# Patient Record
Sex: MALE | Race: WHITE | Employment: OTHER | URBAN - METROPOLITAN AREA
[De-identification: names, ages, dates, MRNs, and addresses within clinical notes are randomized per-mention and may not be internally consistent; named-entity substitution may affect disease eponyms.]

---

## 2017-02-22 ENCOUNTER — APPOINTMENT (OUTPATIENT)
Dept: GENERAL RADIOLOGY | Age: 67
End: 2017-02-22
Attending: EMERGENCY MEDICINE
Payer: COMMERCIAL

## 2017-02-22 ENCOUNTER — HOSPITAL ENCOUNTER (EMERGENCY)
Age: 67
Discharge: HOME | End: 2017-02-22
Attending: EMERGENCY MEDICINE
Payer: COMMERCIAL

## 2017-02-22 VITALS
RESPIRATION RATE: 16 BRPM | HEIGHT: 71 IN | TEMPERATURE: 98.3 F | SYSTOLIC BLOOD PRESSURE: 145 MMHG | HEART RATE: 79 BPM | OXYGEN SATURATION: 97 % | WEIGHT: 219 LBS | DIASTOLIC BLOOD PRESSURE: 89 MMHG | BODY MASS INDEX: 30.66 KG/M2

## 2017-02-22 DIAGNOSIS — J06.9 ACUTE UPPER RESPIRATORY INFECTION: Primary | ICD-10-CM

## 2017-02-22 PROCEDURE — 99283 EMERGENCY DEPT VISIT LOW MDM: CPT

## 2017-02-22 PROCEDURE — 71020 XR CHEST PA LAT: CPT

## 2017-02-22 PROCEDURE — 93005 ELECTROCARDIOGRAM TRACING: CPT

## 2017-02-22 RX ORDER — CODEINE PHOSPHATE AND GUAIFENESIN 10; 100 MG/5ML; MG/5ML
10 SOLUTION ORAL
Qty: 118 ML | Refills: 0 | Status: SHIPPED | OUTPATIENT
Start: 2017-02-22 | End: 2017-03-01

## 2017-02-22 NOTE — ED PROVIDER NOTES
HPI Comments: 1:46 PM Isamar Cui is a 78 y.o. male who presents to the ED with complaints of CP for 3 days, which he only experiences when he coughs. The pt reports the nonproductive dry cough as an associated sx that began around the same time as the CP, as well as rhinorrhea and mild abdominal pain. He also reports chills, which began when he woke up this morning. The pt denies sick contacts, fever, allergies, tobacco use, and etoh use. The pt states he has been taking OTC medications, including Nyquil and Sudafed, which has not relieved his sxs. No further symptoms or complaints expressed at this time. PCP: Jassi Garza MD      The history is provided by the patient. No past medical history on file. No past surgical history on file. No family history on file. Social History     Social History    Marital status:      Spouse name: N/A    Number of children: N/A    Years of education: N/A     Occupational History    Not on file. Social History Main Topics    Smoking status: Never Smoker    Smokeless tobacco: Not on file    Alcohol use Yes    Drug use: Not on file    Sexual activity: Not on file     Other Topics Concern    Not on file     Social History Narrative         ALLERGIES: Review of patient's allergies indicates no known allergies. Review of Systems   Constitutional: Positive for chills. Negative for fever. HENT: Positive for rhinorrhea. Negative for sore throat. Eyes: Negative for redness and visual disturbance. Respiratory: Positive for cough. Negative for shortness of breath and wheezing. Cardiovascular: Negative for chest pain. Gastrointestinal: Positive for abdominal pain. Negative for nausea. Endocrine: Negative for polyuria. Genitourinary: Negative for dysuria. Musculoskeletal: Negative for arthralgias and neck stiffness. Skin: Negative for rash. Neurological: Negative for dizziness and headaches.    All other systems reviewed and are negative. Vitals:    02/22/17 1329   BP: 145/89   Pulse: 79   Resp: 16   Temp: 98.3 °F (36.8 °C)   SpO2: 97%   Weight: 99.3 kg (219 lb)   Height: 5' 11\" (1.803 m)            Physical Exam   Constitutional: He is oriented to person, place, and time. He appears well-developed and well-nourished. No distress. Active cough  Nontoxic appearing   HENT:   Head: Normocephalic and atraumatic. Nose: Rhinorrhea (Mild) present. Mouth/Throat: Oropharynx is clear and moist.   Eyes: Conjunctivae are normal. Pupils are equal, round, and reactive to light. No scleral icterus. Neck: Normal range of motion. Neck supple. Cardiovascular: Normal rate and intact distal pulses. Capillary refill < 3 seconds   Pulmonary/Chest: Effort normal and breath sounds normal. No respiratory distress. He has no wheezes. Abdominal: Soft. Bowel sounds are normal. He exhibits no distension. There is no tenderness. Musculoskeletal: Normal range of motion. He exhibits no edema. Lymphadenopathy:     He has no cervical adenopathy. Neurological: He is alert and oriented to person, place, and time. No cranial nerve deficit. Skin: Skin is warm and dry. He is not diaphoretic. Psychiatric: His behavior is normal.   Nursing note and vitals reviewed. MDM  Number of Diagnoses or Management Options  Acute upper respiratory infection:   Diagnosis management comments: ddx URI, bronchitis, PNA    I have reassessed the patient. I have discussed the workup, results and plan with the patient and patient is in agreement. Patient has no new complaint. Patient will be prescribed robitussin ac, saline nasal spray. Patient was discharge in stable condition. Patient was given outpatient follow up. Patient is to return to emergency department if any new or worsening condition.         Amount and/or Complexity of Data Reviewed  Tests in the radiology section of CPT®: ordered and reviewed    Risk of Complications, Morbidity, and/or Mortality  Presenting problems: low    Patient Progress  Patient progress: stable    ED Course       Procedures    PROGRESS NOTES  2:02 PM: Nuriarosalva Velazquez DO arrives to the bedside to evaluate the patient. Answered the patient's questions regarding the treatment plan. ED PHYSICIAN ORDERS  Orders Placed This Encounter    XR CHEST PA LAT     Standing Status:   Standing     Number of Occurrences:   1     Order Specific Question:   Transport     Answer:   Wheelchair [7]     Order Specific Question:   Reason for Exam     Answer:   cp    EKG, 12 LEAD, INITIAL     Standing Status:   Standing     Number of Occurrences:   1     Order Specific Question:   Reason for Exam:     Answer:   cp    guaiFENesin-codeine (ROBITUSSIN AC) 100-10 mg/5 mL solution     Sig: Take 10 mL by mouth three (3) times daily as needed for Cough or Congestion for up to 7 days. Max Daily Amount: 30 mL. Dispense:  118 mL     Refill:  0    sodium chloride (SALINE MIST) 0.65 % nasal spray     Si Sprays by Both Nostrils route every three (3) hours as needed for Congestion. Indications: Nasal Congestion     Dispense:  15 mL     Refill:  0         Vitals:  Patient Vitals for the past 12 hrs:   Temp Pulse Resp BP SpO2   17 1329 98.3 °F (36.8 °C) 79 16 145/89 97 %           RADIOLOGY INTERPRETATIONS  XR CHEST PA LAT        Dr. Aashish Kaufman Impression: No acute process. EKG interpretation by ED Dr. Aashish Kaufman  NSR rate of 82. Normal QRS. No ST elevation. No ST depression. ED DIAGNOSIS & DISPOSITION INFORMATION  Diagnosis:   1.  Acute upper respiratory infection          Disposition: DISCHARGED    Follow-up Information     Follow up With Details Comments Contact Info    Ajit Wang MD Schedule an appointment as soon as possible for a visit in 2 days  [address]  [address] [facility] EMERGENCY DEPT  As needed, If symptoms worsen [address] [place] [id number] [phone] Patient's Medications   Start Taking    GUAIFENESIN-CODEINE (ROBITUSSIN AC) 100-10 MG/5 ML SOLUTION    Take 10 mL by mouth three (3) times daily as needed for Cough or Congestion for up to 7 days. Max Daily Amount: 30 mL. SODIUM CHLORIDE (SALINE MIST) 0.65 % NASAL SPRAY    2 Sprays by Both Nostrils route every three (3) hours as needed for Congestion. Indications: Nasal Congestion   Continue Taking    ALLOPURINOL (ZYLOPRIM) 300 MG TABLET        BYSTOLIC 10 MG TABLET    take 1 tablet by mouth once daily    ESCITALOPRAM OXALATE (LEXAPRO) 10 MG TABLET        HYDROCHLOROTHIAZIDE (HYDRODIURIL) 12.5 MG TABLET        MELOXICAM (MOBIC) 15 MG TABLET        ZOLPIDEM (AMBIEN) 10 MG TABLET    take 1 tablet by mouth at bedtime if needed   These Medications have changed    No medications on file   Stop Taking    No medications on file       ATTESTATION STATEMENT  Scribe Attestation:     Jesús Roberto, scribing for and in the presence of Catina Porter DO February 22, 2017 at 2:02 PM   Signed by: Austin Roman February 22, 2017, 2:02 PM      Physician Attestation:   I personally performed the services described in this documentation, reviewed and edited the documentation which was dictated to the scribe in my presence, and it accurately records my words and actions.  Catina Porter DO February 22, 2017 at 2:02 PM

## 2017-02-22 NOTE — DISCHARGE INSTRUCTIONS

## 2017-02-24 LAB
ATRIAL RATE: 82 BPM
CALCULATED P AXIS, ECG09: 20 DEGREES
CALCULATED R AXIS, ECG10: -19 DEGREES
CALCULATED T AXIS, ECG11: 37 DEGREES
DIAGNOSIS, 93000: NORMAL
P-R INTERVAL, ECG05: 188 MS
Q-T INTERVAL, ECG07: 386 MS
QRS DURATION, ECG06: 84 MS
QTC CALCULATION (BEZET), ECG08: 450 MS
VENTRICULAR RATE, ECG03: 82 BPM

## 2018-01-22 ENCOUNTER — HOSPITAL ENCOUNTER (OUTPATIENT)
Dept: GENERAL RADIOLOGY | Age: 68
Discharge: HOME | End: 2018-01-22
Payer: COMMERCIAL

## 2018-01-22 DIAGNOSIS — M54.31 RIGHT SIDED SCIATICA: ICD-10-CM

## 2018-01-22 PROCEDURE — 72110 X-RAY EXAM L-2 SPINE 4/>VWS: CPT

## 2018-04-03 ENCOUNTER — OFFICE VISIT (OUTPATIENT)
Dept: ORTHOPEDIC SURGERY | Age: 68
End: 2018-04-03

## 2018-04-03 VITALS
HEART RATE: 72 BPM | OXYGEN SATURATION: 97 % | WEIGHT: 220 LBS | RESPIRATION RATE: 16 BRPM | DIASTOLIC BLOOD PRESSURE: 89 MMHG | TEMPERATURE: 97.6 F | SYSTOLIC BLOOD PRESSURE: 157 MMHG | HEIGHT: 71 IN | BODY MASS INDEX: 30.8 KG/M2

## 2018-04-03 DIAGNOSIS — M54.17 LUMBOSACRAL RADICULOPATHY AT L5: ICD-10-CM

## 2018-04-03 DIAGNOSIS — M79.2 NEURITIS: Primary | ICD-10-CM

## 2018-04-03 DIAGNOSIS — M51.36 DDD (DEGENERATIVE DISC DISEASE), LUMBAR: ICD-10-CM

## 2018-04-03 DIAGNOSIS — M62.838 MUSCLE SPASM: ICD-10-CM

## 2018-04-03 RX ORDER — GABAPENTIN 300 MG/1
CAPSULE ORAL
Qty: 60 CAP | Refills: 1 | Status: SHIPPED | OUTPATIENT
Start: 2018-04-03 | End: 2018-04-17 | Stop reason: SDUPTHER

## 2018-04-03 NOTE — PROGRESS NOTES
[facility]  [address] [place], [facility] [address]  Phone: [phone]  Fax: [phone]    NEW PATIENT  Pt's YOB: 1950    ASSESSMENT   Diagnoses and all orders for this visit:    1. Neuritis  -     gabapentin (NEURONTIN) 300 mg capsule; Take 1 in the evening for 1 week then increase to 2  Indications: NEUROPATHIC PAIN  -     MRI LUMB SPINE WO CONT; Future    2. DDD (degenerative disc disease), lumbar  -     gabapentin (NEURONTIN) 300 mg capsule; Take 1 in the evening for 1 week then increase to 2  Indications: NEUROPATHIC PAIN  -     MRI LUMB SPINE WO CONT; Future    3. Muscle spasm  -     MRI LUMB SPINE WO CONT; Future    4. Lumbosacral radiculopathy at L5  -     MRI LUMB SPINE WO CONT; Future         IMPRESSION AND PLAN:  Zackary Pugh is a 80 y.o. male with history of lumbar pain x 2-3.5 months. He complains of pain in the lower back that radiates down the right leg through the foot and 1st toe. Pt has not tried physical therapy and admits to weakness in the right leg. 1) Pt was given information on lumbar arthritis exercises. 2) Discussed treatment options with the Pt, including medication and physical therapy,   3) An open lumbar MRI was ordered. He has lumbar radiculopathy, weakness in the right leg, and has tried Mobic. 4) Pt was referred to lumbar physical therapy. 5) He was prescribed Neurontin 300 mg 2 tabs QHS, tapering up as directed. 6) Mr. Naila Izquierdo has a reminder for a \"due or due soon\" health maintenance. I have asked that he contact his primary care provider, Tresa Hernandez MD, for follow-up on this health maintenance. 7)  demonstrated consistency with prescribing. 8) Pt will follow-up in 2-3 weeks or sooner if needed. HISTORY OF PRESENT ILLNESS:  Zackary Pugh is a 80 y.o. male with history of lumbar pain x 2-3.5 months.  Pt presents to the office today as a new patient referred by  Isaac Marshall. He complains of pain in the lower back that radiates down the right leg through the foot and 1st toe. Pt admits to weakness in the right leg, balance issues, and occasionally feels unstable on his feet. He denies any issues when bending forward but admits to pain when walking for prolonged periods of time with his job. Of note, patient works at the [employer] as a  and will be retiring within the year. He admits that his pain interferes with his sleep and often has difficulty finding a comfortable position in bed. Pt states that he fell years ago when his blood sugars dropped and acquired a compression fracture. He was diagnosed with diabetes about 14 years ago and manages his blood sugars with diet. Pt admits that his blood sugars are now well controlled and since his fall he has not experienced any other episodes of hypoglycemia. He has not tried physical therapy and admits that he is physically active while at work. Pt denies any prior lumbar surgery and has tried Mobic. Pt at this time desires to proceed with a lumbar MRI, medication evaluation, and physical therapy. Pain Scale: 8/10     PCP: Yvrose Jose MD    Past Medical History:   Diagnosis Date    Arthritis     Ill-defined condition     GOUT        Social History     Social History    Marital status:      Spouse name: N/A    Number of children: N/A    Years of education: N/A     Occupational History    Not on file.      Social History Main Topics    Smoking status: Never Smoker    Smokeless tobacco: Never Used    Alcohol use 3.6 oz/week     6 Cans of beer per week      Comment: weekly on weekends    Drug use: Not on file    Sexual activity: Not on file     Other Topics Concern    Not on file     Social History Narrative       Current Outpatient Prescriptions   Medication Sig Dispense Refill    gabapentin (NEURONTIN) 300 mg capsule Take 1 in the evening for 1 week then increase to 2  Indications: NEUROPATHIC PAIN 60 Cap 1    BYSTOLIC 10 mg tablet take 1 tablet by mouth once daily  0    meloxicam (MOBIC) 15 mg tablet   0    allopurinol (ZYLOPRIM) 300 mg tablet   0    zolpidem (AMBIEN) 10 mg tablet take 1 tablet by mouth at bedtime if needed  0    sodium chloride (SALINE MIST) 0.65 % nasal spray 2 Sprays by Both Nostrils route every three (3) hours as needed for Congestion. Indications: Nasal Congestion 15 mL 0    hydroCHLOROthiazide (HYDRODIURIL) 12.5 mg tablet   0    escitalopram oxalate (LEXAPRO) 10 mg tablet   0       No Known Allergies    REVIEW OF SYSTEMS    Constitutional: Negative for fever, chills, or weight change. Respiratory: Negative for cough or shortness of breath. Cardiovascular: Negative for chest pain or palpitations. Gastrointestinal: Negative for acid reflux, change in bowel habits, or constipation. Genitourinary: Negative for dysuria and flank pain. Musculoskeletal: Positive for lumbar pain. Skin: Negative for rash. Neurological: Negative for headaches, dizziness, or numbness. Endo/Heme/Allergies: Negative for increased bruising. Psychiatric/Behavioral: Positive for difficulty with sleep. PHYSICAL EXAMINATION  Visit Vitals    /89 (BP 1 Location: Left arm, BP Patient Position: Sitting)    Pulse 72    Temp 97.6 °F (36.4 °C) (Oral)    Resp 16    Ht 5' 11\" (1.803 m)    Wt 220 lb (99.8 kg)    SpO2 97%    BMI 30.68 kg/m2       Constitutional: Awake, alert, and in no acute distress. HEENT: Normocephalic. Atraumatic. Oropharynx is moist and clear. PERRL. EOMI. Sclerae are nonicteric  Heart: Regular rate and rhythm  Lungs: Clear to auscultation bilaterally  Abdomen: Soft and nontender. Bowel sounds are present  Neurological: 1+ symmetrical DTRs in the upper extremities. 1+ symmetrical DTRs in the lower extremities. Sensation to light touch is intact. Negative Camden's sign bilaterally. Skin: warm, dry, and intact.    Musculoskeletal: Tenderness to palpation in the lower lumbar region. Moderate pain with extension and axial loading. Mild decreased range of motion with internal rotation of his left hip. Negative straight leg raise bilaterally. No difficulty with the single leg stand bilaterally. No difficulty with toe or heel walking. Biceps  Triceps Deltoids Wrist Ext Wrist Flex Hand Intrin   Right +4/5 +4/5 +4/5 +4/5 +4/5 +4/5   Left +4/5 +4/5 +4/5 +4/5 +4/5 +4/5      Hip Flex  Quads Hamstrings Ankle DF EHL Ankle PF   Right +4/5 +4/5  4/5  4/5 +4/5 +4/5   Left +4/5 +4/5 +4/5 +4/5 +4/5 +4/5     IMAGING:    Lumbar spine 4V x-rays from 01/22/2018 were personally reviewed with the patient and demonstrated:    Results from [place] encounter on 01/22/18   XR SPINE LUMB MIN 4 V   Narrative EXAM: X-ray of the Lumbar Spine. INDICATION: Back pain    TECHNIQUE: 5 views of the lumbar spine obtained. COMPARISON: None    FINDINGS: There are 5 lumbar-type vertebra. There is retrolisthesis of L1 on L2  and L2 on L3. Old wedging of L1 is noted. This is mild. No evidence of an acute  fracture. The facets are appropriately aligned. Moderate disc space height loss  is noted at every level with endplate sclerosis and osteophyte formation. Impression IMPRESSION:  1. No evidence of acute fracture. 2.  Multilevel degenerative disc disease. 3.  Old L1 wedging. Written by Edmundo Beltre, as dictated by Genevieve Siegel MD.  I, Dr. Genevieve Siegel confirm that all documentation is accurate.

## 2018-04-03 NOTE — MR AVS SNAPSHOT
[address] [place] [address] [address] [place] [address] 
[phone] Patient: Anu Scales MRN: [id number] NHT:[birth year] Visit Information Date & Time Provider Department Dept. Phone Encounter #  
 4/3/2018  9:00 AM Christine Garcia, [facility] [phone] [id number] Follow-up Instructions Return in about 2 weeks (around 4/17/2018) for Diagnostic Test follow up, Medication follow up. Upcoming Health Maintenance Date Due Hepatitis C Screening 1950 DTaP/Tdap/Td series (1 - Tdap) 2/3/1972 FOBT Q 1 YEAR AGE 50-75 2/3/2001 ZOSTER VACCINE AGE 60> 12/3/2010 GLAUCOMA SCREENING Q2Y 2/3/2016 Pneumococcal 65+ Low/Medium Risk (1 of 2 - PCV13) 2/3/2016 Influenza Age 5 to Adult 8/1/2017 MEDICARE YEARLY EXAM 3/20/2018 Allergies as of 4/3/2018  Review Complete On: 4/3/2018 By: Christine Garcia MD  
 No Known Allergies Current Immunizations  Never Reviewed No immunizations on file. Not reviewed this visit You Were Diagnosed With   
  
 Codes Comments Neuritis    -  Primary ICD-10-CM: M79.2 ICD-9-CM: 729.2 DDD (degenerative disc disease), lumbar     ICD-10-CM: M51.36 
ICD-9-CM: 722.52 Muscle spasm     ICD-10-CM: R60.145 ICD-9-CM: 728.85 Lumbosacral radiculopathy at L5     ICD-10-CM: M54.17 ICD-9-CM: 724.4 Vitals BP Pulse Temp Resp Height(growth percentile) Weight(growth percentile) 157/89 (BP 1 Location: Left arm, BP Patient Position: Sitting) 72 97.6 °F (36.4 °C) (Oral) 16 5' 11\" (1.803 m) 220 lb (99.8 kg) SpO2 BMI Smoking Status 97% 30.68 kg/m2 Never Smoker BMI and BSA Data Body Mass Index Body Surface Area  
 30.68 kg/m 2 2.24 m 2 Preferred Pharmacy Pharmacy Name Phone [id number] [address] [facility]. [phone] Your Updated Medication List  
  
 This list is accurate as of 4/3/18 10:25 AM.  Always use your most recent med list.  
  
  
  
  
 allopurinol 300 mg tablet Commonly known as:  ZYLOPRIM  
  
 BYSTOLIC 10 mg tablet Generic drug:  nebivolol  
take 1 tablet by mouth once daily  
  
 escitalopram oxalate 10 mg tablet Commonly known as:  LEXAPRO  
  
 gabapentin 300 mg capsule Commonly known as:  NEURONTIN Take 1 in the evening for 1 week then increase to 2  Indications: NEUROPATHIC PAIN  
  
 hydroCHLOROthiazide 12.5 mg tablet Commonly known as:  HYDRODIURIL  
  
 meloxicam 15 mg tablet Commonly known as:  MOBIC  
  
 sodium chloride 0.65 % nasal squeeze bottle Commonly known as:  SALINE MIST 2 Sprays by Both Nostrils route every three (3) hours as needed for Congestion. Indications: Nasal Congestion  
  
 zolpidem 10 mg tablet Commonly known as:  AMBIEN  
take 1 tablet by mouth at bedtime if needed Prescriptions Sent to Pharmacy Refills  
 gabapentin (NEURONTIN) 300 mg capsule 1 Sig: Take 1 in the evening for 1 week then increase to 2  Indications: NEUROPATHIC PAIN Class: Normal  
 Pharmacy: [facility] [address]. [facility], [address] [facility] #: [phone] Follow-up Instructions Return in about 2 weeks (around 4/17/2018) for Diagnostic Test follow up, Medication follow up. To-Do List   
 04/04/2018 8:30 PM  
  Appointment with [facility] MRI RM 1 at [facility] RAD MRI ([phone]) GENERAL INSTRUCTIONS  Bring information (ID card) if you have any medically implanted devices. You will be required to lie still while the procedure is being performed. Remove any jewelry (including body piercing, hairpins) prior to MRI. If you have had a creatinine level drawn within the past 30 days, please bring most recent results to your appt.   Bring any films, CD's, and reports related to your study with you on the day of your exam.  This only includes studies done outside of [facility], [facility] [place], [place], and [place]. Bring a complete list of all medications you are currently taking to include prescriptions, over-the-counter meds, herbals, vitamins & any dietary supplements. If you were given medications for claustrophobia or anxiety, please arrange to have someone drive you to your appointment. QUESTIONS  Notify the MRI Department if you have any questions concerning your study. Cielo Lang [facility] - [phone]  
  
 04/10/2018 Imaging:  MRI LUMB SPINE WO CONT Patient Instructions Low Back Arthritis: Exercises Your Care Instructions Here are some examples of typical rehabilitation exercises for your condition. Start each exercise slowly. Ease off the exercise if you start to have pain. Your doctor or physical therapist will tell you when you can start these exercises and which ones will work best for you. When you are not being active, find a comfortable position for rest. Some people are comfortable on the floor or a medium-firm bed with a small pillow under their head and another under their knees. Some people prefer to lie on their side with a pillow between their knees. Don't stay in one position for too long. Take short walks (10 to 20 minutes) every 2 to 3 hours. Avoid slopes, hills, and stairs until you feel better. Walk only distances you can manage without pain, especially leg pain. How to do the exercises Pelvic tilt 1. Lie on your back with your knees bent. 2. \"Brace\" your stomach-tighten your muscles by pulling in and imagining your belly button moving toward your spine. 3. Press your lower back into the floor. You should feel your hips and pelvis rock back. 4. Hold for 6 seconds while breathing smoothly. 5. Relax and allow your pelvis and hips to rock forward. 6. Repeat 8 to 12 times. Back stretches 1. Get down on your hands and knees on the floor. 2. Relax your head and allow it to droop. Round your back up toward the ceiling until you feel a nice stretch in your upper, middle, and lower back. Hold this stretch for as long as it feels comfortable, or about 15 to 30 seconds. 3. Return to the starting position with a flat back while you are on your hands and knees. 4. Let your back sway by pressing your stomach toward the floor. Lift your buttocks toward the ceiling. 5. Hold this position for 15 to 30 seconds. 6. Repeat 2 to 4 times. Follow-up care is a key part of your treatment and safety. Be sure to make and go to all appointments, and call your doctor if you are having problems. It's also a good idea to know your test results and keep a list of the medicines you take. Where can you learn more? Go to http://kaila-ynes.info/. Enter S203 in the search box to learn more about \"Low Back Arthritis: Exercises. \" Current as of: March 21, 2017 Content Version: 11.4 © 0802-5665 Healthwise, [employer]. Care instructions adapted under license by [employer] (which disclaims liability or warranty for this information). If you have questions about a medical condition or this instruction, always ask your healthcare professional. [address] any warranty or liability for your use of this information. Please provide this summary of care documentation to your next provider. Your primary care clinician is listed as [phone]. If you have any questions after today's visit, please call [phone].

## 2018-04-04 ENCOUNTER — HOSPITAL ENCOUNTER (OUTPATIENT)
Dept: MRI IMAGING | Age: 68
Discharge: HOME | End: 2018-04-04
Attending: PHYSICAL MEDICINE & REHABILITATION
Payer: COMMERCIAL

## 2018-04-04 DIAGNOSIS — M79.2 NEURITIS: ICD-10-CM

## 2018-04-04 DIAGNOSIS — M54.17 LUMBOSACRAL RADICULOPATHY AT L5: ICD-10-CM

## 2018-04-04 DIAGNOSIS — M51.36 DDD (DEGENERATIVE DISC DISEASE), LUMBAR: ICD-10-CM

## 2018-04-04 DIAGNOSIS — M62.838 MUSCLE SPASM: ICD-10-CM

## 2018-04-04 PROCEDURE — 72148 MRI LUMBAR SPINE W/O DYE: CPT

## 2018-04-05 ENCOUNTER — TELEPHONE (OUTPATIENT)
Dept: ORTHOPEDIC SURGERY | Age: 68
End: 2018-04-05

## 2018-04-05 NOTE — TELEPHONE ENCOUNTER
Patient called stating he is having severe pain spreading down his legs and he has been unable to go to work. He is wondering if he can get a work note for 04/03/18, 04/04/18, 04/05/8, and 04/06/18. He is requesting a call back regarding this at [phone].

## 2018-04-05 NOTE — TELEPHONE ENCOUNTER
Per Dr. Núñez Sheets, ok to give out of work note until his follow up on 04/17/18. Called patient to inform, Reached voicemail identified as \"Jamie\", left message, identified myself/facility/callback number, informed letter is ready for  at our [place] location, requested return call to facility with any further questions/concerns. No further action required at this time.

## 2018-04-05 NOTE — LETTER
NOTIFICATION RETURN TO WORK / SCHOOL 
 
4/5/2018 1:08 PM 
 
Mr. Yo Lam [address] [place] [id number] To Whom It May Concern: 
 
Yo Lam is currently under the care of [facility]. Mr. Antwan Gómez was seen in our office on 04/03/18. Mr. Antwan Gómez is to remain out of work beginning 04/03/18, until his follow up appointment on 04/17/18 at which time his work status will be re-evaluated. If there are questions or concerns please have the patient contact our office. Sincerely, Lacey Burris MD

## 2018-04-12 ENCOUNTER — TELEPHONE (OUTPATIENT)
Dept: ORTHOPEDIC SURGERY | Age: 68
End: 2018-04-12

## 2018-04-12 DIAGNOSIS — G89.29 OTHER CHRONIC PAIN: Primary | ICD-10-CM

## 2018-04-12 NOTE — TELEPHONE ENCOUNTER
Patient is requesting pain medication. He is having extreme back pain and radiating rt leg having difficulty walking. MRI done and his appt not till 04/17/2018. He works at [employer] and has to due awful lot of walking due to his job  Patient has no know allergies to any pain meds.   Please call him at [phone]

## 2018-04-12 NOTE — TELEPHONE ENCOUNTER
We can not prescribe pain medications at this time due to regulations. He was started on Gabapentin 300 mg 2 tabs QHS. If he is tolerating this dose, I would recommend adding 300 mg in the morning. He can also try OTC ES tylenol, heat and ice.

## 2018-04-12 NOTE — TELEPHONE ENCOUNTER
Per Dr. James Meza, ok to give Tylenol #3 1 PO BID #10/0RF as long as patient is not allergic to anything. Attempted to call patient to verify allergies. Reached voicemail identified as \"Jamie\", unable to leave message, voicemail box is full.

## 2018-04-13 RX ORDER — ACETAMINOPHEN AND CODEINE PHOSPHATE 300; 30 MG/1; MG/1
1 TABLET ORAL
Qty: 10 TAB | Refills: 0 | Status: ON HOLD | OUTPATIENT
Start: 2018-04-13 | End: 2018-04-25

## 2018-04-13 NOTE — TELEPHONE ENCOUNTER
Patient called back checking on status for medication. He states he does not have any allergies. He will be clearing out his mailbox so when the office calls back to let him know the medication has been sent he can answer or get VM.  Please contact patient at [phone]

## 2018-04-13 NOTE — TELEPHONE ENCOUNTER
Returned call to patient, verified , patient denies having any allergies nor any liver conditions. Patient prefers for med to be phoned into [facility] on [address] Patient aware this medication is a controlled substance. Patient verified next appointment date, and will bring disc to appointment to review MRI with Dr. Ramona Kim. Med phoned in to [address] [place] [address] as per patient request.    Yessenia Harrison left with patient's name, , medication dose/frequency/quantity. Provided Dr. Chavarria Credit name/SUNNY number/office phone number. Provided my name and title. No further action required at this time.

## 2018-04-17 ENCOUNTER — OFFICE VISIT (OUTPATIENT)
Dept: ORTHOPEDIC SURGERY | Age: 68
End: 2018-04-17

## 2018-04-17 VITALS
DIASTOLIC BLOOD PRESSURE: 80 MMHG | HEART RATE: 68 BPM | RESPIRATION RATE: 17 BRPM | SYSTOLIC BLOOD PRESSURE: 143 MMHG | TEMPERATURE: 97.6 F | WEIGHT: 220 LBS | HEIGHT: 71 IN | OXYGEN SATURATION: 98 % | BODY MASS INDEX: 30.8 KG/M2

## 2018-04-17 DIAGNOSIS — M51.26 HNP (HERNIATED NUCLEUS PULPOSUS), LUMBAR: Primary | ICD-10-CM

## 2018-04-17 DIAGNOSIS — M79.2 NEURITIS: ICD-10-CM

## 2018-04-17 DIAGNOSIS — M51.36 DDD (DEGENERATIVE DISC DISEASE), LUMBAR: ICD-10-CM

## 2018-04-17 DIAGNOSIS — R29.898 RIGHT LEG WEAKNESS: ICD-10-CM

## 2018-04-17 DIAGNOSIS — M54.50 LUMBAR PAIN: ICD-10-CM

## 2018-04-17 RX ORDER — GABAPENTIN 300 MG/1
CAPSULE ORAL
Qty: 120 CAP | Refills: 1 | Status: SHIPPED | OUTPATIENT
Start: 2018-04-17 | End: 2020-03-11

## 2018-04-17 RX ORDER — TRAMADOL HYDROCHLORIDE 50 MG/1
TABLET ORAL
Qty: 14 TAB | Refills: 0 | Status: SHIPPED | OUTPATIENT
Start: 2018-04-17 | End: 2020-03-11

## 2018-04-17 NOTE — PATIENT INSTRUCTIONS

## 2018-04-17 NOTE — LETTER
NOTIFICATION RETURN TO WORK / SCHOOL 
 
4/17/2018 10:30 AM 
 
Mr. Yo Lam [address] [place] [id number] To Whom It May Concern: 
 
Yo Lam is currently under the care of [facility]. He was seen in the office today and will return to work tomorrow, April 18, 2018. If there are questions or concerns please have the patient contact our office. Sincerely, Lacey Burris MD

## 2018-04-17 NOTE — PROGRESS NOTES
[facility]  [address] [place] [address], [facility] [address]  Phone: [phone]  Fax: [phone]    Pt's YOB: 1950    ASSESSMENT   Diagnoses and all orders for this visit:    1. HNP (herniated nucleus pulposus), lumbar  -     traMADol (ULTRAM) 50 mg tablet; 1 po bid as needed for severe pain  Indications: NEUROPATHIC PAIN, Pain  -     SCHEDULE SURGERY    2. Neuritis  -     gabapentin (NEURONTIN) 300 mg capsule; Take 1-2 in the morning and 2 in the evening  Indications: NEUROPATHIC PAIN  -     traMADol (ULTRAM) 50 mg tablet; 1 po bid as needed for severe pain  Indications: NEUROPATHIC PAIN, Pain  -     SCHEDULE SURGERY    3. Right leg weakness  -     SCHEDULE SURGERY    4. Lumbar pain    5. DDD (degenerative disc disease), lumbar  -     gabapentin (NEURONTIN) 300 mg capsule; Take 1-2 in the morning and 2 in the evening  Indications: NEUROPATHIC PAIN         IMPRESSION AND PLAN:  Shayna Thomson is a 80 y.o. male with history of lumbar pain with right radicular symptoms. He complains of pain in the lower back that extends down the right leg through the foot x 2.5 months. 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 1) Pt was given information on lumbar arthritis exercises. 2) Discussed treatment options with the Pt, including medication, physical therapy, steroid injections, and surgical intervention. 3) Pt was scheduled for a right L5 and right S1 SNRB. 4) He will increase his Neurontin 300 mg to 1-2 tabs QAM and 2 tabs QHS, tapering up as directed, to better magnae his right leg pain. 5) Pt was given an out of work note for today. 6) He was prescribed Ultram 50 mg 1 tab BID prn severe pain. 7) Mr. Rivera García has a reminder for a \"due or due soon\" health maintenance.  I have asked that he contact his primary care provider, Addison Cogan, MD, for follow-up on this health maintenance. 8)  demonstrated consistency with prescribing. 9) Pt will follow-up in 1 month or sooner if needed. HISTORY OF PRESENT ILLNESS:  Ted Barrera is a 80 y.o. male with history of lumbar pain with right radicular symptoms x 2.5 months. Pt presents to the office today for lumbar MRI follow up. He complains of pain in the lower back that extends down the right leg through the foot x 2.5 months. 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 He tried steroid injections 11-12 years ago with significant relief. Pt states he has been using Neurontin 300 mg and takes 1 tab QAM and 1 tab QHS with minimal relief. He admits that he wakes up at night due to pain. Pt takes Mobic 15 mg. He has not tried Ultram. Pt at this time desires to proceed with steroid injections and medication evaluation. Pain Scale: 6/10    PCP: Patricia Caro MD     Past Medical History:   Diagnosis Date    Arthritis     Ill-defined condition     GOUT        Social History     Social History    Marital status:      Spouse name: N/A    Number of children: N/A    Years of education: N/A     Occupational History    Not on file.      Social History Main Topics    Smoking status: Never Smoker    Smokeless tobacco: Never Used    Alcohol use 3.6 oz/week     6 Cans of beer per week      Comment: weekly on weekends    Drug use: Not on file    Sexual activity: Not on file     Other Topics Concern    Not on file     Social History Narrative       Current Outpatient Prescriptions   Medication Sig Dispense Refill    gabapentin (NEURONTIN) 300 mg capsule Take 1-2 in the morning and 2 in the evening  Indications: NEUROPATHIC PAIN 120 Cap 1    traMADol (ULTRAM) 50 mg tablet 1 po bid as needed for severe pain  Indications: NEUROPATHIC PAIN, Pain 14 Tab 0    sodium chloride (SALINE MIST) 0.65 % nasal spray 2 Sprays by Both Nostrils route every three (3) hours as needed for Congestion. Indications: Nasal Congestion 15 mL 0    BYSTOLIC 10 mg tablet take 1 tablet by mouth once daily  0    meloxicam (MOBIC) 15 mg tablet   0    hydroCHLOROthiazide (HYDRODIURIL) 12.5 mg tablet   0    escitalopram oxalate (LEXAPRO) 10 mg tablet   0    allopurinol (ZYLOPRIM) 300 mg tablet   0    zolpidem (AMBIEN) 10 mg tablet take 1 tablet by mouth at bedtime if needed  0    acetaminophen-codeine (TYLENOL-CODEINE #3) 300-30 mg per tablet Take 1 Tab by mouth two (2) times daily as needed for Pain. Max Daily Amount: 2 Tabs. 10 Tab 0       No Known Allergies      REVIEW OF SYSTEMS    Constitutional: Negative for fever, chills, or weight change. Respiratory: Negative for cough or shortness of breath. Cardiovascular: Negative for chest pain or palpitations. Gastrointestinal: Negative for acid reflux, change in bowel habits, or constipation. Genitourinary: Negative for dysuria and flank pain. Musculoskeletal: Positive for lumbar pain. Skin: Negative for rash. Neurological: Negative for headaches, dizziness, or numbness. Endo/Heme/Allergies: Negative for increased bruising. Psychiatric/Behavioral: Positive for difficulty with sleep. PHYSICAL EXAMINATION  Visit Vitals    /80    Pulse 68    Temp 97.6 °F (36.4 °C) (Oral)    Resp 17    Ht 5' 11\" (1.803 m)    Wt 220 lb (99.8 kg)    SpO2 98%    BMI 30.68 kg/m2       Constitutional: Awake, alert, and in no acute distress. Neurological: 1+ symmetrical DTRs in the lower extremities. Sensation to light touch is intact. Skin: warm, dry, and intact. Musculoskeletal: Tenderness to palpation in the lower lumbar region. Moderate pain with extension and axial loading. No pain with internal rotation of his hips. Negative straight leg raise bilaterally.      Hip Flex  Quads Hamstrings Ankle DF EHL Ankle PF   Right +4/5 +4/5 +4/5 +4/5 +4/5 +4/5   Left +4/5 +4/5 +4/5 +4/5 +4/5 +4/5     IMAGING:    Lumbar spine MRI from 04/04/2018 was personally reviewed with the patient and demonstrated:    Results from [place] encounter on 04/04/18   MRI LUMB SPINE WO CONT   Narrative MRI LUMBAR SPINE     CPT CODE: 46976    INDICATION: Chronic lower back pain. Right sided radiculopathy extending to the  foot. Degenerative discogenic disease. .    TECHNIQUE: MRI of the lumbar spine performed consisting of sagittal T1, T2 and  inversion recovery sequences with additional axial T2 sequence and oblique axial  T1 sequence. COMPARISON: Plain films 1/22/2018. FINDINGS:     Sagittal images show slight straightening of lumbar lordosis overall. There is  mild superior endplate compression of L1, stable from plain films, and without  edema to suggest this is acute. There is midline superior endplate impression  consistent with large Schmorl's node involving L4. Chronic degenerative signal  changes involving L3-L5 endplate margins. No vertebral body edema otherwise. Probable small hemangioma within inferior T12. There are confluent bridging  osteophytes between inferior T12 and superior L1 endplates anterior. Cauda equina tapers at superior L2. Correlation with axial images shows:    L1-2:  No significant canal narrowing. There is mild to moderate left-sided  foraminal encroachment due to disc bulge and some mild facet encroachment. L2-3:  Mild to moderate bilateral degenerative facet disease and mild posterior  ligamentous thickening. Mild diffuse disc bulge with effective central canal  narrowing mild in degree down to 9.2 mm. There is superimposed disc herniation into the left lateral recess and proximal  left-sided foramina. This contacts and distorts the thecal sac and results in  severe lateral recess stenosis. Disc herniation measures approximately 11 mm x 5  mm in size on sagittal images. Effective moderate to severe exit foramina  stenosis due to combined factors. Mild right foraminal narrowing due to facet  hypertrophy.     L3-4: Moderate bilateral degenerative facet hypertrophy and mild posterior  ligamentous thickening. Prominent epidural fat in volume as well. There is mild  asymmetric disc bulge. Effective central canal narrowing down to 7.4 mm midline  due to combined factors. There is moderate to significant left foraminal  stenosis due to disc bulge laterally, endplate ridging and facet hypertrophy. There is moderate narrowing due to similar factors on the right. L4-5:  Mild to moderate bilateral degenerative facet hypertrophy, right greater  than left with some posterior ligamentous thickening. There is mild endplate  ridging and mild disc bulge. Significant volume of fat within the canal  contributes to compression of the nerve roots to an effective 8mm AP diameter on  axial image 13. There is moderate to severe right-sided foraminal stenosis due  to endplate spur and facet encroachment. Moderate narrowing on the left due to  similar factors. L5-S1:  Moderate bilateral degenerative facet hypertrophy. There is an  asymmetric broad-based disc protrusion much more extensive from the midline  through the right lateral recess and extending into the right exit foramina. There is significant lateral recess narrowing and severe proximal right  foraminal stenosis due to combination of factors. There is moderate left  foraminal narrowing due to facet encroachment predominantly. No significant  central canal stenosis. Impression IMPRESSION:    1. Mild wedging L-1 without edema, compatible with congenital or remote chronic  findings.  -No vertebral body edema or compression deformity seen to suggest acute injury. 2. Severe right foraminal stenosis L5-S1 on a multifactorial basis as above. Moderate to significant narrowing on the left. 3. Severe left foraminal stenosis and lateral recess narrowing L2-3 due to focal  disc protrusion/herniation and associated degenerative change as above.     4. Effective up to moderate central canal narrowing on a multifactorial basis  throughout the lumbar spine as discussed above.  -Additional degrees of mild to significant neural foraminal stenosis on both  sides as outlined above. Written by Raven Hagen, as dictated by Nasreen Perez MD.  I, Dr. Nasreen Perez confirm that all documentation is accurate.

## 2018-04-17 NOTE — MR AVS SNAPSHOT
[address] [place] [address] [address] [place] [address] 
[phone] Patient: Dagmar Banks MRN: [id number] OCB:[id number]10 Visit Information Date & Time Provider Department Dept. Phone Encounter #  
 4/17/2018  9:50 AM Karli Weeks MD [facility] [phone] [id number] Upcoming Health Maintenance Date Due Hepatitis C Screening 1950 DTaP/Tdap/Td series (1 - Tdap) 2/3/1972 FOBT Q 1 YEAR AGE 50-75 2/3/2001 ZOSTER VACCINE AGE 60> 12/3/2010 GLAUCOMA SCREENING Q2Y 2/3/2016 Pneumococcal 65+ Low/Medium Risk (1 of 2 - PCV13) 2/3/2016 Influenza Age 5 to Adult 8/1/2017 MEDICARE YEARLY EXAM 3/20/2018 Allergies as of 4/17/2018  Review Complete On: 4/17/2018 By: Karli Weeks MD  
 No Known Allergies Current Immunizations  Never Reviewed No immunizations on file. Not reviewed this visit You Were Diagnosed With   
  
 Codes Comments HNP (herniated nucleus pulposus), lumbar    -  Primary ICD-10-CM: M51.26 
ICD-9-CM: 722.10 Neuritis     ICD-10-CM: M79.2 ICD-9-CM: 729.2 Right leg weakness     ICD-10-CM: R29.898 ICD-9-CM: 729.89 Lumbar pain     ICD-10-CM: M54.5 ICD-9-CM: 724.2 DDD (degenerative disc disease), lumbar     ICD-10-CM: M51.36 
ICD-9-CM: 722.52 Vitals BP Pulse Temp Resp Height(growth percentile) Weight(growth percentile) 143/80 68 97.6 °F (36.4 °C) (Oral) 17 5' 11\" (1.803 m) 220 lb (99.8 kg) SpO2 BMI Smoking Status 98% 30.68 kg/m2 Never Smoker Vitals History BMI and BSA Data Body Mass Index Body Surface Area  
 30.68 kg/m 2 2.24 m 2 Preferred Pharmacy Pharmacy Name Phone [id number] [place], [address] [facility] [phone] Your Updated Medication List  
  
   
This list is accurate as of 4/17/18 10:40 AM.  Always use your most recent med list.  
  
 acetaminophen-codeine 300-30 mg per tablet Commonly known as:  TYLENOL-CODEINE #3 Take 1 Tab by mouth two (2) times daily as needed for Pain. Max Daily Amount: 2 Tabs. allopurinol 300 mg tablet Commonly known as:  ZYLOPRIM  
  
 BYSTOLIC 10 mg tablet Generic drug:  nebivolol  
take 1 tablet by mouth once daily  
  
 escitalopram oxalate 10 mg tablet Commonly known as:  LEXAPRO  
  
 gabapentin 300 mg capsule Commonly known as:  NEURONTIN Take 1-2 in the morning and 2 in the evening  Indications: NEUROPATHIC PAIN  
  
 hydroCHLOROthiazide 12.5 mg tablet Commonly known as:  HYDRODIURIL  
  
 meloxicam 15 mg tablet Commonly known as:  MOBIC  
  
 sodium chloride 0.65 % nasal squeeze bottle Commonly known as:  SALINE MIST 2 Sprays by Both Nostrils route every three (3) hours as needed for Congestion. Indications: Nasal Congestion  
  
 traMADol 50 mg tablet Commonly known as:  ULTRAM  
1 po bid as needed for severe pain  Indications: NEUROPATHIC PAIN, Pain  
  
 zolpidem 10 mg tablet Commonly known as:  AMBIEN  
take 1 tablet by mouth at bedtime if needed Prescriptions Printed Refills  
 traMADol (ULTRAM) 50 mg tablet 0 Si po bid as needed for severe pain  Indications: NEUROPATHIC PAIN, Pain Class: Print Prescriptions Sent to Pharmacy Refills  
 gabapentin (NEURONTIN) 300 mg capsule 1 Sig: Take 1-2 in the morning and 2 in the evening  Indications: NEUROPATHIC PAIN Class: Normal  
 Pharmacy: [id number]. [facility] [address] [facility] #: [phone] We Performed the Following SCHEDULE SURGERY [ABG4484 Custom] Patient Instructions Low Back Arthritis: Exercises Your Care Instructions Here are some examples of typical rehabilitation exercises for your condition. Start each exercise slowly. Ease off the exercise if you start to have pain. Your doctor or physical therapist will tell you when you can start these exercises and which ones will work best for you. When you are not being active, find a comfortable position for rest. Some people are comfortable on the floor or a medium-firm bed with a small pillow under their head and another under their knees. Some people prefer to lie on their side with a pillow between their knees. Don't stay in one position for too long. Take short walks (10 to 20 minutes) every 2 to 3 hours. Avoid slopes, hills, and stairs until you feel better. Walk only distances you can manage without pain, especially leg pain. How to do the exercises Pelvic tilt 1. Lie on your back with your knees bent. 2. \"Brace\" your stomach-tighten your muscles by pulling in and imagining your belly button moving toward your spine. 3. Press your lower back into the floor. You should feel your hips and pelvis rock back. 4. Hold for 6 seconds while breathing smoothly. 5. Relax and allow your pelvis and hips to rock forward. 6. Repeat 8 to 12 times. Back stretches 1. Get down on your hands and knees on the floor. 2. Relax your head and allow it to droop. Round your back up toward the ceiling until you feel a nice stretch in your upper, middle, and lower back. Hold this stretch for as long as it feels comfortable, or about 15 to 30 seconds. 3. Return to the starting position with a flat back while you are on your hands and knees. 4. Let your back sway by pressing your stomach toward the floor. Lift your buttocks toward the ceiling. 5. Hold this position for 15 to 30 seconds. 6. Repeat 2 to 4 times. Follow-up care is a key part of your treatment and safety. Be sure to make and go to all appointments, and call your doctor if you are having problems. It's also a good idea to know your test results and keep a list of the medicines you take. Where can you learn more? Go to http://kaila-ynes.info/. Enter X401 in the search box to learn more about \"Low Back Arthritis: Exercises. \" Current as of: March 21, 2017 Content Version: 11.4 © 2755-2185 Healthwise, [employer]. Care instructions adapted under license by [employer] (which disclaims liability or warranty for this information). If you have questions about a medical condition or this instruction, always ask your healthcare professional. [place] [address] any warranty or liability for your use of this information. Please provide this summary of care documentation to your next provider. Your primary care clinician is listed as [phone]. If you have any questions after today's visit, please call [phone].

## 2018-04-25 ENCOUNTER — HOSPITAL ENCOUNTER (OUTPATIENT)
Age: 68
Setting detail: OUTPATIENT SURGERY
Discharge: HOME | End: 2018-04-25
Attending: PHYSICAL MEDICINE & REHABILITATION | Admitting: PHYSICAL MEDICINE & REHABILITATION
Payer: COMMERCIAL

## 2018-04-25 ENCOUNTER — APPOINTMENT (OUTPATIENT)
Dept: GENERAL RADIOLOGY | Age: 68
End: 2018-04-25
Attending: PHYSICAL MEDICINE & REHABILITATION
Payer: COMMERCIAL

## 2018-04-25 VITALS
HEIGHT: 71 IN | WEIGHT: 220 LBS | RESPIRATION RATE: 20 BRPM | DIASTOLIC BLOOD PRESSURE: 84 MMHG | SYSTOLIC BLOOD PRESSURE: 139 MMHG | HEART RATE: 70 BPM | OXYGEN SATURATION: 94 % | TEMPERATURE: 98.4 F | BODY MASS INDEX: 30.8 KG/M2

## 2018-04-25 PROBLEM — M51.26 HNP (HERNIATED NUCLEUS PULPOSUS), LUMBAR: Status: ACTIVE | Noted: 2018-04-25

## 2018-04-25 PROBLEM — M79.2 NEURITIS: Status: ACTIVE | Noted: 2018-04-25

## 2018-04-25 PROCEDURE — 77030003669 HC NDL SPN COOK -B: Performed by: PHYSICAL MEDICINE & REHABILITATION

## 2018-04-25 PROCEDURE — 74011636320 HC RX REV CODE- 636/320: Performed by: PHYSICAL MEDICINE & REHABILITATION

## 2018-04-25 PROCEDURE — 74011000250 HC RX REV CODE- 250

## 2018-04-25 PROCEDURE — 74011250636 HC RX REV CODE- 250/636: Performed by: PHYSICAL MEDICINE & REHABILITATION

## 2018-04-25 PROCEDURE — 74011636320 HC RX REV CODE- 636/320

## 2018-04-25 PROCEDURE — 76010000009 HC PAIN MGT 0 TO 30 MIN PROC: Performed by: PHYSICAL MEDICINE & REHABILITATION

## 2018-04-25 PROCEDURE — 74011250636 HC RX REV CODE- 250/636

## 2018-04-25 PROCEDURE — 74011250637 HC RX REV CODE- 250/637: Performed by: PHYSICAL MEDICINE & REHABILITATION

## 2018-04-25 RX ORDER — SODIUM CHLORIDE 0.9 % (FLUSH) 0.9 %
5-10 SYRINGE (ML) INJECTION AS NEEDED
Status: DISCONTINUED | OUTPATIENT
Start: 2018-04-25 | End: 2018-04-25 | Stop reason: HOSPADM

## 2018-04-25 RX ORDER — DIAZEPAM 5 MG/1
5-20 TABLET ORAL ONCE
Status: COMPLETED | OUTPATIENT
Start: 2018-04-25 | End: 2018-04-25

## 2018-04-25 RX ORDER — DEXAMETHASONE SODIUM PHOSPHATE 100 MG/10ML
INJECTION INTRAMUSCULAR; INTRAVENOUS AS NEEDED
Status: DISCONTINUED | OUTPATIENT
Start: 2018-04-25 | End: 2018-04-25 | Stop reason: HOSPADM

## 2018-04-25 RX ORDER — LIDOCAINE HYDROCHLORIDE 10 MG/ML
INJECTION, SOLUTION EPIDURAL; INFILTRATION; INTRACAUDAL; PERINEURAL AS NEEDED
Status: DISCONTINUED | OUTPATIENT
Start: 2018-04-25 | End: 2018-04-25 | Stop reason: HOSPADM

## 2018-04-25 RX ADMIN — DIAZEPAM 10 MG: 5 TABLET ORAL at 13:41

## 2018-04-25 NOTE — H&P
Date of Surgery Update:  Darren Chandler was seen and examined. History and physical has been reviewed. The patient has been examined. There have been no significant clinical changes since the last office visit.       Signed By: Jody Cardenas MD     April 25, 2018 1:00 PM

## 2018-04-25 NOTE — DISCHARGE INSTRUCTIONS
[address] [place] [address] for Pain Management      Post Procedures Instructions    *Resume Diet and Activity as tolerated. Rest for the remainder of the day. *You may fell worse before you feel better as the numbing medications wear off before the steroids take effect if used for your procedures. *Do not use affected extremity until numbness or loss of sensation has completely resolved without assistance. *DO NOT DRIVE, operate machinery/heavey equipment for 24 hours. *DO NOT DRINK ALCOHOL for 24 hours as it may interact with the sedation if you received it and also thins your blood and may cause you to bleed. *WAIT 24 hours before starting back ANY Blood thinning medications:   (Heparin, Coumadin, Warfarin, Lovenox, Plavix, Aggrenox)    *Resume Pre-Procedure Medications as prescribed except Blood Thinners unless directed by your Physician or Cardiologist.     *Avoid Hot tubs and Heating pad for 24 hours to prevent dissipation of medications, you may shower to remove bandages and remaining prep residue on the skin. * If you develop a Headache, drink plenty of fluids including beverages with caffeine (Coffee, Mt. Dew etc.) and rest.  If the headache persists longer than 24 hoursor intensifies - Please call [facility] ([facility]) [phone]    * If you are DIABETIC, check your blood sugar three times a day for the next three days, the steroids will increase your blood sugar. If your blood sugar is greater than 400 have someone drive you to the nearest [address]. * If you experience any of the following problems, call the [facility] [phone] between 8:00 am - 4:30pm or After Hours [phone].     Shortness of breath    Fever of 101 F or higher    Nausea / Vomiting (not normal to you)    Increasing stiffness in the neck    Weakness or numbness in the arms or legs that is not resolving    Prolonged and increasing pain > than 4 days    ANYTHING OUT of the ORDINARY TO YOU    If YOU are experiencing a severe reaction / complication that you have never had before post procedure, call 911 or go to the nearest emergency room! All patients must have a  for transportation [place] regardless if you do or do not receive sedation. DISCHARGE SUMMARY from Nurse      PATIENT INSTRUCTIONS:    After Oral  or intravenous sedation, for 24 hours or while taking prescription Narcotics:  · Limit your activities  · Do not drive and operate hazardous machinery  · Do not make important personal or business decisions  · Do  not drink alcoholic beverages  · If you have not urinated within 8 hours after discharge, please contact your surgeon on call. Report the following to your surgeon:  · Excessive pain, swelling, redness or odor of or around the surgical area  · Temperature over 101  · Nausea and vomiting lasting longer than 4 hours or if unable to take medications  · Any signs of decreased circulation or nerve impairment to extremity: change in color, persistent  numbness, tingling, coldness or increase pain  · Any questions        What to do at Home:  Recommended activity: Activity as tolerated, NO DRIVING FOR 24 Hours post injection          *  Please give a list of your current medications to your Primary Care Provider. *  Please update this list whenever your medications are discontinued, doses are      changed, or new medications (including over-the-counter products) are added. *  Please carry medication information at all times in case of emergency situations. These are general instructions for a healthy lifestyle:    No smoking/ No tobacco products/ Avoid exposure to second hand smoke    Surgeon General's Warning:  Quitting smoking now greatly reduces serious risk to your health.     Obesity, smoking, and sedentary lifestyle greatly increases your risk for illness    A healthy diet, regular physical exercise & weight monitoring are important for maintaining a healthy lifestyle    You may be retaining fluid if you have a history of heart failure or if you experience any of the following symptoms:  Weight gain of 3 pounds or more overnight or 5 pounds in a week, increased swelling in our hands or feet or shortness of breath while lying flat in bed. Please call your doctor as soon as you notice any of these symptoms; do not wait until your next office visit. Recognize signs and symptoms of STROKE:    F-face looks uneven    A-arms unable to move or move unevenly    S-speech slurred or non-existent    T-time-call 911 as soon as signs and symptoms begin-DO NOT go       Back to bed or wait to see if you get better-TIME IS BRAIN.

## 2018-04-25 NOTE — PROCEDURES
PROCEDURE NOTE      Patient Name: Mari Cam    Date of Procedure: April 25, 2018    Preoperative Diagnosis:  HNP (herniated nucleus pulposus), lumbar [M51.26]  Neuritis [M79.2]  Weakness of right leg [R29.898]    Post Operative Diagnosis:  HNP (herniated nucleus pulposus), lumbar [M51.26]  Neuritis [M79.2]  Weakness of right leg [R29.898]    Procedure :    right L5 Selective Nerve Root Block  right S1 Selective Nerve Root Block      Consent:  Informed consent was obtained prior to the procedure. The patient was given the opportunity to ask questions regarding the procedure and its associated risks. In addition to the potential risks associated with the procedure itself, the patient was informed both verbally and in writing of the potential side effects of the use of glucocorticoid. The patient appeared to comprehend the informed consent and desired to have the procedure performed. Procedure: The patient was placed in the prone position on the fluoroscopy table and the back was prepped and draped in the usual sterile manner. The exact spinal level was  identified using fluoroscopy, and Lidocaine 1 % was injected locally, a # 22 gauge spinal needle was passed to the transverse process. The depth was noted and the needle redirected to pass inferior and approximately one cm anterior to the transverse process. YES  1 cc of Isovue M-200 was used to verify positioning in the epidural and paravertebral space and outlined the course of the spinal nerve into the epidural space. The same procedure was repeated at each spinal level indicated above. A total of 30 mg of preservative free Dexamethasone and 5 cc of Lidocaine was slowly injected. The patient tolerated the procedure well. The injection area was cleaned and bandaids applied. Not excessive bleeding was noted. Patient dressed and discharged to home with instructions. Discussion: The patient tolerated the procedure well. Guzman Kat MD  April 25, 2018

## 2018-11-07 ENCOUNTER — HOSPITAL ENCOUNTER (OUTPATIENT)
Dept: ULTRASOUND IMAGING | Age: 68
Discharge: HOME | End: 2018-11-07
Attending: INTERNAL MEDICINE
Payer: COMMERCIAL

## 2018-11-07 DIAGNOSIS — R79.89 ABNORMAL LFTS: ICD-10-CM

## 2018-11-07 PROCEDURE — 76705 ECHO EXAM OF ABDOMEN: CPT

## 2018-11-20 ENCOUNTER — HOSPITAL ENCOUNTER (OUTPATIENT)
Dept: VASCULAR SURGERY | Age: 68
Discharge: HOME | End: 2018-11-20
Attending: FAMILY MEDICINE
Payer: COMMERCIAL

## 2018-11-20 DIAGNOSIS — R20.8 DECREASED SENSATION: ICD-10-CM

## 2018-11-20 PROCEDURE — 93923 UPR/LXTR ART STDY 3+ LVLS: CPT

## 2018-11-21 LAB
LEFT ABI: 1.32
LEFT ANTERIOR TIBIAL: 193 MMHG
LEFT ARM BP: 167 MMHG
LEFT CALF PRESSURE: 199 MMHG
LEFT POSTERIOR TIBIAL: 220 MMHG
LEFT TBI: 0.88
LEFT TOE PRESSURE: 147 MMHG
RIGHT ABI: 1.29
RIGHT ANTERIOR TIBIAL: 192 MMHG
RIGHT ARM BP: 164 MMHG
RIGHT CALF PRESSURE: 203 MMHG
RIGHT POSTERIOR TIBIAL: 216 MMHG
RIGHT TBI: 0.9
RIGHT TOE PRESSURE: 150 MMHG

## 2018-12-03 ENCOUNTER — DOCUMENTATION ONLY (OUTPATIENT)
Dept: NEUROLOGY | Age: 68
End: 2018-12-03

## 2018-12-03 NOTE — PROGRESS NOTES
Patient's chart reviewed for up coming appointment in our office.    Patient is New   And is being seen for: EMG testing d/t cold feet   BLE, ordered by Dr. Leilani Stanley, juve Frankel needed

## 2018-12-05 ENCOUNTER — OFFICE VISIT (OUTPATIENT)
Dept: NEUROLOGY | Age: 68
End: 2018-12-05

## 2018-12-05 VITALS — HEIGHT: 71 IN | BODY MASS INDEX: 30.68 KG/M2

## 2018-12-05 DIAGNOSIS — M79.2 NEURITIS: Primary | ICD-10-CM

## 2018-12-05 NOTE — LETTER
12/5/2018 10:00 AM 
 
Patient:  Aliyah Saab YOB: 1950 Date of Visit: 12/5/2018 Dear MD Patricia Sanders [address] [place] [id number] VIA Facsimile: [phone] 
 : Thank you for referring Mr. Idalia Canavan to me for evaluation/treatment. Below are the relevant portions of my assessment and plan of care. If you have questions, please do not hesitate to call me. I look forward to following Mr. Eden Perez along with you.  
 
 
 
Sincerely, 
 
 
Rashmi Drew MD

## 2018-12-05 NOTE — PROGRESS NOTES
Deloris Grijalva is a 80 y.o., right handed male, with an established history of hypertension but no diabetes who comes into this practice complaining of 2 years of pain in his feet starting in this feet working its way up to his knees. He says the right side is worse than the left side. It does not go above the knee. Says that it is an aching sensation starting from the arch of the foot. His feet are always cold. He denies any heavy alcohol use. He denies any history of diabetes. Social History; patient is single lives alone. No alcohol tobacco use. Works as a . Current Outpatient Medications   Medication Sig Dispense Refill    gabapentin (NEURONTIN) 300 mg capsule Take 1-2 in the morning and 2 in the evening  Indications: NEUROPATHIC PAIN 120 Cap 1    traMADol (ULTRAM) 50 mg tablet 1 po bid as needed for severe pain  Indications: NEUROPATHIC PAIN, Pain 14 Tab 0    sodium chloride (SALINE MIST) 0.65 % nasal spray 2 Sprays by Both Nostrils route every three (3) hours as needed for Congestion.  Indications: Nasal Congestion 15 mL 0    BYSTOLIC 10 mg tablet take 1 tablet by mouth once daily  0    meloxicam (MOBIC) 15 mg tablet   0    hydroCHLOROthiazide (HYDRODIURIL) 12.5 mg tablet   0    escitalopram oxalate (LEXAPRO) 10 mg tablet   0    allopurinol (ZYLOPRIM) 300 mg tablet   0    zolpidem (AMBIEN) 10 mg tablet take 1 tablet by mouth at bedtime if needed  0       Past Medical History:   Diagnosis Date    Arthritis     Ill-defined condition     GOUT       Past Surgical History:   Procedure Laterality Date    HX ORTHOPAEDIC      LEFT HAND       No Known Allergies    Patient Active Problem List   Diagnosis Code    HNP (herniated nucleus pulposus), lumbar M51.26    Neuritis M79.2         Review of Systems:   As above otherwise 11 point review of systems negative including;   Constitutional no fever or chills  Skin denies rash or itching  HENT  Denies tinnitus, hearing lose  Eyes denies diplopia vision lose  Respiratory denies shortness of breath  Cardiovascular denies chest pain, dyspnea on exertion  Gastrointestinal denies nausea, vomiting, diarrhea, constipation  Genitourinary denies incontinence  Musculoskeletal denies joint pain or swelling  Endocrine denies weight change  Hematology denies easy bruising or bleeding   Neurological as above in HPI      PHYSICAL EXAMINATION:      VITAL SIGNS:    Visit Vitals  Ht 5' 11\" (1.803 m)   BMI 30.68 kg/m²       GENERAL: The patient is well developed, well nourished, and in no apparent distress. EXTREMITIES: No clubbing, cyanosis, or edema is identified. Pulses 2+ and symmetrical.  Muscle tone is normal.  HEAD:   Ear, nose, and throat appear to be without trauma. The patient is normocephalic. NEUROLOGIC EXAMINATION    MENTAL STATUS: The patient is awake, alert, and oriented x 4. Fund of knowledge is adequate. Speech is fluent and memory appears to be intact, both long and short term. CRANIAL NERVES: II - Visual fields are full to confrontation. Funduscopic examination reveals flat disks bilaterally. Pupils are both 4 mm and briskly reactive to light and accommodation. III, IV, VI - Extraocular movements are intact and there is no nystagmus. V - Facial sensation is intact to pinprick and light touch. VII - Face is symmetrical.   VIII - Hearing is present. IX, X, XII- Palate rises symmetrically. Gag is present. Tongue is in the midline. XI - Shoulder shrugging and head turning intact  MOTOR:  The patient is 5/5 in all four limbs without any drift. Fine finger movements are symmetrical.  Isolated motor group testing reveals no focal abnormalities. Tone is normal.  Sensory examination is intact to pinprick, light touch and position sense testing. Reflexes are 2+ and symmetrical. Plantars are down going. Cerebellar examination reveals no gross ataxia or dysmetria.  Gait is normal.      [facility]  OFFICE PROCEDURE PROGRESS NOTE        Chart reviewed for the following:   Moshe Morse MD, have reviewed the History, Physical and updated the Allergic reactions for [address] performed immediately prior to start of procedure:   Mohse Morse MD, have performed the following reviews on Clearnce Ramus prior to the start of the procedure:            * Patient was identified by name and date of birth   * Agreement on procedure being performed was verified  * Risks and Benefits explained to the patient  * Procedure site verified and marked as necessary  * Patient was positioned for comfort  * Consent was signed and verified     Time: 9:56 AM    Date of procedure: 12/5/2018    Procedure performed by:  Cielo Horvath MD    Provider assisted by: Chloé Adames MA    Patient assisted by: self    How tolerated by patient: tolerated the procedure well with no complications    Post Procedural Pain Scale: 0 - No Hurt    Comments: none      [email or web address]  [facility] [address], [address]  [phone]      [facility]    Neurophysiology Report      Patient: Brent Garcia     ID: [id number] Physician: Gertrudis Torres.  Christine Alan MD   Gender: Male Ref Phys: Ruddy Councilman, MD    Handedness: Chloé Adames     Study Date: December 5, 2018       Nerve Conduction Studies  Anti Sensory Summary Table     Stim Site NR Peak (ms) Norm Peak (ms) O-P Amp (µV) Norm O-P Amp Dist (cm) Jah (m/s)   Left Sural Anti Sensory (Ankle)   Calf    4.4  6.5 >5 14.0 42.4   Site 2    3.9  1.6      Right Sural Anti Sensory (Ankle)   Calf    3.8  2.0 >5 14.0 43.8   Site 2    3.6  1.8        Motor Summary Table     Stim Site NR Onset (ms) Norm Onset (ms) O-P Amp (mV) Norm O-P Amp Dist (cm) Jah (m/s) Norm Jah (m/s)   Left Peroneal Motor (EDB)   Ankle    4.2 <6.5 2.3 >2.0 38.0 45.2 >44   Fibula (Head)    12.6  2.3  10.0 66.7    Pop Fossa    14.1  2.8       Right Peroneal Motor (EDB) Ankle    4.3 <6.5 1.3 >2.0 38.0 40.9 >44   Fibula (Head)    13.6  1.2  10.0 45.5    Pop Fossa    15.8  1.0       Left Tibial Motor (Abd Palm Brev)   Ankle    5.2 <5.8 3.1 >4.0 47.0 41.2 >41   Knee    16.6  1.9       Right Tibial Motor (Abd Palm Brev)   Ankle    7.0 <5.8 1.1 >4.0 30.0 25.2 >41   Knee    18.9  0.8           EMG     Side Muscle Nerve Root Ins Act Fibs Psw Fasc Amp Dur Poly Recrt Int Alric Pernell Comment   Right AntTibialis Dp Br Fibular L4-5 Nml Nml Nml None Nml Nml 0 Nml Nml    Right MedGastroc   Nml Nml Nml None Nml Nml 0 Nml Nml    Right ExtHallLong Dp Br Fibular L5, S1 Nml Nml Nml None Nml Nml 0 Nml Nml    Right BicepsFemS Sciatic L5-S1 Nml Nml Nml None Nml Nml 0 Nml Nml    Right VastusMed Femoral L2-4 Nml Nml Nml None Nml Nml 0 Nml Nml    Right Lumbo Parasp Up Rami L1-2 Nml Nml Nml          Right Lumbo Parasp Mid Rami L3-4 Nml Nml Nml          Right Lumbo Parasp Low Rami L5-S1 Nml Nml Nml            NCS/EMG FINDINGS:     Evaluation of the Left peroneal motor and the Left sural sensory nerves were unremarkable.  The Right peroneal motor nerve showed reduced amplitude (1.3 mV) and decreased conduction velocity (Fibula (Head)-Ankle, 40.9 m/s).  The Left tibial motor and the Right sural sensory nerves showed reduced amplitude (L3.1, R2.0 µV).  The Right tibial motor nerve showed prolonged distal onset latency (7.0 ms), reduced amplitude (1.1 mV), and decreased conduction velocity (Knee-Ankle, 25.2 m/s). INTERPRETATION:       ___________________________  Forest Casarez.  Ashley Miller MD          Waveforms:                     CBC:   Lab Results   Component Value Date/Time    WBC 10.9 08/18/2010 12:06 AM    RBC 4.75 08/18/2010 12:06 AM    HGB 15.6 08/18/2010 12:06 AM    HCT 44.7 08/18/2010 12:06 AM    PLATELET 252 56/32/4914 12:06 AM     BMP:   Lab Results   Component Value Date/Time    Glucose 122 (H) 08/18/2010 12:06 AM    Sodium 136 08/18/2010 12:06 AM    Potassium 3.3 (L) 08/18/2010 12:06 AM    Chloride 101 08/18/2010 12:06 AM    CO2 22 08/18/2010 12:06 AM    BUN 20 (H) 08/18/2010 12:06 AM    Creatinine 1.6 (H) 08/18/2010 12:06 AM    Calcium 8.4 08/18/2010 12:06 AM     CMP:   Lab Results   Component Value Date/Time    Glucose 122 (H) 08/18/2010 12:06 AM    Sodium 136 08/18/2010 12:06 AM    Potassium 3.3 (L) 08/18/2010 12:06 AM    Chloride 101 08/18/2010 12:06 AM    CO2 22 08/18/2010 12:06 AM    BUN 20 (H) 08/18/2010 12:06 AM    Creatinine 1.6 (H) 08/18/2010 12:06 AM    Calcium 8.4 08/18/2010 12:06 AM    Anion gap 13 08/18/2010 12:06 AM    BUN/Creatinine ratio 13 08/18/2010 12:06 AM     Coagulation: No results found for: PTP, INR, APTT, PTTT       Impression: This was an abnormal nerve conduction and EMG study showing signs of a diffuse sensory worse than motor neuropathy of both lower extremities. This would be supportive of the diagnosis of a peripheral neuropathy. Plan: Appropriate workup should be taken in this gentleman looking for causes of neuropathy which might include hypothyroidism, vitamin deficiency, exposure to hydrocarbons, alcohol usage, diabetes etc.    PLEASE NOTE:   This document has been produced using voice recognition software. Unrecognized errors in transcription may be present.

## 2019-01-17 ENCOUNTER — DOCUMENTATION ONLY (OUTPATIENT)
Dept: NEUROLOGY | Age: 69
End: 2019-01-17

## 2019-01-17 NOTE — PROGRESS NOTES
Chart review reveals scheduled new patient visit to discuss neuritis. patient previously seen for EMG.

## 2019-01-21 ENCOUNTER — OFFICE VISIT (OUTPATIENT)
Dept: NEUROLOGY | Age: 69
End: 2019-01-21

## 2019-01-21 VITALS
SYSTOLIC BLOOD PRESSURE: 180 MMHG | WEIGHT: 227.8 LBS | DIASTOLIC BLOOD PRESSURE: 84 MMHG | HEART RATE: 54 BPM | TEMPERATURE: 97.6 F | HEIGHT: 71 IN | BODY MASS INDEX: 31.89 KG/M2 | RESPIRATION RATE: 22 BRPM | OXYGEN SATURATION: 98 %

## 2019-01-21 DIAGNOSIS — M79.2 NEURITIS: Primary | ICD-10-CM

## 2019-01-21 RX ORDER — DULOXETIN HYDROCHLORIDE 60 MG/1
60 CAPSULE, DELAYED RELEASE ORAL DAILY
Qty: 30 CAP | Refills: 5 | Status: SHIPPED | OUTPATIENT
Start: 2019-01-21 | End: 2019-05-20 | Stop reason: SDUPTHER

## 2019-01-21 RX ORDER — LISINOPRIL 10 MG/1
TABLET ORAL
Refills: 0 | COMMUNITY
Start: 2019-01-06 | End: 2020-03-11

## 2019-01-21 NOTE — PROGRESS NOTES
Rishi Dave is a 80 y.o. male new patient in today to discuss bilateral leg pain 9/10; referred by Fito Garcia MD. Patient previously seen for EMG.

## 2019-01-21 NOTE — PROGRESS NOTES
Yo Lam is a 80 y.o., right handed male, with an established history of hypertnesion, no diabetes, who comes in complaining of pain in the legs. He states that his feet feel cold, like he's walking in snow. They tingle and are numb in various locations. This seemed to have started about 1 year ago and has gradually worsened. He has fallen twice for no particular reason. He just lost his balance and went down. There's no trouble with the hands. No tingling of the fingers. He states that the legs are burning up to the thighs bilaterally. He just has tingling in the feet. There's some back pain, but nothing dramatic. He's drank heavily in the past.      Social History;  20 years ago, single now after the loss of his second wife. He denies tobacco use. He drinks on the weekend, usually a 6 pack only. 20 years ago when going through his divorce, he was drinking up to 20 beers per night. Family History; mother  from cancer. Father  from drinking. Sibling in good health except has hypertension. Current Outpatient Medications   Medication Sig Dispense Refill    lisinopril (PRINIVIL, ZESTRIL) 10 mg tablet   0    gabapentin (NEURONTIN) 300 mg capsule Take 1-2 in the morning and 2 in the evening  Indications: NEUROPATHIC PAIN 120 Cap 1    traMADol (ULTRAM) 50 mg tablet 1 po bid as needed for severe pain  Indications: NEUROPATHIC PAIN, Pain 14 Tab 0    BYSTOLIC 10 mg tablet take 1 tablet by mouth once daily  0    meloxicam (MOBIC) 15 mg tablet   0    allopurinol (ZYLOPRIM) 300 mg tablet   0    sodium chloride (SALINE MIST) 0.65 % nasal spray 2 Sprays by Both Nostrils route every three (3) hours as needed for Congestion.  Indications: Nasal Congestion 15 mL 0    hydroCHLOROthiazide (HYDRODIURIL) 12.5 mg tablet   0    escitalopram oxalate (LEXAPRO) 10 mg tablet   0    zolpidem (AMBIEN) 10 mg tablet take 1 tablet by mouth at bedtime if needed  0       Past Medical History:   Diagnosis Date    Arthritis     Ill-defined condition     GOUT       Past Surgical History:   Procedure Laterality Date    HX ORTHOPAEDIC      LEFT HAND       No Known Allergies    Patient Active Problem List   Diagnosis Code    HNP (herniated nucleus pulposus), lumbar M51.26    Neuritis M79.2         Review of Systems:   As above otherwise 11 point review of systems negative including;   Constitutional no fever or chills  Skin denies rash or itching  HENT  Denies tinnitus, hearing lose  Eyes denies diplopia vision lose  Respiratory denies shortness of breath  Cardiovascular denies chest pain, dyspnea on exertion  Gastrointestinal denies nausea, vomiting, diarrhea, constipation  Genitourinary denies incontinence  Musculoskeletal denies joint pain or swelling  Endocrine denies weight change  Hematology denies easy bruising or bleeding   Neurological as above in HPI      PHYSICAL EXAMINATION:      VITAL SIGNS:    Visit Vitals  /84 (BP 1 Location: Left arm, BP Patient Position: Sitting)   Pulse (!) 54   Temp 97.6 °F (36.4 °C) (Oral)   Resp 22   Ht 5' 11\" (1.803 m)   Wt 103.3 kg (227 lb 12.8 oz)   SpO2 98%   BMI 31.77 kg/m²       GENERAL: The patient is well developed, well nourished, and in no apparent distress. EXTREMITIES: No clubbing, cyanosis, or edema is identified. Pulses 2+ and symmetrical.  Muscle tone is normal.  HEAD:   Ear, nose, and throat appear to be without trauma. The patient is normocephalic. NEUROLOGIC EXAMINATION    MENTAL STATUS: The patient is awake, alert, and oriented x 4. Fund of knowledge is adequate. Speech is fluent and memory appears to be intact, both long and short term. CRANIAL NERVES: II - Visual fields are full to confrontation. Funduscopic examination reveals flat disks bilaterally. Pupils are both 3 mm and briskly reactive to light and accommodation. III, IV, VI - Extraocular movements are intact and there is no nystagmus.    V - Facial sensation is intact to pinprick and light touch. VII - Face is symmetrical.   VIII - Hearing is present. IX, X, XII- Palate rises symmetrically. Gag is present. Tongue is in the midline. XI - Shoulder shrugging and head turning intact  MOTOR:  The patient is 5/5 in all four limbs without any drift. Fine finger movements are symmetrical.  Isolated motor group testing reveals no focal abnormalities. Tone is normal.  Sensory examination decrease to pinprick, light touch to the ankle bilaterally. Reflexes are trace and symmetrical. Plantars are down going. Cerebellar examination reveals no gross ataxia or dysmetria. Gait is normal and the patient can tandem walk without any difficulty. CBC:   Lab Results   Component Value Date/Time    WBC 10.9 08/18/2010 12:06 AM    RBC 4.75 08/18/2010 12:06 AM    HGB 15.6 08/18/2010 12:06 AM    HCT 44.7 08/18/2010 12:06 AM    PLATELET 529 48/02/8353 12:06 AM     BMP:   Lab Results   Component Value Date/Time    Glucose 122 (H) 08/18/2010 12:06 AM    Sodium 136 08/18/2010 12:06 AM    Potassium 3.3 (L) 08/18/2010 12:06 AM    Chloride 101 08/18/2010 12:06 AM    CO2 22 08/18/2010 12:06 AM    BUN 20 (H) 08/18/2010 12:06 AM    Creatinine 1.6 (H) 08/18/2010 12:06 AM    Calcium 8.4 08/18/2010 12:06 AM     CMP:   Lab Results   Component Value Date/Time    Glucose 122 (H) 08/18/2010 12:06 AM    Sodium 136 08/18/2010 12:06 AM    Potassium 3.3 (L) 08/18/2010 12:06 AM    Chloride 101 08/18/2010 12:06 AM    CO2 22 08/18/2010 12:06 AM    BUN 20 (H) 08/18/2010 12:06 AM    Creatinine 1.6 (H) 08/18/2010 12:06 AM    Calcium 8.4 08/18/2010 12:06 AM    Anion gap 13 08/18/2010 12:06 AM    BUN/Creatinine ratio 13 08/18/2010 12:06 AM     Coagulation: No results found for: PTP, INR, APTT, PTTT  Cardiac markers: No results found for: CPK, CKND1, MARLY       Impression: What appears to be a peripheral neuropathy in this gentleman who has risk factors including none identified.   He has a painful neuropathy of both lower extremities with no particular reason. Is drinking in the past may have been an additive effect, but he really has not been drinking that heavy in the last several years. Plan: Need to review his old blood workup is done by his primary doctor. And then to change him, or at least add Cymbalta at this time to try to help with the painful neuropathic pain he is having. Return visit here in a couple of weeks. PLEASE NOTE:   This document has been produced using voice recognition software. Unrecognized errors in transcription may be present.

## 2019-01-21 NOTE — LETTER
1/21/2019 1:23 PM 
 
Patient:  Chio Keiht YOB: 1950 Date of Visit: 1/21/2019 Dear MD Patricia Muñoz [address] [place] [address] VIA Facsimile: [phone] 
 : Thank you for referring Mr. Marielle Cote to me for evaluation/treatment. Below are the relevant portions of my assessment and plan of care. If you have questions, please do not hesitate to call me. I look forward to following Mr. Dinorah Mcgee along with you.  
 
 
 
Sincerely, 
 
 
Jeannie Arias MD

## 2019-02-15 ENCOUNTER — DOCUMENTATION ONLY (OUTPATIENT)
Dept: NEUROLOGY | Age: 69
End: 2019-02-15

## 2019-02-21 ENCOUNTER — OFFICE VISIT (OUTPATIENT)
Dept: NEUROLOGY | Age: 69
End: 2019-02-21

## 2019-02-21 VITALS
BODY MASS INDEX: 30.88 KG/M2 | HEIGHT: 71 IN | OXYGEN SATURATION: 96 % | DIASTOLIC BLOOD PRESSURE: 68 MMHG | SYSTOLIC BLOOD PRESSURE: 134 MMHG | RESPIRATION RATE: 22 BRPM | WEIGHT: 220.6 LBS | TEMPERATURE: 97.8 F

## 2019-02-21 DIAGNOSIS — M79.2 NEURITIS: Primary | ICD-10-CM

## 2019-02-21 NOTE — PROGRESS NOTES
Morgan Gallegos is a 77 y.o. male in today for  follow-up to discuss neuritis of BLE. Learning assessment completed; primary language is Eliza Herzog. 1. Have you been to the ER, urgent care clinic since your last visit? Hospitalized since your last visit? No    2. Have you seen or consulted any other health care providers outside of the [facility] since your last visit? Include any pap smears or colon screening.  No

## 2019-02-21 NOTE — PROGRESS NOTES
Re:  Dickson Razo up visit     2/21/2019 9:55 AM    SSN: [id number]    Subjective:   Zackary Pugh returns for follow up of is neuropathy. The Cymbalta worked great, the burning and stinging is gone, he just feels the numbness. Medications:    Current Outpatient Medications   Medication Sig Dispense Refill    lisinopril (PRINIVIL, ZESTRIL) 10 mg tablet   0    DULoxetine (CYMBALTA) 60 mg capsule Take 1 Cap by mouth daily. 30 Cap 5    gabapentin (NEURONTIN) 300 mg capsule Take 1-2 in the morning and 2 in the evening  Indications: NEUROPATHIC PAIN 120 Cap 1    traMADol (ULTRAM) 50 mg tablet 1 po bid as needed for severe pain  Indications: NEUROPATHIC PAIN, Pain 14 Tab 0    sodium chloride (SALINE MIST) 0.65 % nasal spray 2 Sprays by Both Nostrils route every three (3) hours as needed for Congestion.  Indications: Nasal Congestion 15 mL 0    BYSTOLIC 10 mg tablet take 1 tablet by mouth once daily  0    meloxicam (MOBIC) 15 mg tablet   0    hydroCHLOROthiazide (HYDRODIURIL) 12.5 mg tablet   0    allopurinol (ZYLOPRIM) 300 mg tablet   0    zolpidem (AMBIEN) 10 mg tablet take 1 tablet by mouth at bedtime if needed  0       Vital signs:    Visit Vitals  /68 (BP 1 Location: Left arm, BP Patient Position: Sitting)   Temp 97.8 °F (36.6 °C) (Oral)   Resp 22   Ht 5' 11\" (1.803 m)   Wt 100.1 kg (220 lb 9.6 oz)   SpO2 96%   BMI 30.77 kg/m²       Review of Systems:   As above otherwise 11 point review of systems negative including;   Constitutional no fever or chills  Skin denies rash or itching  HEENT  Denies tinnitus, hearing lose  Eyes denies diplopia vision lose  Respiratory denies sortness of breath  Cardiovascular denies chest pain, dyspnea on exertion  Gastrointestinal denies nausea, vomiting, diarrhea, constipation  Genitourinary denies incontinence  Musculoskeletal denies joint pain or swelling  Endocrine denies weight change  Hematology denies easy bruising or bleeding   Neurological as above in HPI      Patient Active Problem List   Diagnosis Code    HNP (herniated nucleus pulposus), lumbar M51.26    Neuritis M79.2         Objective: The patient is awake, alert, and oriented x 4. Fund of knowledge is adequate. Speech is fluent and memory is intact. Cranial Nerves: II - Visual fields are full to confrontation. III, IV, VI - Extraocular movements are intact. There is no nystagmus. V - Facial sensation is intact to pinprick. VII - Face is symmetrical.  VIII - Hearing is present. IX, X, XII - Palate is symmetrical.   XI - Shoulder shrugging and head turning intact  Motor: The patient moves all four limbs fairly well and symmetrically. Tone is normal. Reflexes are 2+ and symmetrical. Plantars are down going.  Gait is normal.    CBC:   Lab Results   Component Value Date/Time    WBC 10.9 08/18/2010 12:06 AM    RBC 4.75 08/18/2010 12:06 AM    HGB 15.6 08/18/2010 12:06 AM    HCT 44.7 08/18/2010 12:06 AM    PLATELET 859 84/27/4361 12:06 AM     BMP:   Lab Results   Component Value Date/Time    Glucose 122 (H) 08/18/2010 12:06 AM    Sodium 136 08/18/2010 12:06 AM    Potassium 3.3 (L) 08/18/2010 12:06 AM    Chloride 101 08/18/2010 12:06 AM    CO2 22 08/18/2010 12:06 AM    BUN 20 (H) 08/18/2010 12:06 AM    Creatinine 1.6 (H) 08/18/2010 12:06 AM    Calcium 8.4 08/18/2010 12:06 AM     CMP:   Lab Results   Component Value Date/Time    Glucose 122 (H) 08/18/2010 12:06 AM    Sodium 136 08/18/2010 12:06 AM    Potassium 3.3 (L) 08/18/2010 12:06 AM    Chloride 101 08/18/2010 12:06 AM    CO2 22 08/18/2010 12:06 AM    BUN 20 (H) 08/18/2010 12:06 AM    Creatinine 1.6 (H) 08/18/2010 12:06 AM    Calcium 8.4 08/18/2010 12:06 AM    Anion gap 13 08/18/2010 12:06 AM    BUN/Creatinine ratio 13 08/18/2010 12:06 AM     Coagulation: No results found for: PTP, INR, APTT, PTTT  Cardiac markers: No results found for: CPK, CKND1, MARLY     Reviewed Dr Blanca Gómez' records, he has normal resuults, but no SPEP.      Assessment:  Idiopathic neuropathy. , better on Cymbalta. Plan:  Get SPEP. Continue current medications. Sincerely,        Jerald Cox.  Rodriguez Mccormick M.D.

## 2019-05-20 ENCOUNTER — OFFICE VISIT (OUTPATIENT)
Dept: NEUROLOGY | Age: 69
End: 2019-05-20

## 2019-05-20 VITALS
RESPIRATION RATE: 22 BRPM | HEART RATE: 71 BPM | HEIGHT: 71 IN | WEIGHT: 225.6 LBS | TEMPERATURE: 98.1 F | DIASTOLIC BLOOD PRESSURE: 60 MMHG | OXYGEN SATURATION: 98 % | SYSTOLIC BLOOD PRESSURE: 124 MMHG | BODY MASS INDEX: 31.58 KG/M2

## 2019-05-20 DIAGNOSIS — M79.2 NEURITIS: ICD-10-CM

## 2019-05-20 RX ORDER — SILDENAFIL 50 MG/1
50 TABLET, FILM COATED ORAL AS NEEDED
Qty: 10 TAB | Refills: 5 | Status: SHIPPED | OUTPATIENT
Start: 2019-05-20 | End: 2019-08-13

## 2019-05-20 RX ORDER — DULOXETIN HYDROCHLORIDE 60 MG/1
60 CAPSULE, DELAYED RELEASE ORAL DAILY
Qty: 30 CAP | Refills: 11 | Status: SHIPPED | OUTPATIENT
Start: 2019-05-20 | End: 2019-08-13 | Stop reason: SINTOL

## 2019-05-20 NOTE — LETTER
5/20/19 Patient: Kathleen Pastor YOB: 1950 Date of Visit: 5/20/2019 MD Patricia Snell [address] [place] [address] VIA Facsimile: [phone] Dear Sage Whitman MD, Thank you for referring Mr. Blaine Michaels to [facility] for evaluation. My notes for this consultation are attached. If you have questions, please do not hesitate to call me. I look forward to following your patient along with you.  
 
 
Sincerely, 
 
Ayden Talamantes MD

## 2019-05-20 NOTE — PROGRESS NOTES
Re:  Archie Ayala,Follow up visit     5/20/2019 10:50 AM 
 
SSN: [id number] Subjective:   Lynne Pérez returns for follow up of is neuropathy. The Cymbalta worked great, the burning and stinging is gone, he just feels the numbness. The Cymbalta is causing difficulty with achieving orgasm, he has no erectile trouble. Medications:   
Current Outpatient Medications Medication Sig Dispense Refill  lisinopril (PRINIVIL, ZESTRIL) 10 mg tablet   0  
 DULoxetine (CYMBALTA) 60 mg capsule Take 1 Cap by mouth daily. 30 Cap 5  
 gabapentin (NEURONTIN) 300 mg capsule Take 1-2 in the morning and 2 in the evening  Indications: NEUROPATHIC PAIN 120 Cap 1  
 traMADol (ULTRAM) 50 mg tablet 1 po bid as needed for severe pain  Indications: NEUROPATHIC PAIN, Pain 14 Tab 0  
 sodium chloride (SALINE MIST) 0.65 % nasal spray 2 Sprays by Both Nostrils route every three (3) hours as needed for Congestion. Indications: Nasal Congestion 15 mL 0  
 BYSTOLIC 10 mg tablet take 1 tablet by mouth once daily  0  
 meloxicam (MOBIC) 15 mg tablet   0  
 hydroCHLOROthiazide (HYDRODIURIL) 12.5 mg tablet   0  
 allopurinol (ZYLOPRIM) 300 mg tablet   0  
 zolpidem (AMBIEN) 10 mg tablet take 1 tablet by mouth at bedtime if needed  0 Vital signs:   
Visit Vitals /60 (BP 1 Location: Left arm, BP Patient Position: Sitting) Pulse 71 Temp 98.1 °F (36.7 °C) (Oral) Resp 22 Ht 5' 11\" (1.803 m) Wt 102.3 kg (225 lb 9.6 oz) SpO2 98% BMI 31.46 kg/m² Review of Systems: As above otherwise 11 point review of systems negative including;  
Constitutional no fever or chills Skin denies rash or itching HEENT  Denies tinnitus, hearing lose Eyes denies diplopia vision lose Respiratory denies sortness of breath Cardiovascular denies chest pain, dyspnea on exertion Gastrointestinal denies nausea, vomiting, diarrhea, constipation Genitourinary denies incontinence Musculoskeletal denies joint pain or swelling Endocrine denies weight change Hematology denies easy bruising or bleeding Neurological as above in HPI Patient Active Problem List  
Diagnosis Code  HNP (herniated nucleus pulposus), lumbar M51.26  
 Neuritis M79.2 Objective: The patient is awake, alert, and oriented x 4. Fund of knowledge is adequate. Speech is fluent and memory is intact. Cranial Nerves: II  Visual fields are full to confrontation. III, IV, VI  Extraocular movements are intact. There is no nystagmus. V  Facial sensation is intact to pinprick. VII  Face is symmetrical.  VIII - Hearing is present. IX, X, XII  Palate is symmetrical.   XI - Shoulder shrugging and head turning intact Motor: The patient moves all four limbs fairly well and symmetrically. Tone is normal. Reflexes are 2+ and symmetrical. Plantars are down going. Gait is normal. 
 
CBC:  
Lab Results Component Value Date/Time WBC 10.9 08/18/2010 12:06 AM  
 RBC 4.75 08/18/2010 12:06 AM  
 HGB 15.6 08/18/2010 12:06 AM  
 HCT 44.7 08/18/2010 12:06 AM  
 PLATELET 873 35/07/3659 12:06 AM  
 
BMP:  
Lab Results Component Value Date/Time Glucose 122 (H) 08/18/2010 12:06 AM  
 Sodium 136 08/18/2010 12:06 AM  
 Potassium 3.3 (L) 08/18/2010 12:06 AM  
 Chloride 101 08/18/2010 12:06 AM  
 CO2 22 08/18/2010 12:06 AM  
 BUN 20 (H) 08/18/2010 12:06 AM  
 Creatinine 1.6 (H) 08/18/2010 12:06 AM  
 Calcium 8.4 08/18/2010 12:06 AM  
 
CMP:  
Lab Results Component Value Date/Time Glucose 122 (H) 08/18/2010 12:06 AM  
 Sodium 136 08/18/2010 12:06 AM  
 Potassium 3.3 (L) 08/18/2010 12:06 AM  
 Chloride 101 08/18/2010 12:06 AM  
 CO2 22 08/18/2010 12:06 AM  
 BUN 20 (H) 08/18/2010 12:06 AM  
 Creatinine 1.6 (H) 08/18/2010 12:06 AM  
 Calcium 8.4 08/18/2010 12:06 AM  
 Anion gap 13 08/18/2010 12:06 AM  
 BUN/Creatinine ratio 13 08/18/2010 12:06 AM  
 
Coagulation: No results found for: PTP, INR, APTT, PTTT Cardiac markers: No results found for: CPK, CKND1, MARLY  
 
SPEP was done and was normal 
 
Assessment:  Idiopathic neuropathy. , better on Cymbalta. Having significant difficulty with achieving orgasm. Plan:  Continue current medications. Add Viagra and see back in 6 months. Sincerely, 
 
 
 
Hamida Montgomery.  Emmy Fuller M.D.

## 2019-05-20 NOTE — PROGRESS NOTES
Lynne Pérez is a 77 y.o. male in today for follow-up on neuritis and lab results. Learning assessment previously completed 2/21/2019; primary language is [place]. Fall Risk Assessment, last 12 mths 5/20/2019 Able to walk? Yes Fall in past 12 months? No  
 
3 most recent PHQ Screens 5/20/2019 Little interest or pleasure in doing things Not at all Feeling down, depressed, irritable, or hopeless Not at all Total Score PHQ 2 0  
 
 
1. Have you been to the ER, urgent care clinic since your last visit? Hospitalized since your last visit? No 
 
2. Have you seen or consulted any other health care providers outside of the [facility] since your last visit? Include any pap smears or colon screening.  No

## 2019-08-13 ENCOUNTER — OFFICE VISIT (OUTPATIENT)
Dept: NEUROLOGY | Age: 69
End: 2019-08-13

## 2019-08-13 VITALS
HEART RATE: 81 BPM | DIASTOLIC BLOOD PRESSURE: 72 MMHG | SYSTOLIC BLOOD PRESSURE: 126 MMHG | OXYGEN SATURATION: 97 % | HEIGHT: 71 IN | BODY MASS INDEX: 31.89 KG/M2 | WEIGHT: 227.8 LBS | RESPIRATION RATE: 20 BRPM | TEMPERATURE: 98.2 F

## 2019-08-13 DIAGNOSIS — M79.2 NEURITIS: Primary | ICD-10-CM

## 2019-08-13 RX ORDER — NORTRIPTYLINE HYDROCHLORIDE 25 MG/1
25 CAPSULE ORAL
Qty: 60 CAP | Refills: 5 | Status: SHIPPED | OUTPATIENT
Start: 2019-08-13 | End: 2019-09-13 | Stop reason: SDUPTHER

## 2019-08-13 NOTE — PROGRESS NOTES
Re:  Manuela Dears up visit     8/13/2019 10:09 AM    SSN: [id number]    Subjective:   Kvng Hancock returns for follow up of is neuropathy. The Cymbalta worked great, the burning and stinging is gone, he just feels the numbness. Unfortunately he began to get swelling of the feet, which is better off the Cymbalta. Now complaining of significant pain in the feet, tingling and burning. Medications:    Current Outpatient Medications   Medication Sig Dispense Refill    lisinopril (PRINIVIL, ZESTRIL) 10 mg tablet   0    gabapentin (NEURONTIN) 300 mg capsule Take 1-2 in the morning and 2 in the evening  Indications: NEUROPATHIC PAIN 120 Cap 1    traMADol (ULTRAM) 50 mg tablet 1 po bid as needed for severe pain  Indications: NEUROPATHIC PAIN, Pain 14 Tab 0    BYSTOLIC 10 mg tablet take 1 tablet by mouth once daily  0    meloxicam (MOBIC) 15 mg tablet   0    hydroCHLOROthiazide (HYDRODIURIL) 12.5 mg tablet   0    allopurinol (ZYLOPRIM) 300 mg tablet   0    zolpidem (AMBIEN) 10 mg tablet take 1 tablet by mouth at bedtime if needed  0    sildenafil citrate (VIAGRA) 50 mg tablet Take 1 Tab by mouth as needed for Other (intercourse). 10 Tab 5    DULoxetine (CYMBALTA) 60 mg capsule Take 1 Cap by mouth daily. Indications: Neuropathic Pain 30 Cap 11    sodium chloride (SALINE MIST) 0.65 % nasal spray 2 Sprays by Both Nostrils route every three (3) hours as needed for Congestion.  Indications: Nasal Congestion 15 mL 0       Vital signs:    Visit Vitals  /72 (BP 1 Location: Left arm, BP Patient Position: Sitting)   Pulse 81   Temp 98.2 °F (36.8 °C) (Oral)   Resp 20   Ht 5' 11\" (1.803 m)   Wt 103.3 kg (227 lb 12.8 oz)   SpO2 97%   BMI 31.77 kg/m²       Review of Systems:   As above otherwise 11 point review of systems negative including;   Constitutional no fever or chills  Skin denies rash or itching  HEENT  Denies tinnitus, hearing lose  Eyes denies diplopia vision lose  Respiratory denies sortness of breath  Cardiovascular denies chest pain, dyspnea on exertion  Gastrointestinal denies nausea, vomiting, diarrhea, constipation  Genitourinary denies incontinence  Musculoskeletal denies joint pain or swelling  Endocrine denies weight change  Hematology denies easy bruising or bleeding   Neurological as above in HPI      Patient Active Problem List   Diagnosis Code    HNP (herniated nucleus pulposus), lumbar M51.26    Neuritis M79.2         Objective: The patient is awake, alert, and oriented x 4. Fund of knowledge is adequate. Speech is fluent and memory is intact. Cranial Nerves: II - Visual fields are full to confrontation. III, IV, VI - Extraocular movements are intact. There is no nystagmus. V - Facial sensation is intact to pinprick. VII - Face is symmetrical.  VIII - Hearing is present. IX, X, XII - Palate is symmetrical.   XI - Shoulder shrugging and head turning intact  Motor: The patient moves all four limbs fairly well and symmetrically. Tone is normal. Reflexes are 2+ and symmetrical. Plantars are down going. Gait is antalgic, limping off of both feet.     CBC:   Lab Results   Component Value Date/Time    WBC 10.9 08/18/2010 12:06 AM    RBC 4.75 08/18/2010 12:06 AM    HGB 15.6 08/18/2010 12:06 AM    HCT 44.7 08/18/2010 12:06 AM    PLATELET 884 64/79/1711 12:06 AM     BMP:   Lab Results   Component Value Date/Time    Glucose 122 (H) 08/18/2010 12:06 AM    Sodium 136 08/18/2010 12:06 AM    Potassium 3.3 (L) 08/18/2010 12:06 AM    Chloride 101 08/18/2010 12:06 AM    CO2 22 08/18/2010 12:06 AM    BUN 20 (H) 08/18/2010 12:06 AM    Creatinine 1.6 (H) 08/18/2010 12:06 AM    Calcium 8.4 08/18/2010 12:06 AM     CMP:   Lab Results   Component Value Date/Time    Glucose 122 (H) 08/18/2010 12:06 AM    Sodium 136 08/18/2010 12:06 AM    Potassium 3.3 (L) 08/18/2010 12:06 AM    Chloride 101 08/18/2010 12:06 AM    CO2 22 08/18/2010 12:06 AM    BUN 20 (H) 08/18/2010 12:06 AM Creatinine 1.6 (H) 08/18/2010 12:06 AM    Calcium 8.4 08/18/2010 12:06 AM    Anion gap 13 08/18/2010 12:06 AM    BUN/Creatinine ratio 13 08/18/2010 12:06 AM     Coagulation: No results found for: PTP, INR, APTT, PTTT  Cardiac markers: No results found for: CPK, CKND1, MARLY     SPEP was done and was normal    Assessment:  Idiopathic neuropathy. , better on Cymbalta, but now with significant side effects. Plan: Will try Pamelor 25-50 mg nightly. RTC 4 weeks. Sincerely,        Corwin Ott.  Michael Villeda M.D.

## 2019-08-13 NOTE — LETTER
8/13/19 Patient: Kahlil Rogers YOB: 1950 Date of Visit: 8/13/2019 MD Patricia Chung [address] [place] [id number] VIA Facsimile: [phone] Dear Tien Esparza MD, Thank you for referring Mr. Latoya Beverly to [facility] for evaluation. My notes for this consultation are attached. If you have questions, please do not hesitate to call me. I look forward to following your patient along with you.  
 
 
Sincerely, 
 
Aravind Lim MD

## 2019-08-28 ENCOUNTER — HOSPITAL ENCOUNTER (OUTPATIENT)
Dept: ULTRASOUND IMAGING | Age: 69
Discharge: HOME | End: 2019-08-28
Attending: NURSE PRACTITIONER
Payer: MEDICARE

## 2019-08-28 DIAGNOSIS — D64.9 ANEMIA OF UNKNOWN ETIOLOGY: ICD-10-CM

## 2019-08-28 DIAGNOSIS — D53.9 MACROCYTIC ANEMIA: ICD-10-CM

## 2019-08-28 DIAGNOSIS — Z86.010 PERSONAL HISTORY OF COLONIC POLYPS: ICD-10-CM

## 2019-08-28 DIAGNOSIS — K76.0 NONALCOHOLIC FATTY LIVER DISEASE: ICD-10-CM

## 2019-08-28 DIAGNOSIS — R79.89 ABNORMAL LFTS: ICD-10-CM

## 2019-08-28 PROCEDURE — 76705 ECHO EXAM OF ABDOMEN: CPT

## 2019-09-13 ENCOUNTER — OFFICE VISIT (OUTPATIENT)
Dept: NEUROLOGY | Age: 69
End: 2019-09-13

## 2019-09-13 ENCOUNTER — HOSPITAL ENCOUNTER (OUTPATIENT)
Dept: LAB | Age: 69
Discharge: HOME | End: 2019-09-13
Payer: COMMERCIAL

## 2019-09-13 VITALS
WEIGHT: 234 LBS | BODY MASS INDEX: 32.76 KG/M2 | HEART RATE: 65 BPM | TEMPERATURE: 97.7 F | SYSTOLIC BLOOD PRESSURE: 148 MMHG | RESPIRATION RATE: 16 BRPM | HEIGHT: 71 IN | OXYGEN SATURATION: 98 % | DIASTOLIC BLOOD PRESSURE: 80 MMHG

## 2019-09-13 DIAGNOSIS — M79.2 NEURITIS: ICD-10-CM

## 2019-09-13 LAB
ALBUMIN SERPL-MCNC: 4.1 G/DL (ref 3.4–5)
ALBUMIN/GLOB SERPL: 1.4 {RATIO} (ref 0.8–1.7)
ALP SERPL-CCNC: 144 U/L (ref 45–117)
ALT SERPL-CCNC: 43 U/L (ref 16–61)
ANION GAP SERPL CALC-SCNC: 6 MMOL/L (ref 3–18)
AST SERPL-CCNC: 46 U/L (ref 10–38)
BASOPHILS # BLD: 0 K/UL (ref 0–0.1)
BASOPHILS NFR BLD: 1 % (ref 0–2)
BILIRUB SERPL-MCNC: 0.5 MG/DL (ref 0.2–1)
BUN SERPL-MCNC: 12 MG/DL (ref 7–18)
BUN/CREAT SERPL: 9 (ref 12–20)
CALCIUM SERPL-MCNC: 8.9 MG/DL (ref 8.5–10.1)
CHLORIDE SERPL-SCNC: 100 MMOL/L (ref 100–111)
CO2 SERPL-SCNC: 26 MMOL/L (ref 21–32)
CREAT SERPL-MCNC: 1.34 MG/DL (ref 0.6–1.3)
DIFFERENTIAL METHOD BLD: ABNORMAL
EOSINOPHIL # BLD: 0.6 K/UL (ref 0–0.4)
EOSINOPHIL NFR BLD: 8 % (ref 0–5)
ERYTHROCYTE [DISTWIDTH] IN BLOOD BY AUTOMATED COUNT: 12 % (ref 11.6–14.5)
FERRITIN SERPL-MCNC: 525 NG/ML (ref 8–388)
GLOBULIN SER CALC-MCNC: 3 G/DL (ref 2–4)
GLUCOSE SERPL-MCNC: 95 MG/DL (ref 74–99)
HCT VFR BLD AUTO: 38.6 % (ref 36–48)
HGB BLD-MCNC: 13.3 G/DL (ref 13–16)
INR PPP: 1 (ref 0.8–1.2)
IRON SATN MFR SERPL: 58 %
IRON SERPL-MCNC: 163 UG/DL (ref 50–175)
LYMPHOCYTES # BLD: 1.8 K/UL (ref 0.9–3.6)
LYMPHOCYTES NFR BLD: 25 % (ref 21–52)
MCH RBC QN AUTO: 33.3 PG (ref 24–34)
MCHC RBC AUTO-ENTMCNC: 34.5 G/DL (ref 31–37)
MCV RBC AUTO: 96.5 FL (ref 74–97)
MONOCYTES # BLD: 0.8 K/UL (ref 0.05–1.2)
MONOCYTES NFR BLD: 12 % (ref 3–10)
NEUTS SEG # BLD: 3.9 K/UL (ref 1.8–8)
NEUTS SEG NFR BLD: 54 % (ref 40–73)
PLATELET # BLD AUTO: 198 K/UL (ref 135–420)
PMV BLD AUTO: 9.3 FL (ref 9.2–11.8)
POTASSIUM SERPL-SCNC: 4.4 MMOL/L (ref 3.5–5.5)
PROT SERPL-MCNC: 7.1 G/DL (ref 6.4–8.2)
PROTHROMBIN TIME: 13.1 SEC (ref 11.5–15.2)
RBC # BLD AUTO: 4 M/UL (ref 4.7–5.5)
SODIUM SERPL-SCNC: 132 MMOL/L (ref 136–145)
TIBC SERPL-MCNC: 283 UG/DL (ref 250–450)
VIT B12 SERPL-MCNC: 396 PG/ML (ref 211–911)
WBC # BLD AUTO: 7.1 K/UL (ref 4.6–13.2)

## 2019-09-13 PROCEDURE — 85025 COMPLETE CBC W/AUTO DIFF WBC: CPT

## 2019-09-13 PROCEDURE — 82728 ASSAY OF FERRITIN: CPT

## 2019-09-13 PROCEDURE — 80053 COMPREHEN METABOLIC PANEL: CPT

## 2019-09-13 PROCEDURE — 82607 VITAMIN B-12: CPT

## 2019-09-13 PROCEDURE — 83540 ASSAY OF IRON: CPT

## 2019-09-13 PROCEDURE — 84450 TRANSFERASE (AST) (SGOT): CPT

## 2019-09-13 PROCEDURE — 85610 PROTHROMBIN TIME: CPT

## 2019-09-13 PROCEDURE — 86708 HEPATITIS A ANTIBODY: CPT

## 2019-09-13 PROCEDURE — 36415 COLL VENOUS BLD VENIPUNCTURE: CPT

## 2019-09-13 PROCEDURE — 86704 HEP B CORE ANTIBODY TOTAL: CPT

## 2019-09-13 PROCEDURE — 82977 ASSAY OF GGT: CPT

## 2019-09-13 RX ORDER — NORTRIPTYLINE HYDROCHLORIDE 25 MG/1
25 CAPSULE ORAL
Qty: 60 CAP | Refills: 8 | Status: SHIPPED | OUTPATIENT
Start: 2019-09-13 | End: 2020-03-11

## 2019-09-13 NOTE — PROGRESS NOTES
Re:  Donovan Ayala,Follow up visit     9/13/2019 10:10 AM    SSN: [id number]    Subjective:   Vishal Joyner returns for follow up of is neuropathy. The Cymbalta worked great, the burning and stinging is gone, he just feels the numbness. Unfortunately he began to get swelling of the feet, which is better off the Cymbalta. Now on Pamelor which he feels much better on, no side effects. Medications:    Current Outpatient Medications   Medication Sig Dispense Refill    nortriptyline (PAMELOR) 25 mg capsule Take 1 Cap by mouth nightly.  May increase to 2 pills in 3 days 60 Cap 5    lisinopril (PRINIVIL, ZESTRIL) 10 mg tablet   0    gabapentin (NEURONTIN) 300 mg capsule Take 1-2 in the morning and 2 in the evening  Indications: NEUROPATHIC PAIN 754 Cap 1    BYSTOLIC 10 mg tablet take 1 tablet by mouth once daily  0    hydroCHLOROthiazide (HYDRODIURIL) 12.5 mg tablet   0    allopurinol (ZYLOPRIM) 300 mg tablet   0    zolpidem (AMBIEN) 10 mg tablet take 1 tablet by mouth at bedtime if needed  0    traMADol (ULTRAM) 50 mg tablet 1 po bid as needed for severe pain  Indications: NEUROPATHIC PAIN, Pain 14 Tab 0    meloxicam (MOBIC) 15 mg tablet   0       Vital signs:    Visit Vitals  /80 (BP 1 Location: Right arm, BP Patient Position: Sitting)   Pulse 65   Temp 97.7 °F (36.5 °C) (Oral)   Resp 16   Ht 5' 11\" (1.803 m)   Wt 106.1 kg (234 lb)   SpO2 98%   BMI 32.64 kg/m²       Review of Systems:   As above otherwise 11 point review of systems negative including;   Constitutional no fever or chills  Skin denies rash or itching  HEENT  Denies tinnitus, hearing lose  Eyes denies diplopia vision lose  Respiratory denies sortness of breath  Cardiovascular denies chest pain, dyspnea on exertion  Gastrointestinal denies nausea, vomiting, diarrhea, constipation  Genitourinary denies incontinence  Musculoskeletal denies joint pain or swelling  Endocrine denies weight change  Hematology denies easy bruising or bleeding   Neurological as above in HPI      Patient Active Problem List   Diagnosis Code    HNP (herniated nucleus pulposus), lumbar M51.26    Neuritis M79.2         Objective: The patient is awake, alert, and oriented x 4. Fund of knowledge is adequate. Speech is fluent and memory is intact. Cranial Nerves: II - Visual fields are full to confrontation. III, IV, VI - Extraocular movements are intact. There is no nystagmus. V - Facial sensation is intact to pinprick. VII - Face is symmetrical.  VIII - Hearing is present. IX, X, XII - Palate is symmetrical.   XI - Shoulder shrugging and head turning intact  Motor: The patient moves all four limbs fairly well and symmetrically. Tone is normal. Reflexes are 2+ and symmetrical. Plantars are down going. Gait is normal at this time.     CBC:   Lab Results   Component Value Date/Time    WBC 10.9 08/18/2010 12:06 AM    RBC 4.75 08/18/2010 12:06 AM    HGB 15.6 08/18/2010 12:06 AM    HCT 44.7 08/18/2010 12:06 AM    PLATELET 525 11/40/1326 12:06 AM     BMP:   Lab Results   Component Value Date/Time    Glucose 122 (H) 08/18/2010 12:06 AM    Sodium 136 08/18/2010 12:06 AM    Potassium 3.3 (L) 08/18/2010 12:06 AM    Chloride 101 08/18/2010 12:06 AM    CO2 22 08/18/2010 12:06 AM    BUN 20 (H) 08/18/2010 12:06 AM    Creatinine 1.6 (H) 08/18/2010 12:06 AM    Calcium 8.4 08/18/2010 12:06 AM     CMP:   Lab Results   Component Value Date/Time    Glucose 122 (H) 08/18/2010 12:06 AM    Sodium 136 08/18/2010 12:06 AM    Potassium 3.3 (L) 08/18/2010 12:06 AM    Chloride 101 08/18/2010 12:06 AM    CO2 22 08/18/2010 12:06 AM    BUN 20 (H) 08/18/2010 12:06 AM    Creatinine 1.6 (H) 08/18/2010 12:06 AM    Calcium 8.4 08/18/2010 12:06 AM    Anion gap 13 08/18/2010 12:06 AM    BUN/Creatinine ratio 13 08/18/2010 12:06 AM     Coagulation: No results found for: PTP, INR, APTT, PTTT  Cardiac markers: No results found for: CPK, CKND1, MARLY     SPEP was done and was normal    Assessment:  Idiopathic neuropathy. , better on Cymbalta, but now with significant side effects. Better on low dose Pamelor without side effects. Plan:  Continue Pamelor and RTC in 6 moinths. Sincerely,        Nakia Hurley.  Nayla Healy M.D.

## 2019-09-13 NOTE — LETTER
9/13/2019 10:12 AM 
 
Patient:  Kahlil Rogers YOB: 1950 Date of Visit: 9/13/2019 Dear MD Patricia Chung [address] [place] [id number] VIA Facsimile: [phone] 
 : Thank you for referring Mr. Latoya Beverly to me for evaluation/treatment. Below are the relevant portions of my assessment and plan of care. If you have questions, please do not hesitate to call me. I look forward to following Mr. Ananya Mckenzie along with you.  
 
 
 
Sincerely, 
 
 
Aravind Lim MD

## 2019-09-14 LAB
GGT SERPL-CCNC: 202 U/L (ref 15–85)
HAV AB SER QL IA: POSITIVE
HBV CORE AB SERPL QL IA: NEGATIVE

## 2019-09-17 ENCOUNTER — HOSPITAL ENCOUNTER (OUTPATIENT)
Dept: LAB | Age: 69
Discharge: HOME | End: 2019-09-17
Payer: COMMERCIAL

## 2019-09-17 DIAGNOSIS — R74.8 ACID PHOSPHATASE ELEVATED: ICD-10-CM

## 2019-09-17 DIAGNOSIS — R77.8 ELEVATED TOTAL PROTEIN: ICD-10-CM

## 2019-09-17 LAB
A2 MACROGLOB SERPL-MCNC: 221 MG/DL (ref 110–276)
ALT (SGPT) P5P, 001547: 37 IU/L (ref 0–55)
APO A-I SERPL-MCNC: 169 MG/DL (ref 101–178)
AST SERPL W P-5'-P-CCNC: 50 IU/L (ref 0–40)
BILIRUB SERPL-MCNC: 0.3 MG/DL (ref 0–1.2)
CHOLEST SERPL-MCNC: 186 MG/DL (ref 100–199)
COMMENT:: ABNORMAL
FIBROSIS SCORING:, 550107: ABNORMAL
FIBROSIS STAGE SERPL QL: ABNORMAL
GGT SERPL-CCNC: 153 IU/L (ref 0–65)
GLUCOSE SERPL-MCNC: 97 MG/DL (ref 65–99)
HAPTOGLOB SERPL-MCNC: 107 MG/DL (ref 34–200)
HEIGHT (NASH), 13912: 71
INTERPRETATIONS:, 550143: ABNORMAL
LIVER FIBR SCORE SERPL CALC.FIBROSURE: 0.41 (ref 0–0.21)
NASH SCORING, 550144: ABNORMAL
NECROINFLAMMATORY ACT GRADE SERPL QL: ABNORMAL
NECROINFLAMMATORY ACT SCORE SERPL: 0.5
SERVICE CMNT-IMP: ABNORMAL
STEATOSIS GRADE, 550153: ABNORMAL
STEATOSIS GRADING, 550189: ABNORMAL
STEATOSIS SCORE, 550149: 0.72 (ref 0–0.3)
TRIGL SERPL-MCNC: 181 MG/DL (ref 0–149)
WEIGHT (NASH): 220

## 2019-09-17 PROCEDURE — 36415 COLL VENOUS BLD VENIPUNCTURE: CPT

## 2019-09-17 PROCEDURE — 81256 HFE GENE: CPT

## 2019-09-23 LAB — HFE GENE MUT ANL BLD/T: NORMAL

## 2020-03-11 ENCOUNTER — OFFICE VISIT (OUTPATIENT)
Dept: NEUROLOGY | Age: 70
End: 2020-03-11

## 2020-03-11 VITALS
HEART RATE: 76 BPM | TEMPERATURE: 98 F | RESPIRATION RATE: 18 BRPM | HEIGHT: 71 IN | SYSTOLIC BLOOD PRESSURE: 110 MMHG | OXYGEN SATURATION: 96 % | DIASTOLIC BLOOD PRESSURE: 70 MMHG | BODY MASS INDEX: 33.18 KG/M2 | WEIGHT: 237 LBS

## 2020-03-11 DIAGNOSIS — M79.2 NEURITIS: ICD-10-CM

## 2020-03-11 DIAGNOSIS — G62.9 NEUROPATHY: Primary | ICD-10-CM

## 2020-03-11 RX ORDER — NORTRIPTYLINE HYDROCHLORIDE 25 MG/1
25 CAPSULE ORAL
Qty: 60 CAP | Refills: 6 | Status: SHIPPED | OUTPATIENT
Start: 2020-03-11 | End: 2020-09-09

## 2020-03-11 NOTE — PATIENT INSTRUCTIONS
Nortriptyline (By mouth) Nortriptyline (nor-TRIP-ti-fanta) Treats depression. This medicine is a TCA. Brand Name(s): Pamelor There may be other brand names for this medicine. When This Medicine Should Not Be Used: This medicine is not right for everyone. Do not use it if you had an allergic reaction to nortriptyline or similar medicines, or you had a recent heart attack. How to Use This Medicine:  
Capsule, Liquid · Take your medicine as directed. Your dose may need to be changed several times to find what works best for you. · Measure the oral liquid medicine with a marked measuring spoon, oral syringe, or medicine cup. · This medicine should come with a Medication Guide. Ask your pharmacist for a copy if you do not have one. · Missed dose: Take a dose as soon as you remember. If it is almost time for your next dose, wait until then and take a regular dose. Do not take extra medicine to make up for a missed dose. · Store the medicine in a closed container at room temperature, away from heat, moisture, and direct light. Drugs and Foods to Avoid: Ask your doctor or pharmacist before using any other medicine, including over-the-counter medicines, vitamins, and herbal products. · Do not use this medicine and an MAO inhibitor (MAOI) within 14 days of each other. · Tell your doctor if you are using the following:  
¨ Buspirone, chlorpropamide, cimetidine, fentanyl, guanethidine, lithium, reserpine, Bandar's wort, tramadol, tryptophan ¨ Thyroid medicine, a phenothiazine medicine (such as chlorpromazine, perphenazine, promethazine, prochlorperazine, thioridazine), medicine for heart rhythm problems (propafenone, flecainide), triptan medicine to treat migraine headaches · Alcohol, narcotic pain relievers, or sleeping pills may cause you to feel more lightheaded, dizzy, or faint when used with this medicine. Tell your doctor if you drink alcohol or use pain relievers or sleeping pills. Warnings While Using This Medicine: · Tell your doctor if you are pregnant or breastfeeding, or if you have heart disease, heart rhythm problems, glaucoma, depression, an overactive thyroid, trouble urinating, or a history of seizures. · For some children, teenagers, and young adults, this medicine may increase mental or emotional problems. This may lead to thoughts of suicide and violence. Talk with your doctor right away if you have any thoughts or behavior changes that concern you. Tell your doctor if you or anyone in your family has a history of bipolar disorder or suicide attempts. · This medicine may cause serotonin syndrome, especially if you take it with certain other medicines. · This medicine may make you dizzy or drowsy. Do not drive or do anything that could be dangerous until you know how this medicine affects you. · Do not stop using this medicine suddenly. Your doctor will need to slowly decrease your dose before you stop it completely. · Tell any doctor or dentist who treats you that you are using this medicine. You may need to stop using this medicine several days before you have surgery or medical tests. · Keep all medicine out of the reach of children. Never share your medicine with anyone. Possible Side Effects While Using This Medicine:  
Call your doctor right away if you notice any of these side effects: · Allergic reaction: Itching or hives, swelling in your face or hands, swelling or tingling in your mouth or throat, chest tightness, trouble breathing · Anxiety, restlessness, fever, sweating, muscle spasms, twitching, nausea, vomiting, diarrhea, seeing or hearing things that are not there · Change in how much or how often you urinate, problems urinating · Chest pain or fast, pounding, or uneven heartbeat · Eye pain, vision changes, seeing halos around lights · Seizures or tremors · Thoughts of hurting yourself or others, unusual behavior If you notice other side effects that you think are caused by this medicine, tell your doctor. Call your doctor for medical advice about side effects. You may report side effects to FDA at 4-520-IZX-7833 © 2017 [facility] Information is for End User's use only and may not be sold, redistributed or otherwise used for commercial purposes. The above information is an  only. It is not intended as medical advice for individual conditions or treatments. Talk to your doctor, nurse or pharmacist before following any medical regimen to see if it is safe and effective for you. Preventing Falls: After Your Visit Your Care Instructions Getting around your home safely can be a challenge if you have injuries or health problems that make it easy for you to fall. Loose rugs and furniture in walkways are among the dangers for many older people who have problems walking or who have poor eyesight. People who have conditions such as arthritis, osteoporosis, or dementia also have to be careful not to fall. You can make your home safer with a few simple measures. Follow-up care is a key part of your treatment and safety. Be sure to make and go to all appointments, and call your doctor if you are having problems. It's also a good idea to know your test results and keep a list of the medicines you take. How can you care for yourself at home? Taking care of yourself You may get dizzy if you do not drink enough water. To prevent dehydration, drink plenty of fluids, enough so that your urine is light yellow or clear like water. Choose water and other caffeine-free clear liquids. If you have kidney, heart, or liver disease and have to limit fluids, talk with your doctor before you increase the amount of fluids you drink. Exercise regularly to improve your strength, muscle tone, and balance. Walk if you can. Swimming may be a good choice if you cannot walk easily. Have your vision and hearing checked each year or any time you notice a change. If you have trouble seeing and hearing, you might not be able to avoid objects and could lose your balance. Know the side effects of the medicines you take. Ask your doctor or pharmacist whether the medicines you take can affect your balance. Sleeping pills or sedatives can affect your balance. Limit the amount of alcohol you drink. Alcohol can impair your balance and other senses. Ask your doctor whether calluses or corns on your feet need to be removed. If you wear loose-fitting shoes because of calluses or corns, you can lose your balance and fall. Talk to your doctor if you have numbness in your feet. Preventing falls at home Remove raised doorway thresholds, throw rugs, and clutter. Repair loose carpet or raised areas in the floor. Move furniture and electrical cords to keep them out of walking paths. Use nonskid floor wax, and wipe up spills right away, especially on ceramic tile floors. If you use a walker or cane, put rubber tips on it. If you use crutches, clean the bottoms of them regularly with an abrasive pad, such as steel wool. Keep your house well lit, especially stairways, porches, and outside walkways. Use night-lights in areas such as hallways and bathrooms. Add extra light switches or use remote switches (such as switches that go on or off when you clap your hands) to make it easier to turn lights on if you have to get up during the night. Install sturdy handrails on stairways. Move items in your cabinets so that the things you use a lot are on the lower shelves (about waist level). Keep a cordless phone and a flashlight with new batteries by your bed. If possible, put a phone in each of the main rooms of your house, or carry a cell phone in case you fall and cannot reach a phone. Or, you can wear a device around your neck or wrist. You push a button that sends a signal for help. Wear low-heeled shoes that fit well and give your feet good support. Use footwear with nonskid soles. Check the heels and soles of your shoes for wear. Repair or replace worn heels or soles. Do not wear socks without shoes on wood floors. Walk on the grass when the sidewalks are slippery. If you live in an area that gets snow and ice in the winter, sprinkle salt on slippery steps and sidewalks. Preventing falls in the bath Install grab bars and nonskid mats inside and outside your shower or tub and near the toilet and sinks. Use shower chairs and bath benches. Use a hand-held shower head that will allow you to sit while showering. Get into a tub or shower by putting the weaker leg in first. Get out of a tub or shower with your strong side first.  
Repair loose toilet seats and consider installing a raised toilet seat to make getting on and off the toilet easier. Keep your bathroom door unlocked while you are in the shower. Where can you learn more? Go to [employer] Enter [phone] in the search box to learn more about \"Preventing Falls: After Your Visit. \"   
© 9820-7121 [employer]. Care instructions adapted under license by [employer] [facility] (which disclaims liability or warranty for this information). This care instruction is for use with your licensed healthcare professional. If you have questions about a medical condition or this instruction, always ask your healthcare professional. [place] [address] any warranty or liability for your use of this information. Content Version: 10.1.263928; Current as of: May 15, 2013 Thank you for choosing [facility] [facility] and [address] [place] [address] [facility] Clinic for your  
 
care. You may receive a survey about your visit. We appreciate you taking time  
 
to complete this survey as we use your feedback to improve our services. We  
 
realize we are not perfect, but we strive to provide excellent care.

## 2020-03-11 NOTE — PROGRESS NOTES
[facility]  [address], [address], [place], [address]  [address] [place], [address]  Office:  [phone]  Fax: [phone]  Chief Complaint   Patient presents with    Neuropathy     follow up       HPI: Princess Tineo presents in follow-up for neuropathy. He was last seen here in September 2019 by Dr. Blake Nieto. At that time it was noted that the Cymbalta was helping for the burning and stinging pain and he just felt the numbness, but he experienced side effect of swelling of the feet which improved off the Cymbalta. Then he was on Pamelor which was helping without side effects. He was first seen here in December 2018 for 2 years of pain in his feet working its way up to his knees. He has history of hypertension but no diabetes. The right side was worse than the left. It does not go above the knee. It is an aching sensation starting from the arch of the foot. His feet are always cold. He was on gabapentin at that time as well as tramadol. He had an EMG at that time which was an abnormal nerve conduction and EMG study showing signs of a diffuse sensory worsening motor neuropathy of both lower extremities which would be supportive of the diagnosis of a peripheral neuropathy. History of heavy alcohol use in the past.  It was noted that he had lab work for neuropathy and had normal results. He has some labs found in the media section including hemoglobin A1c in August 2018 which was normal, 4.9, and vitamin B12 and folate normal.    He presents today in follow-up. He notes that he is still having the pain affecting the feet and lower legs. He has to think when he is getting ready to walk. He had a couple falls. One was at a store in September in which his foot tripped on the shopping cart when he thought he cleared his foot from the shopping cart and one was last year in April. He reports that he does not have good balance.   He has a list of his medications here and it appears that he is no longer on the Pamelor. He is also no longer on gabapentin. He notes that the pain is constant. He wakes up at night to go to the bathroom but he does use a light. He can feel the pain at night and during the daytime. In the evening it is rated at about an 8 out of 10. He lives by himself. He is retired. He notes that the Cymbalta gave him significant side effects. He was found to have hemochromatosis so he has been undergoing phlebotomy. He is retired. He decreased his alcohol intake to almost nothing. Past Medical History:   Diagnosis Date    Arthritis     Ill-defined condition     GOUT       Past Surgical History:   Procedure Laterality Date    HX ORTHOPAEDIC      LEFT HAND       Current Outpatient Medications   Medication Sig Dispense Refill    nortriptyline (PAMELOR) 25 mg capsule Take 1 Cap by mouth nightly. After 1 week, can increase to 2 caps by mouth nightly. 60 Cap 6    BYSTOLIC 10 mg tablet take 1 tablet by mouth once daily  0    meloxicam (MOBIC) 15 mg tablet   0    allopurinol (ZYLOPRIM) 300 mg tablet   0    zolpidem (AMBIEN) 10 mg tablet take 1 tablet by mouth at bedtime if needed  0        No Known Allergies    Social History     Tobacco Use    Smoking status: Never Smoker    Smokeless tobacco: Never Used   Substance Use Topics    Alcohol use: Yes     Alcohol/week: 6.0 standard drinks     Types: 6 Cans of beer per week     Comment: weekly on weekends    Drug use: Not on file       History reviewed. No pertinent family history. Review of Systems:  GENERAL: Denies fever or fatigue  CARDIAC: No CP or SOB  PULMONARY: No cough or SOB  MUSCULOSKELETAL: No new joint pain  NEURO: SEE HPI    Physical Examination:  Visit Vitals  /70 (BP 1 Location: Left arm, BP Patient Position: Sitting)   Pulse 76   Temp 98 °F (36.7 °C)   Resp 18   Ht 5' 11\" (1.803 m)   Wt 107.5 kg (237 lb)   SpO2 96%   BMI 33.05 kg/m²       Alert, in NAD.  Heart is regular. Oriented x3. Fund of knowledge is adequate. Speech is fluent and memory appears intact. Speech is clear. EOMs are full, PERRL, VFFTC, no nystagmus. No facial asymmetry. Tongue midline. Strength and tone are normal. DTRs +2. Gait symmetric. Impression/Plan: This is a 43-year-old male who presents in follow-up for neuropathy, believed idiopathic. He does have a risk factor including heavy alcohol usage in the past.  He has his list of medications here and appears to no longer be on Pamelor. He denies side effects on the medication that he knows of, reporting that he believes the Cymbalta was the one that caused to the side effects. Will restart the medication at this time at 25 mg nightly and may increase to 2 capsules after 1 week. Discussed with him to call back if he finds that he is on the medication or if he experiences any side effects on the medication. Discussed potential side effects and provided written information. Discussed safety measures. Encouraged physical therapy referral for safety and mobility but he would like to hold off on this for now. Follow up in about 3 months. Diagnoses and all orders for this visit:    1. Neuropathy  -     nortriptyline (PAMELOR) 25 mg capsule; Take 1 Cap by mouth nightly. After 1 week, can increase to 2 caps by mouth nightly. 2. Neuritis      Total time 30 minutes with 20 minutes spent in counseling. Signed By: Daniel Peña NP      This note will not be viewable in [address] PLEASE NOTE:   Portions of this document may have been produced using voice recognition software. Unrecognized errors in transcription may be present.

## 2020-03-11 NOTE — PROGRESS NOTES
Chief Complaint   Patient presents with    Neuropathy     follow up       Kathleen Pastor presents today for   Chief Complaint   Patient presents with    Neuropathy     follow up       Is someone accompanying this pt? no    Is the patient using any DME equipment during [address]? no    Depression Screening:  3 most recent PHQ Screens 3/11/2020   Little interest or pleasure in doing things Not at all   Feeling down, depressed, irritable, or hopeless Not at all   Total Score PHQ 2 0       Learning Assessment:  Learning Assessment 2/21/2019   PRIMARY LEARNER Patient   HIGHEST LEVEL OF EDUCATION - PRIMARY LEARNER  DID NOT GRADUATE HIGH SCHOOL   BARRIERS PRIMARY LEARNER HEARING   CO-LEARNER CAREGIVER No   PRIMARY LANGUAGE ENGLISH   LEARNER PREFERENCE PRIMARY DEMONSTRATION   ANSWERED BY Patient   RELATIONSHIP SELF       Abuse Screening:  No flowsheet data found. Fall Risk  Fall Risk Assessment, last 12 mths 3/11/2020   Able to walk? Yes   Fall in past 12 months? Yes   Fall with injury? No   Number of falls in past 12 months 2   Fall Risk Score 2         Coordination of Care:  1. Have you been to the ER, urgent care clinic since your last visit? Hospitalized since your last visit? no    2. Have you seen or consulted any other health care providers outside of the [address] [place] [address] since your last visit? Include any pap smears or colon screening.  no

## 2020-07-28 ENCOUNTER — OFFICE VISIT (OUTPATIENT)
Dept: VASCULAR SURGERY | Age: 70
End: 2020-07-28

## 2020-07-28 VITALS — HEIGHT: 71 IN | WEIGHT: 237 LBS | BODY MASS INDEX: 33.18 KG/M2

## 2020-07-28 DIAGNOSIS — M79.89 LEG SWELLING: Primary | ICD-10-CM

## 2020-07-28 DIAGNOSIS — M79.605 PAIN IN BOTH LOWER EXTREMITIES: ICD-10-CM

## 2020-07-28 DIAGNOSIS — E83.119 HEMOCHROMATOSIS, UNSPECIFIED HEMOCHROMATOSIS TYPE: ICD-10-CM

## 2020-07-28 DIAGNOSIS — G62.9 NEUROPATHY: ICD-10-CM

## 2020-07-28 DIAGNOSIS — M79.604 PAIN IN BOTH LOWER EXTREMITIES: ICD-10-CM

## 2021-01-01 DIAGNOSIS — G62.9 NEUROPATHY: ICD-10-CM

## 2021-01-04 RX ORDER — NORTRIPTYLINE HYDROCHLORIDE 25 MG/1
CAPSULE ORAL
Qty: 180 CAP | Refills: 0 | Status: SHIPPED | OUTPATIENT
Start: 2021-01-04 | End: 2021-03-31

## 2021-02-16 NOTE — PATIENT INSTRUCTIONS

## 2021-03-02 ENCOUNTER — TRANSCRIBE ORDER (OUTPATIENT)
Dept: SCHEDULING | Age: 71
End: 2021-03-02

## 2021-03-02 DIAGNOSIS — E83.119 HEMOCHROMATOSIS: Primary | ICD-10-CM

## 2021-03-08 ENCOUNTER — HOSPITAL ENCOUNTER (OUTPATIENT)
Dept: ULTRASOUND IMAGING | Age: 71
Discharge: HOME | End: 2021-03-08
Attending: NURSE PRACTITIONER
Payer: MEDICARE

## 2021-03-08 DIAGNOSIS — E83.119 HEMOCHROMATOSIS: ICD-10-CM

## 2021-03-08 PROCEDURE — 76705 ECHO EXAM OF ABDOMEN: CPT

## 2021-03-09 ENCOUNTER — TRANSCRIBE ORDER (OUTPATIENT)
Dept: SCHEDULING | Age: 71
End: 2021-03-09

## 2021-03-09 DIAGNOSIS — R93.2 ABNORMAL LIVER ULTRASOUND: Primary | ICD-10-CM

## 2021-03-09 DIAGNOSIS — E83.119 HEMOCHROMATOSIS: ICD-10-CM

## 2021-03-17 ENCOUNTER — HOSPITAL ENCOUNTER (OUTPATIENT)
Age: 71
Discharge: HOME | End: 2021-03-17
Attending: NURSE PRACTITIONER
Payer: MEDICARE

## 2021-03-17 DIAGNOSIS — E83.119 HEMOCHROMATOSIS: ICD-10-CM

## 2021-03-17 DIAGNOSIS — R93.2 ABNORMAL LIVER ULTRASOUND: ICD-10-CM

## 2021-03-17 LAB — CREAT UR-MCNC: 1.6 MG/DL (ref 0.6–1.3)

## 2021-03-17 PROCEDURE — 82565 ASSAY OF CREATININE: CPT

## 2021-03-17 PROCEDURE — 74183 MRI ABD W/O CNTR FLWD CNTR: CPT

## 2021-03-17 PROCEDURE — A9577 INJ MULTIHANCE: HCPCS

## 2021-03-17 PROCEDURE — 74011250636 HC RX REV CODE- 250/636

## 2021-03-17 RX ADMIN — GADOBENATE DIMEGLUMINE 20 ML: 529 INJECTION, SOLUTION INTRAVENOUS at 16:00

## 2021-03-31 DIAGNOSIS — G62.9 NEUROPATHY: ICD-10-CM

## 2021-03-31 RX ORDER — NORTRIPTYLINE HYDROCHLORIDE 25 MG/1
CAPSULE ORAL
Qty: 180 CAP | Refills: 0 | Status: SHIPPED | OUTPATIENT
Start: 2021-03-31 | End: 2021-06-29

## 2021-05-24 ENCOUNTER — TRANSCRIBE ORDER (OUTPATIENT)
Dept: SCHEDULING | Age: 71
End: 2021-05-24

## 2021-05-24 DIAGNOSIS — R74.8 ACID PHOSPHATASE ELEVATED: Primary | ICD-10-CM

## 2021-05-27 ENCOUNTER — TRANSCRIBE ORDER (OUTPATIENT)
Dept: SCHEDULING | Age: 71
End: 2021-05-27

## 2021-05-27 DIAGNOSIS — R74.8 ACID PHOSPHATASE ELEVATED: Primary | ICD-10-CM

## 2021-06-28 DIAGNOSIS — G62.9 NEUROPATHY: ICD-10-CM

## 2021-06-29 RX ORDER — NORTRIPTYLINE HYDROCHLORIDE 25 MG/1
CAPSULE ORAL
Qty: 180 CAPSULE | Refills: 0 | Status: SHIPPED | OUTPATIENT
Start: 2021-06-29 | End: 2021-10-04

## 2021-10-13 ENCOUNTER — HOSPITAL ENCOUNTER (EMERGENCY)
Age: 71
Discharge: HOME | End: 2021-10-13
Attending: EMERGENCY MEDICINE
Payer: MEDICARE

## 2021-10-13 ENCOUNTER — APPOINTMENT (OUTPATIENT)
Dept: GENERAL RADIOLOGY | Age: 71
End: 2021-10-13
Attending: EMERGENCY MEDICINE
Payer: MEDICARE

## 2021-10-13 VITALS
OXYGEN SATURATION: 98 % | WEIGHT: 220 LBS | SYSTOLIC BLOOD PRESSURE: 131 MMHG | TEMPERATURE: 97.7 F | HEART RATE: 70 BPM | RESPIRATION RATE: 16 BRPM | BODY MASS INDEX: 30.68 KG/M2 | DIASTOLIC BLOOD PRESSURE: 73 MMHG

## 2021-10-13 DIAGNOSIS — M51.37 DDD (DEGENERATIVE DISC DISEASE), LUMBOSACRAL: Primary | ICD-10-CM

## 2021-10-13 PROCEDURE — 99281 EMR DPT VST MAYX REQ PHY/QHP: CPT

## 2021-10-13 PROCEDURE — 72110 X-RAY EXAM L-2 SPINE 4/>VWS: CPT

## 2021-10-13 RX ORDER — BENZYL ALCOHOL, LIDOCAINE HYDROCHLORIDE 10; 4 G/100G; G/100G
1 CREAM PERCUTANEOUS; TOPICAL; TRANSDERMAL
Qty: 1 EACH | Refills: 0 | Status: SHIPPED | OUTPATIENT
Start: 2021-10-13

## 2021-10-13 RX ORDER — OXYCODONE AND ACETAMINOPHEN 5; 325 MG/1; MG/1
1 TABLET ORAL
Qty: 12 TABLET | Refills: 0 | Status: SHIPPED | OUTPATIENT
Start: 2021-10-13 | End: 2021-10-16

## 2021-10-13 NOTE — ED PROVIDER NOTES
EMERGENCY DEPARTMENT HISTORY AND PHYSICAL EXAM    Date: 10/13/2021  Patient Name: Marlin Duffy    History of Presenting Illness     Chief Complaint   Patient presents with    Back Pain         History Provided By: Patient    Additional History (Context): Marlin Duffy is a 80 y.o. male with osteoarthritis who presents with c/o low back pain x 1 week. Denies saddle anesthesia, bowel incontinence, fever, rash, trauma, sciatica, urinary retention, IVDU. Has lost weight this past month. PCP: Deangelo Will MD    Current Outpatient Medications   Medication Sig Dispense Refill    lidocaine HCL-benzyl alcohoL (Salonpas Lidocaine Plus) 4-10 % crea 1 Actuation(s) by Apply Externally route two (2) times daily as needed for Pain. 1 Each 0    oxyCODONE-acetaminophen (Percocet) 5-325 mg per tablet Take 1 Tablet by mouth every six (6) hours as needed for Pain for up to 3 days. Max Daily Amount: 4 Tablets. 12 Tablet 0    nortriptyline (PAMELOR) 25 mg capsule take 1 capsule by mouth every evening ;AFTER 1 WEEK, CAN INCREASE TO 2 CAPSULES BY MOUTH NIGHTLY 60 Capsule 0    BYSTOLIC 10 mg tablet take 1 tablet by mouth once daily  0    meloxicam (MOBIC) 15 mg tablet   0    allopurinol (ZYLOPRIM) 300 mg tablet   0    zolpidem (AMBIEN) 10 mg tablet take 1 tablet by mouth at bedtime if needed  0       Past History     Past Medical History:  Past Medical History:   Diagnosis Date    Arthritis     Ill-defined condition     GOUT       Past Surgical History:  Past Surgical History:   Procedure Laterality Date    HX ORTHOPAEDIC      LEFT HAND       Family History:  No family history on file. Social History:  Social History     Tobacco Use    Smoking status: Never Smoker    Smokeless tobacco: Never Used   Substance Use Topics    Alcohol use:  Yes     Alcohol/week: 6.0 standard drinks     Types: 6 Cans of beer per week     Comment: weekly on weekends    Drug use: Not on file       Allergies:  No Known Allergies      Review of Systems   Review of Systems   Constitutional: Positive for unexpected weight change. Negative for fever. Genitourinary: Negative for difficulty urinating. Musculoskeletal: Positive for back pain. Skin: Negative for rash and wound. Neurological: Negative for weakness and numbness. All Other Systems Negative  Physical Exam     Vitals:    10/13/21 0802   BP: 131/73   Pulse: 70   Resp: 16   Temp: 97.7 °F (36.5 °C)   SpO2: 98%   Weight: 99.8 kg (220 lb)     Physical Exam  Vitals and nursing note reviewed. Constitutional:       General: He is not in acute distress. Appearance: He is well-developed. He is not ill-appearing, toxic-appearing or diaphoretic. HENT:      Head: Normocephalic and atraumatic. Neck:      Thyroid: No thyromegaly. Vascular: No carotid bruit. Trachea: No tracheal deviation. Cardiovascular:      Rate and Rhythm: Normal rate and regular rhythm. Heart sounds: Normal heart sounds. No murmur heard. No friction rub. No gallop. Pulmonary:      Effort: Pulmonary effort is normal. No respiratory distress. Breath sounds: Normal breath sounds. No stridor. No wheezing or rales. Chest:      Chest wall: No tenderness. Abdominal:      General: There is no distension. Palpations: Abdomen is soft. There is no mass. Tenderness: There is no abdominal tenderness. There is no guarding or rebound. Comments: No pulsatile mass palpated. Musculoskeletal:         General: Tenderness present. Normal range of motion. Cervical back: Normal range of motion and neck supple. Comments: Diffuse lower lumbar spine TTP. Skin:     General: Skin is warm and dry. Coloration: Skin is not pale. Neurological:      Mental Status: He is alert. Psychiatric:         Speech: Speech normal.         Behavior: Behavior normal.         Thought Content:  Thought content normal.         Judgment: Judgment normal.          Diagnostic Study Results     Labs -   No results found for this or any previous visit (from the past 12 hour(s)). Radiologic Studies -   XR SPINE LUMB MIN 4 V    (Results Pending)     CT Results  (Last 48 hours)    None        CXR Results  (Last 48 hours)    None            Medical Decision Making   I am the first provider for this patient. I reviewed the vital signs, available nursing notes, past medical history, past surgical history, family history and social history. Vital Signs-Reviewed the patient's vital signs. Records Reviewed: Nursing Notes    Procedures:  Procedures    Provider Notes (Medical Decision Making): image. Severe DDD on x-rays. No concern for epidural abscess, cauda equina, spinal cord hematoma. Treat pain; refer to PCP. MED RECONCILIATION:  No current facility-administered medications for this encounter. Current Outpatient Medications   Medication Sig    lidocaine HCL-benzyl alcohoL (Salonpas Lidocaine Plus) 4-10 % crea 1 Actuation(s) by Apply Externally route two (2) times daily as needed for Pain.  oxyCODONE-acetaminophen (Percocet) 5-325 mg per tablet Take 1 Tablet by mouth every six (6) hours as needed for Pain for up to 3 days. Max Daily Amount: 4 Tablets.  nortriptyline (PAMELOR) 25 mg capsule take 1 capsule by mouth every evening ;AFTER 1 WEEK, CAN INCREASE TO 2 CAPSULES BY MOUTH NIGHTLY    BYSTOLIC 10 mg tablet take 1 tablet by mouth once daily    meloxicam (MOBIC) 15 mg tablet     allopurinol (ZYLOPRIM) 300 mg tablet     zolpidem (AMBIEN) 10 mg tablet take 1 tablet by mouth at bedtime if needed       Disposition:  Home    DISCHARGE NOTE:   8:38 AM    Pt has been reexamined. Patient has no new complaints, changes, or physical findings. Care plan outlined and precautions discussed. Results of x-rays were reviewed with the patient. All medications were reviewed with the patient; will d/c home with lidocaine and percocet.  All of pt's questions and concerns were addressed. Patient was instructed and agrees to follow up with PCP, as well as to return to the ED upon further deterioration. Patient is ready to go home. Follow-up Information     Follow up With Specialties Details Why Contact Info    Aida Hills MD Family Medicine Schedule an appointment as soon as possible for a visit in 3 days As needed [address] [facility] [address] [address] [phone]      [facility] EMERGENCY DEPT Emergency Medicine  If symptoms worsen return immediately [address] [place] [address]  [phone]          Current Discharge Medication List      START taking these medications    Details   lidocaine HCL-benzyl alcohoL (Salonpas Lidocaine Plus) 4-10 % crea 1 Actuation(s) by Apply Externally route two (2) times daily as needed for Pain. Qty: 1 Each, Refills: 0  Start date: 10/13/2021      oxyCODONE-acetaminophen (Percocet) 5-325 mg per tablet Take 1 Tablet by mouth every six (6) hours as needed for Pain for up to 3 days. Max Daily Amount: 4 Tablets. Qty: 12 Tablet, Refills: 0  Start date: 10/13/2021, End date: 10/16/2021    Associated Diagnoses: DDD (degenerative disc disease), lumbosacral             Diagnosis     Clinical Impression:   1.  DDD (degenerative disc disease), lumbosacral

## 2021-10-13 NOTE — ED TRIAGE NOTES
Pt presents to ED w/ lower back pain x 1 week. Pt states no injury has occurred & he has had no relief w/ home pain relievers.

## 2021-11-24 ENCOUNTER — OFFICE VISIT (OUTPATIENT)
Dept: ORTHOPEDIC SURGERY | Age: 71
End: 2021-11-24
Payer: MEDICARE

## 2021-11-24 VITALS
HEIGHT: 71 IN | BODY MASS INDEX: 30.38 KG/M2 | HEART RATE: 117 BPM | OXYGEN SATURATION: 98 % | WEIGHT: 217 LBS | TEMPERATURE: 97.6 F

## 2021-11-24 DIAGNOSIS — M48.061 LUMBAR STENOSIS WITHOUT NEUROGENIC CLAUDICATION: ICD-10-CM

## 2021-11-24 DIAGNOSIS — M47.816 LUMBAR SPONDYLOSIS: Primary | ICD-10-CM

## 2021-11-24 PROCEDURE — G8536 NO DOC ELDER MAL SCRN: HCPCS | Performed by: PHYSICAL MEDICINE & REHABILITATION

## 2021-11-24 PROCEDURE — 3017F COLORECTAL CA SCREEN DOC REV: CPT | Performed by: PHYSICAL MEDICINE & REHABILITATION

## 2021-11-24 PROCEDURE — 99204 OFFICE O/P NEW MOD 45 MIN: CPT | Performed by: PHYSICAL MEDICINE & REHABILITATION

## 2021-11-24 PROCEDURE — G8417 CALC BMI ABV UP PARAM F/U: HCPCS | Performed by: PHYSICAL MEDICINE & REHABILITATION

## 2021-11-24 PROCEDURE — G8427 DOCREV CUR MEDS BY ELIG CLIN: HCPCS | Performed by: PHYSICAL MEDICINE & REHABILITATION

## 2021-11-24 PROCEDURE — G8432 DEP SCR NOT DOC, RNG: HCPCS | Performed by: PHYSICAL MEDICINE & REHABILITATION

## 2021-11-24 PROCEDURE — 1101F PT FALLS ASSESS-DOCD LE1/YR: CPT | Performed by: PHYSICAL MEDICINE & REHABILITATION

## 2021-11-24 RX ORDER — COLCHICINE 0.6 MG/1
0.6 TABLET ORAL AS NEEDED
COMMUNITY
Start: 2021-11-15

## 2021-11-24 RX ORDER — BACLOFEN 10 MG/1
5-10 TABLET ORAL
Qty: 60 TABLET | Refills: 0 | Status: SHIPPED | OUTPATIENT
Start: 2021-11-24 | End: 2022-05-09

## 2021-11-24 RX ORDER — LISINOPRIL 10 MG/1
10 TABLET ORAL DAILY
COMMUNITY
Start: 2021-11-03 | End: 2021-11-24

## 2021-11-24 RX ORDER — ZOSTER VACCINE RECOMBINANT, ADJUVANTED 50 MCG/0.5
KIT INTRAMUSCULAR
COMMUNITY

## 2021-11-24 NOTE — PATIENT INSTRUCTIONS
Low Back Pain: Exercises  Introduction  Here are some examples of exercises for you to try. The exercises may be suggested for a condition or for rehabilitation. Start each exercise slowly. Ease off the exercises if you start to have pain. You will be told when to start these exercises and which ones will work best for you. How to do the exercises  Press-up    1. Lie on your stomach, supporting your body with your forearms. 2. Press your elbows down into the floor to raise your upper back. As you do this, relax your stomach muscles and allow your back to arch without using your back muscles. As your press up, do not let your hips or pelvis come off the floor. 3. Hold for 15 to 30 seconds, then relax. 4. Repeat 2 to 4 times. Alternate arm and leg (bird dog) exercise    Do this exercise slowly. Try to keep your body straight at all times, and do not let one hip drop lower than the other. 1. Start on the floor, on your hands and knees. 2. Tighten your belly muscles. 3. Raise one leg off the floor, and hold it straight out behind you. Be careful not to let your hip drop down, because that will twist your trunk. 4. Hold for about 6 seconds, then lower your leg and switch to the other leg. 5. Repeat 8 to 12 times on each leg. 6. Over time, work up to holding for 10 to 30 seconds each time. 7. If you feel stable and secure with your leg raised, try raising the opposite arm straight out in front of you at the same time. Knee-to-chest exercise    1. Lie on your back with your knees bent and your feet flat on the floor. 2. Bring one knee to your chest, keeping the other foot flat on the floor (or keeping the other leg straight, whichever feels better on your lower back). 3. Keep your lower back pressed to the floor. Hold for at least 15 to 30 seconds. 4. Relax, and lower the knee to the starting position. 5. Repeat with the other leg. Repeat 2 to 4 times with each leg.   6. To get more stretch, put your other leg flat on the floor while pulling your knee to your chest.  Curl-ups    1. Lie on the floor on your back with your knees bent at a 90-degree angle. Your feet should be flat on the floor, about 12 inches from your buttocks. 2. Cross your arms over your chest. If this bothers your neck, try putting your hands behind your neck (not your head), with your elbows spread apart. 3. Slowly tighten your belly muscles and raise your shoulder blades off the floor. 4. Keep your head in line with your body, and do not press your chin to your chest.  5. Hold this position for 1 or 2 seconds, then slowly lower yourself back down to the floor. 6. Repeat 8 to 12 times. Pelvic tilt exercise    1. Lie on your back with your knees bent. 2. \"Brace\" your stomach. This means to tighten your muscles by pulling in and imagining your belly button moving toward your spine. You should feel like your back is pressing to the floor and your hips and pelvis are rocking back. 3. Hold for about 6 seconds while you breathe smoothly. 4. Repeat 8 to 12 times. Heel dig bridging    1. Lie on your back with both knees bent and your ankles bent so that only your heels are digging into the floor. Your knees should be bent about 90 degrees. 2. Then push your heels into the floor, squeeze your buttocks, and lift your hips off the floor until your shoulders, hips, and knees are all in a straight line. 3. Hold for about 6 seconds as you continue to breathe normally, and then slowly lower your hips back down to the floor and rest for up to 10 seconds. 4. Do 8 to 12 repetitions. Hamstring stretch in doorway    1. Lie on your back in a doorway, with one leg through the open door. 2. Slide your leg up the wall to straighten your knee. You should feel a gentle stretch down the back of your leg. 3. Hold the stretch for at least 15 to 30 seconds. Do not arch your back, point your toes, or bend either knee.  Keep one heel touching the floor and the other heel touching the wall. 4. Repeat with your other leg. 5. Do 2 to 4 times for each leg. Hip flexor stretch    1. Kneel on the floor with one knee bent and one leg behind you. Place your forward knee over your foot. Keep your other knee touching the floor. 2. Slowly push your hips forward until you feel a stretch in the upper thigh of your rear leg. 3. Hold the stretch for at least 15 to 30 seconds. Repeat with your other leg. 4. Do 2 to 4 times on each side. Wall sit    1. Stand with your back 10 to 12 inches away from a wall. 2. Lean into the wall until your back is flat against it. 3. Slowly slide down until your knees are slightly bent, pressing your lower back into the wall. 4. Hold for about 6 seconds, then slide back up the wall. 5. Repeat 8 to 12 times. Follow-up care is a key part of your treatment and safety. Be sure to make and go to all appointments, and call your doctor if you are having problems. It's also a good idea to know your test results and keep a list of the medicines you take. Where can you learn more? Go to http://www.gray.com/  Enter X300 in the search box to learn more about \"Low Back Pain: Exercises. \"  Current as of: July 1, 2021               Content Version: 13.0  © 1339-0194 [employer]. Care instructions adapted under license by [employer] (which disclaims liability or warranty for this information). If you have questions about a medical condition or this instruction, always ask your healthcare professional. [place] [address] any warranty or liability for your use of this information. Lumbar Spinal Stenosis: Care Instructions  Your Care Instructions     Stenosis in the spine is a narrowing of the canal that is around the spinal cord and nerve roots in your back. It can happen as part of aging.  Sometimes bone and other tissue grow into this canal and press on the nerves that branch out from the spinal cord. This can cause pain, numbness, and weakness. When it happens in the lower part of your back, it is called lumbar spinal stenosis. It can cause problems in the legs, feet, and rear end (buttocks). You may be able to get relief from the symptoms of spinal stenosis by taking pain medicine. Your doctor may suggest physical therapy and exercises to keep your spine strong and flexible. Some people try steroid shots to reduce swelling. If pain and numbness in your legs are still so bad that you cannot do your normal activities, you may need surgery. Follow-up care is a key part of your treatment and safety. Be sure to make and go to all appointments, and call your doctor if you are having problems. It's also a good idea to know your test results and keep a list of the medicines you take. How can you care for yourself at home? · Take an over-the-counter pain medicine. Nonsteroidal anti-inflammatory drugs (NSAIDs) such as ibuprofen or naproxen seem to work best. But if you can't take NSAIDs, you can try acetaminophen. Be safe with medicines. Read and follow all instructions on the label. · Do not take two or more pain medicines at the same time unless the doctor told you to. Many pain medicines have acetaminophen, which is Tylenol. Too much acetaminophen (Tylenol) can be harmful. · Stay at a healthy weight. Being overweight puts extra strain on your spine. · Change positions often when you sit or stand. This can ease pain. It may also reduce pressure on the spinal cord and its nerves. · Avoid doing things that make your symptoms worse. Walking downhill and standing for a long time may cause pain. · Stretch and strengthen your back muscles as your doctor or physical therapist recommends. If your doctor says it is okay to do them, these exercises may help. ? Lie on your back with your knees bent.  Gently pull one bent knee to your chest. Put that foot back on the floor, and then pull the other knee to your chest.  ? Do pelvic tilts. Lie on your back with your knees bent. Tighten your stomach muscles. Pull your belly button (navel) in and up toward your ribs. You should feel like your back is pressing to the floor and your hips and pelvis are slightly lifting off the floor. Hold for 6 seconds while breathing smoothly. ? Stand with your back flat against a wall. Slowly slide down until your knees are slightly bent. Hold for 10 seconds, then slide back up the wall. · Remove or change anything in your house that may cause you to fall. Keep walkways clear of clutter, electrical cords, and throw rugs. When should you call for help? Call 911 anytime you think you may need emergency care. For example, call if:    · You are unable to move a leg at all. Call your doctor now or seek immediate medical care if:    · You have new or worse symptoms in your legs, belly, or buttocks. Symptoms may include:  ? Numbness or tingling. ? Weakness. ? Pain.     · You lose bladder or bowel control. Watch closely for changes in your health, and be sure to contact your doctor if:    · You have a fever, lose weight, or don't feel well.     · You are not getting better as expected. Where can you learn more? Go to http://www.gray.com/  Enter X327 in the search box to learn more about \"Lumbar Spinal Stenosis: Care Instructions. \"  Current as of: July 1, 2021               Content Version: 13.0  © 4698-9041 [employer]. Care instructions adapted under license by [employer] (which disclaims liability or warranty for this information). If you have questions about a medical condition or this instruction, always ask your healthcare professional. [place] [address] any warranty or liability for your use of this information.

## 2021-11-24 NOTE — PROGRESS NOTES
[facility] [address]  [address], [address]  [place], [address] [place] [address] Street  Phone: [phone]  Fax: [phone]        Paul Griffin  : 1950  PCP: Lucio Stewart, DO    NEW PATIENT EVALUATION      ASSESSMENT AND PLAN    Diagnoses and all orders for this visit:    1. Lumbar spondylosis  -     baclofen (LIORESAL) 10 mg tablet; Take 0.5-1 Tablets by mouth three (3) times daily as needed for Pain. Indications: for acute/episodic pain    2. Lumbar stenosis without neurogenic claudication         1. Jose Angel Donovan is a 80 y.o. male w/mechanical >stenotic symptoms. 2. Given instructions on lumbar exercises. Perform as tolerated. 3. DC Mobic, no benefit  4. Trial of Baclofen 5-10 mg TID PRN for pain      Follow-up and Dispositions    · Return in about 6 weeks (around 2022). HISTORY OF PRESENT ILLNESS  Jose Angel Donovan is seen today in consultation for low back pain x 16 years. Last seen 2021 by Dr. Darwin Velarde. He reports a constant, aching pain. He states that the pain started after he passed out and fell on a tool box. The pain is exacerbated with bending. He has difficulty transitioning from sit to stand. He has a walking tolerance of 5 feet. He has failed Aleve, Gabapentin, Mobic, and Icy Hot. Denies red flags including persistent fevers, chills, weight changes, neurogenic bowel or bladder symptoms.     Pain Assessment  2021   Location of Pain Back   Location Modifiers -   Severity of Pain 8   Quality of Pain Aching   Quality of Pain Comment -   Duration of Pain Persistent   Frequency of Pain Constant   Aggravating Factors Other (Comment)   Aggravating Factors Comment normal everyday things, getting up or sitting down   Limiting Behavior Yes   Relieving Factors NSAID   Result of Injury No       Onset of pain: 16 years, injury, fall    Does patient have numbness/tingling in extremities: no    Denies persistent fevers, chills, weight changes, saddle paresthesias, and neurogenic bowel or bladder symptoms. Investigations:   L XR 10/2021: mild degenerative findings  L MRI 2018: old L1 compression fx, moderate stenosis  Spine surgery consult: none    Treatments:  Physical therapy: no  Spinal injections: right L5 right S1 SNRB by Dr. Alex Falcon 2018, reports no benefit  Spinal surgery- no  Beneficial medications: none  Failed medications: Aleve, Icy Hot, Gabapentin, Mobic  Work Status: retired  Pertinent PMHx:  Hemochromatosis, neuropathy, gout. PHYSICAL EXAM  Lumbar flexion fingertips to knees, pain returning to neutral   Unable to do tandem gait  SLR negative  TTP midline L5-S1  LE strength intact    Visit Vitals  Pulse (!) 117 Comment: pt asymptomatic, MD aware   Temp 97.6 °F (36.4 °C) (Temporal)   Ht 5' 11\" (1.803 m)   Wt 217 lb (98.4 kg)   SpO2 98% Comment: RA   BMI 30.27 kg/m²         Past Medical History:   Diagnosis Date    Arthritis     Hemochromatosis     Ill-defined condition     GOUT        Past Surgical History:   Procedure Laterality Date    HX ORTHOPAEDIC      LEFT HAND         Current Outpatient Medications   Medication Sig Dispense Refill    colchicine 0.6 mg tablet Take 0.6 mg by mouth as needed (gout flare ups).  varicella-zoster recombinant, PF, (Shingrix, PF,) 50 mcg/0.5 mL susr injection Shingrix (PF) 50 mcg/0.5 mL intramuscular suspension, kit      baclofen (LIORESAL) 10 mg tablet Take 0.5-1 Tablets by mouth three (3) times daily as needed for Pain. Indications: for acute/episodic pain 60 Tablet 0    lidocaine HCL-benzyl alcohoL (Salonpas Lidocaine Plus) 4-10 % crea 1 Actuation(s) by Apply Externally route two (2) times daily as needed for Pain. 1 Each 0    BYSTOLIC 10 mg tablet take 1 tablet by mouth once daily  0    meloxicam (MOBIC) 15 mg tablet Take 15 mg by mouth daily. 0    allopurinol (ZYLOPRIM) 300 mg tablet Take 300 mg by mouth daily.   0    zolpidem (AMBIEN) 10 mg tablet take 1 tablet by mouth at bedtime if needed  0

## 2021-11-24 NOTE — LETTER
11/24/2021    Patient: Bucky Stanley   YOB: 1950   Date of Visit: 11/24/2021     Kaela Umana MD  [address] [address]  Via Fax: [phone]    Dear Kaela Umana MD,      Thank you for referring Mr. Pavan May to [address] [place] [address] for evaluation. My notes for this consultation are attached. If you have questions, please do not hesitate to call me. I look forward to following your patient along with you.       Sincerely,    Jeremie Murillo MD

## 2021-11-24 NOTE — PROGRESS NOTES
Olga Wagner presents today for   Chief Complaint   Patient presents with    Back Pain       Is someone accompanying this pt? no    Is the patient using any DME equipment during OV? no    Depression Screening:  3 most recent PHQ Screens 3/11/2020   Little interest or pleasure in doing things Not at all   Feeling down, depressed, irritable, or hopeless Not at all   Total Score PHQ 2 0       Learning Assessment:  Learning Assessment 2/21/2019   PRIMARY LEARNER Patient   HIGHEST LEVEL OF EDUCATION - PRIMARY LEARNER  DID NOT GRADUATE [facility] PRIMARY LEARNER HEARING   CO-LEARNER CAREGIVER No   PRIMARY LANGUAGE ENGLISH   LEARNER PREFERENCE PRIMARY DEMONSTRATION   ANSWERED BY Patient   RELATIONSHIP SELF       Fall Risk  Fall Risk Assessment, last 12 mths 3/11/2020   Able to walk? Yes   Fall in past 12 months? Yes   Number of falls in past 12 months 2   Fall with injury? 0       Coordination of Care:  1. Have you been to the ER, urgent care clinic since your last visit? Yes, pt states he went to the ER within last 3 weeks for back pain. Notes in connect care. Hospitalized since your last visit? no    2. Have you seen or consulted any other health care providers outside of the [address] [place] [address] since your last visit? Yes, hematology Include any pap smears or colon screening.  no

## 2021-12-21 ENCOUNTER — HOSPITAL ENCOUNTER (EMERGENCY)
Age: 71
Discharge: HOME | End: 2021-12-22
Attending: EMERGENCY MEDICINE
Payer: MEDICARE

## 2021-12-21 ENCOUNTER — APPOINTMENT (OUTPATIENT)
Dept: GENERAL RADIOLOGY | Age: 71
End: 2021-12-21
Attending: EMERGENCY MEDICINE
Payer: MEDICARE

## 2021-12-21 VITALS
BODY MASS INDEX: 28 KG/M2 | SYSTOLIC BLOOD PRESSURE: 166 MMHG | HEIGHT: 71 IN | RESPIRATION RATE: 16 BRPM | WEIGHT: 200 LBS | TEMPERATURE: 98.3 F | DIASTOLIC BLOOD PRESSURE: 78 MMHG | OXYGEN SATURATION: 95 % | HEART RATE: 101 BPM

## 2021-12-21 DIAGNOSIS — L97.515 ULCER OF RIGHT FOOT WITH MUSCLE INVOLVEMENT WITHOUT EVIDENCE OF NECROSIS (HCC): Primary | ICD-10-CM

## 2021-12-21 PROCEDURE — 73630 X-RAY EXAM OF FOOT: CPT

## 2021-12-21 PROCEDURE — 99281 EMR DPT VST MAYX REQ PHY/QHP: CPT

## 2021-12-22 LAB
ALBUMIN SERPL-MCNC: 3.5 G/DL (ref 3.4–5)
ALBUMIN/GLOB SERPL: 0.7 {RATIO} (ref 0.8–1.7)
ALP SERPL-CCNC: 229 U/L (ref 45–117)
ALT SERPL-CCNC: 36 U/L (ref 16–61)
ANION GAP SERPL CALC-SCNC: 7 MMOL/L (ref 3–18)
AST SERPL-CCNC: 66 U/L (ref 10–38)
BASOPHILS # BLD: 0.1 K/UL (ref 0–0.1)
BASOPHILS NFR BLD: 1 % (ref 0–2)
BILIRUB SERPL-MCNC: 0.6 MG/DL (ref 0.2–1)
BUN SERPL-MCNC: 16 MG/DL (ref 7–18)
BUN/CREAT SERPL: 9 (ref 12–20)
CALCIUM SERPL-MCNC: 9.2 MG/DL (ref 8.5–10.1)
CHLORIDE SERPL-SCNC: 96 MMOL/L (ref 100–111)
CO2 SERPL-SCNC: 23 MMOL/L (ref 21–32)
CREAT SERPL-MCNC: 1.69 MG/DL (ref 0.6–1.3)
DIFFERENTIAL METHOD BLD: ABNORMAL
EOSINOPHIL # BLD: 0.2 K/UL (ref 0–0.4)
EOSINOPHIL NFR BLD: 2 % (ref 0–5)
ERYTHROCYTE [DISTWIDTH] IN BLOOD BY AUTOMATED COUNT: 12.8 % (ref 11.6–14.5)
ERYTHROCYTE [SEDIMENTATION RATE] IN BLOOD: 62 MM/HR (ref 0–20)
GLOBULIN SER CALC-MCNC: 5.1 G/DL (ref 2–4)
GLUCOSE SERPL-MCNC: 98 MG/DL (ref 74–99)
HCT VFR BLD AUTO: 36.4 % (ref 36–48)
HGB BLD-MCNC: 12.4 G/DL (ref 13–16)
IMM GRANULOCYTES # BLD AUTO: 0 K/UL (ref 0–0.04)
IMM GRANULOCYTES NFR BLD AUTO: 0 % (ref 0–0.5)
LYMPHOCYTES # BLD: 2.8 K/UL (ref 0.9–3.6)
LYMPHOCYTES NFR BLD: 23 % (ref 21–52)
MCH RBC QN AUTO: 31.5 PG (ref 24–34)
MCHC RBC AUTO-ENTMCNC: 34.1 G/DL (ref 31–37)
MCV RBC AUTO: 92.4 FL (ref 78–100)
MONOCYTES # BLD: 1.4 K/UL (ref 0.05–1.2)
MONOCYTES NFR BLD: 11 % (ref 3–10)
NEUTS SEG # BLD: 7.8 K/UL (ref 1.8–8)
NEUTS SEG NFR BLD: 64 % (ref 40–73)
NRBC # BLD: 0 K/UL (ref 0–0.01)
NRBC BLD-RTO: 0 PER 100 WBC
PLATELET # BLD AUTO: 305 K/UL (ref 135–420)
PMV BLD AUTO: 8.5 FL (ref 9.2–11.8)
POTASSIUM SERPL-SCNC: 5.1 MMOL/L (ref 3.5–5.5)
PROT SERPL-MCNC: 8.6 G/DL (ref 6.4–8.2)
RBC # BLD AUTO: 3.94 M/UL (ref 4.35–5.65)
SODIUM SERPL-SCNC: 126 MMOL/L (ref 136–145)
WBC # BLD AUTO: 12.2 K/UL (ref 4.6–13.2)

## 2021-12-22 PROCEDURE — 85652 RBC SED RATE AUTOMATED: CPT

## 2021-12-22 PROCEDURE — 80053 COMPREHEN METABOLIC PANEL: CPT

## 2021-12-22 PROCEDURE — 85025 COMPLETE CBC W/AUTO DIFF WBC: CPT

## 2021-12-22 RX ORDER — OXYCODONE AND ACETAMINOPHEN 5; 325 MG/1; MG/1
1 TABLET ORAL
Qty: 12 TABLET | Refills: 0 | Status: SHIPPED | OUTPATIENT
Start: 2021-12-22 | End: 2021-12-29

## 2021-12-22 NOTE — ED PROVIDER NOTES
EMERGENCY DEPARTMENT HISTORY AND PHYSICAL EXAM  This was created with voice recognition software and transcription errors may be present. 10:02 PM  Date: 12/21/2021  Patient Name: Jose Angel Donovan    History of Presenting Illness     Chief Complaint:    History Provided By:     HPI: Jose Angel Donovan is a 80 y.o. male past medical history of arthritis gout and hemochromatosis who presents with a foot ulcer. Patient states that is been going on for the past few months but tonight has become too painful so he came in for evaluation. Patient does not have fever chills no nausea or vomiting the ulcer is painful but not draining. PCP: Lucio Stewart DO      Past History     Past Medical History:  Past Medical History:   Diagnosis Date    Arthritis     Hemochromatosis     Ill-defined condition     GOUT       Past Surgical History:  Past Surgical History:   Procedure Laterality Date    HX ORTHOPAEDIC      LEFT HAND       Family History:  No family history on file. Social History:  Social History     Tobacco Use    Smoking status: Never Smoker    Smokeless tobacco: Never Used   Substance Use Topics    Alcohol use: Yes     Alcohol/week: 6.0 standard drinks     Types: 6 Cans of beer per week     Comment: weekly on weekends    Drug use: Not on file       Allergies:  No Known Allergies    Review of Systems     Review of Systems   All other systems reviewed and are negative. 10 point review of systems otherwise negative unless noted in HPI. Physical Exam       Physical Exam  Constitutional:       Appearance: He is well-developed. HENT:      Head: Normocephalic and atraumatic. Eyes:      Pupils: Pupils are equal, round, and reactive to light. Cardiovascular:      Rate and Rhythm: Normal rate and regular rhythm. Heart sounds: Normal heart sounds. No murmur heard. No friction rub. Pulmonary:      Effort: Pulmonary effort is normal. No respiratory distress.       Breath sounds: Normal breath sounds. No wheezing. Abdominal:      General: There is no distension. Palpations: Abdomen is soft. Tenderness: There is no abdominal tenderness. There is no guarding or rebound. Musculoskeletal:         General: Normal range of motion. Cervical back: Normal range of motion and neck supple. Skin:     General: Skin is warm and dry. Comments: Clean ulcer to the plantar surface of the foot under the base of the first metatarsal   Neurological:      Mental Status: He is alert and oriented to person, place, and time. Psychiatric:         Behavior: Behavior normal.         Thought Content: Thought content normal.         Diagnostic Study Results     Vital Signs  EKG:  Labs:   Imaging:     Medical Decision Making     ED Course: Progress Notes, Reevaluation, and Consults:    I will be the provider of record for this patient. Provider Notes (Medical Decision Making): Presents with a ulcer to his foot. There is no overt drainage patient states it smells badly I do not see any significant erythema or necrosis or drainage. Will check basic labs sed rate and x-ray for osteo-         Diagnosis     Clinical Impression: No diagnosis found. Disposition:        Patient's Medications   Start Taking    No medications on file   Continue Taking    ALLOPURINOL (ZYLOPRIM) 300 MG TABLET    Take 300 mg by mouth daily. BACLOFEN (LIORESAL) 10 MG TABLET    Take 0.5-1 Tablets by mouth three (3) times daily as needed for Pain. Indications: for acute/episodic pain    BYSTOLIC 10 MG TABLET    take 1 tablet by mouth once daily    COLCHICINE 0.6 MG TABLET    Take 0.6 mg by mouth as needed (gout flare ups). LIDOCAINE HCL-BENZYL ALCOHOL (SALONPAS LIDOCAINE PLUS) 4-10 % CREA    1 Actuation(s) by Apply Externally route two (2) times daily as needed for Pain. MELOXICAM (MOBIC) 15 MG TABLET    Take 15 mg by mouth daily.     VARICELLA-ZOSTER RECOMBINANT, PF, (SHINGRIX, PF,) 50 MCG/0.5 ML SUSR INJECTION Shingrix (PF) 50 mcg/0.5 mL intramuscular suspension, kit    ZOLPIDEM (AMBIEN) 10 MG TABLET    take 1 tablet by mouth at bedtime if needed   These Medications have changed    No medications on file   Stop Taking    No medications on file

## 2021-12-22 NOTE — ED TRIAGE NOTES
Pt to ED with complaints of ulcer to bottom of right foot that started 3-4 months ago and worsened today.

## 2022-01-04 ENCOUNTER — HOSPITAL ENCOUNTER (OUTPATIENT)
Dept: LAB | Age: 72
Discharge: HOME | End: 2022-01-04
Payer: MEDICARE

## 2022-01-04 PROCEDURE — U0003 INFECTIOUS AGENT DETECTION BY NUCLEIC ACID (DNA OR RNA); SEVERE ACUTE RESPIRATORY SYNDROME CORONAVIRUS 2 (SARS-COV-2) (CORONAVIRUS DISEASE [COVID-19]), AMPLIFIED PROBE TECHNIQUE, MAKING USE OF HIGH THROUGHPUT TECHNOLOGIES AS DESCRIBED BY CMS-2020-01-R: HCPCS

## 2022-01-05 LAB — SARS-COV-2, NAA: NOT DETECTED

## 2022-02-11 ENCOUNTER — TRANSCRIBE ORDER (OUTPATIENT)
Dept: SCHEDULING | Age: 72
End: 2022-02-11

## 2022-02-11 DIAGNOSIS — I43 DILATED CARDIOMYOPATHY SECONDARY TO HEMOCHROMATOSIS (HCC): Primary | ICD-10-CM

## 2022-02-11 DIAGNOSIS — E83.119 DILATED CARDIOMYOPATHY SECONDARY TO HEMOCHROMATOSIS (HCC): Primary | ICD-10-CM

## 2022-03-18 PROBLEM — M51.26 HNP (HERNIATED NUCLEUS PULPOSUS), LUMBAR: Status: ACTIVE | Noted: 2018-04-25

## 2022-03-19 PROBLEM — M79.2 NEURITIS: Status: ACTIVE | Noted: 2018-04-25

## 2022-03-19 PROBLEM — G62.9 NEUROPATHY: Status: ACTIVE | Noted: 2020-03-11

## 2022-04-29 ENCOUNTER — APPOINTMENT (OUTPATIENT)
Dept: GENERAL RADIOLOGY | Age: 72
DRG: 917 | End: 2022-04-29
Attending: STUDENT IN AN ORGANIZED HEALTH CARE EDUCATION/TRAINING PROGRAM
Payer: MEDICARE

## 2022-04-29 ENCOUNTER — HOSPITAL ENCOUNTER (INPATIENT)
Age: 72
LOS: 10 days | Discharge: HOME HEALTH | DRG: 917 | End: 2022-05-09
Attending: STUDENT IN AN ORGANIZED HEALTH CARE EDUCATION/TRAINING PROGRAM | Admitting: INTERNAL MEDICINE
Payer: MEDICARE

## 2022-04-29 ENCOUNTER — APPOINTMENT (OUTPATIENT)
Dept: CT IMAGING | Age: 72
DRG: 917 | End: 2022-04-29
Attending: STUDENT IN AN ORGANIZED HEALTH CARE EDUCATION/TRAINING PROGRAM
Payer: MEDICARE

## 2022-04-29 DIAGNOSIS — E87.20 ACIDOSIS: ICD-10-CM

## 2022-04-29 DIAGNOSIS — N17.9 AKI (ACUTE KIDNEY INJURY) (HCC): Primary | ICD-10-CM

## 2022-04-29 DIAGNOSIS — J96.01 ACUTE RESPIRATORY FAILURE WITH HYPOXIA (HCC): ICD-10-CM

## 2022-04-29 DIAGNOSIS — R56.9 SEIZURE (HCC): ICD-10-CM

## 2022-04-29 DIAGNOSIS — R41.82 ALTERED MENTAL STATUS, UNSPECIFIED ALTERED MENTAL STATUS TYPE: ICD-10-CM

## 2022-04-29 DIAGNOSIS — T68.XXXA HYPOTHERMIA, INITIAL ENCOUNTER: ICD-10-CM

## 2022-04-29 LAB
ALBUMIN SERPL-MCNC: 4.1 G/DL (ref 3.4–5)
ALBUMIN/GLOB SERPL: 1.1 {RATIO} (ref 0.8–1.7)
ALP SERPL-CCNC: 187 U/L (ref 45–117)
ALT SERPL-CCNC: 32 U/L (ref 16–61)
AMMONIA PLAS-SCNC: 25 UMOL/L (ref 11–32)
AMPHET UR QL SCN: NEGATIVE
ANION GAP SERPL CALC-SCNC: 6 MMOL/L (ref 3–18)
ANION GAP SERPL CALC-SCNC: 6 MMOL/L (ref 3–18)
APAP SERPL-MCNC: <2 UG/ML (ref 10–30)
APPEARANCE UR: CLEAR
ARTERIAL PATENCY WRIST A: POSITIVE
AST SERPL-CCNC: 36 U/L (ref 10–38)
BACTERIA URNS QL MICRO: NEGATIVE /HPF
BARBITURATES UR QL SCN: NEGATIVE
BASE DEFICIT BLD-SCNC: 7.6 MMOL/L
BASOPHILS # BLD: 0 K/UL (ref 0–0.1)
BASOPHILS NFR BLD: 0 % (ref 0–2)
BDY SITE: ABNORMAL
BENZODIAZ UR QL: NEGATIVE
BILIRUB SERPL-MCNC: 0.6 MG/DL (ref 0.2–1)
BILIRUB UR QL: NEGATIVE
BUN SERPL-MCNC: 25 MG/DL (ref 7–18)
BUN SERPL-MCNC: 26 MG/DL (ref 7–18)
BUN/CREAT SERPL: 10 (ref 12–20)
BUN/CREAT SERPL: 9 (ref 12–20)
CALCIUM SERPL-MCNC: 8.1 MG/DL (ref 8.5–10.1)
CALCIUM SERPL-MCNC: 9.8 MG/DL (ref 8.5–10.1)
CANNABINOIDS UR QL SCN: NEGATIVE
CHLORIDE SERPL-SCNC: 104 MMOL/L (ref 100–111)
CHLORIDE SERPL-SCNC: 110 MMOL/L (ref 100–111)
CO2 SERPL-SCNC: 22 MMOL/L (ref 21–32)
CO2 SERPL-SCNC: 25 MMOL/L (ref 21–32)
COCAINE UR QL SCN: NEGATIVE
COLOR UR: YELLOW
CREAT SERPL-MCNC: 2.48 MG/DL (ref 0.6–1.3)
CREAT SERPL-MCNC: 2.89 MG/DL (ref 0.6–1.3)
DIFFERENTIAL METHOD BLD: ABNORMAL
EOSINOPHIL # BLD: 0 K/UL (ref 0–0.4)
EOSINOPHIL NFR BLD: 0 % (ref 0–5)
EPITH CASTS URNS QL MICRO: NORMAL /LPF (ref 0–5)
ERYTHROCYTE [DISTWIDTH] IN BLOOD BY AUTOMATED COUNT: 13 % (ref 11.6–14.5)
ETHANOL SERPL-MCNC: <3 MG/DL (ref 0–3)
GAS FLOW.O2 O2 DELIVERY SYS: ABNORMAL L/MIN
GLOBULIN SER CALC-MCNC: 3.9 G/DL (ref 2–4)
GLUCOSE BLD STRIP.AUTO-MCNC: 102 MG/DL (ref 70–110)
GLUCOSE BLD STRIP.AUTO-MCNC: 116 MG/DL (ref 74–106)
GLUCOSE SERPL-MCNC: 102 MG/DL (ref 74–99)
GLUCOSE SERPL-MCNC: 123 MG/DL (ref 74–99)
GLUCOSE UR STRIP.AUTO-MCNC: NEGATIVE MG/DL
HCO3 BLD-SCNC: 19.2 MMOL/L (ref 22–26)
HCT VFR BLD AUTO: 39.6 % (ref 36–48)
HDSCOM,HDSCOM: NORMAL
HGB BLD-MCNC: 13.1 G/DL (ref 13–16)
HGB UR QL STRIP: NEGATIVE
HYALINE CASTS URNS QL MICRO: NORMAL /LPF (ref 0–2)
IMM GRANULOCYTES # BLD AUTO: 0.2 K/UL (ref 0–0.04)
IMM GRANULOCYTES NFR BLD AUTO: 1 % (ref 0–0.5)
KETONES UR QL STRIP.AUTO: NEGATIVE MG/DL
LACTATE BLD-SCNC: 0.61 MMOL/L (ref 0.4–2)
LACTATE SERPL-SCNC: 1 MMOL/L (ref 0.4–2)
LEUKOCYTE ESTERASE UR QL STRIP.AUTO: NEGATIVE
LITHIUM SERPL-SCNC: <0.2 MMOL/L (ref 0.6–1.2)
LYMPHOCYTES # BLD: 1.4 K/UL (ref 0.9–3.6)
LYMPHOCYTES NFR BLD: 8 % (ref 21–52)
MAGNESIUM SERPL-MCNC: 1.8 MG/DL (ref 1.6–2.6)
MCH RBC QN AUTO: 30.8 PG (ref 24–34)
MCHC RBC AUTO-ENTMCNC: 33.1 G/DL (ref 31–37)
MCV RBC AUTO: 93.2 FL (ref 78–100)
METHADONE UR QL: NEGATIVE
MONOCYTES # BLD: 1.5 K/UL (ref 0.05–1.2)
MONOCYTES NFR BLD: 9 % (ref 3–10)
NEUTS SEG # BLD: 13.3 K/UL (ref 1.8–8)
NEUTS SEG NFR BLD: 81 % (ref 40–73)
NITRITE UR QL STRIP.AUTO: NEGATIVE
NRBC # BLD: 0 K/UL (ref 0–0.01)
NRBC BLD-RTO: 0 PER 100 WBC
O2/TOTAL GAS SETTING VFR VENT: 21 %
OPIATES UR QL: NEGATIVE
PCO2 BLD: 43 MMHG (ref 35–45)
PCP UR QL: NEGATIVE
PH BLD: 7.26 [PH] (ref 7.35–7.45)
PH UR STRIP: 5 [PH] (ref 5–8)
PLATELET # BLD AUTO: 355 K/UL (ref 135–420)
PMV BLD AUTO: 8.7 FL (ref 9.2–11.8)
PO2 BLD: 78 MMHG (ref 80–100)
POTASSIUM SERPL-SCNC: 4.7 MMOL/L (ref 3.5–5.5)
POTASSIUM SERPL-SCNC: 5.1 MMOL/L (ref 3.5–5.5)
PROCALCITONIN SERPL-MCNC: 0.08 NG/ML
PROT SERPL-MCNC: 8 G/DL (ref 6.4–8.2)
PROT UR STRIP-MCNC: 30 MG/DL
RBC # BLD AUTO: 4.25 M/UL (ref 4.35–5.65)
RBC #/AREA URNS HPF: NEGATIVE /HPF (ref 0–5)
SALICYLATES SERPL-MCNC: <1.7 MG/DL (ref 2.8–20)
SAO2 % BLD: 93.3 % (ref 92–97)
SERVICE CMNT-IMP: ABNORMAL
SERVICE CMNT-IMP: ABNORMAL
SODIUM SERPL-SCNC: 135 MMOL/L (ref 136–145)
SODIUM SERPL-SCNC: 138 MMOL/L (ref 136–145)
SP GR UR REFRACTOMETRY: 1.01 (ref 1–1.03)
SPECIMEN TYPE: ABNORMAL
T4 FREE SERPL-MCNC: 0.9 NG/DL (ref 0.7–1.5)
TOTAL RESP. RATE, ITRR: 10
TROPONIN-HIGH SENSITIVITY: 5 NG/L (ref 0–78)
TSH SERPL DL<=0.05 MIU/L-ACNC: 3.78 UIU/ML (ref 0.36–3.74)
UROBILINOGEN UR QL STRIP.AUTO: 0.2 EU/DL (ref 0.2–1)
WBC # BLD AUTO: 16.4 K/UL (ref 4.6–13.2)
WBC URNS QL MICRO: NORMAL /HPF (ref 0–4)

## 2022-04-29 PROCEDURE — 80179 DRUG ASSAY SALICYLATE: CPT

## 2022-04-29 PROCEDURE — 93005 ELECTROCARDIOGRAM TRACING: CPT

## 2022-04-29 PROCEDURE — 81001 URINALYSIS AUTO W/SCOPE: CPT

## 2022-04-29 PROCEDURE — 85025 COMPLETE CBC W/AUTO DIFF WBC: CPT

## 2022-04-29 PROCEDURE — 83930 ASSAY OF BLOOD OSMOLALITY: CPT

## 2022-04-29 PROCEDURE — 74011250636 HC RX REV CODE- 250/636: Performed by: PHYSICIAN ASSISTANT

## 2022-04-29 PROCEDURE — 82140 ASSAY OF AMMONIA: CPT

## 2022-04-29 PROCEDURE — 0BH17EZ INSERTION OF ENDOTRACHEAL AIRWAY INTO TRACHEA, VIA NATURAL OR ARTIFICIAL OPENING: ICD-10-PCS | Performed by: INTERNAL MEDICINE

## 2022-04-29 PROCEDURE — 71045 X-RAY EXAM CHEST 1 VIEW: CPT

## 2022-04-29 PROCEDURE — 96374 THER/PROPH/DIAG INJ IV PUSH: CPT

## 2022-04-29 PROCEDURE — 83605 ASSAY OF LACTIC ACID: CPT

## 2022-04-29 PROCEDURE — 84145 PROCALCITONIN (PCT): CPT

## 2022-04-29 PROCEDURE — 94762 N-INVAS EAR/PLS OXIMTRY CONT: CPT

## 2022-04-29 PROCEDURE — 99285 EMERGENCY DEPT VISIT HI MDM: CPT

## 2022-04-29 PROCEDURE — 5A1955Z RESPIRATORY VENTILATION, GREATER THAN 96 CONSECUTIVE HOURS: ICD-10-PCS | Performed by: INTERNAL MEDICINE

## 2022-04-29 PROCEDURE — 36600 WITHDRAWAL OF ARTERIAL BLOOD: CPT

## 2022-04-29 PROCEDURE — 74011000250 HC RX REV CODE- 250: Performed by: PHYSICIAN ASSISTANT

## 2022-04-29 PROCEDURE — 84443 ASSAY THYROID STIM HORMONE: CPT

## 2022-04-29 PROCEDURE — 80143 DRUG ASSAY ACETAMINOPHEN: CPT

## 2022-04-29 PROCEDURE — 51702 INSERT TEMP BLADDER CATH: CPT

## 2022-04-29 PROCEDURE — 74011250636 HC RX REV CODE- 250/636: Performed by: STUDENT IN AN ORGANIZED HEALTH CARE EDUCATION/TRAINING PROGRAM

## 2022-04-29 PROCEDURE — 99291 CRITICAL CARE FIRST HOUR: CPT | Performed by: PHYSICIAN ASSISTANT

## 2022-04-29 PROCEDURE — 74011000250 HC RX REV CODE- 250: Performed by: STUDENT IN AN ORGANIZED HEALTH CARE EDUCATION/TRAINING PROGRAM

## 2022-04-29 PROCEDURE — 87040 BLOOD CULTURE FOR BACTERIA: CPT

## 2022-04-29 PROCEDURE — 77010033678 HC OXYGEN DAILY

## 2022-04-29 PROCEDURE — 84484 ASSAY OF TROPONIN QUANT: CPT

## 2022-04-29 PROCEDURE — 65610000006 HC RM INTENSIVE CARE

## 2022-04-29 PROCEDURE — 83735 ASSAY OF MAGNESIUM: CPT

## 2022-04-29 PROCEDURE — 70450 CT HEAD/BRAIN W/O DYE: CPT

## 2022-04-29 PROCEDURE — 80307 DRUG TEST PRSMV CHEM ANLYZR: CPT

## 2022-04-29 PROCEDURE — 74011000258 HC RX REV CODE- 258: Performed by: PHYSICIAN ASSISTANT

## 2022-04-29 PROCEDURE — 80178 ASSAY OF LITHIUM: CPT

## 2022-04-29 PROCEDURE — 82077 ASSAY SPEC XCP UR&BREATH IA: CPT

## 2022-04-29 PROCEDURE — 31500 INSERT EMERGENCY AIRWAY: CPT

## 2022-04-29 PROCEDURE — 36415 COLL VENOUS BLD VENIPUNCTURE: CPT

## 2022-04-29 PROCEDURE — 94002 VENT MGMT INPAT INIT DAY: CPT

## 2022-04-29 PROCEDURE — 84439 ASSAY OF FREE THYROXINE: CPT

## 2022-04-29 PROCEDURE — 80053 COMPREHEN METABOLIC PANEL: CPT

## 2022-04-29 PROCEDURE — 82962 GLUCOSE BLOOD TEST: CPT

## 2022-04-29 PROCEDURE — 74011000258 HC RX REV CODE- 258: Performed by: STUDENT IN AN ORGANIZED HEALTH CARE EDUCATION/TRAINING PROGRAM

## 2022-04-29 RX ORDER — ETOMIDATE 2 MG/ML
30 INJECTION INTRAVENOUS ONCE
Status: COMPLETED | OUTPATIENT
Start: 2022-04-29 | End: 2022-04-29

## 2022-04-29 RX ORDER — HEPARIN SODIUM 5000 [USP'U]/ML
5000 INJECTION, SOLUTION INTRAVENOUS; SUBCUTANEOUS EVERY 8 HOURS
Status: DISCONTINUED | OUTPATIENT
Start: 2022-04-29 | End: 2022-05-09 | Stop reason: HOSPADM

## 2022-04-29 RX ORDER — ATROPINE SULFATE 1 MG/ML
0.5 INJECTION, SOLUTION INTRAVENOUS ONCE
Status: COMPLETED | OUTPATIENT
Start: 2022-04-29 | End: 2022-04-29

## 2022-04-29 RX ORDER — SODIUM CHLORIDE 0.9 % (FLUSH) 0.9 %
5-40 SYRINGE (ML) INJECTION EVERY 8 HOURS
Status: DISCONTINUED | OUTPATIENT
Start: 2022-04-29 | End: 2022-05-01

## 2022-04-29 RX ORDER — LISINOPRIL 10 MG/1
10 TABLET ORAL DAILY
COMMUNITY
Start: 2022-01-31 | End: 2022-05-09

## 2022-04-29 RX ORDER — MIDAZOLAM IN 0.9 % SOD.CHLORID 1 MG/ML
0-10 PLASTIC BAG, INJECTION (ML) INTRAVENOUS
Status: DISCONTINUED | OUTPATIENT
Start: 2022-04-29 | End: 2022-04-29

## 2022-04-29 RX ORDER — INSULIN LISPRO 100 [IU]/ML
INJECTION, SOLUTION INTRAVENOUS; SUBCUTANEOUS EVERY 6 HOURS
Status: DISCONTINUED | OUTPATIENT
Start: 2022-04-29 | End: 2022-05-06

## 2022-04-29 RX ORDER — ROCURONIUM BROMIDE 10 MG/ML
100 INJECTION, SOLUTION INTRAVENOUS
Status: COMPLETED | OUTPATIENT
Start: 2022-04-29 | End: 2022-04-29

## 2022-04-29 RX ORDER — SODIUM CHLORIDE 0.9 % (FLUSH) 0.9 %
5-40 SYRINGE (ML) INJECTION AS NEEDED
Status: DISCONTINUED | OUTPATIENT
Start: 2022-04-29 | End: 2022-05-01

## 2022-04-29 RX ORDER — VANCOMYCIN 2 GRAM/500 ML IN 0.9 % SODIUM CHLORIDE INTRAVENOUS
2000 ONCE
Status: COMPLETED | OUTPATIENT
Start: 2022-04-29 | End: 2022-04-29

## 2022-04-29 RX ORDER — MIDAZOLAM HYDROCHLORIDE 1 MG/ML
2 INJECTION, SOLUTION INTRAMUSCULAR; INTRAVENOUS
Status: DISCONTINUED | OUTPATIENT
Start: 2022-04-29 | End: 2022-05-05

## 2022-04-29 RX ORDER — TIZANIDINE 2 MG/1
TABLET ORAL
COMMUNITY
Start: 2022-03-17 | End: 2022-05-09

## 2022-04-29 RX ORDER — CHLORHEXIDINE GLUCONATE 1.2 MG/ML
10 RINSE ORAL EVERY 12 HOURS
Status: DISCONTINUED | OUTPATIENT
Start: 2022-04-29 | End: 2022-05-06

## 2022-04-29 RX ORDER — DEXTROSE MONOHYDRATE 100 MG/ML
0-250 INJECTION, SOLUTION INTRAVENOUS AS NEEDED
Status: DISCONTINUED | OUTPATIENT
Start: 2022-04-29 | End: 2022-05-06

## 2022-04-29 RX ORDER — MAGNESIUM SULFATE 100 %
4 CRYSTALS MISCELLANEOUS AS NEEDED
Status: DISCONTINUED | OUTPATIENT
Start: 2022-04-29 | End: 2022-05-06

## 2022-04-29 RX ORDER — FENTANYL CITRATE 50 UG/ML
100 INJECTION, SOLUTION INTRAMUSCULAR; INTRAVENOUS
Status: DISCONTINUED | OUTPATIENT
Start: 2022-04-29 | End: 2022-05-05

## 2022-04-29 RX ORDER — IPRATROPIUM BROMIDE AND ALBUTEROL SULFATE 2.5; .5 MG/3ML; MG/3ML
3 SOLUTION RESPIRATORY (INHALATION)
Status: DISCONTINUED | OUTPATIENT
Start: 2022-04-29 | End: 2022-05-09 | Stop reason: HOSPADM

## 2022-04-29 RX ORDER — NOREPINEPHRINE BITARTRATE/D5W 8 MG/250ML
.5-5 PLASTIC BAG, INJECTION (ML) INTRAVENOUS
Status: DISCONTINUED | OUTPATIENT
Start: 2022-04-29 | End: 2022-05-01

## 2022-04-29 RX ADMIN — HEPARIN SODIUM 5000 UNITS: 5000 INJECTION INTRAVENOUS; SUBCUTANEOUS at 23:18

## 2022-04-29 RX ADMIN — SODIUM CHLORIDE, SODIUM LACTATE, POTASSIUM CHLORIDE, AND CALCIUM CHLORIDE 1000 ML: 600; 310; 30; 20 INJECTION, SOLUTION INTRAVENOUS at 22:00

## 2022-04-29 RX ADMIN — SODIUM CHLORIDE, PRESERVATIVE FREE 10 ML: 5 INJECTION INTRAVENOUS at 23:21

## 2022-04-29 RX ADMIN — SODIUM CHLORIDE, PRESERVATIVE FREE 10 ML: 5 INJECTION INTRAVENOUS at 17:04

## 2022-04-29 RX ADMIN — ATROPINE SULFATE 0.5 MG: 1 INJECTION, SOLUTION INTRAMUSCULAR; INTRAVENOUS; SUBCUTANEOUS at 14:16

## 2022-04-29 RX ADMIN — PIPERACILLIN AND TAZOBACTAM 4.5 G: 4; .5 INJECTION, POWDER, FOR SOLUTION INTRAVENOUS at 17:11

## 2022-04-29 RX ADMIN — VANCOMYCIN HYDROCHLORIDE 2000 MG: 10 INJECTION, POWDER, LYOPHILIZED, FOR SOLUTION INTRAVENOUS at 17:04

## 2022-04-29 RX ADMIN — AMPICILLIN SODIUM AND SULBACTAM SODIUM 3 G: 2; 1 INJECTION, POWDER, FOR SOLUTION INTRAMUSCULAR; INTRAVENOUS at 23:14

## 2022-04-29 RX ADMIN — GLUCAGON HYDROCHLORIDE 1 MG: KIT at 17:43

## 2022-04-29 RX ADMIN — ETOMIDATE INJECTION 30 MG: 2 SOLUTION INTRAVENOUS at 17:46

## 2022-04-29 RX ADMIN — CHLORHEXIDINE GLUCONATE 0.12% ORAL RINSE 10 ML: 1.2 LIQUID ORAL at 23:17

## 2022-04-29 RX ADMIN — SODIUM CHLORIDE, POTASSIUM CHLORIDE, SODIUM LACTATE AND CALCIUM CHLORIDE 1000 ML: 600; 310; 30; 20 INJECTION, SOLUTION INTRAVENOUS at 17:36

## 2022-04-29 RX ADMIN — ROCURONIUM BROMIDE 100 MG: 10 INJECTION, SOLUTION INTRAVENOUS at 17:49

## 2022-04-29 NOTE — Clinical Note
Status[de-identified] INPATIENT [101]   Type of Bed: Intensive Care [6]   Cardiac Monitoring Required?: Yes   Inpatient Hospitalization Certified Necessary for the Following Reasons: 4.  Patient requires ICU level of care interventions (further clarification in H&P documentation)   Admitting Diagnosis: AMS (altered mental status) [[id number]]   Admitting Physician: Sharla Pittman [[id number]]   Attending Physician: Mónica Mojica   Estimated Length of Stay: 3-4 Midnights   Discharge Plan[de-identified] Home with Office Follow-up

## 2022-04-29 NOTE — ROUTINE PROCESS
Bedside and Verbal shift change report given to Byron Disla RN (oncoming nurse) by Brennan Parkinson RN (offgoing nurse). Report included the following information SBAR, Kardex, Intake/Output, MAR and Cardiac Rhythm NSR.

## 2022-04-29 NOTE — ED NOTES
Dr. Janette Martinez at bedside and stated that his current BP is within his baseline. Repeat EKG performed and given to Dr. Janette Martinez at 4497.

## 2022-04-29 NOTE — ED NOTES
Spoke with BODØ from [facility] and gave clinical update. Recommendation is to repeat EKG Q Hour x first 6 hours to monitor QTC. Dr. Kaylee Goodrich made aware.

## 2022-04-29 NOTE — ED PROVIDER NOTES
EMERGENCY DEPARTMENT HISTORY AND PHYSICAL EXAM      Date: 4/29/2022  Patient Name: Danilo Harris    History of Presenting Illness     Chief Complaint   Patient presents with    Altered mental status         History Provided By: EMS     Chief Complaint: AMS  Duration: last known well last night      History (Context): Danilo Harris is a 71 y.o. male with a past medical history significant for hemochromatosis, gout, prediabetes comes into the ED today due to acute AMS. Per EMS, called to patient's house      PCP: Luz Lazar DO    Current Facility-Administered Medications   Medication Dose Route Frequency Provider Last Rate Last Admin    sodium chloride (NS) flush 5-40 mL  5-40 mL IntraVENous Q8H Robbin Hubbard MD        sodium chloride (NS) flush 5-40 mL  5-40 mL IntraVENous PRN Robbin Hubbard MD         Current Outpatient Medications   Medication Sig Dispense Refill    colchicine 0.6 mg tablet Take 0.6 mg by mouth as needed (gout flare ups).  varicella-zoster recombinant, PF, (Shingrix, PF,) 50 mcg/0.5 mL susr injection Shingrix (PF) 50 mcg/0.5 mL intramuscular suspension, kit      baclofen (LIORESAL) 10 mg tablet Take 0.5-1 Tablets by mouth three (3) times daily as needed for Pain. Indications: for acute/episodic pain 60 Tablet 0    lidocaine HCL-benzyl alcohoL (Salonpas Lidocaine Plus) 4-10 % crea 1 Actuation(s) by Apply Externally route two (2) times daily as needed for Pain. 1 Each 0    BYSTOLIC 10 mg tablet take 1 tablet by mouth once daily  0    meloxicam (MOBIC) 15 mg tablet Take 15 mg by mouth daily. 0    allopurinol (ZYLOPRIM) 300 mg tablet Take 300 mg by mouth daily.   0    zolpidem (AMBIEN) 10 mg tablet take 1 tablet by mouth at bedtime if needed  0       Past History     Past Medical History:   Past Medical History:   Diagnosis Date    Arthritis     Hemochromatosis     Ill-defined condition     GOUT       Past Surgical History:  Past Surgical History: Procedure Laterality Date    HX ORTHOPAEDIC      LEFT HAND       Family History:  History reviewed. No pertinent family history. Social History:   Social History     Tobacco Use    Smoking status: Never Smoker    Smokeless tobacco: Never Used   Substance Use Topics    Alcohol use: Yes     Alcohol/week: 6.0 standard drinks     Types: 6 Cans of beer per week     Comment: weekly on weekends    Drug use: Not on file       Allergies:  No Known Allergies    PMH, PSH, family history, social history, allergies reviewed with the patient with significant items noted above. Review of Systems   Review of Systems   Unable to perform ROS: Mental status change       Physical Exam     Vitals:    04/29/22 1415 04/29/22 1430 04/29/22 1432 04/29/22 1439   BP: (!) 93/50  (!) 93/58    Pulse: (!) 54 66 66    Resp: 12 11 10    SpO2: 95% 96% 95% 94%       Physical Exam  Vitals and nursing note reviewed. Constitutional:       Appearance: He is well-developed. He is obese. He is not ill-appearing, toxic-appearing or diaphoretic. Comments: Somnolent, breathing through his mouth, localizes to pain  Maintaining airway   HENT:      Head: Normocephalic and atraumatic. Right Ear: External ear normal.      Left Ear: External ear normal.      Nose: Nose normal.      Mouth/Throat:      Mouth: Mucous membranes are moist.      Pharynx: Oropharynx is clear. Eyes:      Comments: Pupils 4 mm bilaterally, equal round reactive to light   Cardiovascular:      Rate and Rhythm: Normal rate and regular rhythm. Pulses: Normal pulses. Heart sounds: Normal heart sounds. No murmur heard. No gallop. Pulmonary:      Effort: Pulmonary effort is normal. No respiratory distress. Breath sounds: No wheezing, rhonchi or rales. Comments: Respiratory stertor  Abdominal:      General: Bowel sounds are normal. There is no distension. Palpations: Abdomen is soft. Tenderness: There is no guarding.    Musculoskeletal: General: No swelling or tenderness. Skin:     General: Skin is warm and dry. Capillary Refill: Capillary refill takes less than 2 seconds. Findings: No rash. Neurological:      GCS: GCS eye subscore is 2. GCS verbal subscore is 1. GCS motor subscore is 5. Comments: Localizes the pain, winces with pain stimulus, currently nonverbal   Psychiatric:      Comments: somnolent         Diagnostic Study Results     Labs -     Recent Results (from the past 12 hour(s))   METABOLIC PANEL, COMPREHENSIVE    Collection Time: 04/29/22  1:40 PM   Result Value Ref Range    Sodium 135 (L) 136 - 145 mmol/L    Potassium 5.1 3.5 - 5.5 mmol/L    Chloride 104 100 - 111 mmol/L    CO2 25 21 - 32 mmol/L    Anion gap 6 3.0 - 18 mmol/L    Glucose 123 (H) 74 - 99 mg/dL    BUN 26 (H) 7.0 - 18 MG/DL    Creatinine 2.89 (H) 0.6 - 1.3 MG/DL    BUN/Creatinine ratio 9 (L) 12 - 20      GFR est AA 26 (L) >60 ml/min/1.73m2    GFR est non-AA 22 (L) >60 ml/min/1.73m2    Calcium 9.8 8.5 - 10.1 MG/DL    Bilirubin, total 0.6 0.2 - 1.0 MG/DL    ALT (SGPT) 32 16 - 61 U/L    AST (SGOT) 36 10 - 38 U/L    Alk.  phosphatase 187 (H) 45 - 117 U/L    Protein, total 8.0 6.4 - 8.2 g/dL    Albumin 4.1 3.4 - 5.0 g/dL    Globulin 3.9 2.0 - 4.0 g/dL    A-G Ratio 1.1 0.8 - 1.7     TSH 3RD GENERATION    Collection Time: 04/29/22  1:40 PM   Result Value Ref Range    TSH 3.78 (H) 0.36 - 3.74 uIU/mL   MAGNESIUM    Collection Time: 04/29/22  1:40 PM   Result Value Ref Range    Magnesium 1.8 1.6 - 2.6 mg/dL   TROPONIN-HIGH SENSITIVITY    Collection Time: 04/29/22  1:40 PM   Result Value Ref Range    Troponin-High Sensitivity 5 0 - 78 ng/L   ETHYL ALCOHOL    Collection Time: 04/29/22  1:40 PM   Result Value Ref Range    ALCOHOL(ETHYL),SERUM <3 0 - 3 MG/DL   SALICYLATE    Collection Time: 04/29/22  1:40 PM   Result Value Ref Range    Salicylate level <5.3 (L) 2.8 - 20.0 MG/DL   CBC WITH AUTOMATED DIFF    Collection Time: 04/29/22  1:40 PM   Result Value Ref Range    WBC 16.4 (H) 4.6 - 13.2 K/uL    RBC 4.25 (L) 4.35 - 5.65 M/uL    HGB 13.1 13.0 - 16.0 g/dL    HCT 39.6 36.0 - 48.0 %    MCV 93.2 78.0 - 100.0 FL    MCH 30.8 24.0 - 34.0 PG    MCHC 33.1 31.0 - 37.0 g/dL    RDW 13.0 11.6 - 14.5 %    PLATELET 621 747 - 628 K/uL    MPV 8.7 (L) 9.2 - 11.8 FL    NRBC 0.0 0  WBC    ABSOLUTE NRBC 0.00 0.00 - 0.01 K/uL    NEUTROPHILS 81 (H) 40 - 73 %    LYMPHOCYTES 8 (L) 21 - 52 %    MONOCYTES 9 3 - 10 %    EOSINOPHILS 0 0 - 5 %    BASOPHILS 0 0 - 2 %    IMMATURE GRANULOCYTES 1 (H) 0.0 - 0.5 %    ABS. NEUTROPHILS 13.3 (H) 1.8 - 8.0 K/UL    ABS. LYMPHOCYTES 1.4 0.9 - 3.6 K/UL    ABS. MONOCYTES 1.5 (H) 0.05 - 1.2 K/UL    ABS. EOSINOPHILS 0.0 0.0 - 0.4 K/UL    ABS. BASOPHILS 0.0 0.0 - 0.1 K/UL    ABS. IMM.  GRANS. 0.2 (H) 0.00 - 0.04 K/UL    DF AUTOMATED     URINALYSIS W/ RFLX MICROSCOPIC    Collection Time: 04/29/22  1:45 PM   Result Value Ref Range    Color YELLOW      Appearance CLEAR      Specific gravity 1.014 1.005 - 1.030      pH (UA) 5.0 5.0 - 8.0      Protein 30 (A) NEG mg/dL    Glucose Negative NEG mg/dL    Ketone Negative NEG mg/dL    Bilirubin Negative NEG      Blood Negative NEG      Urobilinogen 0.2 0.2 - 1.0 EU/dL    Nitrites Negative NEG      Leukocyte Esterase Negative NEG     URINE MICROSCOPIC ONLY    Collection Time: 04/29/22  1:45 PM   Result Value Ref Range    WBC 0 to 2 0 - 4 /hpf    RBC Negative 0 - 5 /hpf    Epithelial cells FEW 0 - 5 /lpf    Bacteria Negative NEG /hpf    Hyaline cast 1 to 4 0 - 2 /lpf   DRUG SCREEN, URINE    Collection Time: 04/29/22  1:45 PM   Result Value Ref Range    BENZODIAZEPINES Negative NEG      BARBITURATES Negative NEG      THC (TH-CANNABINOL) Negative NEG      OPIATES Negative NEG      PCP(PHENCYCLIDINE) Negative NEG      COCAINE Negative NEG      AMPHETAMINES Negative NEG      METHADONE Negative NEG      HDSCOM (NOTE)    GLUCOSE, POC    Collection Time: 04/29/22  2:23 PM   Result Value Ref Range    Glucose,  (H) 74 - 106 MG/DL    Performed by Beti Tovar    ACETAMINOPHEN    Collection Time: 04/29/22  2:50 PM   Result Value Ref Range    Acetaminophen level <2 (L) 10.0 - 92.4 ug/mL   METABOLIC PANEL, BASIC    Collection Time: 04/29/22  4:00 PM   Result Value Ref Range    Sodium 138 136 - 145 mmol/L    Potassium 4.7 3.5 - 5.5 mmol/L    Chloride 110 100 - 111 mmol/L    CO2 22 21 - 32 mmol/L    Anion gap 6 3.0 - 18 mmol/L    Glucose 102 (H) 74 - 99 mg/dL    BUN 25 (H) 7.0 - 18 MG/DL    Creatinine 2.48 (H) 0.6 - 1.3 MG/DL    BUN/Creatinine ratio 10 (L) 12 - 20      GFR est AA 31 (L) >60 ml/min/1.73m2    GFR est non-AA 26 (L) >60 ml/min/1.73m2    Calcium 8.1 (L) 8.5 - 10.1 MG/DL   BLOOD GAS,LACTIC ACID, POC    Collection Time: 04/29/22  4:28 PM   Result Value Ref Range    Device: ROOM AIR      FIO2 (POC) 21 %    pH (POC) 7.26 (L) 7.35 - 7.45      pCO2 (POC) 43.0 35.0 - 45.0 MMHG    pO2 (POC) 78 (L) 80 - 100 MMHG    HCO3 (POC) 19.2 (L) 22 - 26 MMOL/L    sO2 (POC) 93.3 92 - 97 %    Base deficit (POC) 7.6 mmol/L    Allens test (POC) Positive      Total resp. rate 10      Site RIGHT RADIAL      Specimen type (POC) ARTERIAL      Performed by Bonny Villagran     Lactic Acid (POC) 0.61 0.40 - 2.00 mmol/L      Labs Reviewed   METABOLIC PANEL, COMPREHENSIVE - Abnormal; Notable for the following components:       Result Value    Sodium 135 (*)     Glucose 123 (*)     BUN 26 (*)     Creatinine 2.89 (*)     BUN/Creatinine ratio 9 (*)     GFR est AA 26 (*)     GFR est non-AA 22 (*)     Alk.  phosphatase 187 (*)     All other components within normal limits   TSH 3RD GENERATION - Abnormal; Notable for the following components:    TSH 3.78 (*)     All other components within normal limits   URINALYSIS W/ RFLX MICROSCOPIC - Abnormal; Notable for the following components:    Protein 30 (*)     All other components within normal limits   SALICYLATE - Abnormal; Notable for the following components:    Salicylate level <6.7 (*)     All other components within normal limits   CBC WITH AUTOMATED DIFF - Abnormal; Notable for the following components:    WBC 16.4 (*)     RBC 4.25 (*)     MPV 8.7 (*)     NEUTROPHILS 81 (*)     LYMPHOCYTES 8 (*)     IMMATURE GRANULOCYTES 1 (*)     ABS. NEUTROPHILS 13.3 (*)     ABS. MONOCYTES 1.5 (*)     ABS. IMM. GRANS. 0.2 (*)     All other components within normal limits   ACETAMINOPHEN - Abnormal; Notable for the following components:    Acetaminophen level <2 (*)     All other components within normal limits   GLUCOSE, POC - Abnormal; Notable for the following components:    Glucose,  (*)     All other components within normal limits   METABOLIC PANEL, BASIC - Abnormal; Notable for the following components:    Glucose 102 (*)     BUN 25 (*)     Creatinine 2.48 (*)     BUN/Creatinine ratio 10 (*)     GFR est AA 31 (*)     GFR est non-AA 26 (*)     Calcium 8.1 (*)     All other components within normal limits   BLOOD GAS,LACTIC ACID, POC - Abnormal; Notable for the following components:    pH (POC) 7.26 (*)     pO2 (POC) 78 (*)     HCO3 (POC) 19.2 (*)     All other components within normal limits   CULTURE, BLOOD   CULTURE, BLOOD   CALCIUM   MAGNESIUM   TROPONIN-HIGH SENSITIVITY   LACTIC ACID   ETHYL ALCOHOL   URINE MICROSCOPIC ONLY   DRUG SCREEN, URINE   LITHIUM   BLOOD GAS, ARTERIAL   OSMOLALITY, SERUM/PLASMA       Radiologic Studies -   XR CHEST PORT   Final Result   Hypoinflation and basilar atelectasis. Otherwise no evidence of acute   cardiopulmonary abnormality. CT HEAD WO CONT   Final Result   1. No hemorrhage identified. 2. Cystic lucency anterior inferior right basal ganglia region, subjectively   more remote appearing such as a prior lacunar type infarct. No comparison. 3. Moderate burden of presumed hemispheric small vessel disease change as above. CT Results  (Last 48 hours)               04/29/22 1400  CT HEAD WO CONT Final result    Impression:  1. No hemorrhage identified.    2. Cystic lucency anterior inferior right basal ganglia region, subjectively   more remote appearing such as a prior lacunar type infarct. No comparison. 3. Moderate burden of presumed hemispheric small vessel disease change as above. Narrative:  CT HEAD UNENHANCED       CPT code: 05624       INDICATION: Altered mental status. TECHNIQUE: 5 mm collimation axial images obtained from the skull base through   the vertex without administration of nonionic intravenous contrast.        All CT scans at this facility are performed using dose optimization technique as   appropriate to this specific exam, to include automated exposure control,   adjustment of the mA and/or KP according to patient size or use of iterative   reconstruction techniques. COMPARISON: None       FINDINGS:    Study is mildly degraded by motion and streak artifact. No parenchymal hemorrhage identified. Patchy low attenuation throughout the periventricular and deep hemispheric white   matter bilaterally, most prevalent in the frontal and parietal regions, with   distribution most consistent with chronic ischemic small vessel disease change. Rounded 5 mm hypodensity in the anterior inferior right basal ganglia adjacent   to the ventral insular cortex axial image 17. No regional mass effect. This   appears more sharply marginated and cystic on sagittal reformatted images   however. Subjectively this appears more remote, but may reflect lacunar type   infarct. MRI would be much more sensitive for the detection of infarct or ischemia in the   acute setting. No midline shift of structures. No extra-axial fluid collections. Ventricles are symmetric and not enlarged for age. Calvarium intact to the extent included. Partially included paranasal sinuses appear clear, with apparent left sided   nasal cannula in place.                 CXR Results  (Last 48 hours)               04/29/22 1412  XR CHEST PORT Final result    Impression: Hypoinflation and basilar atelectasis. Otherwise no evidence of acute   cardiopulmonary abnormality. Narrative:  EXAM:  XR CHEST PORT       INDICATION:   Altered mental status and shortness of breath       COMPARISON: 2/22/2017. FINDINGS:   The cardiac and mediastinal silhouette are within normal limits. Pulmonary   vasculature is within normal limits. No pneumothorax or pleural effusions. Hypoinflation. Atelectasis in the bases. No acute osseous abnormality. Old left   clavicle fracture. The laboratory results, imaging results, and other diagnostic exams were reviewed in the EMR. Medical Decision Making   I am the first provider for this patient. I reviewed the vital signs, available nursing notes, past medical history, past surgical history, family history and social history. Vital Signs-Reviewed the patient's vital signs. ED EKG interpretation:  Rhythm: normal sinus rhythm; and regular . Rate (approx.): 73; Axis: normal; P wave: normal; QRS interval: normal ; ST/T wave: normal; Other findings: prolonged QTc. This EKG was interpreted by Rambo Jose MD.       Records Reviewed: Personally, on initial evaluation    MDM:   Iwona Dutta presents with complaint of AMS  DDX includes but is not limited to: MI/ACS, sepsis, CVA, intoxication/overdose    Patient presents somnolent. Per EMS, he was found unresponsive and somnolent in his house. Initial dose of Narcan 2 mg IV was ineffective. Patient's vitals were appropriate and patient was maintaining his airway. He was transported to the emergency department for further evaluation. Patient remains somnolent and unresponsive. He will localize to painful stimuli. Pupils appear to be equal bilaterally and round and reactive to light, approximately 4mm. Initiating AMS work-up with suspicion for possible overdose, substance unknown. EMS did present with the prescription bottle of nebivolol.   Per EMS, no other medications were noted. Patient does appear to have a TCA prescription. However, presentation and toxidrome do not appear consistent with a TCA overdose. Discussed with poison control for further guidance. At this time they recommend repeat EKGs hourly to evaluate for prolonging QTC. Lab work-up only remarkable for an TARI. Creatinine elevated to 2.89. No other electrolyte derangements. CBC with a leukocytosis in the absence of fever, hypotension, tachycardia. Acetaminophen, salicylates, ethanol levels unremarkable. While patient remained stable, he continues to have a GCS of approximately 8. Localizes to painful stimuli, will open eyes with certain painful stimuli. Otherwise nonresponsive. Discussed case with ICU, Dr. Alvaro Gandhi, for airway watch. At this time, patient to be admitted to the ICU with nephrology following for management of his TARI with an unknown source. Suspicion remains high for an overdose, source unknown. Serum Osm's pending. Poison control following and wants hourly EKGs for the next 4 hours to observe for QTC prolongation. Upon reassessment, patient noted to be hypothermic to 93 °F.  Broad-spectrum antibiotics were started. Cultures already pending at this time.     Patient was stable upon transfer to the ICU floor        Orders as below:  Orders Placed This Encounter    NO VTE PROPHYLAXIS NEEDED    CULTURE, BLOOD    CULTURE, BLOOD    XR CHEST PORT    CT HEAD WO CONT    METABOLIC PANEL, COMPREHENSIVE    TSH 3RD GENERATION    CALCIUM    MAGNESIUM    TROPONIN-HIGH SENSITIVITY    LACTIC ACID, PLASMA    LACTIC ACID, PLASMA    URINALYSIS W/ RFLX MICROSCOPIC    BLOOD ALCOHOL    SALICYLATE    CBC WITH AUTOMATED DIFF    URINE MICROSCOPIC ONLY    ACETAMINOPHEN    GLUCOSE, POC    Urine Drug Screen    OSMOLALITY, SERUM/PLASMA    METABOLIC PANEL, BASIC    LITHIUM    BLOOD GAS, ARTERIAL    DIET NPO    POC GLUCOSE    CARDIAC MONITORING    FULL CODE    BLOOD GAS,LACTIC ACID, POC    EKG, 12 LEAD, INITIAL    EKG, 12 LEAD, INITIAL    EKG, 12 LEAD, SUBSEQUENT    sodium chloride (NS) flush 5-40 mL    sodium chloride (NS) flush 5-40 mL    atropine 1 mg/mL injection 0.5 mg    INITIAL PHYSICIAN ORDER: INPATIENT Intensive Care; Yes; 4. Patient requires ICU level of care interventions (further clarification in H&P documentation)        Procedures:  Intubation    Date/Time: 4/29/2022 6:08 PM  Performed by: Gladys Brooks MD  Authorized by: Gladys Brooks MD     Consent:     Consent obtained:  Emergent situation  Pre-procedure details:     Patient status:  Unresponsive    Mallampati score:  II    Pretreatment medications:  None (Etomidate)    Paralytics:  Rocuronium  Procedure details:     Preoxygenation:  Nasal cannula    CPR in progress: no      Intubation method:  Oral    Oral intubation technique:  Video-assisted    Laryngoscope size: S4 lopro. Tube size (mm):  8.0    Tube type:  Cuffed    Number of attempts:  1    Ventilation between attempts: no      Cricoid pressure: no      Tube visualized through cords: yes    Placement assessment:     ETT to lip:  26    ETT to teeth:  25    Tube secured with: Adhesive tape and ETT ngo    Breath sounds:  Equal    Placement verification: chest rise, condensation, CXR verification, direct visualization, equal breath sounds and ETCO2 detector      CXR findings:  ETT in proper place  Post-procedure details:     Patient tolerance of procedure: Tolerated well, no immediate complications            Diagnosis and Disposition     CLINICAL IMPRESSION:  1. TARI (acute kidney injury) ([facility] [address]    2. Altered mental status, unspecified altered mental status type      Current Discharge Medication List          Disposition: admit - ICU    Patient condition at time of disposition: stable        Dragon Disclaimer     Please note that this dictation was completed with [employer], the computer voice recognition software.   Quite often unanticipated grammatical, syntax, homophones, and other interpretive errors are inadvertently transcribed by the computer software. Please disregard these errors. Please excuse any errors that have escaped final proofreading.       Harlan Ruano MD

## 2022-04-29 NOTE — ED NOTES
Received patient from EMS stretcher. Per EMS, patient was found unresponsive at home. Patient arrived with labored breathing and unable to follow any commands. Eyes not opening to any stimulus but does withdraw from pain to bilateral upper extremities. He was placed on monitor. Patient running sinus magy with HR in 40's. Dr. Jordon Baron and Dr. Jere Nissen at bedside evaluating patient. Dr. Jere Nissen placed nasal trumpet to left nare. Patient taken to CT by this nurse.

## 2022-04-29 NOTE — ROUTINE PROCESS
TRANSFER - IN REPORT:    Verbal report received from [address] [place] [address], RN(name) on Caron Quinn  being received from ED(unit) for routine progression of care      Report consisted of patients Situation, Background, Assessment and   Recommendations(SBAR). Information from the following report(s) SBAR, Kardex, ED Summary, MAR, Recent Results and Cardiac Rhythm NSR was reviewed with the receiving nurse. Opportunity for questions and clarification was provided. Assessment completed upon patients arrival to unit and care assumed.

## 2022-04-29 NOTE — H&P
[address] [place] [address] [facility]  Pulmonary, Critical Care, and Sleep Medicine    Name: Willem Dumas MRN: [id number]   : 1950 Hospital: [address] [place] [address] Dr   Date: 2022        Critical Care History and Physical      IMPRESSION:   · Respiratory failure due to critical illness/need for airway protection  · Toxic encephalopathy due to suspected medication overdose- ?tizanidine, baclofen, mobic, lisinopril, bystolic in chart, daughter reports he also takes white triangle pills for pain but unsure what it is   · SIRs criteria - elevated white count, hypothermia, UA and CXR w/o s/o infection at this time, procal negative   · Bradycardia   · Hypothermia- Core temp 93F  · Elevated TSH but normal T4  · Acute on ? CKD stage 3  · CT head  \"cystic lucency anterior inferior right basal ganglia region\"   · Hx Hemochromatosis   · BMI 27.12     Patient Active Problem List   Diagnosis Code    HNP (herniated nucleus pulposus), lumbar M51.26    Neuritis M79.2    Neuropathy G62.9    AMS (altered mental status) R41.82        RECOMMENDATIONS:   Neuro: Titrate sedation to RASS of 0 to -1. PRN for breakthrough sedation needs. CT head results showing Cystic lucency anterior inferior right basal ganglia region  Pulm: Titrate FiO2 for goal SPO2> 90%,VAP prevention bundle, head of the bed at 30' all times. Daily sedation holiday and assessment for weaning with SBT as tolerated. PRN duonebs. CVS :Actively titrate vasopressors aim MAP >65mmHg, troponin negative, EKG ordered. Glucagon ordered for bradycardia and unknown drug overdose. Fluids: 2 L LR ordered  GI: SUP, NPO  Renal:  Trend Renal indices, Strict Is/Os, Will consult nephrology   Hem/Onc: Monitor for s/o active bleeding. I/D:Sepsis bundle per hospital protocol, Blood, respiratory and Urine cultures drawn and will be followed. Lactic acid ordered- initial and repeat Q4hrs till normalized. Antibiotics:unasyn- one time dose of zosyn/vanco given in ED. procal low. Trend WBCs and temperature curve. Endocrine: Q6 glucoses, SSI. TSH and T4 normal   Metabolic:  Daily BMP, mag, phos. Trend lytes, replace as needed. Musc/Skin: no acute issues, wound care  Full Code       Best practice :     Glycemic control  [facility] ICU bundles:   Vent Bundle Followed, Vent Day 4/29    [facility] Vent patients-    VAP bundle-Rosie tube to suction at 20-30 cm Hg, Maintain Rosie tube with 5-10ml air every 4 hours, Routine oral care every 4 hours, Elevation of head > 45 degree, Daily sedation holiday and SBT evaluation starting at 6.00am. and ARDS network Guidelines: Lung protective strategy and Plateau  Pressure goal < 30 cm H2O goals, Oxygenation Goals PaO2 55-80 mm Hg or SaO2 88-95%, PH goal 7.30-7.45  Sress ulcer prophylaxis. Pepcid  DVT prophylaxis. SQH  Need for Lines, jin assessed. Palliative care evaluation. Restraints need. Restraint evaluation     I have reevaluated the patient after initiation of intervention. The patient is comfortable, uninjured, and is not posing a risk to themselves, staff or others. The restraints have been tolerated well. The patient's current medical and behavioral conditions that warrant the use intervention include danger to self and Interference with medical equipment or treatment. Restraint or seclusion will be discontinued at the earliest possible time, regardless of the scheduled expiration of the order. Based on my evaluation, restraints will be continued: YES- this patient will be off sedation and need restraints           Subjective/History: This patient has been seen and evaluated at the request of Dr. Carter Reynolds for altered mental status. 04/29/22    Patient is a 71 y.o. male w/ PMH of hemachromatosis presenting to [facility] from home via EMS from home unresponsive. History obtained from chart as patient is unresponsive.  He was found by his step daughter who reported patient had been acting \"bipolar lately\" and would not let her in the house. She is not able to give a medication list at this time. Patients general UDS negative, CT head negative for acute issues. Poison control contacted by ED- recommend supportive care. Patient required intubation due to mental status. Remains unresponsive, on no sedation. The patient is critically ill and can not provide additional history due to Unconsciousness. Past Medical History:   Diagnosis Date    Arthritis     Hemochromatosis     Ill-defined condition     GOUT        Past Surgical History:   Procedure Laterality Date    HX ORTHOPAEDIC      LEFT HAND        Prior to Admission medications    Medication Sig Start Date End Date Taking? Authorizing Provider   colchicine 0.6 mg tablet Take 0.6 mg by mouth as needed (gout flare ups). 11/15/21   Provider, Historical   varicella-zoster recombinant, PF, (Shingrix, PF,) 50 mcg/0.5 mL susr injection Shingrix (PF) 50 mcg/0.5 mL intramuscular suspension, kit    Provider, Historical   baclofen (LIORESAL) 10 mg tablet Take 0.5-1 Tablets by mouth three (3) times daily as needed for Pain. Indications: for acute/episodic pain 11/24/21   Violeta Jerome MD   lidocaine HCL-benzyl alcohoL (Salonpas Lidocaine Plus) 4-10 % crea 1 Actuation(s) by Apply Externally route two (2) times daily as needed for Pain. 10/13/21   KRISSY Esqueda   BYSTOLIC 10 mg tablet take 1 tablet by mouth once daily 10/11/16   Provider, Historical   meloxicam (MOBIC) 15 mg tablet Take 15 mg by mouth daily. 10/19/16   Provider, Historical   allopurinol (ZYLOPRIM) 300 mg tablet Take 300 mg by mouth daily.  10/19/16   Provider, Historical   zolpidem (AMBIEN) 10 mg tablet take 1 tablet by mouth at bedtime if needed 10/19/16   Provider, Historical       Current Facility-Administered Medications   Medication Dose Route Frequency    sodium chloride (NS) flush 5-40 mL  5-40 mL IntraVENous Q8H    piperacillin-tazobactam (ZOSYN) 4.5 g in 0.9% sodium chloride (MBP/ADV) 100 mL MBP  4.5 g IntraVENous ONCE    vancomycin (VANCOCIN) 2000 mg in  ml infusion  2,000 mg IntraVENous ONCE    glucagon (GLUCAGEN) injection 1 mg  1 mg IntraVENous ONCE       No Known Allergies     Social History     Tobacco Use    Smoking status: Never Smoker    Smokeless tobacco: Never Used   Substance Use Topics    Alcohol use: Yes     Alcohol/week: 6.0 standard drinks     Types: 6 Cans of beer per week     Comment: weekly on weekends        History reviewed. No pertinent family history. Review of Systems:  Review of systems not obtained due to patient factors. Objective:   Vital Signs:    Visit Vitals  BP (!) 106/57   Pulse 62   Temp (!) 93 °F (33.9 °C)   Resp 10   Ht 6' (1.829 m)   Wt 90.7 kg (200 lb)   SpO2 96%   BMI 27.12 kg/m²               Temp (24hrs), Av °F (33.9 °C), Min:93 °F (33.9 °C), Max:93 °F (33.9 °C)       Intake/Output:   Last shift:      No intake/output data recorded. Last 3 shifts: No intake/output data recorded. No intake or output data in the 24 hours ending 22 1715        Physical Exam:     General:  snorous respirations. No acute distress. Appears stated age    Head:  Normocephalic, without obvious abnormality, atraumatic. Eyes:  Conjunctivae/corneas clear. PERRL,    Nose: Nares normal. Septum midline. Mucosa normal. No drainage or sinus tenderness. Throat: Lips, mucosa, and tongue normal. Teeth and gums normal.   Neck: Supple, symmetrical, trachea midline, no adenopathy, thyroid: no enlargment/tenderness/nodules, no carotid bruit and no JVD. Back:   Symmetric, no curvature. ROM normal.   Lungs:   Clear to auscultation bilaterally. Chest wall:  No tenderness or deformity. Heart:  Regular rate and rhythm, S1, S2 normal, no murmur, click, rub or gallop. Abdomen:   Soft, non-tender. Bowel sounds normal. No masses,  No organomegaly. Extremities: Extremities normal, atraumatic, no cyanosis or edema. +/- restraints    Pulses: 2+ and symmetric all extremities.    Skin: Skin color, texture, turgor normal. No rashes or lesions   Lymph nodes:  Cervical, supraclavicular, and axillary nodes normal.   Neurologic: + corneal. No response to deep nail bed pressure. Localizes to deep sternal rub and moans. Otherwise unresponsive. Data:     Recent Results (from the past 24 hour(s))   METABOLIC PANEL, COMPREHENSIVE    Collection Time: 04/29/22  1:40 PM   Result Value Ref Range    Sodium 135 (L) 136 - 145 mmol/L    Potassium 5.1 3.5 - 5.5 mmol/L    Chloride 104 100 - 111 mmol/L    CO2 25 21 - 32 mmol/L    Anion gap 6 3.0 - 18 mmol/L    Glucose 123 (H) 74 - 99 mg/dL    BUN 26 (H) 7.0 - 18 MG/DL    Creatinine 2.89 (H) 0.6 - 1.3 MG/DL    BUN/Creatinine ratio 9 (L) 12 - 20      GFR est AA 26 (L) >60 ml/min/1.73m2    GFR est non-AA 22 (L) >60 ml/min/1.73m2    Calcium 9.8 8.5 - 10.1 MG/DL    Bilirubin, total 0.6 0.2 - 1.0 MG/DL    ALT (SGPT) 32 16 - 61 U/L    AST (SGOT) 36 10 - 38 U/L    Alk.  phosphatase 187 (H) 45 - 117 U/L    Protein, total 8.0 6.4 - 8.2 g/dL    Albumin 4.1 3.4 - 5.0 g/dL    Globulin 3.9 2.0 - 4.0 g/dL    A-G Ratio 1.1 0.8 - 1.7     TSH 3RD GENERATION    Collection Time: 04/29/22  1:40 PM   Result Value Ref Range    TSH 3.78 (H) 0.36 - 3.74 uIU/mL   MAGNESIUM    Collection Time: 04/29/22  1:40 PM   Result Value Ref Range    Magnesium 1.8 1.6 - 2.6 mg/dL   TROPONIN-HIGH SENSITIVITY    Collection Time: 04/29/22  1:40 PM   Result Value Ref Range    Troponin-High Sensitivity 5 0 - 78 ng/L   ETHYL ALCOHOL    Collection Time: 04/29/22  1:40 PM   Result Value Ref Range    ALCOHOL(ETHYL),SERUM <3 0 - 3 MG/DL   SALICYLATE    Collection Time: 04/29/22  1:40 PM   Result Value Ref Range    Salicylate level <1.0 (L) 2.8 - 20.0 MG/DL   CBC WITH AUTOMATED DIFF    Collection Time: 04/29/22  1:40 PM   Result Value Ref Range    WBC 16.4 (H) 4.6 - 13.2 K/uL    RBC 4.25 (L) 4.35 - 5.65 M/uL    HGB 13.1 13.0 - 16.0 g/dL    HCT 39.6 36.0 - 48.0 %    MCV 93.2 78.0 - 100.0 FL    MCH 30.8 24.0 - 34.0 PG    MCHC 33.1 31.0 - 37.0 g/dL    RDW 13.0 11.6 - 14.5 %    PLATELET 800 576 - 483 K/uL    MPV 8.7 (L) 9.2 - 11.8 FL    NRBC 0.0 0  WBC    ABSOLUTE NRBC 0.00 0.00 - 0.01 K/uL    NEUTROPHILS 81 (H) 40 - 73 %    LYMPHOCYTES 8 (L) 21 - 52 %    MONOCYTES 9 3 - 10 %    EOSINOPHILS 0 0 - 5 %    BASOPHILS 0 0 - 2 %    IMMATURE GRANULOCYTES 1 (H) 0.0 - 0.5 %    ABS. NEUTROPHILS 13.3 (H) 1.8 - 8.0 K/UL    ABS. LYMPHOCYTES 1.4 0.9 - 3.6 K/UL    ABS. MONOCYTES 1.5 (H) 0.05 - 1.2 K/UL    ABS. EOSINOPHILS 0.0 0.0 - 0.4 K/UL    ABS. BASOPHILS 0.0 0.0 - 0.1 K/UL    ABS. IMM.  GRANS. 0.2 (H) 0.00 - 0.04 K/UL    DF AUTOMATED     URINALYSIS W/ RFLX MICROSCOPIC    Collection Time: 04/29/22  1:45 PM   Result Value Ref Range    Color YELLOW      Appearance CLEAR      Specific gravity 1.014 1.005 - 1.030      pH (UA) 5.0 5.0 - 8.0      Protein 30 (A) NEG mg/dL    Glucose Negative NEG mg/dL    Ketone Negative NEG mg/dL    Bilirubin Negative NEG      Blood Negative NEG      Urobilinogen 0.2 0.2 - 1.0 EU/dL    Nitrites Negative NEG      Leukocyte Esterase Negative NEG     URINE MICROSCOPIC ONLY    Collection Time: 04/29/22  1:45 PM   Result Value Ref Range    WBC 0 to 2 0 - 4 /hpf    RBC Negative 0 - 5 /hpf    Epithelial cells FEW 0 - 5 /lpf    Bacteria Negative NEG /hpf    Hyaline cast 1 to 4 0 - 2 /lpf   DRUG SCREEN, URINE    Collection Time: 04/29/22  1:45 PM   Result Value Ref Range    BENZODIAZEPINES Negative NEG      BARBITURATES Negative NEG      THC (TH-CANNABINOL) Negative NEG      OPIATES Negative NEG      PCP(PHENCYCLIDINE) Negative NEG      COCAINE Negative NEG      AMPHETAMINES Negative NEG      METHADONE Negative NEG      HDSCOM (NOTE)    GLUCOSE, POC    Collection Time: 04/29/22  2:23 PM   Result Value Ref Range    Glucose,  (H) 74 - 106 MG/DL    Performed by Abron Scheuermann    ACETAMINOPHEN    Collection Time: 04/29/22  2:50 PM   Result Value Ref Range    Acetaminophen level <2 (L) 10.0 - 67.8 ug/mL   METABOLIC PANEL, BASIC    Collection Time: 04/29/22  4:00 PM   Result Value Ref Range    Sodium 138 136 - 145 mmol/L    Potassium 4.7 3.5 - 5.5 mmol/L    Chloride 110 100 - 111 mmol/L    CO2 22 21 - 32 mmol/L    Anion gap 6 3.0 - 18 mmol/L    Glucose 102 (H) 74 - 99 mg/dL    BUN 25 (H) 7.0 - 18 MG/DL    Creatinine 2.48 (H) 0.6 - 1.3 MG/DL    BUN/Creatinine ratio 10 (L) 12 - 20      GFR est AA 31 (L) >60 ml/min/1.73m2    GFR est non-AA 26 (L) >60 ml/min/1.73m2    Calcium 8.1 (L) 8.5 - 10.1 MG/DL   BLOOD GAS,LACTIC ACID, POC    Collection Time: 04/29/22  4:28 PM   Result Value Ref Range    Device: ROOM AIR      FIO2 (POC) 21 %    pH (POC) 7.26 (L) 7.35 - 7.45      pCO2 (POC) 43.0 35.0 - 45.0 MMHG    pO2 (POC) 78 (L) 80 - 100 MMHG    HCO3 (POC) 19.2 (L) 22 - 26 MMOL/L    sO2 (POC) 93.3 92 - 97 %    Base deficit (POC) 7.6 mmol/L    Allens test (POC) Positive      Total resp. rate 10      Site RIGHT RADIAL      Specimen type (POC) ARTERIAL      Performed by Mar Sanchez     Lactic Acid (POC) 0.61 0.40 - 2.00 mmol/L           Recent Labs     04/29/22  1628   FIO2I 21   HCO3I 19.2*   PCO2I 43.0   PHI 7.26*   PO2I 78*       Telemetry:sinus bradycardia    Imaging:  I have personally reviewed the patients radiographs and have reviewed the reports:    XR Results (most recent):  Results from Hospital Encounter encounter on 04/29/22    XR CHEST PORT    Narrative  EXAM:  XR CHEST PORT    INDICATION:   Altered mental status and shortness of breath    COMPARISON: 2/22/2017. FINDINGS:  The cardiac and mediastinal silhouette are within normal limits. Pulmonary  vasculature is within normal limits. No pneumothorax or pleural effusions. Hypoinflation. Atelectasis in the bases. No acute osseous abnormality. Old left  clavicle fracture. Impression  Hypoinflation and basilar atelectasis. Otherwise no evidence of acute  cardiopulmonary abnormality.     CT Results (most recent):  Results from Hospital Encounter encounter on 04/29/22    CT HEAD WO CONT    Narrative  CT HEAD UNENHANCED    CPT code: 51988    INDICATION: Altered mental status. TECHNIQUE: 5 mm collimation axial images obtained from the skull base through  the vertex without administration of nonionic intravenous contrast.    All CT scans at this facility are performed using dose optimization technique as  appropriate to this specific exam, to include automated exposure control,  adjustment of the mA and/or KP according to patient size or use of iterative  reconstruction techniques. COMPARISON: None    FINDINGS:  Study is mildly degraded by motion and streak artifact. No parenchymal hemorrhage identified. Patchy low attenuation throughout the periventricular and deep hemispheric white  matter bilaterally, most prevalent in the frontal and parietal regions, with  distribution most consistent with chronic ischemic small vessel disease change. Rounded 5 mm hypodensity in the anterior inferior right basal ganglia adjacent  to the ventral insular cortex axial image 17. No regional mass effect. This  appears more sharply marginated and cystic on sagittal reformatted images  however. Subjectively this appears more remote, but may reflect lacunar type  infarct. MRI would be much more sensitive for the detection of infarct or ischemia in the  acute setting. No midline shift of structures. No extra-axial fluid collections. Ventricles are symmetric and not enlarged for age. Calvarium intact to the extent included. Partially included paranasal sinuses appear clear, with apparent left sided  nasal cannula in place. Impression  1. No hemorrhage identified. 2. Cystic lucency anterior inferior right basal ganglia region, subjectively  more remote appearing such as a prior lacunar type infarct. No comparison. 3. Moderate burden of presumed hemispheric small vessel disease change as above.                      Total of   50  min critical care time spent at bedside during the course of care providing evaluation,management and care decisions and ordering appropriate treatment related to critical care problems exclusive of procedures. The reason for providing this level of medical care for this critically ill patient was due a critical illness that impaired one or more vital organ systems such that there was a high probability of imminent or life threatening deterioration in the patients condition. This care involved high complexity decision making to assess, manipulate, and support vital system functions, to treat this degree vital organ system failure and to prevent further life threatening deterioration of the patients condition.         Sergio Padilla PA-C  04/29/22  Pulmonary, Critical Care Medicine  [facility]

## 2022-04-30 ENCOUNTER — APPOINTMENT (OUTPATIENT)
Dept: GENERAL RADIOLOGY | Age: 72
DRG: 917 | End: 2022-04-30
Attending: PHYSICIAN ASSISTANT
Payer: MEDICARE

## 2022-04-30 ENCOUNTER — APPOINTMENT (OUTPATIENT)
Dept: GENERAL RADIOLOGY | Age: 72
DRG: 917 | End: 2022-04-30
Attending: INTERNAL MEDICINE
Payer: MEDICARE

## 2022-04-30 LAB
ALBUMIN SERPL-MCNC: 2.9 G/DL (ref 3.4–5)
ANION GAP SERPL CALC-SCNC: 7 MMOL/L (ref 3–18)
ARTERIAL PATENCY WRIST A: POSITIVE
ATRIAL RATE: 51 BPM
ATRIAL RATE: 63 BPM
ATRIAL RATE: 73 BPM
BASE DEFICIT BLD-SCNC: 5.9 MMOL/L
BASOPHILS # BLD: 0 K/UL (ref 0–0.1)
BASOPHILS NFR BLD: 0 % (ref 0–2)
BDY SITE: ABNORMAL
BUN SERPL-MCNC: 27 MG/DL (ref 7–18)
BUN/CREAT SERPL: 11 (ref 12–20)
CALCIUM SERPL-MCNC: 8.5 MG/DL (ref 8.5–10.1)
CALCULATED P AXIS, ECG09: 7 DEGREES
CALCULATED P AXIS, ECG09: 78 DEGREES
CALCULATED P AXIS, ECG09: 79 DEGREES
CALCULATED R AXIS, ECG10: -2 DEGREES
CALCULATED R AXIS, ECG10: -7 DEGREES
CALCULATED R AXIS, ECG10: -9 DEGREES
CALCULATED T AXIS, ECG11: 19 DEGREES
CALCULATED T AXIS, ECG11: 21 DEGREES
CALCULATED T AXIS, ECG11: 9 DEGREES
CHLORIDE SERPL-SCNC: 112 MMOL/L (ref 100–111)
CO2 SERPL-SCNC: 20 MMOL/L (ref 21–32)
CREAT SERPL-MCNC: 2.48 MG/DL (ref 0.6–1.3)
DIAGNOSIS, 93000: NORMAL
DIFFERENTIAL METHOD BLD: ABNORMAL
EOSINOPHIL # BLD: 0.1 K/UL (ref 0–0.4)
EOSINOPHIL NFR BLD: 1 % (ref 0–5)
ERYTHROCYTE [DISTWIDTH] IN BLOOD BY AUTOMATED COUNT: 13.1 % (ref 11.6–14.5)
GAS FLOW.O2 O2 DELIVERY SYS: ABNORMAL L/MIN
GAS FLOW.O2 SETTING OXYMISER: 13 BPM
GLUCOSE BLD STRIP.AUTO-MCNC: 103 MG/DL (ref 70–110)
GLUCOSE BLD STRIP.AUTO-MCNC: 90 MG/DL (ref 70–110)
GLUCOSE BLD STRIP.AUTO-MCNC: 98 MG/DL (ref 70–110)
GLUCOSE BLD STRIP.AUTO-MCNC: 98 MG/DL (ref 70–110)
GLUCOSE SERPL-MCNC: 97 MG/DL (ref 74–99)
HCO3 BLD-SCNC: 19.6 MMOL/L (ref 22–26)
HCT VFR BLD AUTO: 33.8 % (ref 36–48)
HGB BLD-MCNC: 11 G/DL (ref 13–16)
IMM GRANULOCYTES # BLD AUTO: 0.1 K/UL (ref 0–0.04)
IMM GRANULOCYTES NFR BLD AUTO: 1 % (ref 0–0.5)
LYMPHOCYTES # BLD: 2.1 K/UL (ref 0.9–3.6)
LYMPHOCYTES NFR BLD: 16 % (ref 21–52)
MAGNESIUM SERPL-MCNC: 1.5 MG/DL (ref 1.6–2.6)
MAGNESIUM SERPL-MCNC: 3 MG/DL (ref 1.6–2.6)
MCH RBC QN AUTO: 30.3 PG (ref 24–34)
MCHC RBC AUTO-ENTMCNC: 32.5 G/DL (ref 31–37)
MCV RBC AUTO: 93.1 FL (ref 78–100)
MONOCYTES # BLD: 1.3 K/UL (ref 0.05–1.2)
MONOCYTES NFR BLD: 10 % (ref 3–10)
NEUTS SEG # BLD: 9.1 K/UL (ref 1.8–8)
NEUTS SEG NFR BLD: 72 % (ref 40–73)
NRBC # BLD: 0 K/UL (ref 0–0.01)
NRBC BLD-RTO: 0 PER 100 WBC
O2/TOTAL GAS SETTING VFR VENT: 50 %
OSMOLALITY SERPL: 288 MOSMOL/KG (ref 280–301)
P-R INTERVAL, ECG05: 162 MS
P-R INTERVAL, ECG05: 182 MS
P-R INTERVAL, ECG05: 196 MS
PCO2 BLD: 37.8 MMHG (ref 35–45)
PEEP RESPIRATORY: 5 CMH2O
PH BLD: 7.32 [PH] (ref 7.35–7.45)
PHOSPHATE SERPL-MCNC: 4.3 MG/DL (ref 2.5–4.9)
PLATELET # BLD AUTO: 281 K/UL (ref 135–420)
PMV BLD AUTO: 9 FL (ref 9.2–11.8)
PO2 BLD: 99 MMHG (ref 80–100)
POTASSIUM SERPL-SCNC: 4.3 MMOL/L (ref 3.5–5.5)
PROCALCITONIN SERPL-MCNC: 0.47 NG/ML
Q-T INTERVAL, ECG07: 450 MS
Q-T INTERVAL, ECG07: 494 MS
Q-T INTERVAL, ECG07: 518 MS
QRS DURATION, ECG06: 80 MS
QRS DURATION, ECG06: 82 MS
QRS DURATION, ECG06: 86 MS
QTC CALCULATION (BEZET), ECG08: 477 MS
QTC CALCULATION (BEZET), ECG08: 495 MS
QTC CALCULATION (BEZET), ECG08: 505 MS
RBC # BLD AUTO: 3.63 M/UL (ref 4.35–5.65)
SAO2 % BLD: 97.2 % (ref 92–97)
SERVICE CMNT-IMP: ABNORMAL
SODIUM SERPL-SCNC: 139 MMOL/L (ref 136–145)
SPECIMEN TYPE: ABNORMAL
TOTAL RESP. RATE, ITRR: 13
VENTILATION MODE VENT: ABNORMAL
VENTRICULAR RATE, ECG03: 51 BPM
VENTRICULAR RATE, ECG03: 63 BPM
VENTRICULAR RATE, ECG03: 73 BPM
VT SETTING VENT: 520 ML
WBC # BLD AUTO: 12.7 K/UL (ref 4.6–13.2)

## 2022-04-30 PROCEDURE — APPSS30 APP SPLIT SHARED TIME 16-30 MINUTES: Performed by: REGISTERED NURSE

## 2022-04-30 PROCEDURE — 83735 ASSAY OF MAGNESIUM: CPT

## 2022-04-30 PROCEDURE — 87077 CULTURE AEROBIC IDENTIFY: CPT

## 2022-04-30 PROCEDURE — 74011250636 HC RX REV CODE- 250/636: Performed by: PHYSICIAN ASSISTANT

## 2022-04-30 PROCEDURE — 2709999900 HC NON-CHARGEABLE SUPPLY

## 2022-04-30 PROCEDURE — 74011000258 HC RX REV CODE- 258: Performed by: PHYSICIAN ASSISTANT

## 2022-04-30 PROCEDURE — 94668 MNPJ CHEST WALL SBSQ: CPT

## 2022-04-30 PROCEDURE — 74011250636 HC RX REV CODE- 250/636: Performed by: INTERNAL MEDICINE

## 2022-04-30 PROCEDURE — 74018 RADEX ABDOMEN 1 VIEW: CPT

## 2022-04-30 PROCEDURE — 74011000250 HC RX REV CODE- 250: Performed by: REGISTERED NURSE

## 2022-04-30 PROCEDURE — 80069 RENAL FUNCTION PANEL: CPT

## 2022-04-30 PROCEDURE — 74011250636 HC RX REV CODE- 250/636: Performed by: STUDENT IN AN ORGANIZED HEALTH CARE EDUCATION/TRAINING PROGRAM

## 2022-04-30 PROCEDURE — 74011000250 HC RX REV CODE- 250: Performed by: STUDENT IN AN ORGANIZED HEALTH CARE EDUCATION/TRAINING PROGRAM

## 2022-04-30 PROCEDURE — 36600 WITHDRAWAL OF ARTERIAL BLOOD: CPT

## 2022-04-30 PROCEDURE — 94003 VENT MGMT INPAT SUBQ DAY: CPT

## 2022-04-30 PROCEDURE — 87070 CULTURE OTHR SPECIMN AEROBIC: CPT

## 2022-04-30 PROCEDURE — 36415 COLL VENOUS BLD VENIPUNCTURE: CPT

## 2022-04-30 PROCEDURE — 99291 CRITICAL CARE FIRST HOUR: CPT | Performed by: REGISTERED NURSE

## 2022-04-30 PROCEDURE — 94762 N-INVAS EAR/PLS OXIMTRY CONT: CPT

## 2022-04-30 PROCEDURE — 82962 GLUCOSE BLOOD TEST: CPT

## 2022-04-30 PROCEDURE — 85025 COMPLETE CBC W/AUTO DIFF WBC: CPT

## 2022-04-30 PROCEDURE — 94667 MNPJ CHEST WALL 1ST: CPT

## 2022-04-30 PROCEDURE — 71045 X-RAY EXAM CHEST 1 VIEW: CPT

## 2022-04-30 PROCEDURE — 94640 AIRWAY INHALATION TREATMENT: CPT

## 2022-04-30 PROCEDURE — 65610000006 HC RM INTENSIVE CARE

## 2022-04-30 PROCEDURE — 74011000250 HC RX REV CODE- 250: Performed by: PHYSICIAN ASSISTANT

## 2022-04-30 PROCEDURE — C9113 INJ PANTOPRAZOLE SODIUM, VIA: HCPCS | Performed by: PHYSICIAN ASSISTANT

## 2022-04-30 PROCEDURE — 82803 BLOOD GASES ANY COMBINATION: CPT

## 2022-04-30 PROCEDURE — 87186 SC STD MICRODIL/AGAR DIL: CPT

## 2022-04-30 PROCEDURE — 84145 PROCALCITONIN (PCT): CPT

## 2022-04-30 RX ORDER — NORTRIPTYLINE HYDROCHLORIDE 25 MG/1
50 CAPSULE ORAL
COMMUNITY
End: 2022-05-09

## 2022-04-30 RX ORDER — MAGNESIUM SULFATE HEPTAHYDRATE 40 MG/ML
4 INJECTION, SOLUTION INTRAVENOUS ONCE
Status: COMPLETED | OUTPATIENT
Start: 2022-04-30 | End: 2022-04-30

## 2022-04-30 RX ORDER — IPRATROPIUM BROMIDE AND ALBUTEROL SULFATE 2.5; .5 MG/3ML; MG/3ML
3 SOLUTION RESPIRATORY (INHALATION)
Status: DISCONTINUED | OUTPATIENT
Start: 2022-04-30 | End: 2022-05-06

## 2022-04-30 RX ORDER — DULOXETIN HYDROCHLORIDE 60 MG/1
60 CAPSULE, DELAYED RELEASE ORAL DAILY
COMMUNITY
End: 2022-05-09

## 2022-04-30 RX ORDER — VANCOMYCIN 2 GRAM/500 ML IN 0.9 % SODIUM CHLORIDE INTRAVENOUS
2000 ONCE
Status: COMPLETED | OUTPATIENT
Start: 2022-05-01 | End: 2022-05-01

## 2022-04-30 RX ORDER — SODIUM CHLORIDE FOR INHALATION 3 %
4 VIAL, NEBULIZER (ML) INHALATION
Status: DISCONTINUED | OUTPATIENT
Start: 2022-04-30 | End: 2022-05-04

## 2022-04-30 RX ORDER — MAGNESIUM SULFATE HEPTAHYDRATE 40 MG/ML
4 INJECTION, SOLUTION INTRAVENOUS ONCE
Status: DISPENSED | OUTPATIENT
Start: 2022-04-30 | End: 2022-04-30

## 2022-04-30 RX ORDER — RANITIDINE 150 MG/1
150 TABLET, FILM COATED ORAL
COMMUNITY
End: 2022-05-09

## 2022-04-30 RX ADMIN — SODIUM CHLORIDE SOLN NEBU 3% 4 ML: 3 NEBU SOLN at 19:38

## 2022-04-30 RX ADMIN — IPRATROPIUM BROMIDE AND ALBUTEROL SULFATE 3 ML: .5; 2.5 SOLUTION RESPIRATORY (INHALATION) at 19:38

## 2022-04-30 RX ADMIN — SODIUM CHLORIDE, PRESERVATIVE FREE 10 ML: 5 INJECTION INTRAVENOUS at 18:19

## 2022-04-30 RX ADMIN — AMPICILLIN SODIUM AND SULBACTAM SODIUM 3 G: 2; 1 INJECTION, POWDER, FOR SOLUTION INTRAMUSCULAR; INTRAVENOUS at 11:13

## 2022-04-30 RX ADMIN — FENTANYL CITRATE 100 MCG: 50 INJECTION INTRAMUSCULAR; INTRAVENOUS at 02:20

## 2022-04-30 RX ADMIN — FENTANYL CITRATE 50 MCG/HR: 50 INJECTION, SOLUTION INTRAMUSCULAR; INTRAVENOUS at 03:12

## 2022-04-30 RX ADMIN — SODIUM CHLORIDE, PRESERVATIVE FREE 10 ML: 5 INJECTION INTRAVENOUS at 06:00

## 2022-04-30 RX ADMIN — CHLORHEXIDINE GLUCONATE 0.12% ORAL RINSE 10 ML: 1.2 LIQUID ORAL at 20:24

## 2022-04-30 RX ADMIN — PANTOPRAZOLE SODIUM 40 MG: 40 INJECTION, POWDER, FOR SOLUTION INTRAVENOUS at 08:55

## 2022-04-30 RX ADMIN — SODIUM CHLORIDE SOLN NEBU 3% 4 ML: 3 NEBU SOLN at 15:11

## 2022-04-30 RX ADMIN — HEPARIN SODIUM 5000 UNITS: 5000 INJECTION INTRAVENOUS; SUBCUTANEOUS at 20:23

## 2022-04-30 RX ADMIN — HEPARIN SODIUM 5000 UNITS: 5000 INJECTION INTRAVENOUS; SUBCUTANEOUS at 11:13

## 2022-04-30 RX ADMIN — HEPARIN SODIUM 5000 UNITS: 5000 INJECTION INTRAVENOUS; SUBCUTANEOUS at 05:17

## 2022-04-30 RX ADMIN — CHLORHEXIDINE GLUCONATE 0.12% ORAL RINSE 10 ML: 1.2 LIQUID ORAL at 08:55

## 2022-04-30 RX ADMIN — SODIUM CHLORIDE SOLN NEBU 3% 4 ML: 3 NEBU SOLN at 09:28

## 2022-04-30 RX ADMIN — MIDAZOLAM 2 MG: 1 INJECTION INTRAMUSCULAR; INTRAVENOUS at 01:15

## 2022-04-30 RX ADMIN — AMPICILLIN SODIUM AND SULBACTAM SODIUM 3 G: 2; 1 INJECTION, POWDER, FOR SOLUTION INTRAMUSCULAR; INTRAVENOUS at 23:29

## 2022-04-30 RX ADMIN — SODIUM CHLORIDE, PRESERVATIVE FREE 10 ML: 5 INJECTION INTRAVENOUS at 22:52

## 2022-04-30 RX ADMIN — AMPICILLIN SODIUM AND SULBACTAM SODIUM 3 G: 2; 1 INJECTION, POWDER, FOR SOLUTION INTRAMUSCULAR; INTRAVENOUS at 18:18

## 2022-04-30 RX ADMIN — AMPICILLIN SODIUM AND SULBACTAM SODIUM 3 G: 2; 1 INJECTION, POWDER, FOR SOLUTION INTRAMUSCULAR; INTRAVENOUS at 05:16

## 2022-04-30 RX ADMIN — IPRATROPIUM BROMIDE AND ALBUTEROL SULFATE 3 ML: .5; 2.5 SOLUTION RESPIRATORY (INHALATION) at 15:11

## 2022-04-30 RX ADMIN — IPRATROPIUM BROMIDE AND ALBUTEROL SULFATE 3 ML: .5; 2.5 SOLUTION RESPIRATORY (INHALATION) at 09:28

## 2022-04-30 RX ADMIN — MAGNESIUM SULFATE IN WATER 4 G: 40 INJECTION, SOLUTION INTRAVENOUS at 08:40

## 2022-04-30 NOTE — PROGRESS NOTES
Problem: Ventilator Management  Goal: *Adequate oxygenation and ventilation  Outcome: Progressing Towards Goal  Goal: *Patient maintains clear airway/free of aspiration  Outcome: Progressing Towards Goal  Goal: *Absence of infection signs and symptoms  Outcome: Progressing Towards Goal  Goal: *Normal spontaneous ventilation  Outcome: Progressing Towards Goal     Problem: Patient Education: Go to Patient Education Activity  Goal: Patient/Family Education  Outcome: Progressing Towards Goal     Problem: Non-Violent Restraints  Goal: Removal from restraints as soon as assessed to be safe  Outcome: Progressing Towards Goal  Goal: No harm/injury to patient while restraints in use  Outcome: Progressing Towards Goal  Goal: Patient's dignity will be maintained  Outcome: Progressing Towards Goal  Goal: Patient Interventions  Outcome: Progressing Towards Goal     Problem: Falls - Risk of  Goal: *Absence of Falls  Description: Document Nae Brown Fall Risk and appropriate interventions in the flowsheet.   Outcome: Progressing Towards Goal  Note: Fall Risk Interventions:

## 2022-04-30 NOTE — PROGRESS NOTES
rec'd report on pt from offgoing RN, Tara Flores. Currently unresponsive except for mild withdrawal to pain and weak cough refled to suctioning ETT. Pt blood pressure WNL though is bradycardic. Temp 96 range-currently on han hugger. Pupils equal and reactive, will continue to monitor. 2330 -0200   Pt awakening, starting to move upper ext's. rec'd versed and fentanyl for increasing agitation. Pt remain bradycardic though temp is 97.6 A., PA Tanna aware. Soft wrist restraints applied 2400.    0300  Remains normothermic though still bradycardic.incont of large liquid stool x 2. Pt cleaned, bathed, mepilex applied, no skin breakdown observed.

## 2022-04-30 NOTE — PROGRESS NOTES
Problem: Ventilator Management  Goal: *Adequate oxygenation and ventilation  4/30/2022 1347 by Bess Wagner, RN  Outcome: Progressing Towards Goal  4/30/2022 0825 by Bess Wagner, RN  Outcome: Progressing Towards Goal  Goal: *Patient maintains clear airway/free of aspiration  4/30/2022 1347 by Bess Wagner, RN  Outcome: Progressing Towards Goal  4/30/2022 0825 by Bess Wagner, RN  Outcome: Progressing Towards Goal  Goal: *Absence of infection signs and symptoms  4/30/2022 1347 by Bess Wagner, RN  Outcome: Progressing Towards Goal  4/30/2022 0825 by Bess Wagner, RN  Outcome: Progressing Towards Goal  Goal: *Normal spontaneous ventilation  4/30/2022 1347 by Bess Wagner, RN  Outcome: Progressing Towards Goal  4/30/2022 0825 by Bess Wagner, RN  Outcome: Progressing Towards Goal     Problem: Patient Education: Go to Patient Education Activity  Goal: Patient/Family Education  4/30/2022 1347 by eBss Wagner, RN  Outcome: Progressing Towards Goal  4/30/2022 0825 by Bess Wagner, RN  Outcome: Progressing Towards Goal     Problem: Non-Violent Restraints  Goal: Removal from restraints as soon as assessed to be safe  4/30/2022 1347 by Bess Wagner, RN  Outcome: Progressing Towards Goal  4/30/2022 0825 by Bess Wagner, RN  Outcome: Progressing Towards Goal  Goal: No harm/injury to patient while restraints in use  4/30/2022 1347 by Bess Wagner, RN  Outcome: Progressing Towards Goal  4/30/2022 0825 by Bess Wagner, RN  Outcome: Progressing Towards Goal  Goal: Patient's dignity will be maintained  4/30/2022 1347 by Bess Wagner, RN  Outcome: Progressing Towards Goal  4/30/2022 0825 by Bess Wagner, RN  Outcome: Progressing Towards Goal  Goal: Patient Interventions  4/30/2022 1347 by Bess Wagner, RN  Outcome: Progressing Towards Goal  4/30/2022 0825 by Liang Hernández RN  Outcome: Progressing Towards Goal     Problem: Falls - Risk of  Goal: *Absence of Falls  Description: Document Corry Palacios Fall Risk and appropriate interventions in the flowsheet.   4/30/2022 1347 by Liang Hernández RN  Outcome: Progressing Towards Goal  Note: Fall Risk Interventions:       Mentation Interventions: Evaluate medications/consider consulting pharmacy    Medication Interventions: Evaluate medications/consider consulting pharmacy    Elimination Interventions: Patient to call for help with toileting needs           4/30/2022 0825 by Liang Hernández RN  Outcome: Progressing Towards Goal  Note: Fall Risk Interventions:                                Problem: Patient Education: Go to Patient Education Activity  Goal: Patient/Family Education  4/30/2022 1347 by iLang Hernández RN  Outcome: Progressing Towards Goal  4/30/2022 0825 by Liang Hernández RN  Outcome: Progressing Towards Goal     Problem: Pain  Goal: *Control of Pain  4/30/2022 1347 by Liang Hernández RN  Outcome: Progressing Towards Goal  4/30/2022 0825 by Liang Hernández RN  Outcome: Progressing Towards Goal  Goal: *PALLIATIVE CARE:  Alleviation of Pain  4/30/2022 1347 by Liang Hernández RN  Outcome: Progressing Towards Goal  4/30/2022 0825 by Liang Hernández RN  Outcome: Progressing Towards Goal     Problem: Patient Education: Go to Patient Education Activity  Goal: Patient/Family Education  4/30/2022 1347 by Liang Hernández RN  Outcome: Progressing Towards Goal  4/30/2022 0825 by Liang Hernández RN  Outcome: Progressing Towards Goal

## 2022-04-30 NOTE — PROGRESS NOTES
visited with the family of Mark Hartmann, who is a 71 y.o.,male. The  provided the following Interventions:  Initiated a relationship of care and support as I assisted . Herson Bell and another visitor onto the unit and then to the ICU waiting room. Offered empathy and availability of support to them both as they waited to learn more about his medical status. Plan:  Chaplains will continue to follow and will provide pastoral care on an as needed/requested basis.  recommends bedside caregivers page  on duty if patient or family members show signs of acute spiritual or emotional distress.       [address] [place]   [phone]

## 2022-04-30 NOTE — PROGRESS NOTES
1930> Assumed care from [facility]. Initial assessment completed. Patient is no 50mcg of fentanyl, pupils are brisk and reactive at 3, positive cough and gag. Will continue to monitor. 0600> Morning care completed. Noted patient to have muscle twitching on RLE after being moved around. KRISSY Burris made aware. Bedside and Verbal shift change report given to Kaela Torres (oncoming nurse) by Christy Jiménez RN (offgoing nurse). Report included the following information SBAR, Kardex, Intake/Output, MAR, Recent Results and Cardiac Rhythm Sinus magy.

## 2022-04-30 NOTE — PROGRESS NOTES
Pt placed on SBT with PS-8. He is currently tolerating well; will continue to monitor status.        04/30/22 0754   Weaning Parameters   Spontaneous Breathing Trial Complete Yes   Resp Rate Observed 13   Ve 7.6      RSBI 21   PaO2/FiO2 Ratio 247.5 mmHg       @1135 SBT was stopped and pt was placed back on previous vent settings to rest. Pt tolerated PSV for approximately 3.5 hrs with RSBI <26

## 2022-04-30 NOTE — PROGRESS NOTES
[facility]. Pulmonary, Critical Care, and Sleep Medicine    Name: Ishmael Bennett MRN: [id number]   : 1950 Hospital: [address] [place] [address] Dr   Date: 2022  Admission Date: 2022     Chart and notes reviewed. Data reviewed. I have evaluated all findings. [x]I have reviewed the flowsheet and previous days notes. []The patient is unable to give any meaningful history or review of systems because the patient is:  []Intubated []Sedated   []Unresponsive      []The patient is critically ill on      []Mechanical ventilation []Pressors   []BiPAP []         Interval HPI:  Patient is a 71 y.o. male w/ PMH of hemachromatosis presenting to [facility] from home via EMS from home unresponsive. History obtained from chart as patient is unresponsive. He was found by his step daughter who reported patient had been acting \"bipolar lately\" and would not let her in the house. She is not able to give a medication list at this time. Patients general UDS negative, CT head negative for acute issues. Poison control contacted by ED- recommend supportive care. Patient required intubation due to mental status. Remains unresponsive, on no sedation. Subjective 22  Hospital Day:  Vent Day:  Overnight events:No acute events overnight  Mentation/Activity: Opens eyes to pain, no command following, Fentanyl ggt infusing. Respiratory/ Secretions:Minimal vent settings, FIO2:40, PEEP:5. Thick, white secretions. Hemodynamics:no vasopressor needs  Urine output, bowel: Urine through jin 0.4 ml/kg/hr  Diet:NPO  Need for procedures:n/a              ROS:Pertinent items are noted in HPI. Events and notes from last 24 hours reviewed.      Patient Active Problem List   Diagnosis Code    HNP (herniated nucleus pulposus), lumbar M51.26    Neuritis M79.2    Neuropathy G62.9    AMS (altered mental status) R41.82       Vital Signs:  Visit Vitals  BP (!) 107/53   Pulse (!) 53   Temp 97.5 °F (36.4 °C) Resp 12   Ht 6' (1.829 m)   Wt 90.7 kg (200 lb)   SpO2 99%   BMI 27.12 kg/m²       O2 Device: Endotracheal tube,Ventilator       Temp (24hrs), Av °F (35.6 °C), Min:93 °F (33.9 °C), Max:97.5 °F (36.4 °C)       Intake/Output:   Last shift:       0701 -  1900  In: -   Out: 100 [Urine:100]  Last 3 shifts: No intake/output data recorded.     Intake/Output Summary (Last 24 hours) at 2022 0845  Last data filed at 2022 0826  Gross per 24 hour   Intake --   Output 100 ml   Net -100 ml          Current Facility-Administered Medications   Medication Dose Route Frequency    magnesium sulfate 4 g/100 mL IVPB  4 g IntraVENous ONCE    magnesium sulfate 4 g/100 mL IVPB  4 g IntraVENous ONCE    sodium chloride (NS) flush 5-40 mL  5-40 mL IntraVENous Q8H    fentaNYL (PF) 1,500 mcg/30 mL (50 mcg/mL) infusion  0-200 mcg/hr IntraVENous TITRATE    NOREPINephrine (LEVOPHED) 8 mg in 5% dextrose 250mL (32 mcg/mL) infusion  0.5-50 mcg/min IntraVENous TITRATE    chlorhexidine (PERIDEX) 0.12 % mouthwash 10 mL  10 mL Oral Q12H    pantoprazole (PROTONIX) 40 mg in 0.9% sodium chloride 10 mL injection  40 mg IntraVENous DAILY    heparin (porcine) injection 5,000 Units  5,000 Units SubCUTAneous Q8H    insulin lispro (HUMALOG) injection   SubCUTAneous Q6H    ampicillin-sulbactam (UNASYN) 3 g in 0.9% sodium chloride (MBP/ADV) 100 mL MBP  3 g IntraVENous Q6H         Telemetry: []Sinus []A-flutter []Paced    []A-fib []Multiple PVCs                  Physical Exam:      General: Intubated/sedated; Opens eyes to pain, no signs of distress  HEENT:  Anicteric sclerae; pink palpebral conjunctivae; mucosa moist  Resp:  Symmetrical chest expansion, no accessory muscle use; good airway entry; coarse breath sounds  CV:  S1, S2 present; regular rate and rhythm  GI:  Abdomen soft, non-tender; (+) active bowel sounds  Extremities:  +2 pulses on all extremities; no edema/ cyanosis/ clubbing noted   Skin:  Warm; no rashes/ lesions noted, normal turgor/cap refill   Neurologic:  Non-focal, opens eyes to pain, no command following  Devices:  OGT, ETT, [place]      DATA:  MAR reviewed and pertinent medications noted or modified as needed    Labs:  Recent Labs     04/30/22  0236 04/29/22  1340   WBC 12.7 16.4*   HGB 11.0* 13.1   HCT 33.8* 39.6    355     Recent Labs     04/30/22  0236 04/29/22  1600 04/29/22  1340    138 135*   K 4.3 4.7 5.1   * 110 104   CO2 20* 22 25   GLU 97 102* 123*   BUN 27* 25* 26*   CREA 2.48* 2.48* 2.89*   CA 8.5 8.1* 9.8   MG 1.5*  --  1.8   PHOS 4.3  --   --    ALB 2.9*  --  4.1   ALT  --   --  32     No results for input(s): PH, PCO2, PO2, HCO3, FIO2 in the last 72 hours. Recent Labs     04/30/22  0254 04/29/22  1628   FIO2I 50 21   HCO3I 19.6* 19.2*   PCO2I 37.8 43.0   PHI 7.32* 7.26*   PO2I 99 78*       Imaging:  [x]   I have personally reviewed the patients radiographs and reports  XR Results (most recent):  XR Results (most recent):  Results from Hospital Encounter encounter on 04/29/22    XR ABD (KUB)    Narrative  EXAM: Abdominal X-ray    INDICATION:  placement og OGT    TECHNIQUE: AP view of the abdomen obtained. COMPARISON: None    FINDINGS:  2 enteric tubes have their tips overlying the stomach. The bowel gas pattern is  nonobstructive. No dilated small bowel loops appreciated. No abnormal  calcifications appreciated. The osseous structures are unremarkable. Impression  1. The tubes have their tips overlying the stomach. CT Results (most recent):  Results from Hospital Encounter encounter on 04/29/22    CT HEAD WO CONT    Narrative  CT HEAD UNENHANCED    CPT code: 48276    INDICATION: Altered mental status.     TECHNIQUE: 5 mm collimation axial images obtained from the skull base through  the vertex without administration of nonionic intravenous contrast.    All CT scans at this facility are performed using dose optimization technique as  appropriate to this specific exam, to include automated exposure control,  adjustment of the mA and/or KP according to patient size or use of iterative  reconstruction techniques. COMPARISON: None    FINDINGS:  Study is mildly degraded by motion and streak artifact. No parenchymal hemorrhage identified. Patchy low attenuation throughout the periventricular and deep hemispheric white  matter bilaterally, most prevalent in the frontal and parietal regions, with  distribution most consistent with chronic ischemic small vessel disease change. Rounded 5 mm hypodensity in the anterior inferior right basal ganglia adjacent  to the ventral insular cortex axial image 17. No regional mass effect. This  appears more sharply marginated and cystic on sagittal reformatted images  however. Subjectively this appears more remote, but may reflect lacunar type  infarct. MRI would be much more sensitive for the detection of infarct or ischemia in the  acute setting. No midline shift of structures. No extra-axial fluid collections. Ventricles are symmetric and not enlarged for age. Calvarium intact to the extent included. Partially included paranasal sinuses appear clear, with apparent left sided  nasal cannula in place. Impression  1. No hemorrhage identified. 2. Cystic lucency anterior inferior right basal ganglia region, subjectively  more remote appearing such as a prior lacunar type infarct. No comparison. 3. Moderate burden of presumed hemispheric small vessel disease change as above. IMPRESSION:   · Acute respiratory failure requiring mechnical ventilation in setting of encephalopathy with need for airway protection +/- CAP vs Aspiration PNA  · Acute toxic metabolic encephalopathy due to suspected medication overdose- ?tizanidine, baclofen, mobic, lisinopril, bystolic in chart. Negative UDS, ETOH, acetaminophen, salicylate. · SIRs vs sepsis - elevated white count, hypothermia, UA negative, procal negative.  CXR (4/30) showed infiltrate vs atelectasis. · Bradycardia- likely from hypothermia, improved  · Hypothermia- Core temp 93F, resolved  · Elevated TSH but normal T4  · Acute on CKD stage 3- baseline crea (12.21) 1.69. prerenal azotemia vs ATN  · CT head 4/29 \"cystic lucency anterior inferior right basal ganglia region\"   · Hx Hemochromatosis   · BMI 27.12            Patient Active Problem List   Diagnosis Code    HNP (herniated nucleus pulposus), lumbar M51.26    Neuritis M79.2    Neuropathy G62.9    AMS (altered mental status) R41.82        RECOMMENDATIONS:   Neuro:Titrate sedation for RASS goal 0 to -1, daily sedation holiday. Fentanyl ggt for sedation  Pulm: Aspiration precautions, HOB>30'. VAP Bundle  head of the bed at 30' all times. Daily sedation holiday and assessment for weaning with SBT as tolerated. Aggressive pulmonary toilet, hypertonic nebs, duonebs, metaneb, and Chest PT ordered. CVS:Monitor HD, MAP goal >65. GI: NPO. Will start tube feeds  Renal: Trend Cr, UOP. Jin for accurate I/O  Hem/Onc: Trend H/H, monitor for s/o active bleeding. Daily CBC. I/D:Trend WBCs and temperature curve. On Unasyn empirically, deescalate per blood cultures  Metabolic: Daily BMP, mag, phos. Trend lytes and replace per protocol. Endocrine:Q6 glucoses. SSI. Avoid hypoglycemia. Musc/Skin: PT/OT/SLP  Full Code  Discussed in interdisciplinary rounds     Best practice :    Glycemic control  IHI ICU bundles: Jin Bundle Followed and Vent Bundle Followed, Vent Day 1    [facility] Vent patients-    VAP bundle-Hopewell tube to suction at 20-30 cm Hg, Maintain Hopewell tube with 5-10ml air every 4 hours, Routine oral care every 4 hours, Elevation of head > 45 degree, Daily sedation holiday and SBT evaluation starting at 6.00am.  Sress ulcer prophylaxis. Pepcid  DVT prophylaxis. SQH  Need for Lines, jin assessed. Palliative care evaluation. Restraints need.   Attending Non-violent Restraint Reevaluation     I have reevaluated the patient one hour after initiation of intervention. The patient is comfortable, uninjured, but continues to pose an imminent risk of injury to self to themselves and/or serious disruption of medical treatment required to keep patient stable. The patient's current medical and behavioral conditions that warrant the use intervention include danger to self and Interference with medical equipment or treatment. Restraint or seclusion will be discontinued at the earliest possible time, regardless of the scheduled expiration of the order. Based on my evaluation, restraints will be continued: YES         This care involved high complexity decision making to assess, manipulate, and support vital system functions, to treat this degreee vital organ system failure and to prevent further life threatening deterioration of the patients condition  The services I provided to this patient were to treat and/or prevent clinically significant deterioration that could result in the failure of one or more body systems and/or organ systems due to respiratory distress, hypoxia, cardiac dysrhythmia.        Sultana Ferro NP   04/30/22  Pulmonary, Critical Care Medicine  [facility]

## 2022-04-30 NOTE — PROGRESS NOTES
Pt resting comfortably on current vent settings. FiO2 titrated down throughout the day from 0.50  to 0.35. Pt also tolerated PSV-8, for approximately 3.5 hrs, well.

## 2022-04-30 NOTE — PROGRESS NOTES
0730am Bedside shift report received from RICKEY CLARKE  [id number] Stopped Fentanyl IV gtt-SBT initiated. 1135am Pt put back to vent settings. 1200pm Daughter & son are at the bedside. 1500pm HR bradys down to 40's. Will conitnue to monitor. BP has been stable. 1900pm Bedside shift change report given to  Kayden Johnston RN by Stella Oquendo RN. Report included the following information SBAR, Kardex, Intake/Output, MAR, Recent Results, Med Rec Status, Cardiac Rhythm Sinus Bradycardia and Alarm Parameters . 2004pm Update MsJuan Jose Lynne, [employer] Representative. Via phone. [employer] signed off.  Update Brittni Poison Controll Representative

## 2022-05-01 ENCOUNTER — APPOINTMENT (OUTPATIENT)
Dept: GENERAL RADIOLOGY | Age: 72
DRG: 917 | End: 2022-05-01
Attending: PHYSICIAN ASSISTANT
Payer: MEDICARE

## 2022-05-01 ENCOUNTER — APPOINTMENT (OUTPATIENT)
Dept: MRI IMAGING | Age: 72
DRG: 917 | End: 2022-05-01
Attending: REGISTERED NURSE
Payer: MEDICARE

## 2022-05-01 LAB
ALBUMIN SERPL-MCNC: 2.8 G/DL (ref 3.4–5)
ANION GAP SERPL CALC-SCNC: 9 MMOL/L (ref 3–18)
APPEARANCE UR: CLEAR
ARTERIAL PATENCY WRIST A: POSITIVE
BACTERIA URNS QL MICRO: ABNORMAL /HPF
BASE DEFICIT BLD-SCNC: 7 MMOL/L
BASOPHILS # BLD: 0.1 K/UL (ref 0–0.1)
BASOPHILS NFR BLD: 1 % (ref 0–2)
BDY SITE: ABNORMAL
BILIRUB UR QL: NEGATIVE
BUN SERPL-MCNC: 30 MG/DL (ref 7–18)
BUN/CREAT SERPL: 12 (ref 12–20)
CALCIUM SERPL-MCNC: 8.3 MG/DL (ref 8.5–10.1)
CHLORIDE SERPL-SCNC: 115 MMOL/L (ref 100–111)
CO2 SERPL-SCNC: 19 MMOL/L (ref 21–32)
COLOR UR: YELLOW
CREAT SERPL-MCNC: 2.55 MG/DL (ref 0.6–1.3)
DIFFERENTIAL METHOD BLD: ABNORMAL
EOSINOPHIL # BLD: 0.4 K/UL (ref 0–0.4)
EOSINOPHIL NFR BLD: 3 % (ref 0–5)
EPITH CASTS URNS QL MICRO: NEGATIVE /LPF (ref 0–5)
ERYTHROCYTE [DISTWIDTH] IN BLOOD BY AUTOMATED COUNT: 13.4 % (ref 11.6–14.5)
GAS FLOW.O2 O2 DELIVERY SYS: ABNORMAL L/MIN
GAS FLOW.O2 SETTING OXYMISER: 13 BPM
GLUCOSE BLD STRIP.AUTO-MCNC: 83 MG/DL (ref 70–110)
GLUCOSE BLD STRIP.AUTO-MCNC: 90 MG/DL (ref 70–110)
GLUCOSE BLD STRIP.AUTO-MCNC: 91 MG/DL (ref 70–110)
GLUCOSE SERPL-MCNC: 81 MG/DL (ref 74–99)
GLUCOSE UR STRIP.AUTO-MCNC: NEGATIVE MG/DL
HCO3 BLD-SCNC: 18.4 MMOL/L (ref 22–26)
HCT VFR BLD AUTO: 33.9 % (ref 36–48)
HGB BLD-MCNC: 10.9 G/DL (ref 13–16)
HGB UR QL STRIP: ABNORMAL
IMM GRANULOCYTES # BLD AUTO: 0.1 K/UL (ref 0–0.04)
IMM GRANULOCYTES NFR BLD AUTO: 1 % (ref 0–0.5)
KETONES UR QL STRIP.AUTO: NEGATIVE MG/DL
LEUKOCYTE ESTERASE UR QL STRIP.AUTO: NEGATIVE
LYMPHOCYTES # BLD: 1.6 K/UL (ref 0.9–3.6)
LYMPHOCYTES NFR BLD: 11 % (ref 21–52)
MAGNESIUM SERPL-MCNC: 2.7 MG/DL (ref 1.6–2.6)
MCH RBC QN AUTO: 30.9 PG (ref 24–34)
MCHC RBC AUTO-ENTMCNC: 32.2 G/DL (ref 31–37)
MCV RBC AUTO: 96 FL (ref 78–100)
MONOCYTES # BLD: 1.4 K/UL (ref 0.05–1.2)
MONOCYTES NFR BLD: 10 % (ref 3–10)
NEUTS SEG # BLD: 10.7 K/UL (ref 1.8–8)
NEUTS SEG NFR BLD: 75 % (ref 40–73)
NITRITE UR QL STRIP.AUTO: NEGATIVE
NRBC # BLD: 0 K/UL (ref 0–0.01)
NRBC BLD-RTO: 0 PER 100 WBC
O2/TOTAL GAS SETTING VFR VENT: 35 %
PCO2 BLD: 35.7 MMHG (ref 35–45)
PEEP RESPIRATORY: 5 CMH2O
PH BLD: 7.32 [PH] (ref 7.35–7.45)
PH UR STRIP: 5 [PH] (ref 5–8)
PHOSPHATE SERPL-MCNC: 4.1 MG/DL (ref 2.5–4.9)
PLATELET # BLD AUTO: 272 K/UL (ref 135–420)
PMV BLD AUTO: 9.2 FL (ref 9.2–11.8)
PO2 BLD: 116 MMHG (ref 80–100)
POTASSIUM SERPL-SCNC: 4.2 MMOL/L (ref 3.5–5.5)
PROT UR STRIP-MCNC: 30 MG/DL
RBC # BLD AUTO: 3.53 M/UL (ref 4.35–5.65)
RBC #/AREA URNS HPF: ABNORMAL /HPF (ref 0–5)
SAO2 % BLD: 98.2 % (ref 92–97)
SERVICE CMNT-IMP: ABNORMAL
SODIUM SERPL-SCNC: 143 MMOL/L (ref 136–145)
SP GR UR REFRACTOMETRY: 1.02 (ref 1–1.03)
SPECIMEN TYPE: ABNORMAL
TOTAL RESP. RATE, ITRR: 13
UROBILINOGEN UR QL STRIP.AUTO: 0.2 EU/DL (ref 0.2–1)
VENTILATION MODE VENT: ABNORMAL
VT SETTING VENT: 520 ML
WBC # BLD AUTO: 14.2 K/UL (ref 4.6–13.2)
WBC URNS QL MICRO: NEGATIVE /HPF (ref 0–4)

## 2022-05-01 PROCEDURE — 74011250636 HC RX REV CODE- 250/636: Performed by: STUDENT IN AN ORGANIZED HEALTH CARE EDUCATION/TRAINING PROGRAM

## 2022-05-01 PROCEDURE — 36600 WITHDRAWAL OF ARTERIAL BLOOD: CPT

## 2022-05-01 PROCEDURE — 82962 GLUCOSE BLOOD TEST: CPT

## 2022-05-01 PROCEDURE — 94003 VENT MGMT INPAT SUBQ DAY: CPT

## 2022-05-01 PROCEDURE — 74011000250 HC RX REV CODE- 250: Performed by: REGISTERED NURSE

## 2022-05-01 PROCEDURE — 85025 COMPLETE CBC W/AUTO DIFF WBC: CPT

## 2022-05-01 PROCEDURE — 74011250636 HC RX REV CODE- 250/636: Performed by: PHYSICIAN ASSISTANT

## 2022-05-01 PROCEDURE — 74011000258 HC RX REV CODE- 258: Performed by: STUDENT IN AN ORGANIZED HEALTH CARE EDUCATION/TRAINING PROGRAM

## 2022-05-01 PROCEDURE — 74011000258 HC RX REV CODE- 258: Performed by: NURSE PRACTITIONER

## 2022-05-01 PROCEDURE — 99221 1ST HOSP IP/OBS SF/LOW 40: CPT | Performed by: STUDENT IN AN ORGANIZED HEALTH CARE EDUCATION/TRAINING PROGRAM

## 2022-05-01 PROCEDURE — 74011000250 HC RX REV CODE- 250: Performed by: PHYSICIAN ASSISTANT

## 2022-05-01 PROCEDURE — 74011250636 HC RX REV CODE- 250/636: Performed by: NURSE PRACTITIONER

## 2022-05-01 PROCEDURE — 94762 N-INVAS EAR/PLS OXIMTRY CONT: CPT

## 2022-05-01 PROCEDURE — 65610000006 HC RM INTENSIVE CARE

## 2022-05-01 PROCEDURE — 74011000258 HC RX REV CODE- 258: Performed by: PHYSICIAN ASSISTANT

## 2022-05-01 PROCEDURE — 77030040831 HC BAG URINE DRNG MDII -A

## 2022-05-01 PROCEDURE — 95816 EEG AWAKE AND DROWSY: CPT | Performed by: REGISTERED NURSE

## 2022-05-01 PROCEDURE — 95822 EEG COMA OR SLEEP ONLY: CPT

## 2022-05-01 PROCEDURE — 70551 MRI BRAIN STEM W/O DYE: CPT

## 2022-05-01 PROCEDURE — 74011000250 HC RX REV CODE- 250: Performed by: STUDENT IN AN ORGANIZED HEALTH CARE EDUCATION/TRAINING PROGRAM

## 2022-05-01 PROCEDURE — 95822 EEG COMA OR SLEEP ONLY: CPT | Performed by: STUDENT IN AN ORGANIZED HEALTH CARE EDUCATION/TRAINING PROGRAM

## 2022-05-01 PROCEDURE — 94668 MNPJ CHEST WALL SBSQ: CPT

## 2022-05-01 PROCEDURE — APPSS30 APP SPLIT SHARED TIME 16-30 MINUTES: Performed by: REGISTERED NURSE

## 2022-05-01 PROCEDURE — 71045 X-RAY EXAM CHEST 1 VIEW: CPT

## 2022-05-01 PROCEDURE — C9113 INJ PANTOPRAZOLE SODIUM, VIA: HCPCS | Performed by: PHYSICIAN ASSISTANT

## 2022-05-01 PROCEDURE — 36415 COLL VENOUS BLD VENIPUNCTURE: CPT

## 2022-05-01 PROCEDURE — 2709999900 HC NON-CHARGEABLE SUPPLY

## 2022-05-01 PROCEDURE — 83735 ASSAY OF MAGNESIUM: CPT

## 2022-05-01 PROCEDURE — 82803 BLOOD GASES ANY COMBINATION: CPT

## 2022-05-01 PROCEDURE — 77030018798 HC PMP KT ENTRL FED COVD -A

## 2022-05-01 PROCEDURE — 94640 AIRWAY INHALATION TREATMENT: CPT

## 2022-05-01 PROCEDURE — 80069 RENAL FUNCTION PANEL: CPT

## 2022-05-01 PROCEDURE — 81001 URINALYSIS AUTO W/SCOPE: CPT

## 2022-05-01 RX ORDER — MIDAZOLAM IN 0.9 % SOD.CHLORID 1 MG/ML
0-10 PLASTIC BAG, INJECTION (ML) INTRAVENOUS
Status: DISCONTINUED | OUTPATIENT
Start: 2022-05-01 | End: 2022-05-03

## 2022-05-01 RX ADMIN — HEPARIN SODIUM 5000 UNITS: 5000 INJECTION INTRAVENOUS; SUBCUTANEOUS at 04:45

## 2022-05-01 RX ADMIN — VANCOMYCIN HYDROCHLORIDE 2000 MG: 10 INJECTION, POWDER, LYOPHILIZED, FOR SOLUTION INTRAVENOUS at 01:35

## 2022-05-01 RX ADMIN — IPRATROPIUM BROMIDE AND ALBUTEROL SULFATE 3 ML: .5; 2.5 SOLUTION RESPIRATORY (INHALATION) at 20:54

## 2022-05-01 RX ADMIN — SODIUM CHLORIDE SOLN NEBU 3% 4 ML: 3 NEBU SOLN at 07:49

## 2022-05-01 RX ADMIN — SODIUM CHLORIDE, PRESERVATIVE FREE 10 ML: 5 INJECTION INTRAVENOUS at 05:27

## 2022-05-01 RX ADMIN — MIDAZOLAM 2 MG/HR: 5 INJECTION INTRAMUSCULAR; INTRAVENOUS at 22:19

## 2022-05-01 RX ADMIN — AMPICILLIN SODIUM AND SULBACTAM SODIUM 3 G: 2; 1 INJECTION, POWDER, FOR SOLUTION INTRAMUSCULAR; INTRAVENOUS at 05:26

## 2022-05-01 RX ADMIN — AMPICILLIN SODIUM AND SULBACTAM SODIUM 3 G: 2; 1 INJECTION, POWDER, FOR SOLUTION INTRAMUSCULAR; INTRAVENOUS at 17:08

## 2022-05-01 RX ADMIN — CHLORHEXIDINE GLUCONATE 0.12% ORAL RINSE 10 ML: 1.2 LIQUID ORAL at 21:00

## 2022-05-01 RX ADMIN — SODIUM CHLORIDE 1800 MG PE: 9 INJECTION, SOLUTION INTRAVENOUS at 22:21

## 2022-05-01 RX ADMIN — SODIUM CHLORIDE, PRESERVATIVE FREE 10 ML: 5 INJECTION INTRAVENOUS at 17:09

## 2022-05-01 RX ADMIN — IPRATROPIUM BROMIDE AND ALBUTEROL SULFATE 3 ML: .5; 2.5 SOLUTION RESPIRATORY (INHALATION) at 16:27

## 2022-05-01 RX ADMIN — HEPARIN SODIUM 5000 UNITS: 5000 INJECTION INTRAVENOUS; SUBCUTANEOUS at 21:00

## 2022-05-01 RX ADMIN — IPRATROPIUM BROMIDE AND ALBUTEROL SULFATE 3 ML: .5; 2.5 SOLUTION RESPIRATORY (INHALATION) at 07:49

## 2022-05-01 RX ADMIN — PANTOPRAZOLE SODIUM 40 MG: 40 INJECTION, POWDER, FOR SOLUTION INTRAVENOUS at 08:48

## 2022-05-01 RX ADMIN — HEPARIN SODIUM 5000 UNITS: 5000 INJECTION INTRAVENOUS; SUBCUTANEOUS at 11:37

## 2022-05-01 RX ADMIN — CHLORHEXIDINE GLUCONATE 0.12% ORAL RINSE 10 ML: 1.2 LIQUID ORAL at 08:48

## 2022-05-01 RX ADMIN — SODIUM CHLORIDE SOLN NEBU 3% 4 ML: 3 NEBU SOLN at 16:27

## 2022-05-01 RX ADMIN — SODIUM CHLORIDE SOLN NEBU 3% 4 ML: 3 NEBU SOLN at 20:54

## 2022-05-01 RX ADMIN — AMPICILLIN SODIUM AND SULBACTAM SODIUM 3 G: 2; 1 INJECTION, POWDER, FOR SOLUTION INTRAMUSCULAR; INTRAVENOUS at 11:21

## 2022-05-01 NOTE — PROGRESS NOTES
[facility] Pharmacokinetic Monitoring Service - Vancomycin     Mark Hartmann is a 71 y.o. male starting on vancomycin therapy for sepsis. Pharmacy consulted by Oli Camacho PA-C for monitoring and adjustment. Target Concentration: Dosing based on anticipated concentration <15 mg/L due to renal impairment/insufficiency    Additional Antimicrobials: Ampicillin-sulbactam 3 grams q6 hours    Pertinent Laboratory Values: Wt Readings from Last 1 Encounters:   04/29/22 90.7 kg (200 lb)     Temp Readings from Last 1 Encounters:   04/30/22 97.8 °F (36.6 °C)     No components found for: PROCAL  Estimated Creatinine Clearance: 30 mL/min (A) (based on SCr of 2.48 mg/dL (H)). Recent Labs     04/30/22  0236 04/29/22  1340   WBC 12.7 16.4*       Pertinent Cultures:  Culture Date Source Results   4/30/2022   Blood  Sputum  Prelim:GPC  Prelim: GRAM VARIABLE RODS   MRSA Nasal Swab: N/A. Non-respiratory infection. .    Plan:  Concentration-guided dosing due to renal impairment/insufficiency  Start vancomycin 2000 mg IV x 1 dose then dose by levels  Renal labs as indicated   Vancomycin concentration to be ordered by clinical pharmacist  Pharmacy will continue to monitor patient and adjust therapy as indicated    Thank you for the consult,  CARLOS Pearson  4/30/2022 10:55 PM

## 2022-05-01 NOTE — PROGRESS NOTES
Start SBT PS 8 started with toleration, RR 18, RSBI 36, No resp distress, no commands, eyes sometimes open. Sats 97% Hr 54 /63. Will monitor.      05/01/22 0752   Weaning Parameters   Spontaneous Breathing Trial Complete Yes  (PS 8 start SBT)   Resp Rate Observed 18   Ve 6.4      RSBI 36

## 2022-05-01 NOTE — PROGRESS NOTES
Problem: Ventilator Management  Goal: *Adequate oxygenation and ventilation  Outcome: Progressing Towards Goal  Goal: *Patient maintains clear airway/free of aspiration  Outcome: Progressing Towards Goal  Goal: *Absence of infection signs and symptoms  Outcome: Progressing Towards Goal  Goal: *Normal spontaneous ventilation  Outcome: Progressing Towards Goal     Problem: Patient Education: Go to Patient Education Activity  Goal: Patient/Family Education  Outcome: Progressing Towards Goal     Problem: Non-Violent Restraints  Goal: Removal from restraints as soon as assessed to be safe  Outcome: Progressing Towards Goal  Goal: No harm/injury to patient while restraints in use  Outcome: Progressing Towards Goal  Goal: Patient's dignity will be maintained  Outcome: Progressing Towards Goal  Goal: Patient Interventions  Outcome: Progressing Towards Goal     Problem: Falls - Risk of  Goal: *Absence of Falls  Description: Document Kennedy Sites Fall Risk and appropriate interventions in the flowsheet.   Outcome: Progressing Towards Goal  Note: Fall Risk Interventions:       Mentation Interventions: Adequate sleep, hydration, pain control,Door open when patient unattended,Evaluate medications/consider consulting pharmacy,More frequent rounding,Room close to nurse's station    Medication Interventions: Evaluate medications/consider consulting pharmacy,Bed/chair exit alarm    Elimination Interventions: Bed/chair exit alarm,Stay With Me (per policy),Toileting schedule/hourly rounds              Problem: Patient Education: Go to Patient Education Activity  Goal: Patient/Family Education  Outcome: Progressing Towards Goal     Problem: Pain  Goal: *Control of Pain  Outcome: Progressing Towards Goal  Goal: *PALLIATIVE CARE:  Alleviation of Pain  Outcome: Progressing Towards Goal     Problem: Patient Education: Go to Patient Education Activity  Goal: Patient/Family Education  Outcome: Progressing Towards Goal     Problem: Pressure Injury - Risk of  Goal: *Prevention of pressure injury  Description: Document Valente Scale and appropriate interventions in the flowsheet. Outcome: Progressing Towards Goal  Note: Pressure Injury Interventions:  Sensory Interventions: Assess changes in LOC,Assess need for specialty bed,Avoid rigorous massage over bony prominences,Monitor skin under medical devices,Turn and reposition approx. every two hours (pillows and wedges if needed)    Moisture Interventions: Absorbent underpads,Apply protective barrier, creams and emollients,Internal/External urinary devices    Activity Interventions: Pressure redistribution bed/mattress(bed type),Assess need for specialty bed    Mobility Interventions: HOB 30 degrees or less,Float heels,Pressure redistribution bed/mattress (bed type),Turn and reposition approx.  every two hours(pillow and wedges)    Nutrition Interventions: Document food/fluid/supplement intake,Discuss nutritional consult with provider    Friction and Shear Interventions: Apply protective barrier, creams and emollients,Feet elevated on foot rest,HOB 30 degrees or less                Problem: Patient Education: Go to Patient Education Activity  Goal: Patient/Family Education  Outcome: Progressing Towards Goal

## 2022-05-01 NOTE — PROGRESS NOTES
Problem: Ventilator Management  Goal: *Adequate oxygenation and ventilation  5/1/2022 0816 by Ana Maria Pacheco RN  Outcome: Progressing Towards Goal  5/1/2022 0736 by Ana Maria Pacheco RN  Outcome: Progressing Towards Goal  Goal: *Patient maintains clear airway/free of aspiration  5/1/2022 0816 by Ana Maria Pacheco RN  Outcome: Progressing Towards Goal  5/1/2022 0736 by Ana Maria Pacheco RN  Outcome: Progressing Towards Goal  Goal: *Absence of infection signs and symptoms  5/1/2022 0816 by Ana Maria Pacheco RN  Outcome: Progressing Towards Goal  5/1/2022 0736 by Ana Maria Pacheco RN  Outcome: Progressing Towards Goal  Goal: *Normal spontaneous ventilation  5/1/2022 0816 by Ana Maria Pacheco RN  Outcome: Progressing Towards Goal  5/1/2022 0736 by Ana Maria Pacheco RN  Outcome: Progressing Towards Goal     Problem: Patient Education: Go to Patient Education Activity  Goal: Patient/Family Education  5/1/2022 0816 by Ana Maria Pacheco RN  Outcome: Progressing Towards Goal  5/1/2022 0736 by Ana Maria Pacheco RN  Outcome: Progressing Towards Goal     Problem: Non-Violent Restraints  Goal: Removal from restraints as soon as assessed to be safe  5/1/2022 0816 by Ana Maria Pacheco RN  Outcome: Progressing Towards Goal  5/1/2022 0736 by Ana Maria Pacheco RN  Outcome: Progressing Towards Goal  Goal: No harm/injury to patient while restraints in use  5/1/2022 0816 by Ana Maria Pacheco RN  Outcome: Progressing Towards Goal  5/1/2022 0736 by Ana Maria Pacheco RN  Outcome: Progressing Towards Goal  Goal: Patient's dignity will be maintained  5/1/2022 0816 by Ana Maria Pacheco RN  Outcome: Progressing Towards Goal  5/1/2022 0736 by Ana Maria Pacheco RN  Outcome: Progressing Towards Goal  Goal: Patient Interventions  5/1/2022 0816 by Ana Maria Pacheco RN  Outcome: Progressing Towards Goal  5/1/2022 0736 by Ana Maria Pacheco RN  Outcome: Progressing Towards Goal     Problem: Falls - Risk of  Goal: *Absence of Falls  Description: Document Emmy Escobar Fall Risk and appropriate interventions in the flowsheet.   5/1/2022 0816 by Claritza Steen RN  Outcome: Progressing Towards Goal  Note: Fall Risk Interventions:       Mentation Interventions: Adequate sleep, hydration, pain control,Evaluate medications/consider consulting pharmacy,More frequent rounding,Toileting rounds    Medication Interventions: Evaluate medications/consider consulting pharmacy    Elimination Interventions: Bed/chair exit alarm,Patient to call for help with toileting needs,Toileting schedule/hourly rounds           5/1/2022 0736 by Claritza Steen RN  Outcome: Progressing Towards Goal  Note: Fall Risk Interventions:       Mentation Interventions: Adequate sleep, hydration, pain control,Door open when patient unattended,Evaluate medications/consider consulting pharmacy,More frequent rounding,Room close to nurse's station    Medication Interventions: Evaluate medications/consider consulting pharmacy,Bed/chair exit alarm    Elimination Interventions: Bed/chair exit alarm,Stay With Me (per policy),Toileting schedule/hourly rounds              Problem: Patient Education: Go to Patient Education Activity  Goal: Patient/Family Education  5/1/2022 0816 by Claritza Steen RN  Outcome: Progressing Towards Goal  5/1/2022 0736 by Claritza Steen RN  Outcome: Progressing Towards Goal     Problem: Pain  Goal: *Control of Pain  5/1/2022 0816 by Claritza Steen RN  Outcome: Progressing Towards Goal  5/1/2022 0736 by Claritza Steen RN  Outcome: Progressing Towards Goal  Goal: *PALLIATIVE CARE:  Alleviation of Pain  5/1/2022 0816 by Claritza Steen RN  Outcome: Progressing Towards Goal  5/1/2022 0736 by Claritza Steen RN  Outcome: Progressing Towards Goal     Problem: Patient Education: Go to Patient Education Activity  Goal: Patient/Family Education  5/1/2022 0816 by Lila Ferraro RN  Outcome: Progressing Towards Goal  5/1/2022 0736 by Lila Ferraro RN  Outcome: Progressing Towards Goal     Problem: Pressure Injury - Risk of  Goal: *Prevention of pressure injury  Description: Document Valente Scale and appropriate interventions in the flowsheet. 5/1/2022 0816 by Lila Ferraro RN  Outcome: Progressing Towards Goal  Note: Pressure Injury Interventions:  Sensory Interventions: Assess changes in LOC,Float heels,Keep linens dry and wrinkle-free,Minimize linen layers,Pressure redistribution bed/mattress (bed type),Turn and reposition approx. every two hours (pillows and wedges if needed)    Moisture Interventions: Absorbent underpads,Apply protective barrier, creams and emollients,Internal/External urinary devices,Maintain skin hydration (lotion/cream),Minimize layers    Activity Interventions: Pressure redistribution bed/mattress(bed type)    Mobility Interventions: HOB 30 degrees or less,Pressure redistribution bed/mattress (bed type),Turn and reposition approx. every two hours(pillow and wedges)    Nutrition Interventions: Discuss nutritional consult with provider    Friction and Shear Interventions: Apply protective barrier, creams and emollients,Foam dressings/transparent film/skin sealants,Lift team/patient mobility team             5/1/2022 0736 by Lila Ferraro RN  Outcome: Progressing Towards Goal  Note: Pressure Injury Interventions:  Sensory Interventions: Assess changes in LOC,Assess need for specialty bed,Avoid rigorous massage over bony prominences,Monitor skin under medical devices,Turn and reposition approx.  every two hours (pillows and wedges if needed)    Moisture Interventions: Absorbent underpads,Apply protective barrier, creams and emollients,Internal/External urinary devices    Activity Interventions: Pressure redistribution bed/mattress(bed type),Assess need for specialty bed    Mobility Interventions: HOB 30 degrees or less,Float heels,Pressure redistribution bed/mattress (bed type),Turn and reposition approx.  every two hours(pillow and wedges)    Nutrition Interventions: Document food/fluid/supplement intake,Discuss nutritional consult with provider    Friction and Shear Interventions: Apply protective barrier, creams and emollients,Feet elevated on foot rest,HOB 30 degrees or less                Problem: Patient Education: Go to Patient Education Activity  Goal: Patient/Family Education  5/1/2022 0816 by Lila Ferraro RN  Outcome: Progressing Towards Goal  5/1/2022 0736 by Lila Ferraro, RN  Outcome: Progressing Towards Goal

## 2022-05-01 NOTE — PROGRESS NOTES
05/01/22 1226   Weaning Parameters   Spontaneous Breathing Trial Complete No (Comments)  (end SBT on 7 ps alarming Apnea)   Resp Rate Observed 0   End SBT after 4.5 hours

## 2022-05-01 NOTE — CONSULTS
A 71years old male patient with medical history of L, ptosis, arthritis/gout, and prediabetes was admitted to the ICU for altered mental status. History obtained from medical records. Was found unresponsive by his stepdaughter. Was somnolent in the emergency room. He was given Narcan by EMS with no response. Was suspected to have possible overdose [he is on nortriptyline and baclofen]. Intubated in the emergency room for airway protection. He is currently off sedation. Neurology called for persistent altered mental status and possible intermittent jerking during stimulation. His nurse reported that whenever she tries to suction him, he flexes both his lower extremities. No obvious upper extremity jerking. During my evaluation, he appears that he has a startle response when his name is called. His eyes are open. Not following any commands. Not moving his extremities spontaneously. But during sectioning, he has some movement over the upper and lower extremities. Had a CT scan of the brain which did not show any acute changes; it has shown cystic-appearing lesion over right basal ganglia possibly from an old lacunar infarction. No hemorrhage. He does not have any fever. He has leukocytosis. He is currently on Unasyn. Patient's metabolic panels were remarkable for elevated creatinine from his baseline. Electrolytes are unremarkable. Liver enzymes are unremarkable. Urine drug screen was negative. Social History     Socioeconomic History    Marital status: SINGLE     Spouse name: Not on file    Number of children: Not on file    Years of education: Not on file    Highest education level: Not on file   Occupational History    Not on file   Tobacco Use    Smoking status: Never Smoker    Smokeless tobacco: Never Used   Substance and Sexual Activity    Alcohol use:  Yes     Alcohol/week: 6.0 standard drinks     Types: 6 Cans of beer per week     Comment: weekly on weekends    Drug use: Not on file    Sexual activity: Not on file   Other Topics Concern    Not on file   Social History Narrative    Not on file     Social Determinants of Health     Financial Resource Strain:     Difficulty of Paying Living Expenses: Not on file   Food Insecurity:     Worried About Running Out of Food in the Last Year: Not on file    [place] of Food in the Last Year: Not on file   Transportation Needs:     Lack of Transportation (Medical): Not on file    Lack of Transportation (Non-Medical): Not on file   Physical Activity:     Days of Exercise per Week: Not on file    Minutes of Exercise per Session: Not on file   Stress:     Feeling of Stress : Not on file   Social Connections:     Frequency of Communication with Friends and Family: Not on file    Frequency of Social Gatherings with Friends and Family: Not on file    Attends Hinduism Services: Not on file    Active Member of [address] [place] [address] [employer] or Organizations: Not on file    Attends Club or Organization Meetings: Not on file    Marital Status: Not on file   Intimate Partner Violence:     Fear of Current or Ex-Partner: Not on file    Emotionally Abused: Not on file    Physically Abused: Not on file    Sexually Abused: Not on file   Housing Stability:     Unable to Pay for Housing in the Last Year: Not on file    Number of [place] in the Last Year: Not on file    Unstable Housing in the Last Year: Not on file       History reviewed. No pertinent family history.      Current Facility-Administered Medications   Medication Dose Route Frequency Provider Last Rate Last Admin    sodium chloride 3% hypertonic nebulizer soln  4 mL Nebulization Q6H RT Berta Mendoza, NP   4 mL at 05/01/22 0749    And    albuterol-ipratropium (DUO-NEB) 2.5 MG-0.5 MG/3 ML  3 mL Nebulization Q6H RT Berta Mendzoa NP   3 mL at 05/01/22 0749    Vancomycin: Pharmacy to Dose   Other Rx Dosing/Monitoring Sidney Omalley PA-C        sodium chloride (NS) flush 5-40 mL  5-40 mL IntraVENous Q8H Yousuf Lagunas MD   10 mL at 05/01/22 0527    sodium chloride (NS) flush 5-40 mL  5-40 mL IntraVENous PRN Yousuf Lagunas MD        fentaNYL (PF) 1,500 mcg/30 mL (50 mcg/mL) infusion  0-200 mcg/hr IntraVENous TITRATE Gladys Brooks MD   Stopped at 05/01/22 0739    NOREPINephrine (LEVOPHED) 8 mg in 5% dextrose 250mL (32 mcg/mL) infusion  0.5-50 mcg/min IntraVENous TITRATE Smiley Evans PA-C        chlorhexidine (PERIDEX) 0.12 % mouthwash 10 mL  10 mL Oral Q12H JOLANTA'Smiley Bradley PA-C   10 mL at 05/01/22 0848    albuterol-ipratropium (DUO-NEB) 2.5 MG-0.5 MG/3 ML  3 mL Nebulization Q4H PRN Smiley Rodas PA-C        pantoprazole (PROTONIX) 40 mg in 0.9% sodium chloride 10 mL injection  40 mg IntraVENous DAILY Smiley Evans PA-C   40 mg at 05/01/22 0848    midazolam (VERSED) injection 2 mg  2 mg IntraVENous Q10MIN PRN Smiley Evans PA-C   2 mg at 04/30/22 0115    fentaNYL citrate (PF) injection 100 mcg  100 mcg IntraVENous Q30MIN PRN Erica IRVIN PA-C   100 mcg at 04/30/22 0220    heparin (porcine) injection 5,000 Units  5,000 Units SubCUTAneous Q8H JOLANTA'Smiley Bradley PA-C   5,000 Units at 05/01/22 0445    insulin lispro (HUMALOG) injection   SubCUTAneous Q6H JOLANTA'Smiley Bradley PA-C        glucose chewable tablet 16 g  4 Tablet Oral PRN Smiley Rodas PA-C        glucagon (GLUCAGEN) injection 1 mg  1 mg IntraMUSCular PRN Smiley Rodas PA-C        dextrose 10% infusion 0-250 mL  0-250 mL IntraVENous PRN Smiley Rodas PA-C        ampicillin-sulbactam (UNASYN) 3 g in 0.9% sodium chloride (MBP/ADV) 100 mL MBP  3 g IntraVENous Q6H JOLANTA'Smiley Bradley PA-C 200 mL/hr at 05/01/22 0526 3 g at 05/01/22 8438       Past Medical History:   Diagnosis Date    Arthritis     Hemochromatosis     Ill-defined condition     GOUT       Past Surgical History:   Procedure Laterality Date    HX ORTHOPAEDIC      LEFT HAND       No Known Allergies    Patient Active Problem List Diagnosis Code    HNP (herniated nucleus pulposus), lumbar M51.26    Neuritis M79.2    Neuropathy G62.9    AMS (altered mental status) R41.82         Review of Systems:   Unable to obtain due to his clinical condition. PHYSICAL EXAMINATION:      VITAL SIGNS:    Visit Vitals  /62   Pulse (!) 53   Temp 98.1 °F (36.7 °C)   Resp 12   Ht 6' (1.829 m)   Wt 90.7 kg (200 lb)   SpO2 97%   BMI 27.12 kg/m²       GENERAL: Intubated  EXTREMITIES: With increased tone all over; not withdrawing to pain over the extremities. HEAD:   The patient is normocephalic. NEUROLOGIC EXAMINATION  Mental status: His eyes are open; not tracking. Not following commands. Right gaze preference. No subtle seizure-like activity. Startle like response to call. No meningeal signs. Speech and languge: Intubated and not following commands. CN BTT bilaterally; right gaze preference; PERRL, no obvious facial asymmetry noted, intact cough reflex. Motor: Increased tone bilaterally; not withdrawing to painful stimuli over his extremities; some symmetric movement of his extremities to suctioning. Sensory: not withdrawing to pain from the upper and lower extremities. Coordination: Unable to assess      Study Result    Narrative & Impression   CT HEAD UNENHANCED     CPT code: 23050     INDICATION: Altered mental status.     TECHNIQUE: 5 mm collimation axial images obtained from the skull base through  the vertex without administration of nonionic intravenous contrast.      All CT scans at this facility are performed using dose optimization technique as  appropriate to this specific exam, to include automated exposure control,  adjustment of the mA and/or KP according to patient size or use of iterative  reconstruction techniques.     COMPARISON: None     FINDINGS:   Study is mildly degraded by motion and streak artifact. No parenchymal hemorrhage identified.    Patchy low attenuation throughout the periventricular and deep hemispheric white  matter bilaterally, most prevalent in the frontal and parietal regions, with  distribution most consistent with chronic ischemic small vessel disease change. Rounded 5 mm hypodensity in the anterior inferior right basal ganglia adjacent  to the ventral insular cortex axial image 17. No regional mass effect. This  appears more sharply marginated and cystic on sagittal reformatted images  however. Subjectively this appears more remote, but may reflect lacunar type  infarct. MRI would be much more sensitive for the detection of infarct or ischemia in the  acute setting.       No midline shift of structures. No extra-axial fluid collections. Ventricles are symmetric and not enlarged for age. Calvarium intact to the extent included. Partially included paranasal sinuses appear clear, with apparent left sided  nasal cannula in place.      IMPRESSION  1. No hemorrhage identified. 2. Cystic lucency anterior inferior right basal ganglia region, subjectively  more remote appearing such as a prior lacunar type infarct. No comparison. 3. Moderate burden of presumed hemispheric small vessel disease change as above.             CBC:   Lab Results   Component Value Date/Time    WBC 14.2 (H) 05/01/2022 02:51 AM    RBC 3.53 (L) 05/01/2022 02:51 AM    HGB 10.9 (L) 05/01/2022 02:51 AM    HCT 33.9 (L) 05/01/2022 02:51 AM    PLATELET 117 57/40/2085 02:51 AM     BMP:   Lab Results   Component Value Date/Time    Glucose 81 05/01/2022 02:51 AM    Sodium 143 05/01/2022 02:51 AM    Potassium 4.2 05/01/2022 02:51 AM    Chloride 115 (H) 05/01/2022 02:51 AM    CO2 19 (L) 05/01/2022 02:51 AM    BUN 30 (H) 05/01/2022 02:51 AM    Creatinine 2.55 (H) 05/01/2022 02:51 AM    Calcium 8.3 (L) 05/01/2022 02:51 AM     CMP:   Lab Results   Component Value Date/Time    Glucose 81 05/01/2022 02:51 AM    Sodium 143 05/01/2022 02:51 AM    Potassium 4.2 05/01/2022 02:51 AM    Chloride 115 (H) 05/01/2022 02:51 AM    CO2 19 (L) 05/01/2022 02:51 AM    BUN 30 (H) 05/01/2022 02:51 AM    Creatinine 2.55 (H) 05/01/2022 02:51 AM    Calcium 8.3 (L) 05/01/2022 02:51 AM    Anion gap 9 05/01/2022 02:51 AM    BUN/Creatinine ratio 12 05/01/2022 02:51 AM    Alk. phosphatase 187 (H) 04/29/2022 01:40 PM    Protein, total 8.0 04/29/2022 01:40 PM    Albumin 2.8 (L) 05/01/2022 02:51 AM    Globulin 3.9 04/29/2022 01:40 PM    A-G Ratio 1.1 04/29/2022 01:40 PM     Coagulation:   Lab Results   Component Value Date/Time    Prothrombin time 13.1 09/13/2019 02:02 PM    INR 1.0 09/13/2019 02:02 PM     Cardiac markers: No results found for: CPK, CKND1, MARLY  ABGs: No results found for: PH, PO2, PCO2, HCO3, BD, BE       Impression:   A 71years old male patient with above medical problems was admitted to the ICU for altered mental status. Found down unresponsive at home. Suspected drug overdose. Patient is taking nortriptyline [presentation does not look like TCA overdose], baclofen, and tizanidine. Did not have any fever but has leukocytosis. No reported seizure initially. No witnessed myoclonic jerking. He has bradycardia. No fever but has leukocytosis. CT scan of the brain did not show any acute changes; has shown possible old lacunar infarction over the right basal ganglia. Since patient remained poorly responsive off sedation, and ?right gaze preference,  will get EEG. We will also get MRI of the brain. Most likely, has metabolic/toxic encephalopathy but need to rule out other possibilities including seizure. Call for questions. We will follow patient. PLEASE NOTE:   This document has been produced using voice recognition software. Unrecognized errors in transcription may be present. Addendum:  EEG was done and report in chart. It has shown periodic, sharp appearing waves with background suppression. Possible Generalized Periodic discharges. Trial with fosphenytoin 20 mg/Kg. Will check level tomorrow morning. Will be on low dose sedation.

## 2022-05-01 NOTE — PROGRESS NOTES
Problem: Non-Violent Restraints  Goal: Removal from restraints as soon as assessed to be safe  5/1/2022 1929 by Rene Yoon RN  Outcome: Progressing Towards Goal  5/1/2022 0816 by Rene Yoon RN  Outcome: Progressing Towards Goal  5/1/2022 0736 by Rene Yoon RN  Outcome: Progressing Towards Goal  Goal: No harm/injury to patient while restraints in use  5/1/2022 1929 by Rene Yoon RN  Outcome: Progressing Towards Goal  5/1/2022 0816 by Rene Yoon RN  Outcome: Progressing Towards Goal  5/1/2022 0736 by Rene Yoon RN  Outcome: Progressing Towards Goal  Goal: Patient's dignity will be maintained  5/1/2022 1929 by Rene Yoon RN  Outcome: Progressing Towards Goal  5/1/2022 0816 by Rene Yoon RN  Outcome: Progressing Towards Goal  5/1/2022 0736 by Rene Yoon RN  Outcome: Progressing Towards Goal  Goal: Patient Interventions  5/1/2022 1929 by Rene Yoon RN  Outcome: Progressing Towards Goal  5/1/2022 0816 by Rene Yoon RN  Outcome: Progressing Towards Goal  5/1/2022 0736 by Rene Yoon RN  Outcome: Progressing Towards Goal

## 2022-05-01 NOTE — PROGRESS NOTES
Transport with pt on  transport vent to MRI. Tolerated trip well, no resp distress, then monitored by RT and back to ICU bed and back to primary vent with no issues, Ambu and mask back at head of bed.

## 2022-05-01 NOTE — PROGRESS NOTES
[address] [place] [address] [facility]. Pulmonary, Critical Care, and Sleep Medicine    Name: Sonia Farmer MRN: [id number]   : 1950 Hospital: [address] [place] [address] Dr   Date: 2022  Admission Date: 2022     Chart and notes reviewed. Data reviewed. I have evaluated all findings. [x]I have reviewed the flowsheet and previous days notes. []The patient is unable to give any meaningful history or review of systems because the patient is:  []Intubated []Sedated   []Unresponsive      []The patient is critically ill on      []Mechanical ventilation []Pressors   []BiPAP []         Interval HPI:  Patient is a 75 y. o. male w/ PMH of hemachromatosis presenting to [facility] from home via EMS from home unresponsive. History obtained from chart as patient is unresponsive. He was found by his step daughter who reported patient had been acting \"bipolar lately\" and would not let her in the house. She is not able to give a medication list at this time. Patients general UDS negative, CT head negative for acute issues. Poison control contacted by ED- recommend supportive care. Patient required intubation due to mental status. Remains unresponsive, on no sedation. Subjective 22  Hospital Day:  Vent Day:  Overnight events:Nursing staff reported that after they lay the pt flat for his bath, nursing noticed fasciculation on his right thigh and involuntary movement on his legs. He also desaturated and was drowsy after this event. Mentation/Activity: Eyes open wide but no tracking, no command following. Fentanyl ggt was being held for SBT trials. Respiratory/ Secretions:minimal vent settings  Hemodynamics:no vasopressor needs  Urine output, bowel: Adequate urine 0.6ml/kg/hr through jin  Diet:will start tube feeds  Need for procedures:n/a              ROS:Pertinent items are noted in HPI. Events and notes from last 24 hours reviewed.      Patient Active Problem List   Diagnosis Code    HNP (herniated nucleus pulposus), lumbar M51.26    Neuritis M79.2    Neuropathy G62.9    AMS (altered mental status) R41.82       Vital Signs:  Visit Vitals  /68   Pulse (!) 53   Temp 97.8 °F (36.6 °C)   Resp 13   Ht 6' (1.829 m)   Wt 90.7 kg (200 lb)   SpO2 98%   BMI 27.12 kg/m²       O2 Device: Ventilator,Endotracheal tube       Temp (24hrs), Av.8 °F (36.6 °C), Min:97.5 °F (36.4 °C), Max:98.3 °F (36.8 °C)       Intake/Output:   Last shift:      701 - 1900  In: -   Out: 100 [Urine:100]  Last 3 shifts: 1901 - 700  In: 2019.4 [I.V.:2019.4]  Out:  [Urine:1850]    Intake/Output Summary (Last 24 hours) at 2022 0917  Last data filed at 2022 0738  Gross per 24 hour   Intake 819.36 ml   Output 1500 ml   Net -680.64 ml          Current Facility-Administered Medications   Medication Dose Route Frequency    sodium chloride 3% hypertonic nebulizer soln  4 mL Nebulization Q6H RT    And    albuterol-ipratropium (DUO-NEB) 2.5 MG-0.5 MG/3 ML  3 mL Nebulization Q6H RT    Vancomycin: Pharmacy to Dose   Other Rx Dosing/Monitoring    sodium chloride (NS) flush 5-40 mL  5-40 mL IntraVENous Q8H    fentaNYL (PF) 1,500 mcg/30 mL (50 mcg/mL) infusion  0-200 mcg/hr IntraVENous TITRATE    NOREPINephrine (LEVOPHED) 8 mg in 5% dextrose 250mL (32 mcg/mL) infusion  0.5-50 mcg/min IntraVENous TITRATE    chlorhexidine (PERIDEX) 0.12 % mouthwash 10 mL  10 mL Oral Q12H    pantoprazole (PROTONIX) 40 mg in 0.9% sodium chloride 10 mL injection  40 mg IntraVENous DAILY    heparin (porcine) injection 5,000 Units  5,000 Units SubCUTAneous Q8H    insulin lispro (HUMALOG) injection   SubCUTAneous Q6H    ampicillin-sulbactam (UNASYN) 3 g in 0.9% sodium chloride (MBP/ADV) 100 mL MBP  3 g IntraVENous Q6H         Telemetry: []Sinus []A-flutter []Paced    []A-fib []Multiple PVCs                  Physical Exam:      General: Intubated/sedated; Opens eyes to pain, no signs of distress  HEENT:  Anicteric sclerae; pink palpebral conjunctivae; mucosa moist  Resp:  Symmetrical chest expansion, no accessory muscle use; good airway entry; coarse breath sounds  CV:  S1, S2 present; regular rate and rhythm  GI:  Abdomen soft, non-tender; (+) active bowel sounds  Extremities:  +2 pulses on all extremities; no edema/ cyanosis/ clubbing noted   Skin:  Warm; no rashes/ lesions noted, normal turgor/cap refill   Neurologic:  Non-focal, opens eyes to pain, no command following  Devices:  OGT, ETT, Loredo      DATA:  MAR reviewed and pertinent medications noted or modified as needed    Labs:  Recent Labs     05/01/22  0251 04/30/22  0236 04/29/22  1340   WBC 14.2* 12.7 16.4*   HGB 10.9* 11.0* 13.1   HCT 33.9* 33.8* 39.6    281 355     Recent Labs     05/01/22  0251 04/30/22  1536 04/30/22  0236 04/29/22  1600 04/29/22  1340 04/29/22  1340     --  139 138   < > 135*   K 4.2  --  4.3 4.7   < > 5.1   *  --  112* 110   < > 104   CO2 19*  --  20* 22   < > 25   GLU 81  --  97 102*   < > 123*   BUN 30*  --  27* 25*   < > 26*   CREA 2.55*  --  2.48* 2.48*   < > 2.89*   CA 8.3*  --  8.5 8.1*   < > 9.8   MG 2.7* 3.0* 1.5*  --    < > 1.8   PHOS 4.1  --  4.3  --   --   --    ALB 2.8*  --  2.9*  --   --  4.1   ALT  --   --   --   --   --  32    < > = values in this interval not displayed. No results for input(s): PH, PCO2, PO2, HCO3, FIO2 in the last 72 hours. Recent Labs     05/01/22  0439 04/30/22  0254 04/29/22  1628   FIO2I 35 50 21   HCO3I 18.4* 19.6* 19.2*   PCO2I 35.7 37.8 43.0   PHI 7.32* 7.32* 7.26*   PO2I 116* 99 78*       Imaging:  [x]   I have personally reviewed the patients radiographs and reports  XR Results (most recent):  XR Results (most recent):  Results from Hospital Encounter encounter on 04/29/22    XR ABD (KUB)    Narrative  EXAM: Abdominal X-ray    INDICATION:  placement og OGT    TECHNIQUE: AP view of the abdomen obtained.     COMPARISON: None    FINDINGS:  2 enteric tubes have their tips overlying the stomach. The bowel gas pattern is  nonobstructive. No dilated small bowel loops appreciated. No abnormal  calcifications appreciated. The osseous structures are unremarkable. Impression  1. The tubes have their tips overlying the stomach. CT Results (most recent):  Results from Hospital Encounter encounter on 04/29/22    CT HEAD WO CONT    Narrative  CT HEAD UNENHANCED    CPT code: 15662    INDICATION: Altered mental status. TECHNIQUE: 5 mm collimation axial images obtained from the skull base through  the vertex without administration of nonionic intravenous contrast.    All CT scans at this facility are performed using dose optimization technique as  appropriate to this specific exam, to include automated exposure control,  adjustment of the mA and/or KP according to patient size or use of iterative  reconstruction techniques. COMPARISON: None    FINDINGS:  Study is mildly degraded by motion and streak artifact. No parenchymal hemorrhage identified. Patchy low attenuation throughout the periventricular and deep hemispheric white  matter bilaterally, most prevalent in the frontal and parietal regions, with  distribution most consistent with chronic ischemic small vessel disease change. Rounded 5 mm hypodensity in the anterior inferior right basal ganglia adjacent  to the ventral insular cortex axial image 17. No regional mass effect. This  appears more sharply marginated and cystic on sagittal reformatted images  however. Subjectively this appears more remote, but may reflect lacunar type  infarct. MRI would be much more sensitive for the detection of infarct or ischemia in the  acute setting. No midline shift of structures. No extra-axial fluid collections. Ventricles are symmetric and not enlarged for age. Calvarium intact to the extent included. Partially included paranasal sinuses appear clear, with apparent left sided  nasal cannula in place. Impression  1.   No hemorrhage identified. 2. Cystic lucency anterior inferior right basal ganglia region, subjectively  more remote appearing such as a prior lacunar type infarct. No comparison. 3. Moderate burden of presumed hemispheric small vessel disease change as above. IMPRESSION:   · Acute respiratory failure requiring mechnical ventilation in setting of encephalopathy with need for airway protection +/- CAP vs Aspiration PNA  · Acute toxic metabolic encephalopathy due to suspected medication overdose- ?tizanidine, baclofen, mobic, lisinopril, bystolic in chart. Negative UDS, ETOH, acetaminophen, salicylate. · SIRs vs sepsis - elevated white count, hypothermia, UA negative, procal negative. CXR (4/30) showed infiltrate vs atelectasis. · Bradycardia- likely from hypothermia, improved  · Hypothermia- Core temp 93F, resolved  · Elevated TSH but normal T4  · Acute on CKD stage 3- baseline crea (12.21) 1.69. prerenal azotemia vs ATN  · CT head 4/29 \"cystic lucency anterior inferior right basal ganglia region\"   · Hx Hemochromatosis   · BMI 27.12            Patient Active Problem List   Diagnosis Code    HNP (herniated nucleus pulposus), lumbar M51.26    Neuritis M79.2    Neuropathy G62.9    AMS (altered mental status) R41.82        RECOMMENDATIONS:   Neuro:Titrate sedation for RASS goal 0 to -1, daily sedation holiday. Fentanyl ggt for sedation. Consulted neurology for seizure like activity, MRI and EEG ordered. Pulm: Aspiration precautions, HOB>30'. VAP Bundle  head of the bed at 30' all times. Daily sedation holiday and assessment for weaning with SBT as tolerated.  Aggressive pulmonary toilet, hypertonic nebs, duonebs, metaneb, and Chest PT ordered. CXR today improved. CVS:Monitor HD, MAP goal >65. GI: NPO. Will start tube feeds  Renal: Trend Cr, UOP. Loredo for accurate I/O, will attempt to transition to external cath  Hem/Onc: Trend H/H, monitor for s/o active bleeding. Daily CBC.   I/D:Trend WBCs and temperature curve. On Unasyn, grew GPC in his blood culture (4/29), will check for MRSA  Metabolic: Daily BMP, mag, phos. Trend lytes and replace per protocol. Endocrine:Q6 glucoses. SSI. Avoid hypoglycemia. Musc/Skin: PT/OT/SLP  Full Code  Discussed in interdisciplinary rounds     Best practice :    Glycemic control  [facility] ICU bundles: Jin Bundle Followed    Mech Vent patients-    VAP bundle-Roise tube to suction at 20-30 cm Hg, Maintain [place] tube with 5-10ml air every 4 hours, Routine oral care every 4 hours, Elevation of head > 45 degree, Daily sedation holiday and SBT evaluation starting at 6.00am.  Sress ulcer prophylaxis. Pepcid  DVT prophylaxis. SQH  Need for Lines, jin assessed. Palliative care evaluation. Restraints need. Attending Non-violent Restraint Reevaluation     I have reevaluated the patient one hour after initiation of intervention. The patient is comfortable, uninjured, but continues to pose an imminent risk of injury to self to themselves and/or serious disruption of medical treatment required to keep patient stable. The patient's current medical and behavioral conditions that warrant the use intervention include danger to self and Interference with medical equipment or treatment. Restraint or seclusion will be discontinued at the earliest possible time, regardless of the scheduled expiration of the order. Based on my evaluation, restraints will be continued: YES         This care involved high complexity decision making to assess, manipulate, and support vital system functions, to treat this degreee vital organ system failure and to prevent further life threatening deterioration of the patients condition  The services I provided to this patient were to treat and/or prevent clinically significant deterioration that could result in the failure of one or more body systems and/or organ systems due to respiratory distress, hypoxia, cardiac dysrhythmia.        Randy Kenney, KIMBERLY 05/01/22  Pulmonary, Critical Care Medicine  [facility]

## 2022-05-01 NOTE — PROGRESS NOTES
Problem: Ventilator Management  Goal: *Adequate oxygenation and ventilation  Outcome: Progressing Towards Goal     Problem: Ventilator Management  Goal: *Patient maintains clear airway/free of aspiration  Outcome: Progressing Towards Goal     Problem: Falls - Risk of  Goal: *Absence of Falls  Description: Document Celestine Fall Risk and appropriate interventions in the flowsheet. Outcome: Progressing Towards Goal  Note: Fall Risk Interventions:       Mentation Interventions: Adequate sleep, hydration, pain control,Door open when patient unattended,Evaluate medications/consider consulting pharmacy,More frequent rounding,Room close to nurse's station    Medication Interventions: Evaluate medications/consider consulting pharmacy,Bed/chair exit alarm    Elimination Interventions: Bed/chair exit alarm,Stay With Me (per policy),Toileting schedule/hourly rounds              Problem: Patient Education: Go to Patient Education Activity  Goal: Patient/Family Education  Outcome: Progressing Towards Goal     Problem: Pressure Injury - Risk of  Goal: *Prevention of pressure injury  Description: Document Valente Scale and appropriate interventions in the flowsheet. Outcome: Progressing Towards Goal  Note: Pressure Injury Interventions:  Sensory Interventions: Assess changes in LOC,Assess need for specialty bed,Avoid rigorous massage over bony prominences,Monitor skin under medical devices,Turn and reposition approx. every two hours (pillows and wedges if needed)    Moisture Interventions: Absorbent underpads,Apply protective barrier, creams and emollients,Internal/External urinary devices    Activity Interventions: Pressure redistribution bed/mattress(bed type),Assess need for specialty bed    Mobility Interventions: HOB 30 degrees or less,Float heels,Pressure redistribution bed/mattress (bed type),Turn and reposition approx.  every two hours(pillow and wedges)    Nutrition Interventions: Document food/fluid/supplement intake,Discuss nutritional consult with provider    Friction and Shear Interventions: Apply protective barrier, creams and emollients,Feet elevated on foot rest,HOB 30 degrees or less

## 2022-05-01 NOTE — PROGRESS NOTES
0730am Bedside shift report received from Monse Iqbal, [address] [place] [address]  0800am SBT initiated. Fentanyl IV gtt off.  1000am Dr Christopher Owen, Neurologist at the bedside. Assess and evaluate pt. Attempt to turn and stimulate pt while MD present- no tremors noted. 1128am BS-83. TF started as per MD orders  1204pm -MRI screening form completed by daughter. Update daughter via phone r/t pt status  1224pm EEG at the bedside  1330pm Update Poison Controll via phone r/t pt status  1349pn Sons are at the bedside  1545pm Pt went down to MRI  1700pm Pt tolerates MRI  1800pm Pt is waking up. Pt nods yes and no to questions. Pt does not follow commands. No tremors or seizures noted. 1830pm Update reno via phone r/t pt status  1900pm Bedside shift change report given to RICKEY Modi by Julio Ritter RN. Report included the following information SBAR, Kardex, Intake/Output, MAR, Recent Results, Med Rec Status, Cardiac Rhythm NSR/ Sinus Bradycardia and Alarm Parameters .

## 2022-05-02 ENCOUNTER — APPOINTMENT (OUTPATIENT)
Dept: NON INVASIVE DIAGNOSTICS | Age: 72
DRG: 917 | End: 2022-05-02
Attending: INTERNAL MEDICINE
Payer: MEDICARE

## 2022-05-02 ENCOUNTER — APPOINTMENT (OUTPATIENT)
Dept: GENERAL RADIOLOGY | Age: 72
DRG: 917 | End: 2022-05-02
Attending: PHYSICIAN ASSISTANT
Payer: MEDICARE

## 2022-05-02 LAB
ALBUMIN SERPL-MCNC: 3 G/DL (ref 3.4–5)
ANION GAP SERPL CALC-SCNC: 9 MMOL/L (ref 3–18)
ARTERIAL PATENCY WRIST A: POSITIVE
BASE DEFICIT BLD-SCNC: 4.2 MMOL/L
BASOPHILS # BLD: 0.1 K/UL (ref 0–0.1)
BASOPHILS NFR BLD: 1 % (ref 0–2)
BDY SITE: ABNORMAL
BUN SERPL-MCNC: 32 MG/DL (ref 7–18)
BUN/CREAT SERPL: 14 (ref 12–20)
CALCIUM SERPL-MCNC: 8.9 MG/DL (ref 8.5–10.1)
CHLORIDE SERPL-SCNC: 116 MMOL/L (ref 100–111)
CO2 SERPL-SCNC: 20 MMOL/L (ref 21–32)
CREAT SERPL-MCNC: 2.23 MG/DL (ref 0.6–1.3)
DIFFERENTIAL METHOD BLD: ABNORMAL
ECHO AO ASC DIAM: 3.8 CM
ECHO AO ASCENDING AORTA INDEX: 1.77 CM/M2
ECHO AO ROOT DIAM: 4.3 CM
ECHO AO ROOT INDEX: 2 CM/M2
ECHO EST RA PRESSURE: 8 MMHG
ECHO LA VOL 2C: 104 ML (ref 18–58)
ECHO LA VOL 4C: 51 ML (ref 18–58)
ECHO LA VOLUME AREA LENGTH: 78 ML
ECHO LA VOLUME INDEX A2C: 48 ML/M2 (ref 16–34)
ECHO LA VOLUME INDEX A4C: 24 ML/M2 (ref 16–34)
ECHO LA VOLUME INDEX AREA LENGTH: 36 ML/M2 (ref 16–34)
ECHO LV E' LATERAL VELOCITY: 12 CM/S
ECHO LV E' SEPTAL VELOCITY: 9 CM/S
ECHO LV FRACTIONAL SHORTENING: 17 % (ref 28–44)
ECHO LV INTERNAL DIMENSION DIASTOLE INDEX: 1.95 CM/M2
ECHO LV INTERNAL DIMENSION DIASTOLIC: 4.2 CM (ref 4.2–5.9)
ECHO LV INTERNAL DIMENSION SYSTOLIC INDEX: 1.63 CM/M2
ECHO LV INTERNAL DIMENSION SYSTOLIC: 3.5 CM
ECHO LV IVSD: 1 CM (ref 0.6–1)
ECHO LV MASS 2D: 157.1 G (ref 88–224)
ECHO LV MASS INDEX 2D: 73.1 G/M2 (ref 49–115)
ECHO LV POSTERIOR WALL DIASTOLIC: 1.2 CM (ref 0.6–1)
ECHO LV RELATIVE WALL THICKNESS RATIO: 0.57
ECHO LVOT AREA: 3.1 CM2
ECHO LVOT DIAM: 2 CM
ECHO LVOT MEAN GRADIENT: 3 MMHG
ECHO LVOT PEAK GRADIENT: 6 MMHG
ECHO LVOT PEAK VELOCITY: 1.2 M/S
ECHO LVOT STROKE VOLUME INDEX: 36.9 ML/M2
ECHO LVOT SV: 79.4 ML
ECHO LVOT VTI: 25.3 CM
ECHO MV A VELOCITY: 0.54 M/S
ECHO MV E DECELERATION TIME (DT): 219.6 MS
ECHO MV E VELOCITY: 1.07 M/S
ECHO MV E/A RATIO: 1.98
ECHO MV E/E' LATERAL: 8.92
ECHO MV E/E' RATIO (AVERAGED): 10.4
ECHO MV E/E' SEPTAL: 11.89
ECHO RIGHT VENTRICULAR SYSTOLIC PRESSURE (RVSP): 60 MMHG
ECHO RV FREE WALL PEAK S': 12 CM/S
ECHO RV TAPSE: 2.8 CM (ref 1.7–?)
ECHO TV REGURGITANT MAX VELOCITY: 3.6 M/S
ECHO TV REGURGITANT PEAK GRADIENT: 52 MMHG
EOSINOPHIL # BLD: 0.1 K/UL (ref 0–0.4)
EOSINOPHIL NFR BLD: 1 % (ref 0–5)
ERYTHROCYTE [DISTWIDTH] IN BLOOD BY AUTOMATED COUNT: 13.5 % (ref 11.6–14.5)
FENTANYL, URINE: NEGATIVE PG/ML
GAS FLOW.O2 O2 DELIVERY SYS: ABNORMAL L/MIN
GAS FLOW.O2 SETTING OXYMISER: 13 BPM
GLUCOSE BLD STRIP.AUTO-MCNC: 111 MG/DL (ref 70–110)
GLUCOSE BLD STRIP.AUTO-MCNC: 130 MG/DL (ref 70–110)
GLUCOSE BLD STRIP.AUTO-MCNC: 131 MG/DL (ref 70–110)
GLUCOSE BLD STRIP.AUTO-MCNC: 131 MG/DL (ref 70–110)
GLUCOSE SERPL-MCNC: 115 MG/DL (ref 74–99)
HCO3 BLD-SCNC: 20.6 MMOL/L (ref 22–26)
HCT VFR BLD AUTO: 36.1 % (ref 36–48)
HGB BLD-MCNC: 11.4 G/DL (ref 13–16)
IMM GRANULOCYTES # BLD AUTO: 0.1 K/UL (ref 0–0.04)
IMM GRANULOCYTES NFR BLD AUTO: 0 % (ref 0–0.5)
LYMPHOCYTES # BLD: 1.4 K/UL (ref 0.9–3.6)
LYMPHOCYTES NFR BLD: 13 % (ref 21–52)
MAGNESIUM SERPL-MCNC: 2.9 MG/DL (ref 1.6–2.6)
MCH RBC QN AUTO: 30.6 PG (ref 24–34)
MCHC RBC AUTO-ENTMCNC: 31.6 G/DL (ref 31–37)
MCV RBC AUTO: 97 FL (ref 78–100)
MONOCYTES # BLD: 0.9 K/UL (ref 0.05–1.2)
MONOCYTES NFR BLD: 8 % (ref 3–10)
NEUTS SEG # BLD: 8.7 K/UL (ref 1.8–8)
NEUTS SEG NFR BLD: 78 % (ref 40–73)
NRBC # BLD: 0 K/UL (ref 0–0.01)
NRBC BLD-RTO: 0 PER 100 WBC
O2/TOTAL GAS SETTING VFR VENT: 28 %
PCO2 BLD: 36.2 MMHG (ref 35–45)
PEEP RESPIRATORY: 5 CMH2O
PH BLD: 7.36 [PH] (ref 7.35–7.45)
PHENYTOIN SERPL-MCNC: 20.1 UG/ML (ref 10–20)
PHOSPHATE SERPL-MCNC: 3.7 MG/DL (ref 2.5–4.9)
PLATELET # BLD AUTO: 243 K/UL (ref 135–420)
PMV BLD AUTO: 9.4 FL (ref 9.2–11.8)
PO2 BLD: 96 MMHG (ref 80–100)
POTASSIUM SERPL-SCNC: 4 MMOL/L (ref 3.5–5.5)
RBC # BLD AUTO: 3.72 M/UL (ref 4.35–5.65)
SAO2 % BLD: 97.2 % (ref 92–97)
SERVICE CMNT-IMP: ABNORMAL
SODIUM SERPL-SCNC: 145 MMOL/L (ref 136–145)
SPECIMEN TYPE: ABNORMAL
TOTAL RESP. RATE, ITRR: 13
VANCOMYCIN SERPL-MCNC: 23 UG/ML (ref 5–40)
VENTILATION MODE VENT: ABNORMAL
VT SETTING VENT: 520 ML
WBC # BLD AUTO: 11.2 K/UL (ref 4.6–13.2)

## 2022-05-02 PROCEDURE — 36600 WITHDRAWAL OF ARTERIAL BLOOD: CPT

## 2022-05-02 PROCEDURE — 95816 EEG AWAKE AND DROWSY: CPT | Performed by: NEUROLOGICAL SURGERY

## 2022-05-02 PROCEDURE — 74011250636 HC RX REV CODE- 250/636: Performed by: PHYSICIAN ASSISTANT

## 2022-05-02 PROCEDURE — 65610000006 HC RM INTENSIVE CARE

## 2022-05-02 PROCEDURE — 94640 AIRWAY INHALATION TREATMENT: CPT

## 2022-05-02 PROCEDURE — 77030018798 HC PMP KT ENTRL FED COVD -A

## 2022-05-02 PROCEDURE — 99291 CRITICAL CARE FIRST HOUR: CPT | Performed by: INTERNAL MEDICINE

## 2022-05-02 PROCEDURE — 80069 RENAL FUNCTION PANEL: CPT

## 2022-05-02 PROCEDURE — 82803 BLOOD GASES ANY COMBINATION: CPT

## 2022-05-02 PROCEDURE — 36415 COLL VENOUS BLD VENIPUNCTURE: CPT

## 2022-05-02 PROCEDURE — 83735 ASSAY OF MAGNESIUM: CPT

## 2022-05-02 PROCEDURE — 74011000258 HC RX REV CODE- 258: Performed by: PHYSICIAN ASSISTANT

## 2022-05-02 PROCEDURE — 95816 EEG AWAKE AND DROWSY: CPT

## 2022-05-02 PROCEDURE — C9113 INJ PANTOPRAZOLE SODIUM, VIA: HCPCS | Performed by: PHYSICIAN ASSISTANT

## 2022-05-02 PROCEDURE — 82962 GLUCOSE BLOOD TEST: CPT

## 2022-05-02 PROCEDURE — 80185 ASSAY OF PHENYTOIN TOTAL: CPT

## 2022-05-02 PROCEDURE — 85025 COMPLETE CBC W/AUTO DIFF WBC: CPT

## 2022-05-02 PROCEDURE — 74011250637 HC RX REV CODE- 250/637: Performed by: PHYSICIAN ASSISTANT

## 2022-05-02 PROCEDURE — 71045 X-RAY EXAM CHEST 1 VIEW: CPT

## 2022-05-02 PROCEDURE — 77030037878 HC DRSG MEPILEX >48IN BORD MOLN -B

## 2022-05-02 PROCEDURE — 2709999900 HC NON-CHARGEABLE SUPPLY

## 2022-05-02 PROCEDURE — 74011000250 HC RX REV CODE- 250: Performed by: PHYSICIAN ASSISTANT

## 2022-05-02 PROCEDURE — 94762 N-INVAS EAR/PLS OXIMTRY CONT: CPT

## 2022-05-02 PROCEDURE — 80202 ASSAY OF VANCOMYCIN: CPT

## 2022-05-02 PROCEDURE — 74011250636 HC RX REV CODE- 250/636: Performed by: INTERNAL MEDICINE

## 2022-05-02 PROCEDURE — 94003 VENT MGMT INPAT SUBQ DAY: CPT

## 2022-05-02 PROCEDURE — 94668 MNPJ CHEST WALL SBSQ: CPT

## 2022-05-02 PROCEDURE — C8929 TTE W OR WO FOL WCON,DOPPLER: HCPCS

## 2022-05-02 PROCEDURE — 74011000250 HC RX REV CODE- 250: Performed by: REGISTERED NURSE

## 2022-05-02 PROCEDURE — 95816 EEG AWAKE AND DROWSY: CPT | Performed by: STUDENT IN AN ORGANIZED HEALTH CARE EDUCATION/TRAINING PROGRAM

## 2022-05-02 RX ORDER — DOCUSATE SODIUM 50 MG/5ML
100 LIQUID ORAL DAILY
Status: DISCONTINUED | OUTPATIENT
Start: 2022-05-02 | End: 2022-05-06

## 2022-05-02 RX ADMIN — IPRATROPIUM BROMIDE AND ALBUTEROL SULFATE 3 ML: .5; 2.5 SOLUTION RESPIRATORY (INHALATION) at 09:01

## 2022-05-02 RX ADMIN — AMPICILLIN SODIUM AND SULBACTAM SODIUM 3 G: 2; 1 INJECTION, POWDER, FOR SOLUTION INTRAMUSCULAR; INTRAVENOUS at 06:04

## 2022-05-02 RX ADMIN — AMPICILLIN SODIUM AND SULBACTAM SODIUM 3 G: 2; 1 INJECTION, POWDER, FOR SOLUTION INTRAMUSCULAR; INTRAVENOUS at 12:44

## 2022-05-02 RX ADMIN — IPRATROPIUM BROMIDE AND ALBUTEROL SULFATE 3 ML: .5; 2.5 SOLUTION RESPIRATORY (INHALATION) at 19:43

## 2022-05-02 RX ADMIN — CHLORHEXIDINE GLUCONATE 0.12% ORAL RINSE 10 ML: 1.2 LIQUID ORAL at 20:16

## 2022-05-02 RX ADMIN — PANTOPRAZOLE SODIUM 40 MG: 40 INJECTION, POWDER, FOR SOLUTION INTRAVENOUS at 09:11

## 2022-05-02 RX ADMIN — IPRATROPIUM BROMIDE AND ALBUTEROL SULFATE 3 ML: .5; 2.5 SOLUTION RESPIRATORY (INHALATION) at 15:27

## 2022-05-02 RX ADMIN — DOCUSATE SODIUM LIQUID 100 MG: 100 LIQUID ORAL at 12:44

## 2022-05-02 RX ADMIN — SODIUM CHLORIDE SOLN NEBU 3% 4 ML: 3 NEBU SOLN at 09:01

## 2022-05-02 RX ADMIN — HEPARIN SODIUM 5000 UNITS: 5000 INJECTION INTRAVENOUS; SUBCUTANEOUS at 20:16

## 2022-05-02 RX ADMIN — SODIUM CHLORIDE SOLN NEBU 3% 4 ML: 3 NEBU SOLN at 15:27

## 2022-05-02 RX ADMIN — PERFLUTREN 2 ML: 6.52 INJECTION, SUSPENSION INTRAVENOUS at 17:02

## 2022-05-02 RX ADMIN — SODIUM CHLORIDE SOLN NEBU 3% 4 ML: 3 NEBU SOLN at 02:33

## 2022-05-02 RX ADMIN — IPRATROPIUM BROMIDE AND ALBUTEROL SULFATE 3 ML: .5; 2.5 SOLUTION RESPIRATORY (INHALATION) at 02:33

## 2022-05-02 RX ADMIN — HEPARIN SODIUM 5000 UNITS: 5000 INJECTION INTRAVENOUS; SUBCUTANEOUS at 12:45

## 2022-05-02 RX ADMIN — AMPICILLIN SODIUM AND SULBACTAM SODIUM 3 G: 2; 1 INJECTION, POWDER, FOR SOLUTION INTRAMUSCULAR; INTRAVENOUS at 00:03

## 2022-05-02 RX ADMIN — AMPICILLIN SODIUM AND SULBACTAM SODIUM 3 G: 2; 1 INJECTION, POWDER, FOR SOLUTION INTRAMUSCULAR; INTRAVENOUS at 17:43

## 2022-05-02 RX ADMIN — CHLORHEXIDINE GLUCONATE 0.12% ORAL RINSE 10 ML: 1.2 LIQUID ORAL at 09:11

## 2022-05-02 RX ADMIN — SODIUM CHLORIDE SOLN NEBU 3% 4 ML: 3 NEBU SOLN at 19:43

## 2022-05-02 RX ADMIN — HEPARIN SODIUM 5000 UNITS: 5000 INJECTION INTRAVENOUS; SUBCUTANEOUS at 04:30

## 2022-05-02 NOTE — PROGRESS NOTES
[facility]. Pulmonary, Critical Care, and Sleep Medicine    Name: Eboni Nolan MRN: [id number]   : 1950 Hospital: [address] [place] [address] Dr   Date: 2022  Admission Date: 2022     Chart and notes reviewed. Data reviewed. I have evaluated all findings. [x]I have reviewed the flowsheet and previous days notes. []The patient is unable to give any meaningful history or review of systems because the patient is:  []Intubated []Sedated   []Unresponsive      []The patient is critically ill on      []Mechanical ventilation []Pressors   []BiPAP []         Interval HPI:  Patient is a 75 y. o. male w/ PMH of hemachromatosis presenting to [facility] from home via EMS from home unresponsive. History obtained from chart as patient is unresponsive. He was found by his step daughter who reported patient had been acting \"bipolar lately\" and would not let her in the house. She is not able to give a medication list at this time. Patients general UDS negative, CT head negative for acute issues. Poison control contacted by ED- recommend supportive care. Patient required intubation due to mental status. Remains unresponsive, on no sedation. Subjective 22  Hospital Day:  Vent Day:  Overnight events: EEG was found to have seizure activity. Started on fosphenytoin and versed for sedation. Original recommendation for propofol, however, patient has remained bradycardic. This morning, sedation off for SBT. MRI demonstrated no acute findings, evidence of small vessel disease. Tube feeds off for SBT. Mentation/Activity: Eyes open wide on voice but no tracking, no command following. Versed gtt was being held for SBT trials. Respiratory/ Secretions:minimal vent settings  Hemodynamics:no vasopressor needs  Urine output, bowel: Adequate urine 0.6ml/kg/hr through jin  Diet: tube feeds paused for SBT and possible extubation.   Need for procedures:n/a              ROS:Pertinent items are noted in HPI. Events and notes from last 24 hours reviewed. Patient Active Problem List   Diagnosis Code    HNP (herniated nucleus pulposus), lumbar M51.26    Neuritis M79.2    Neuropathy G62.9    AMS (altered mental status) R41.82       Vital Signs:  Visit Vitals  /62   Pulse (!) 52   Temp 97.6 °F (36.4 °C)   Resp 13   Ht 6' (1.829 m)   Wt 92.6 kg (204 lb 2.3 oz)   SpO2 100%   BMI 27.69 kg/m²       O2 Device: Endotracheal tube,Ventilator       Temp (24hrs), Av °F (36.7 °C), Min:97.5 °F (36.4 °C), Max:99.2 °F (37.3 °C)       Intake/Output:   Last shift:      No intake/output data recorded.   Last 3 shifts:  1901 -  0700  In: 2148 [I.V.:1188]  Out: 2100 [Urine:2000]    Intake/Output Summary (Last 24 hours) at 2022 0754  Last data filed at 2022 0700  Gross per 24 hour   Intake 1539.18 ml   Output 1300 ml   Net 239.18 ml          Current Facility-Administered Medications   Medication Dose Route Frequency    midazolam in normal saline (VERSED) 1 mg/mL infusion  0-10 mg/hr IntraVENous TITRATE    sodium chloride 3% hypertonic nebulizer soln  4 mL Nebulization Q6H RT    And    albuterol-ipratropium (DUO-NEB) 2.5 MG-0.5 MG/3 ML  3 mL Nebulization Q6H RT    Vancomycin: Pharmacy to Dose   Other Rx Dosing/Monitoring    chlorhexidine (PERIDEX) 0.12 % mouthwash 10 mL  10 mL Oral Q12H    pantoprazole (PROTONIX) 40 mg in 0.9% sodium chloride 10 mL injection  40 mg IntraVENous DAILY    heparin (porcine) injection 5,000 Units  5,000 Units SubCUTAneous Q8H    insulin lispro (HUMALOG) injection   SubCUTAneous Q6H    ampicillin-sulbactam (UNASYN) 3 g in 0.9% sodium chloride (MBP/ADV) 100 mL MBP  3 g IntraVENous Q6H         Telemetry: []Sinus []A-flutter []Paced    []A-fib []Multiple PVCs                  Physical Exam:      General: Intubated/sedated; Opens eyes to voice, no signs of distress  HEENT:  Anicteric sclerae; pink palpebral conjunctivae; mucosa moist  Resp:  Symmetrical chest expansion, no accessory muscle use; good airway entry; coarse breath sounds  CV:  S1, S2 present; regular rate and rhythm  GI:  Abdomen soft, non-tender; (+) active bowel sounds  Extremities:  +2 pulses on all extremities; no edema/ cyanosis/ clubbing noted   Skin:  Warm; no rashes/ lesions noted, normal turgor/cap refill   Neurologic:  Non-focal, opens eyes to voice, no command following  Devices:  NGT, ETT, [place]      DATA:  MAR reviewed and pertinent medications noted or modified as needed    Labs:  Recent Labs     05/02/22  0346 05/01/22  0251 04/30/22  0236   WBC 11.2 14.2* 12.7   HGB 11.4* 10.9* 11.0*   HCT 36.1 33.9* 33.8*    272 281     Recent Labs     05/02/22  0346 05/01/22  0251 04/30/22  1536 04/30/22  0236 04/30/22  0236 04/29/22  1600 04/29/22  1340    143  --   --  139   < > 135*   K 4.0 4.2  --   --  4.3   < > 5.1   * 115*  --   --  112*   < > 104   CO2 20* 19*  --   --  20*   < > 25   * 81  --   --  97   < > 123*   BUN 32* 30*  --   --  27*   < > 26*   CREA 2.23* 2.55*  --   --  2.48*   < > 2.89*   CA 8.9 8.3*  --   --  8.5   < > 9.8   MG 2.9* 2.7* 3.0*   < > 1.5*   < > 1.8   PHOS 3.7 4.1  --   --  4.3  --   --    ALB 3.0* 2.8*  --   --  2.9*   < > 4.1   ALT  --   --   --   --   --   --  32    < > = values in this interval not displayed. No results for input(s): PH, PCO2, PO2, HCO3, FIO2 in the last 72 hours. Recent Labs     05/02/22  0401 05/01/22  0439 04/30/22  0254   FIO2I 28 35 50   HCO3I 20.6* 18.4* 19.6*   PCO2I 36.2 35.7 37.8   PHI 7.36 7.32* 7.32*   PO2I 96 116* 99       Imaging:  [x]   I have personally reviewed the patients radiographs and reports  XR Results (most recent):  XR Results (most recent):  Results from Hospital Encounter encounter on 04/29/22    XR ABD (KUB)    Narrative  EXAM: Abdominal X-ray    INDICATION:  placement og OGT    TECHNIQUE: AP view of the abdomen obtained.     COMPARISON: None    FINDINGS:  2 enteric tubes have their tips overlying the stomach. The bowel gas pattern is  nonobstructive. No dilated small bowel loops appreciated. No abnormal  calcifications appreciated. The osseous structures are unremarkable. Impression  1. The tubes have their tips overlying the stomach. CT Results (most recent):  Results from Hospital Encounter encounter on 04/29/22    CT HEAD WO CONT    Narrative  CT HEAD UNENHANCED    CPT code: 36008    INDICATION: Altered mental status. TECHNIQUE: 5 mm collimation axial images obtained from the skull base through  the vertex without administration of nonionic intravenous contrast.    All CT scans at this facility are performed using dose optimization technique as  appropriate to this specific exam, to include automated exposure control,  adjustment of the mA and/or KP according to patient size or use of iterative  reconstruction techniques. COMPARISON: None    FINDINGS:  Study is mildly degraded by motion and streak artifact. No parenchymal hemorrhage identified. Patchy low attenuation throughout the periventricular and deep hemispheric white  matter bilaterally, most prevalent in the frontal and parietal regions, with  distribution most consistent with chronic ischemic small vessel disease change. Rounded 5 mm hypodensity in the anterior inferior right basal ganglia adjacent  to the ventral insular cortex axial image 17. No regional mass effect. This  appears more sharply marginated and cystic on sagittal reformatted images  however. Subjectively this appears more remote, but may reflect lacunar type  infarct. MRI would be much more sensitive for the detection of infarct or ischemia in the  acute setting. No midline shift of structures. No extra-axial fluid collections. Ventricles are symmetric and not enlarged for age. Calvarium intact to the extent included. Partially included paranasal sinuses appear clear, with apparent left sided  nasal cannula in place. Impression  1.   No hemorrhage identified. 2. Cystic lucency anterior inferior right basal ganglia region, subjectively  more remote appearing such as a prior lacunar type infarct. No comparison. 3. Moderate burden of presumed hemispheric small vessel disease change as above. IMPRESSION:   · Acute respiratory failure requiring mechnical ventilation in setting of encephalopathy with need for airway protection +/- CAP vs Aspiration PNA  · Acute toxic metabolic encephalopathy due to suspected medication overdose- ?tizanidine, baclofen, mobic, lisinopril, bystolic in chart. Negative UDS, ETOH, acetaminophen, salicylate. · SIRs vs sepsis - elevated white count, hypothermia, UA negative, procal negative. CXR (4/30) showed infiltrate vs atelectasis. · Bradycardia- likely from hypothermia, improved  · Hypothermia- Core temp 93F, resolved  · Elevated TSH but normal T4  · Acute on CKD stage 3- baseline crea (12.21) 1.69. prerenal azotemia vs ATN  · CT head 4/29 \"cystic lucency anterior inferior right basal ganglia region\"   · EEG 5/1: \"This is an abnormal EEG with the presence generalized periodic appearing discharges. The Generalized periodic discharges are non specific and can be seen in metabolic/toxic encephalopathy or hypoxic injury. Could also be seen in non convulsive seizure. \"   · Brain MRI (5/1): \"No acute infarct. Small vessel disease such as is seen in patients with diabetes or hypertension. \"  · Hx Hemochromatosis   · BMI 27.12            Patient Active Problem List   Diagnosis Code    HNP (herniated nucleus pulposus), lumbar M51.26    Neuritis M79.2    Neuropathy G62.9    AMS (altered mental status) R41.82        RECOMMENDATIONS:   Neuro:Titrate sedation for RASS goal 0 to -1, daily sedation holiday. Started fosphenytoin for seizures. Versed preferred for sedation. Will follow up with neuro regarding possible repeat EEG to see if seizure activity is controlled. Pulm: Aspiration precautions, HOB>30'.  VAP Bundle  head of the bed at 30' all times. Daily sedation holiday and assessment for weaning with SBT as tolerated.  Aggressive pulmonary toilet, hypertonic nebs, duonebs, metaneb, and Chest PT ordered. CXR today slightly worse. Will SBT today, possible extubation. CVS:Monitor HD, MAP goal >65. GI: Continue tube feeds, increase goal, start Colace  Renal: Trend Cr, UOP. Jin for accurate I/O, will attempt to transition to external cath  Hem/Onc: Trend H/H, monitor for s/o active bleeding. Daily CBC. I/D:Trend WBCs and temperature curve. On Unasyn, grew GPC in his blood culture (4/29), MRSA culture pending  Metabolic: Daily BMP, mag, phos. Trend lytes and replace per protocol. Endocrine:Q6 glucoses. SSI. Avoid hypoglycemia. Musc/Skin: PT/OT/SLP  Full Code  Discussed in interdisciplinary rounds     Best practice :    Glycemic control  IHI ICU bundles: Jin Bundle Followed    [facility] Vent patients-    VAP bundle-Rosie tube to suction at 20-30 cm Hg, Maintain [place] tube with 5-10ml air every 4 hours, Routine oral care every 4 hours, Elevation of head > 45 degree, Daily sedation holiday and SBT evaluation starting at 6.00am.  Sress ulcer prophylaxis. Pepcid  DVT prophylaxis. SQH  Need for Lines, jin assessed. Palliative care evaluation. Restraints need. Attending Non-violent Restraint Reevaluation     I have reevaluated the patient one hour after initiation of intervention. The patient is comfortable, uninjured, but continues to pose an imminent risk of injury to self to themselves and/or serious disruption of medical treatment required to keep patient stable. The patient's current medical and behavioral conditions that warrant the use intervention include danger to self and Interference with medical equipment or treatment. Restraint or seclusion will be discontinued at the earliest possible time, regardless of the scheduled expiration of the order.     Based on my evaluation, restraints will be continued: YES         This care involved high complexity decision making to assess, manipulate, and support vital system functions, to treat this degreee vital organ system failure and to prevent further life threatening deterioration of the patients condition  The services I provided to this patient were to treat and/or prevent clinically significant deterioration that could result in the failure of one or more body systems and/or organ systems due to respiratory distress, hypoxia, cardiac dysrhythmia.        Alyx Salazar DO   05/02/22  Pulmonary, Critical Care Medicine  [facility]

## 2022-05-02 NOTE — INTERDISCIPLINARY ROUNDS
[facility]  Pulmonary, Critical Care, and Sleep Medicine  Interdisciplinary and Ventilator Weaning Rounds    Patient discussed in morning walking rounds and interdisciplinary rounds. ICU day: 4/29/22    Overnight events:    Started on fosphenytoin over night     Assessments and best practice:   Ventilator  o Ventilator day 4/29/22  o Vent settings: FiO2 of 25 and PEEP of 6  o VAP bundle, aim to keep peak plateau pressure [id number] H2O  o Weaning assessed and documented   - Patient does meet criteria for SBT. - Patient is on sedation holiday. - Plan to wean with PS.  - Outcome: will SBT if he begins following commands  - Final plan: possible extubation pending SBT   Sedation  o Versed   Other pertinent drips  o n/a   Lines noted  o peripherals   Critical labs assessed  o Yes   Antibiotics  o Vancomycin and unasyn   Medications reviewed  o Yes   Pending imaging  o none   Pending send out labs  o No   Pending Procedures  o None   Glycemic control   Stress ulcer prophylaxis. o Protonix   DVT prophylaxis. o SQH   Need for Lines, jin assessed.  o Yes   Restraint Reevaluation   o Yes  o I have reevaluated the patient one hour after initiation of intervention. The patient is comfortable, uninjured, but continues to pose an imminent risk of injury to self to themselves and/or serious disruption of medical treatment required to keep patient stable. The patient's current medical and behavioral conditions that warrant the use intervention include danger to self and Interference with medical equipment or treatment. Restraint or seclusion will be discontinued at the earliest possible time, regardless of the scheduled expiration of the order.  Based on my evaluation, restraints will be continued: YES       Family contact/MPOA: daughter, Darlyn Shaffer to be updated to ICU staff today    Palliative consult within 3 days of admission to ICU-  Ethics Guidance: 21 days      Daily Plans:   Follow up with neurology about possible repeat EEG   Will start colace    CAMERON time 20 minutes        Ravalli CHRIST Cody  05/02/22  Pulmonary, Critical Care Medicine  [facility]

## 2022-05-02 NOTE — PROGRESS NOTES
Neurology follow-up visit    Patient remains unresponsive with eyes open but not following verbal commands nor even responding well to pain. He had an EEG done which supposedly showed. Periodic discharges. His MRI scan did not show acute strokes. The question is whether he may have overdosed on baclofen and needs dialysis. We should repeat EEG which I will put in the order today.

## 2022-05-02 NOTE — PROGRESS NOTES
Comprehensive Nutrition Assessment    Type and Reason for Visit: Initial,NPO/clear liquid    Nutrition Recommendations/Plan:   1. Plan to modify tube feeds. Continue Vital High Protein; continuing with rate of 40 mL/hr, advancing as pt tolerates by 15 mL q 8 hours to goal rate of 85 mL/hr with water flushes of 100 mL q 4 hours               (goal EN regimen provides:   2040 kcal, 179 gm protein, 1705 mL free water, 100% RDIs)     Malnutrition Assessment:  Malnutrition Status:  Insufficient data (05/02/22 1105)      Nutrition History and Allergies: Past medical hx:  Gout. Wt loss of 13 lb, 6% x 5 month PTA per chart hx;  pt weighed 217 lb on 11/24/2021. Net wth loss 23 lb, 10.1% in past 3 years and 3 month PTA  (wt of 227 lb on 1/21/2019 per chart hx). No known food allergies     Nutrition Assessment:    Pt admitted for altered mentation; has neuritis, neuropathy, herniated nucleus pulposus. Pt required intubation and mechanical ventilation. Had NGT placed; was started on tube feeds of Vital High Protein at current goal rate of 40 mL/hr. Discussed with interdisciplinary team about modifying tube feeds, increasing to modified goal rate following assessment of estimated nutrition needs; team agreed with plan. SBT was initiated this morning     Nutrition Related Findings:    BM 5/1; bowel regimen started. no edema. Wound Type: None    Current Nutrition Intake & Therapies:  Average Meal Intake: NPO  Average Supplement Intake: NPO  DIET NPO  ADULT TUBE FEEDING Orogastric; Peptide Based High Protein; Delivery Method: Continuous; Continuous Initial Rate (mL/hr): 10; Continuous Advance Tube Feeding: Yes; Advancement Volume (mL/hr): 10; Advancement Frequency: Q 4 hours; Continuous Goal Ra. .. Anthropometric Measures:  Height: 6' (182.9 cm)  Ideal Body Weight (IBW): 178 lbs (81 kg)  Admission Body Weight: 199 lb 15.3 oz  Current Body Wt:  92.6 kg (204 lb 2.3 oz), 114.7 % IBW.  Bed scale  Current BMI (kg/m2): 27.7  Usual Body Weight: 98.4 kg (217 lb) (11/24/2021 per chart hx)  % Weight Change (Calculated): -5.9  Weight Adjustment: No adjustment  BMI Category: Overweight (BMI 25.0-29. 9)    Estimated Daily Nutrient Needs:  Energy Requirements Based On: Formula  Weight Used for Energy Requirements: Current  Energy (kcal/day): 7147-2385  Weight Used for Protein Requirements: Current (x1.2-2)  Protein (g/day): 111-185  Method Used for Fluid Requirements: 1 ml/kcal  Fluid (ml/day): 9777-2013    Nutrition Diagnosis:   · Inadequate oral intake related to cognitive or neurological impairment as evidenced by NPO or clear liquid status due to medical condition,intubation      Nutrition Interventions:   Food and/or Nutrient Delivery: Continue NPO,Continue tube feeding,Modify tube feeding  Nutrition Education/Counseling: No recommendations at this time,Education not indicated  Coordination of Nutrition Care: Continue to monitor while inpatient,Interdisciplinary rounds  Plan of Care discussed with: interdisciplinary team    Goals:     Goals: Meet at least 75% of estimated needs,Tolerate nutrition support at goal rate,by next RD assessment       Nutrition Monitoring and Evaluation:   Behavioral-Environmental Outcomes: None identified  Food/Nutrient Intake Outcomes: Enteral nutrition intake/tolerance  Physical Signs/Symptoms Outcomes: Biochemical data,Hemodynamic status    Discharge Planning:     Too soon to determine    Jessica Stock, [address]  Contact: [phone]

## 2022-05-02 NOTE — PROGRESS NOTES
SBT initiated, PS 7, PEEP +5, FiO2 28%. Pt off sedation,sleepy, breathing spontaneously but not yet following commands. 05/02/22 0902   Weaning Parameters   Spontaneous Breathing Trial Complete Yes   Resp Rate Observed 21   Ve 6.5      RSBI 67     Tolerating. Will monitor for signs of apnea or distress.

## 2022-05-02 NOTE — PROCEDURES
Pt's Name: Dione Shore  MR#: [id number]  : 1950  DATE OF SERVICE: 22     REFERRING PHYSICIAN:  Yulisa ORDOÑEZ     EEG NUMBER:None     CLINICAL:  This is a comatose  EEG recording in a patient with acute encephalopathy. Reason for the EEG: Evaluation of AMS     Patient's MEDICATIONS:  PRN midazolam, fentanyl. EEG Technique: Standard 10-20 system with 16 channel recording and an EKG. Activation procedure used was photic stimulation. Total duration of the recording was 31 minutes. Patient was on Mechanical ventilation. EEG REPORT:  The background consists of diffusely suppressed activity. There are intermittent, periodic appearing large amplitude delta waves mixed with sharp appearing triphasic waves. No obvious reactivity. No changes with photic stimulation. IMPRESSION: This is an abnormal EEG with the presence generalized periodic appearing discharges. Clinical Correlation:  The Generalized periodic discharges are non specific and can be seen in metabolic/toxic encephalopathy or hypoxic injury. Could also be seen in non convulsive seizure. Bladimir Horner MD

## 2022-05-02 NOTE — PROGRESS NOTES
5/1/22:  1930: Assumed care of patient after report. Drips verified. Orders reviewed. Bilateral wrist restraints on for airway and line integrity due to restless behavior and confusion. 2130: Neurology called and spoke with Wyatt Posey NP regarding EEG results - Wyatt Posey wants to stop fentanyl drip and start versed gtt for EEG reading of generalized periodic discharges - will start once available from pharmacy. 2219: Fentanyl drip stopped and versed drip started. 5/2/22:  0471: Versed drip stopped for SBT today per Kia HARRIS.    Barroso Mercedes: Tube feeding stopped for SBT today. 0730: Bedside and Verbal shift change report given to Kina Mccain RN (oncoming nurse) by Pavan Minor RN (offgoing nurse). Report included the following information SBAR, Intake/Output, MAR, Recent Results and Cardiac Rhythm SR/SB.

## 2022-05-03 ENCOUNTER — APPOINTMENT (OUTPATIENT)
Dept: GENERAL RADIOLOGY | Age: 72
DRG: 917 | End: 2022-05-03
Attending: PHYSICIAN ASSISTANT
Payer: MEDICARE

## 2022-05-03 ENCOUNTER — APPOINTMENT (OUTPATIENT)
Dept: VASCULAR SURGERY | Age: 72
DRG: 917 | End: 2022-05-03
Attending: STUDENT IN AN ORGANIZED HEALTH CARE EDUCATION/TRAINING PROGRAM
Payer: MEDICARE

## 2022-05-03 ENCOUNTER — APPOINTMENT (OUTPATIENT)
Dept: GENERAL RADIOLOGY | Age: 72
DRG: 917 | End: 2022-05-03
Attending: STUDENT IN AN ORGANIZED HEALTH CARE EDUCATION/TRAINING PROGRAM
Payer: MEDICARE

## 2022-05-03 LAB
ALBUMIN SERPL-MCNC: 2.7 G/DL (ref 3.4–5)
ANION GAP SERPL CALC-SCNC: 5 MMOL/L (ref 3–18)
ARTERIAL PATENCY WRIST A: POSITIVE
BACTERIA SPEC CULT: ABNORMAL
BASE DEFICIT BLD-SCNC: 0.6 MMOL/L
BASOPHILS # BLD: 0 K/UL (ref 0–0.1)
BASOPHILS NFR BLD: 0 % (ref 0–2)
BDY SITE: ABNORMAL
BUN SERPL-MCNC: 24 MG/DL (ref 7–18)
BUN/CREAT SERPL: 15 (ref 12–20)
CALCIUM SERPL-MCNC: 8.7 MG/DL (ref 8.5–10.1)
CHLORIDE SERPL-SCNC: 117 MMOL/L (ref 100–111)
CO2 SERPL-SCNC: 25 MMOL/L (ref 21–32)
CREAT SERPL-MCNC: 1.64 MG/DL (ref 0.6–1.3)
D DIMER PPP FEU-MCNC: 1.59 UG/ML(FEU)
DIFFERENTIAL METHOD BLD: ABNORMAL
EOSINOPHIL # BLD: 0.1 K/UL (ref 0–0.4)
EOSINOPHIL NFR BLD: 1 % (ref 0–5)
ERYTHROCYTE [DISTWIDTH] IN BLOOD BY AUTOMATED COUNT: 13.7 % (ref 11.6–14.5)
GAS FLOW.O2 O2 DELIVERY SYS: ABNORMAL L/MIN
GAS FLOW.O2 SETTING OXYMISER: 14 BPM
GLUCOSE BLD STRIP.AUTO-MCNC: 111 MG/DL (ref 70–110)
GLUCOSE BLD STRIP.AUTO-MCNC: 112 MG/DL (ref 70–110)
GLUCOSE BLD STRIP.AUTO-MCNC: 143 MG/DL (ref 70–110)
GLUCOSE BLD STRIP.AUTO-MCNC: 70 MG/DL (ref 70–110)
GLUCOSE BLD STRIP.AUTO-MCNC: 72 MG/DL (ref 70–110)
GLUCOSE BLD STRIP.AUTO-MCNC: 99 MG/DL (ref 70–110)
GLUCOSE SERPL-MCNC: 101 MG/DL (ref 74–99)
GRAM STN SPEC: ABNORMAL
HBV SURFACE AG SER QL: <0.1 INDEX
HBV SURFACE AG SER QL: NEGATIVE
HCO3 BLD-SCNC: 23.7 MMOL/L (ref 22–26)
HCT VFR BLD AUTO: 33 % (ref 36–48)
HGB BLD-MCNC: 10.7 G/DL (ref 13–16)
IMM GRANULOCYTES # BLD AUTO: 0.1 K/UL (ref 0–0.04)
IMM GRANULOCYTES NFR BLD AUTO: 0 % (ref 0–0.5)
LYMPHOCYTES # BLD: 1.5 K/UL (ref 0.9–3.6)
LYMPHOCYTES NFR BLD: 13 % (ref 21–52)
MAGNESIUM SERPL-MCNC: 1.9 MG/DL (ref 1.6–2.6)
MAGNESIUM SERPL-MCNC: 2.3 MG/DL (ref 1.6–2.6)
MCH RBC QN AUTO: 30.5 PG (ref 24–34)
MCHC RBC AUTO-ENTMCNC: 32.4 G/DL (ref 31–37)
MCV RBC AUTO: 94 FL (ref 78–100)
MONOCYTES # BLD: 1.2 K/UL (ref 0.05–1.2)
MONOCYTES NFR BLD: 10 % (ref 3–10)
NEUTS SEG # BLD: 8.5 K/UL (ref 1.8–8)
NEUTS SEG NFR BLD: 75 % (ref 40–73)
NRBC # BLD: 0 K/UL (ref 0–0.01)
NRBC BLD-RTO: 0 PER 100 WBC
O2/TOTAL GAS SETTING VFR VENT: 28 %
PCO2 BLD: 36.8 MMHG (ref 35–45)
PEEP RESPIRATORY: 5 CMH2O
PH BLD: 7.42 [PH] (ref 7.35–7.45)
PHOSPHATE SERPL-MCNC: 2.2 MG/DL (ref 2.5–4.9)
PHOSPHATE SERPL-MCNC: 2.6 MG/DL (ref 2.5–4.9)
PHOSPHATE SERPL-MCNC: 2.8 MG/DL (ref 2.5–4.9)
PLATELET # BLD AUTO: 234 K/UL (ref 135–420)
PMV BLD AUTO: 9.1 FL (ref 9.2–11.8)
PO2 BLD: 93 MMHG (ref 80–100)
POTASSIUM SERPL-SCNC: 3.2 MMOL/L (ref 3.5–5.5)
POTASSIUM SERPL-SCNC: 3.5 MMOL/L (ref 3.5–5.5)
PROCALCITONIN SERPL-MCNC: 0.51 NG/ML
RBC # BLD AUTO: 3.51 M/UL (ref 4.35–5.65)
SAO2 % BLD: 97.4 % (ref 92–97)
SERVICE CMNT-IMP: ABNORMAL
SERVICE CMNT-IMP: ABNORMAL
SODIUM SERPL-SCNC: 147 MMOL/L (ref 136–145)
SPECIMEN TYPE: ABNORMAL
TOTAL RESP. RATE, ITRR: 15
VANCOMYCIN SERPL-MCNC: 14.2 UG/ML (ref 5–40)
VENTILATION MODE VENT: ABNORMAL
VT SETTING VENT: 520 ML
WBC # BLD AUTO: 11.3 K/UL (ref 4.6–13.2)

## 2022-05-03 PROCEDURE — 74011000250 HC RX REV CODE- 250: Performed by: REGISTERED NURSE

## 2022-05-03 PROCEDURE — 71045 X-RAY EXAM CHEST 1 VIEW: CPT

## 2022-05-03 PROCEDURE — 99291 CRITICAL CARE FIRST HOUR: CPT | Performed by: INTERNAL MEDICINE

## 2022-05-03 PROCEDURE — 80202 ASSAY OF VANCOMYCIN: CPT

## 2022-05-03 PROCEDURE — 84100 ASSAY OF PHOSPHORUS: CPT

## 2022-05-03 PROCEDURE — 74011250636 HC RX REV CODE- 250/636: Performed by: PHYSICIAN ASSISTANT

## 2022-05-03 PROCEDURE — 85379 FIBRIN DEGRADATION QUANT: CPT

## 2022-05-03 PROCEDURE — 86706 HEP B SURFACE ANTIBODY: CPT

## 2022-05-03 PROCEDURE — 83735 ASSAY OF MAGNESIUM: CPT

## 2022-05-03 PROCEDURE — 87340 HEPATITIS B SURFACE AG IA: CPT

## 2022-05-03 PROCEDURE — 36600 WITHDRAWAL OF ARTERIAL BLOOD: CPT

## 2022-05-03 PROCEDURE — 74011250636 HC RX REV CODE- 250/636: Performed by: NURSE PRACTITIONER

## 2022-05-03 PROCEDURE — 5A1D70Z PERFORMANCE OF URINARY FILTRATION, INTERMITTENT, LESS THAN 6 HOURS PER DAY: ICD-10-PCS | Performed by: INTERNAL MEDICINE

## 2022-05-03 PROCEDURE — 93970 EXTREMITY STUDY: CPT

## 2022-05-03 PROCEDURE — 74011250637 HC RX REV CODE- 250/637: Performed by: NURSE PRACTITIONER

## 2022-05-03 PROCEDURE — 74011000258 HC RX REV CODE- 258: Performed by: PHYSICIAN ASSISTANT

## 2022-05-03 PROCEDURE — 82962 GLUCOSE BLOOD TEST: CPT

## 2022-05-03 PROCEDURE — 36415 COLL VENOUS BLD VENIPUNCTURE: CPT

## 2022-05-03 PROCEDURE — 74011250636 HC RX REV CODE- 250/636: Performed by: INTERNAL MEDICINE

## 2022-05-03 PROCEDURE — 74011000250 HC RX REV CODE- 250: Performed by: NURSE PRACTITIONER

## 2022-05-03 PROCEDURE — 74011250637 HC RX REV CODE- 250/637: Performed by: PHYSICIAN ASSISTANT

## 2022-05-03 PROCEDURE — 87040 BLOOD CULTURE FOR BACTERIA: CPT

## 2022-05-03 PROCEDURE — 85025 COMPLETE CBC W/AUTO DIFF WBC: CPT

## 2022-05-03 PROCEDURE — 74011000250 HC RX REV CODE- 250: Performed by: PHYSICIAN ASSISTANT

## 2022-05-03 PROCEDURE — 90935 HEMODIALYSIS ONE EVALUATION: CPT

## 2022-05-03 PROCEDURE — C9113 INJ PANTOPRAZOLE SODIUM, VIA: HCPCS | Performed by: PHYSICIAN ASSISTANT

## 2022-05-03 PROCEDURE — 36556 INSERT NON-TUNNEL CV CATH: CPT | Performed by: INTERNAL MEDICINE

## 2022-05-03 PROCEDURE — 82803 BLOOD GASES ANY COMBINATION: CPT

## 2022-05-03 PROCEDURE — 94640 AIRWAY INHALATION TREATMENT: CPT

## 2022-05-03 PROCEDURE — 84132 ASSAY OF SERUM POTASSIUM: CPT

## 2022-05-03 PROCEDURE — 84145 PROCALCITONIN (PCT): CPT

## 2022-05-03 PROCEDURE — 94762 N-INVAS EAR/PLS OXIMTRY CONT: CPT

## 2022-05-03 PROCEDURE — 94003 VENT MGMT INPAT SUBQ DAY: CPT

## 2022-05-03 PROCEDURE — 65610000006 HC RM INTENSIVE CARE

## 2022-05-03 PROCEDURE — 2709999900 HC NON-CHARGEABLE SUPPLY

## 2022-05-03 RX ORDER — ACETAMINOPHEN 325 MG/1
650 TABLET ORAL
Status: DISCONTINUED | OUTPATIENT
Start: 2022-05-03 | End: 2022-05-09 | Stop reason: HOSPADM

## 2022-05-03 RX ORDER — MAGNESIUM SULFATE HEPTAHYDRATE 40 MG/ML
2 INJECTION, SOLUTION INTRAVENOUS ONCE
Status: COMPLETED | OUTPATIENT
Start: 2022-05-03 | End: 2022-05-04

## 2022-05-03 RX ORDER — VANCOMYCIN HYDROCHLORIDE
1250 EVERY 24 HOURS
Status: DISCONTINUED | OUTPATIENT
Start: 2022-05-03 | End: 2022-05-03

## 2022-05-03 RX ORDER — PROPOFOL 10 MG/ML
5-50 VIAL (ML) INTRAVENOUS
Status: DISCONTINUED | OUTPATIENT
Start: 2022-05-03 | End: 2022-05-05

## 2022-05-03 RX ADMIN — AMPICILLIN SODIUM AND SULBACTAM SODIUM 3 G: 2; 1 INJECTION, POWDER, FOR SOLUTION INTRAMUSCULAR; INTRAVENOUS at 18:14

## 2022-05-03 RX ADMIN — HEPARIN SODIUM 5000 UNITS: 5000 INJECTION INTRAVENOUS; SUBCUTANEOUS at 05:22

## 2022-05-03 RX ADMIN — SODIUM CHLORIDE SOLN NEBU 3% 4 ML: 3 NEBU SOLN at 14:13

## 2022-05-03 RX ADMIN — CHLORHEXIDINE GLUCONATE 0.12% ORAL RINSE 10 ML: 1.2 LIQUID ORAL at 20:36

## 2022-05-03 RX ADMIN — HEPARIN SODIUM 5000 UNITS: 5000 INJECTION INTRAVENOUS; SUBCUTANEOUS at 13:15

## 2022-05-03 RX ADMIN — PROPOFOL 20 MCG/KG/MIN: 10 INJECTION, EMULSION INTRAVENOUS at 18:20

## 2022-05-03 RX ADMIN — CHLORHEXIDINE GLUCONATE 0.12% ORAL RINSE 10 ML: 1.2 LIQUID ORAL at 09:43

## 2022-05-03 RX ADMIN — VANCOMYCIN HYDROCHLORIDE 1250 MG: 10 INJECTION, POWDER, LYOPHILIZED, FOR SOLUTION INTRAVENOUS at 15:57

## 2022-05-03 RX ADMIN — AMPICILLIN SODIUM AND SULBACTAM SODIUM 3 G: 2; 1 INJECTION, POWDER, FOR SOLUTION INTRAMUSCULAR; INTRAVENOUS at 00:39

## 2022-05-03 RX ADMIN — POTASSIUM PHOSPHATE, MONOBASIC POTASSIUM PHOSPHATE, DIBASIC: 224; 236 INJECTION, SOLUTION, CONCENTRATE INTRAVENOUS at 06:20

## 2022-05-03 RX ADMIN — POTASSIUM BICARBONATE 40 MEQ: 782 TABLET, EFFERVESCENT ORAL at 05:22

## 2022-05-03 RX ADMIN — AMPICILLIN SODIUM AND SULBACTAM SODIUM 3 G: 2; 1 INJECTION, POWDER, FOR SOLUTION INTRAMUSCULAR; INTRAVENOUS at 13:14

## 2022-05-03 RX ADMIN — IPRATROPIUM BROMIDE AND ALBUTEROL SULFATE 3 ML: .5; 2.5 SOLUTION RESPIRATORY (INHALATION) at 01:25

## 2022-05-03 RX ADMIN — PROPOFOL 50 MCG/KG/MIN: 10 INJECTION, EMULSION INTRAVENOUS at 21:00

## 2022-05-03 RX ADMIN — SODIUM CHLORIDE SOLN NEBU 3% 4 ML: 3 NEBU SOLN at 20:54

## 2022-05-03 RX ADMIN — MAGNESIUM SULFATE HEPTAHYDRATE 2 G: 40 INJECTION, SOLUTION INTRAVENOUS at 23:36

## 2022-05-03 RX ADMIN — SODIUM CHLORIDE SOLN NEBU 3% 4 ML: 3 NEBU SOLN at 01:25

## 2022-05-03 RX ADMIN — AMPICILLIN SODIUM AND SULBACTAM SODIUM 3 G: 2; 1 INJECTION, POWDER, FOR SOLUTION INTRAMUSCULAR; INTRAVENOUS at 05:33

## 2022-05-03 RX ADMIN — SODIUM CHLORIDE SOLN NEBU 3% 4 ML: 3 NEBU SOLN at 08:40

## 2022-05-03 RX ADMIN — HEPARIN SODIUM 5000 UNITS: 5000 INJECTION INTRAVENOUS; SUBCUTANEOUS at 20:31

## 2022-05-03 RX ADMIN — DOCUSATE SODIUM LIQUID 100 MG: 100 LIQUID ORAL at 09:43

## 2022-05-03 RX ADMIN — IPRATROPIUM BROMIDE AND ALBUTEROL SULFATE 3 ML: .5; 2.5 SOLUTION RESPIRATORY (INHALATION) at 08:40

## 2022-05-03 RX ADMIN — PANTOPRAZOLE SODIUM 40 MG: 40 INJECTION, POWDER, FOR SOLUTION INTRAVENOUS at 09:42

## 2022-05-03 RX ADMIN — IPRATROPIUM BROMIDE AND ALBUTEROL SULFATE 3 ML: .5; 2.5 SOLUTION RESPIRATORY (INHALATION) at 14:13

## 2022-05-03 RX ADMIN — FENTANYL CITRATE 100 MCG: 50 INJECTION INTRAMUSCULAR; INTRAVENOUS at 14:46

## 2022-05-03 RX ADMIN — IPRATROPIUM BROMIDE AND ALBUTEROL SULFATE 3 ML: .5; 2.5 SOLUTION RESPIRATORY (INHALATION) at 20:54

## 2022-05-03 NOTE — ROUTINE PROCESS
Bedside and Verbal shift change report given to Paresh Prado RN (oncoming nurse) by Daniel Wallace RN (offgoing nurse). Report included the following information SBAR, Intake/Output, MAR, Recent Results, Cardiac Rhythm sr, Quality Measures and Dual Neuro Assessment.

## 2022-05-03 NOTE — PROCEDURES
[facility]  Temporary Hemodialysis Catheter Procedure Note With Ultrasound Guidance    Indication: Need for HD. Risks, benefits, alternatives explained and consent obtained. Time out performed. Patient positioned in Trendelenburg. Temporary Hemodialysis Catheter Bundle:  Full sterile barrier precautions used. 7-Step Sterility Protocol followed. (cap, mask sterile gown, sterile gloves, large sterile sheet, hand hygiene, 2% chlorhexidine for cutaneous antisepsis)  5 mL 1% Lidocaine placed at insertion site. Using ultrasound guidance,   Right internal jugular vein cannulated x 1 attempt(s) utilizing the modified Seldinger technique. Position of guidewire confirmed in vein using ultrasound prior to dilating. Dilation completed twice successfully. Guidewire was removed. Central venous catheter was placed without difficulty. no immediate complications encountered. Good blood return on all 3 ports. Catheter secured & Biopatch applied. Sterile Tegaderm placed. CXR pending. First assist: None.     Matias Long,   05/03/22  Pulmonary, Critical Care Medicine  [facility]

## 2022-05-03 NOTE — PROGRESS NOTES
After discussing with ICU Team who inturn discussed with neurology, it was felt that he was showing signs of baclofen toxicity if not treated can lead to anoxic damage to brain. Not able to get in touch with family on contact number. 2 physician emergency need consent is going to be utilized to initiate dialysis in this patient. Spoke with dialysis staff. Will initiate dialysis once HD access is obtained.

## 2022-05-03 NOTE — CONSULTS
Infectious Disease Consultation Note        Reason: aspiration pneumonia, coagulase negative staph bacteremia, yeast in sputum cx    Current abx Prior abx   Unasyn, vancomycin since 4/29/22      Lines:       Assessment :  71 y. o. male w/ PMH of hemachromatosis presented to [facility] from home via EMS on 4/29/22 with unresponsiveness. Now with low grade fever, positive blood cx, persistent altered mentation, TARI, Periodic discharges noted on EEG 5/2/22    Patient presents with highly complex picture. Etiology of fever not entirely clear at this time. Differential diagnosis includes-partially treated aspiration pneumonia/ongoing seizures/baclofen withdrawal  Will monitor for occult infection  Agree with ruling out DVT    Persistent fevers despite current antibiotics concerning for partially treated pneumonia with ampicillin/sulbactam resistant gram-negative's. We will modify antibiotic to cover for this possibility. Sputum cultures 4/30/22-methicillin susceptible Staph aureus, normal respiratory adam, yeast.  Yeast in sputum culture likely colonizer    Single positive blood culture for coagulase-negative Staphylococcus on 4/29-likely contaminant. No evidence of skin/soft tissue infection noted on today's exam.    Periodic discharges noted on EEG 5/2/2022. Neurology follow-up appreciated. Concern for baclofen toxicity. Acute kidney injury-nephrology follow-up appreciated. Improving creatinine. Plans for dialysis for suspected baclofen toxicity noted      Recommendations:  1. Discontinue Unasyn. Start piperacillin/tazobactam.  Discontinue vancomycin  2. We will hold off on antifungals at this time  3. Follow-up nephrology recommendations regarding dialysis  4. Follow-up neurology recommendations  5. Follow-up venous duplex lower extremity  6. Monitor procalcitonin. Send Fungitell  7.   Will need to obtain further imaging studies to rule out occult infection if persistent fevers noted on future exams    Thank you for consultation request. Above plan was discussed in details with ICU team. Please call me if any further questions or concerns. Will continue to participate in the care of this patient. HPI:    71 y. o. male w/ PMH of hemachromatosis presented to [facility] from home via EMS on 4/29/22 with unresponsiveness. History obtained from chart as patient is unresponsive. He was found by his step daughter who reported patient had been acting \"bipolar lately\" and would not let her in the house. Patients general UDS negative, CT head negative for acute issues. Poison control contacted by ED- recommend supportive care. Patient required intubation due to mental status. Remains unresponsive. There was concern for aspiration pneumonia. Patient was started on Unasyn, vancomycin on admission. Per toxicology and nephrology concern this is likely baclofen overdose. EEG performed yesterday. Febrile to 100.4 overnight. Blood culture on admission positive for coagulase-negative Staphylococcus. Sputum culture positive for MSSA, yeast.  I have been consulted for further recommendations. Detailed review of system not feasible since patient is intubated/nonverbal      Past Medical History:   Diagnosis Date    Arthritis     Hemochromatosis     Ill-defined condition     GOUT       Past Surgical History:   Procedure Laterality Date    HX ORTHOPAEDIC      LEFT HAND       Current Discharge Medication List      CONTINUE these medications which have NOT CHANGED    Details   raNITIdine (Zantac) 150 mg tablet Take 150 mg by mouth once over twenty-four (24) hours. DULoxetine (CYMBALTA) 60 mg capsule Take 60 mg by mouth daily. nortriptyline (PAMELOR) 25 mg capsule Take 50 mg by mouth nightly. lisinopriL (PRINIVIL, ZESTRIL) 10 mg tablet Take 10 mg by mouth daily.       tiZANidine (ZANAFLEX) 2 mg tablet take 1 tablet by mouth three times a day if needed      colchicine 0.6 mg tablet Take 0.6 mg by mouth as needed (gout flare ups). varicella-zoster recombinant, PF, (Shingrix, PF,) 50 mcg/0.5 mL susr injection Shingrix (PF) 50 mcg/0.5 mL intramuscular suspension, kit      baclofen (LIORESAL) 10 mg tablet Take 0.5-1 Tablets by mouth three (3) times daily as needed for Pain. Indications: for acute/episodic pain  Qty: 60 Tablet, Refills: 0    Associated Diagnoses: Lumbar spondylosis      lidocaine HCL-benzyl alcohoL (Salonpas Lidocaine Plus) 4-10 % crea 1 Actuation(s) by Apply Externally route two (2) times daily as needed for Pain. Qty: 1 Each, Refills: 0      BYSTOLIC 10 mg tablet take 1 tablet by mouth once daily  Refills: 0      meloxicam (MOBIC) 15 mg tablet Take 15 mg by mouth daily. Refills: 0      allopurinol (ZYLOPRIM) 300 mg tablet Take 300 mg by mouth daily.   Refills: 0      zolpidem (AMBIEN) 10 mg tablet take 1 tablet by mouth at bedtime if needed  Refills: 0             Current Facility-Administered Medications   Medication Dose Route Frequency    vancomycin (VANCOCIN) 1250 mg in  ml infusion  1,250 mg IntraVENous Q24H    acetaminophen (TYLENOL) tablet 650 mg  650 mg Oral Q6H PRN    propofol (DIPRIVAN) 10 mg/mL infusion  5-50 mcg/kg/min IntraVENous TITRATE    docusate (COLACE) 50 mg/5 mL oral liquid 100 mg  100 mg Oral DAILY    sodium chloride 3% hypertonic nebulizer soln  4 mL Nebulization Q6H RT    And    albuterol-ipratropium (DUO-NEB) 2.5 MG-0.5 MG/3 ML  3 mL Nebulization Q6H RT    chlorhexidine (PERIDEX) 0.12 % mouthwash 10 mL  10 mL Oral Q12H    albuterol-ipratropium (DUO-NEB) 2.5 MG-0.5 MG/3 ML  3 mL Nebulization Q4H PRN    pantoprazole (PROTONIX) 40 mg in 0.9% sodium chloride 10 mL injection  40 mg IntraVENous DAILY    midazolam (VERSED) injection 2 mg  2 mg IntraVENous Q10MIN PRN    fentaNYL citrate (PF) injection 100 mcg  100 mcg IntraVENous Q30MIN PRN    heparin (porcine) injection 5,000 Units  5,000 Units SubCUTAneous Q8H    insulin lispro (HUMALOG) injection   SubCUTAneous Q6H  glucose chewable tablet 16 g  4 Tablet Oral PRN    glucagon (GLUCAGEN) injection 1 mg  1 mg IntraMUSCular PRN    dextrose 10% infusion 0-250 mL  0-250 mL IntraVENous PRN    ampicillin-sulbactam (UNASYN) 3 g in 0.9% sodium chloride (MBP/ADV) 100 mL MBP  3 g IntraVENous Q6H       Allergies: Patient has no known allergies. History reviewed. No pertinent family history. Social History     Socioeconomic History    Marital status: SINGLE     Spouse name: Not on file    Number of children: Not on file    Years of education: Not on file    Highest education level: Not on file   Occupational History    Not on file   Tobacco Use    Smoking status: Never Smoker    Smokeless tobacco: Never Used   Substance and Sexual Activity    Alcohol use: Yes     Alcohol/week: 6.0 standard drinks     Types: 6 Cans of beer per week     Comment: weekly on weekends    Drug use: Not on file    Sexual activity: Not on file   Other Topics Concern    Not on file   Social History Narrative    Not on file     Social Determinants of Health     Financial Resource Strain:     Difficulty of Paying Living Expenses: Not on file   Food Insecurity:     Worried About Running Out of Food in the Last Year: Not on file    [place] of Food in the Last Year: Not on file   Transportation Needs:     Lack of Transportation (Medical): Not on file    Lack of Transportation (Non-Medical):  Not on file   Physical Activity:     Days of Exercise per Week: Not on file    Minutes of Exercise per Session: Not on file   Stress:     Feeling of Stress : Not on file   Social Connections:     Frequency of Communication with Friends and Family: Not on file    Frequency of Social Gatherings with Friends and Family: Not on file    Attends Episcopal Services: Not on file    Active Member of Clubs or Organizations: Not on file    Attends Club or Organization Meetings: Not on file    Marital Status: Not on file   Intimate Partner Violence:     Fear of Current or Ex-Partner: Not on file    Emotionally Abused: Not on file    Physically Abused: Not on file    Sexually Abused: Not on file   Housing Stability:     Unable to Pay for Housing in the Last Year: Not on file    Number of Timmy in the Last Year: Not on file    Unstable Housing in the Last Year: Not on file     Social History     Tobacco Use   Smoking Status Never Smoker   Smokeless Tobacco Never Used        Temp (24hrs), Av.9 °F (37.2 °C), Min:97.7 °F (36.5 °C), Max:100.4 °F (38 °C)    Visit Vitals  BP (!) 151/66 (BP 1 Location: Right upper arm, BP Patient Position: At rest)   Pulse 76   Temp 97.7 °F (36.5 °C)   Resp 26   Ht 6' (1.829 m)   Wt 93.2 kg (205 lb 7.5 oz)   SpO2 96%   BMI 27.87 kg/m²       ROS: unable to obtain due to patient factors    Physical Exam:    General: Intubated/sedated; in no apparent distress  HEENT:  Anicteric sclerae; pink palpebral conjunctivae; mucosa moist  Resp:  Symmetrical chest expansion, no accessory muscle use; good airway entry; coarse breath sounds  CV:  S1, S2 present; regular rate and rhythm  GI:  Abdomen soft, non-tender; (+) active bowel sounds  Extremities:  +2 pulses on all extremities; no edema/ cyanosis/ clubbing noted   Skin:  Warm; no rashes/ lesions noted, normal turgor/cap refill   Neurologic:  Non-focal, does not withdraw from pain, no command following         Labs: Results:   Chemistry Recent Labs     22  1042 22  0231 22  0346 22  0251 22  0251   GLU  --  101* 115*  --  81   NA  --  147* 145  --  143   K 3.5 3.2* 4.0   < > 4.2   CL  --  117* 116*  --  115*   CO2  --  25 20*  --  19*   BUN  --  24* 32*  --  30*   CREA  --  1.64* 2.23*  --  2.55*   CA  --  8.7 8.9  --  8.3*   AGAP  --  5 9  --  9   BUCR  --  15 14  --  12   ALB  --  2.7* 3.0*  --  2.8*    < > = values in this interval not displayed.       CBC w/Diff Recent Labs     22  0231 22  0346 22  0251   WBC 11.3 11.2 14.2*   RBC 3.51* 3.72* 3.53*   HGB 10.7* 11.4* 10.9*   HCT 33.0* 36.1 33.9*    243 272   GRANS 75* 78* 75*   LYMPH 13* 13* 11*   EOS 1 1 3      Microbiology Recent Labs     05/01/22  2227   CULT CULTURE IN PROGRESS,FURTHER UPDATES TO FOLLOW          RADIOLOGY:    All available imaging studies/reports in [facility] for this admission were reviewed      Disclaimer: Sections of this note are dictated utilizing voice recognition software, which may have resulted in some phonetic based errors in grammar and contents. Even though attempts were made to correct all the mistakes, some may have been missed, and remained in the body of the document. If questions arise, please contact our department.     Dr. Papito Stroud, Infectious Disease Specialist  [phone]  May 3, 2022  4:57 PM

## 2022-05-03 NOTE — PROGRESS NOTES
Problem: Ventilator Management  Goal: *Adequate oxygenation and ventilation  Outcome: Progressing Towards Goal  Goal: *Patient maintains clear airway/free of aspiration  Outcome: Progressing Towards Goal  Goal: *Absence of infection signs and symptoms  Outcome: Progressing Towards Goal  Goal: *Normal spontaneous ventilation  Outcome: Progressing Towards Goal     Problem: Non-Violent Restraints  Goal: Removal from restraints as soon as assessed to be safe  Outcome: Progressing Towards Goal  Goal: No harm/injury to patient while restraints in use  Outcome: Progressing Towards Goal  Goal: Patient's dignity will be maintained  Outcome: Progressing Towards Goal  Goal: Patient Interventions  Outcome: Progressing Towards Goal     Problem: Falls - Risk of  Goal: *Absence of Falls  Description: Document Diaznecandy June Fall Risk and appropriate interventions in the flowsheet. Outcome: Progressing Towards Goal  Variance Patient Condition  Impact: High  Note: Fall Risk Interventions:       Mentation Interventions: Adequate sleep, hydration, pain control,Door open when patient unattended,Evaluate medications/consider consulting pharmacy    Medication Interventions: Assess postural VS orthostatic hypotension,Evaluate medications/consider consulting pharmacy    Elimination Interventions: Call light in reach,Toileting schedule/hourly rounds              Problem: Pain  Goal: *Control of Pain  Outcome: Progressing Towards Goal  Goal: *PALLIATIVE CARE:  Alleviation of Pain  Outcome: Progressing Towards Goal     Problem: Pressure Injury - Risk of  Goal: *Prevention of pressure injury  Description: Document Valente Scale and appropriate interventions in the flowsheet.   Outcome: Progressing Towards Goal  Variance Patient Condition  Impact: High  Note: Pressure Injury Interventions:  Sensory Interventions: Assess changes in LOC,Assess need for specialty bed,Float heels,Keep linens dry and wrinkle-free,Maintain/enhance activity level,Minimize linen layers,Monitor skin under medical devices,Pad between skin to skin,Pressure redistribution bed/mattress (bed type),Turn and reposition approx.  every two hours (pillows and wedges if needed)    Moisture Interventions: Absorbent underpads,Apply protective barrier, creams and emollients,Check for incontinence Q2 hours and as needed,Internal/External urinary devices    Activity Interventions: Pressure redistribution bed/mattress(bed type)    Mobility Interventions: Float heels,Pressure redistribution bed/mattress (bed type)    Nutrition Interventions: Document food/fluid/supplement intake    Friction and Shear Interventions: Apply protective barrier, creams and emollients,Foam dressings/transparent film/skin sealants,Lift team/patient mobility team,Minimize layers,Transferring/repositioning devices                Problem: Injury - Risk of, Adverse Drug Event  Goal: *Absence of adverse drug events  Outcome: Progressing Towards Goal  Goal: *Absence of medication errors  Outcome: Progressing Towards Goal  Goal: *Knowledge of prescribed medications  Outcome: Progressing Towards Goal     Problem: Nutrition Deficit  Goal: *Optimize nutritional status  Outcome: Progressing Towards Goal     Problem: Delirium Treatment  Goal: *Level of consciousness restored to baseline  Outcome: Progressing Towards Goal  Goal: *Level of environmental perceptions restored to baseline  Outcome: Progressing Towards Goal  Goal: *Sensory perception restored to baseline  Outcome: Progressing Towards Goal  Goal: *Emotional stability restored to baseline  Outcome: Progressing Towards Goal  Goal: *Functional assessment restored to baseline  Outcome: Progressing Towards Goal  Goal: *Absence of falls  Outcome: Progressing Towards Goal  Goal: *Will remain free of delirium, CAM Score negative  Outcome: Progressing Towards Goal  Goal: *Cognitive status will be restored to baseline  Outcome: Progressing Towards Goal  Goal: Interventions  Outcome: Progressing Towards Goal     Problem: Patient Education: Go to Patient Education Activity  Goal: Patient/Family Education  Outcome: Progressing Towards Goal

## 2022-05-03 NOTE — PROGRESS NOTES
Attempted to contact emergency contact for consent related to dialysis and dialysis catheter placement. Emergency contact did not answer. Unable to leave voicemail due to full mailbox.     Leda Arias DO  05/03/22, 2:12 PM

## 2022-05-03 NOTE — PROGRESS NOTES
attended the interdisciplinary rounds for Ruthie Calix, who is a 71 y.o.,male. Patients Primary Language is: [place]. According to the patients EMR Mormon Affiliation is: No preference. The reason the Patient came to the hospital is:   Patient Active Problem List    Diagnosis Date Noted    AMS (altered mental status) 04/29/2022    Neuropathy 03/11/2020    HNP (herniated nucleus pulposus), lumbar 04/25/2018    Neuritis 04/25/2018      Plan:  Surinder Parrish will continue to follow and will provide pastoral care on an as needed/requested basis.  recommends bedside caregivers page  on duty if patient shows signs of acute spiritual or emotional distress.     [address] [facility]  Board Certified [address] [facility]   [phone]

## 2022-05-03 NOTE — ROUTINE PROCESS
HD cath inserted into right IJ by Dr Barth Standing via ultrasound. Charge Nurse Hailey Lowery with assistance. Verbal report. Versed increased to 6 mg for procedure due to restlessness. TF stopped and patient was in trendelenberg. Insertion successful. 1545 versed decreased back to regular dose of 2 mg  1550 PCXRAY done  1600 PVL studies done at bedside. Neg for DVT. 1612 dialysis nurse notified of HD cath insertion completed.  Nurse Jey Dawn states dialysis will be done around 6-630 pm Dr Lary Abdi made aware

## 2022-05-03 NOTE — PROGRESS NOTES
Problem: Ventilator Management  Goal: *Adequate oxygenation and ventilation  Outcome: Progressing Towards Goal  Goal: *Patient maintains clear airway/free of aspiration  Outcome: Progressing Towards Goal  Goal: *Absence of infection signs and symptoms  Outcome: Progressing Towards Goal  Goal: *Normal spontaneous ventilation  Outcome: Not Progressing Towards Goal     Problem: Non-Violent Restraints  Goal: Removal from restraints as soon as assessed to be safe  Outcome: Progressing Towards Goal  Goal: No harm/injury to patient while restraints in use  Outcome: Progressing Towards Goal  Goal: Patient's dignity will be maintained  Outcome: Progressing Towards Goal     Problem: Falls - Risk of  Goal: *Absence of Falls  Description: Document Janiya Florentino Fall Risk and appropriate interventions in the flowsheet.   Outcome: Progressing Towards Goal  Note: Fall Risk Interventions:       Mentation Interventions: Adequate sleep, hydration, pain control,Bed/chair exit alarm,Door open when patient unattended,Evaluate medications/consider consulting pharmacy,Update white board,Toileting rounds,Reorient patient,More frequent rounding,Familiar objects from home    Medication Interventions: Bed/chair exit alarm,Evaluate medications/consider consulting pharmacy,Patient to call before getting OOB,Teach patient to arise slowly    Elimination Interventions: Bed/chair exit alarm,Call light in reach,Patient to call for help with toileting needs,Stay With Me (per policy),Toileting schedule/hourly rounds              Problem: Patient Education: Go to Patient Education Activity  Goal: Patient/Family Education  Outcome: Not Progressing Towards Goal     Problem: Pain  Goal: *Control of Pain  Outcome: Progressing Towards Goal     Problem: Patient Education: Go to Patient Education Activity  Goal: Patient/Family Education  Outcome: Not Progressing Towards Goal     Problem: Patient Education: Go to Patient Education Activity  Goal: Patient/Family Education  Outcome: Not Progressing Towards Goal     Problem: Pressure Injury - Risk of  Goal: *Prevention of pressure injury  Description: Document Valente Scale and appropriate interventions in the flowsheet. Outcome: Progressing Towards Goal  Note: Pressure Injury Interventions:  Sensory Interventions: Assess changes in LOC,Assess need for specialty bed,Avoid rigorous massage over bony prominences,Check visual cues for pain,Float heels,Keep linens dry and wrinkle-free,Minimize linen layers,Monitor skin under medical devices,Pad between skin to skin,Pressure redistribution bed/mattress (bed type),Turn and reposition approx. every two hours (pillows and wedges if needed)    Moisture Interventions: Absorbent underpads,Apply protective barrier, creams and emollients,Assess need for specialty bed,Check for incontinence Q2 hours and as needed,Internal/External urinary devices,Minimize layers,Moisture barrier,Maintain skin hydration (lotion/cream)    Activity Interventions: Assess need for specialty bed,Pressure redistribution bed/mattress(bed type)    Mobility Interventions: Assess need for specialty bed,Float heels,HOB 30 degrees or less,Pressure redistribution bed/mattress (bed type),Turn and reposition approx. every two hours(pillow and wedges)    Nutrition Interventions: Document food/fluid/supplement intake    Friction and Shear Interventions: Transferring/repositioning devices,Minimize layers,Lift sheet,Lift team/patient mobility team,HOB 30 degrees or less,Foam dressings/transparent film/skin sealants,Apply protective barrier, creams and emollients                Problem: Pressure Injury - Risk of  Goal: *Prevention of pressure injury  Description: Document Valente Scale and appropriate interventions in the flowsheet.   Outcome: Progressing Towards Goal  Note: Pressure Injury Interventions:  Sensory Interventions: Assess changes in LOC,Assess need for specialty bed,Avoid rigorous massage over bony prominences,Check visual cues for pain,Float heels,Keep linens dry and wrinkle-free,Minimize linen layers,Monitor skin under medical devices,Pad between skin to skin,Pressure redistribution bed/mattress (bed type),Turn and reposition approx. every two hours (pillows and wedges if needed)    Moisture Interventions: Absorbent underpads,Apply protective barrier, creams and emollients,Assess need for specialty bed,Check for incontinence Q2 hours and as needed,Internal/External urinary devices,Minimize layers,Moisture barrier,Maintain skin hydration (lotion/cream)    Activity Interventions: Assess need for specialty bed,Pressure redistribution bed/mattress(bed type)    Mobility Interventions: Assess need for specialty bed,Float heels,HOB 30 degrees or less,Pressure redistribution bed/mattress (bed type),Turn and reposition approx.  every two hours(pillow and wedges)    Nutrition Interventions: Document food/fluid/supplement intake    Friction and Shear Interventions: Transferring/repositioning devices,Minimize layers,Lift sheet,Lift team/patient mobility team,HOB 30 degrees or less,Foam dressings/transparent film/skin sealants,Apply protective barrier, creams and emollients                Problem: Patient Education: Go to Patient Education Activity  Goal: Patient/Family Education  Outcome: Not Progressing Towards Goal     Problem: Injury - Risk of, Adverse Drug Event  Goal: *Absence of adverse drug events  Outcome: Progressing Towards Goal  Goal: *Absence of medication errors  Outcome: Progressing Towards Goal  Goal: *Knowledge of prescribed medications  Outcome: Not Progressing Towards Goal     Problem: Patient Education: Go to Patient Education Activity  Goal: Patient/Family Education  Outcome: Not Progressing Towards Goal     Problem: Nutrition Deficit  Goal: *Optimize nutritional status  Outcome: Progressing Towards Goal

## 2022-05-03 NOTE — PROCEDURES
EEG REPORT    Patient:Nuno Georges  MRN: [id number]    Clinical History: The patient is 71years old status post apparent baclofen toxicity with a metabolic encephalopathy and poor responsiveness. This is a bedside EEG. Interpretation: The background activity consists of burst suppression type pattern with high amplitude triphasic waves followed by suppression of voltage every second or so no lateralizing features are noted. No clear-cut epileptiform features are seen. Impression: This is an abnormal EEG because of the generalized triphasic wave pattern which is periodic at about 1 second and suppression of voltage. This pattern is often seen in toxic metabolic encephalopathies.

## 2022-05-03 NOTE — PROGRESS NOTES
1930 Bedside and Verbal shift change report given to vianey rn (oncoming nurse) by rashi alvarado (offgoing nurse). Report included the following information SBAR, Kardex and Recent Results   Dual drip verification completed. 2000 assessment completed. 0000 reassessment completed. 0400 reassessment completed. Kia esteban np notified temp 100.4. , will continue to monitor. 0600 t. Sylvester np notified temp 100.4 will contiune to monitor. 0730 Bedside and Verbal shift change report given to Nicholas (oncoming nurse) by vianey rn (offgoing nurse). Report included the following information SBAR, Kardex and Recent Results. . Dual drip verification completed.

## 2022-05-03 NOTE — PROGRESS NOTES
Able to speak with patient's daughter. Consent obtained over telephone for HD and catheter placement.      Madelaine Mendoza, DO

## 2022-05-03 NOTE — PROGRESS NOTES
[address] [place] [address] [facility]. Pulmonary, Critical Care, and Sleep Medicine    Name: Ricoc Small MRN: [id number]   : 1950 Hospital: [address] [place] [address] Dr   Date: 5/3/2022  Admission Date: 2022     Chart and notes reviewed. Data reviewed. I have evaluated all findings. [x]I have reviewed the flowsheet and previous days notes. []The patient is unable to give any meaningful history or review of systems because the patient is:  []Intubated []Sedated   []Unresponsive      []The patient is critically ill on      []Mechanical ventilation []Pressors   []BiPAP []         Interval HPI:  Patient is a 75 y. o. male w/ PMH of hemachromatosis presenting to [facility] from home via EMS from home unresponsive. History obtained from chart as patient is unresponsive. He was found by his step daughter who reported patient had been acting \"bipolar lately\" and would not let her in the house. She is not able to give a medication list at this time. Patients general UDS negative, CT head negative for acute issues. Poison control contacted by ED- recommend supportive care. Patient required intubation due to mental status. Remains unresponsive. Subjective 22  Hospital Day:  Vent Day:  Overnight events: Per toxicology and nephrology concern this is likely baclofen overdose. EEG performed yesterday with read pending. Has become more lethargic throughout night. No longer withdrawing from pain or opening eyes to voice. Febrile to 100.4 overnight. K and P replaced. Mentation/Activity: Does not open eyes to command, no withdrawing from pain, no command following. Respiratory/ Secretions:minimal vent settings  Hemodynamics:no vasopressor needs  Urine output, bowel: Adequate urine 1 ml/kg/hr through jin  Diet: tube feeds   Need for procedures:n/a              ROS:Pertinent items are noted in HPI. Events and notes from last 24 hours reviewed.      Patient Active Problem List   Diagnosis Code    HNP (herniated nucleus pulposus), lumbar M51.26    Neuritis M79.2    Neuropathy G62.9    AMS (altered mental status) R41.82       Vital Signs:  Visit Vitals  BP (!) 156/69   Pulse 79   Temp 100.4 °F (38 °C) Comment: marie np notified temp no new orders received,willmonitor. Resp 15   Ht 6' (1.829 m)   Wt 93.2 kg (205 lb 7.5 oz)   SpO2 99%   BMI 27.87 kg/m²       O2 Device: Endotracheal tube,Heated,Humidifier,Ventilator       Temp (24hrs), Av.6 °F (37 °C), Min:97.9 °F (36.6 °C), Max:100.4 °F (38 °C)       Intake/Output:   Last shift:      No intake/output data recorded.   Last 3 shifts:  1901 -  0700  In: 2877.7 [I.V.:982.7]  Out: 2800 [Urine:2800]    Intake/Output Summary (Last 24 hours) at 5/3/2022 0816  Last data filed at 5/3/2022 0700  Gross per 24 hour   Intake 1820.51 ml   Output 2025 ml   Net -204.49 ml          Current Facility-Administered Medications   Medication Dose Route Frequency    potassium phosphate 15 mmol in 0.9% sodium chloride 250 mL infusion   IntraVENous ONCE    vancomycin (VANCOCIN) 1250 mg in  ml infusion  1,250 mg IntraVENous Q24H    docusate (COLACE) 50 mg/5 mL oral liquid 100 mg  100 mg Oral DAILY    midazolam in normal saline (VERSED) 1 mg/mL infusion  0-10 mg/hr IntraVENous TITRATE    sodium chloride 3% hypertonic nebulizer soln  4 mL Nebulization Q6H RT    And    albuterol-ipratropium (DUO-NEB) 2.5 MG-0.5 MG/3 ML  3 mL Nebulization Q6H RT    chlorhexidine (PERIDEX) 0.12 % mouthwash 10 mL  10 mL Oral Q12H    pantoprazole (PROTONIX) 40 mg in 0.9% sodium chloride 10 mL injection  40 mg IntraVENous DAILY    heparin (porcine) injection 5,000 Units  5,000 Units SubCUTAneous Q8H    insulin lispro (HUMALOG) injection   SubCUTAneous Q6H    ampicillin-sulbactam (UNASYN) 3 g in 0.9% sodium chloride (MBP/ADV) 100 mL MBP  3 g IntraVENous Q6H         Telemetry: []Sinus []A-flutter []Paced    []A-fib []Multiple PVCs                  Physical Exam:      General: Intubated/sedated; no signs of distress, hot to touch  HEENT:  Anicteric sclerae; pink palpebral conjunctivae; mucosa moist  Resp:  Symmetrical chest expansion, no accessory muscle use; good airway entry; coarse breath sounds  CV:  S1, S2 present; regular rate and rhythm  GI:  Abdomen soft, non-tender; (+) active bowel sounds  Extremities:  +2 pulses on all extremities; no edema/ cyanosis/ clubbing noted   Skin:  Warm; no rashes/ lesions noted, normal turgor/cap refill   Neurologic:  Non-focal, does not withdraw from pain, no command following  Devices:  NGT, ETT, [place]      DATA:  MAR reviewed and pertinent medications noted or modified as needed    Labs:  Recent Labs     05/03/22  0231 05/02/22  0346 05/01/22  0251   WBC 11.3 11.2 14.2*   HGB 10.7* 11.4* 10.9*   HCT 33.0* 36.1 33.9*    243 272     Recent Labs     05/03/22  0231 05/02/22  0346 05/01/22  0251   * 145 143   K 3.2* 4.0 4.2   * 116* 115*   CO2 25 20* 19*   * 115* 81   BUN 24* 32* 30*   CREA 1.64* 2.23* 2.55*   CA 8.7 8.9 8.3*   MG 2.3 2.9* 2.7*   PHOS 2.2* 3.7 4.1   ALB 2.7* 3.0* 2.8*     No results for input(s): PH, PCO2, PO2, HCO3, FIO2 in the last 72 hours. Recent Labs     05/03/22  0320 05/02/22  0401 05/01/22  0439   FIO2I 28 28 35   HCO3I 23.7 20.6* 18.4*   PCO2I 36.8 36.2 35.7   PHI 7.42 7.36 7.32*   PO2I 93 96 116*       Imaging:  [x]   I have personally reviewed the patients radiographs and reports  XR Results (most recent):  XR Results (most recent):  Results from Hospital Encounter encounter on 04/29/22    XR ABD (KUB)    Narrative  EXAM: Abdominal X-ray    INDICATION:  placement og OGT    TECHNIQUE: AP view of the abdomen obtained. COMPARISON: None    FINDINGS:  2 enteric tubes have their tips overlying the stomach. The bowel gas pattern is  nonobstructive. No dilated small bowel loops appreciated. No abnormal  calcifications appreciated. The osseous structures are unremarkable. Impression  1.   The tubes have their tips overlying the stomach. CT Results (most recent):  Results from Hospital Encounter encounter on 04/29/22    CT HEAD WO CONT    Narrative  CT HEAD UNENHANCED    CPT code: 30465    INDICATION: Altered mental status. TECHNIQUE: 5 mm collimation axial images obtained from the skull base through  the vertex without administration of nonionic intravenous contrast.    All CT scans at this facility are performed using dose optimization technique as  appropriate to this specific exam, to include automated exposure control,  adjustment of the mA and/or KP according to patient size or use of iterative  reconstruction techniques. COMPARISON: None    FINDINGS:  Study is mildly degraded by motion and streak artifact. No parenchymal hemorrhage identified. Patchy low attenuation throughout the periventricular and deep hemispheric white  matter bilaterally, most prevalent in the frontal and parietal regions, with  distribution most consistent with chronic ischemic small vessel disease change. Rounded 5 mm hypodensity in the anterior inferior right basal ganglia adjacent  to the ventral insular cortex axial image 17. No regional mass effect. This  appears more sharply marginated and cystic on sagittal reformatted images  however. Subjectively this appears more remote, but may reflect lacunar type  infarct. MRI would be much more sensitive for the detection of infarct or ischemia in the  acute setting. No midline shift of structures. No extra-axial fluid collections. Ventricles are symmetric and not enlarged for age. Calvarium intact to the extent included. Partially included paranasal sinuses appear clear, with apparent left sided  nasal cannula in place. Impression  1. No hemorrhage identified. 2. Cystic lucency anterior inferior right basal ganglia region, subjectively  more remote appearing such as a prior lacunar type infarct. No comparison.   3. Moderate burden of presumed hemispheric small vessel disease change as above. IMPRESSION:   · Acute respiratory failure requiring mechnical ventilation in setting of encephalopathy with need for airway protection +/- CAP vs Aspiration PNA  · Acute toxic metabolic encephalopathy due to suspected medication overdose- ?tizanidine, baclofen, mobic, lisinopril, bystolic in chart. Negative UDS, ETOH, acetaminophen, salicylate. · SIRs vs sepsis - elevated white count, hypothermia, UA negative, procal negative. CXR (4/30) showed infiltrate vs atelectasis. · Bradycardia- likely from hypothermia, improved  · Hypothermia- Core temp 93F, resolved  · Elevated TSH but normal T4  · Acute on CKD stage 3- baseline crea (12.21) 1.69. prerenal azotemia vs ATN  · CT head 4/29 \"cystic lucency anterior inferior right basal ganglia region\"   · EEG 5/1: \"This is an abnormal EEG with the presence generalized periodic appearing discharges. The Generalized periodic discharges are non specific and can be seen in metabolic/toxic encephalopathy or hypoxic injury. Could also be seen in non convulsive seizure. \"   · Brain MRI (5/1): \"No acute infarct. Small vessel disease such as is seen in patients with diabetes or hypertension. \"  · Hx Hemochromatosis   · BMI 27.12            Patient Active Problem List   Diagnosis Code    HNP (herniated nucleus pulposus), lumbar M51.26    Neuritis M79.2    Neuropathy G62.9    AMS (altered mental status) R41.82        RECOMMENDATIONS:   Neuro:Titrate sedation for RASS goal 0 to -1, daily sedation holiday. Versed for sedation. Will follow up on EEG results. Will consider HD for possible baclofen overdose. Pulm: Aspiration precautions, HOB>30'. VAP Bundle  head of the bed at 30' all times. Daily sedation holiday and assessment for weaning with SBT as tolerated.  Aggressive pulmonary toilet, hypertonic nebs, duonebs, metaneb, and Chest PT ordered. Will SBT today. CVS:Monitor HD, MAP goal >65.    GI: Continue tube feeds, Colace  Renal: Trend Cr, UOP. Jin for accurate I/O, Renal consult for possible HD. Hem/Onc: Trend H/H, monitor for s/o active bleeding. Daily CBC. D-dimer ordered, if elevated will order PVL b/l LE  I/D:Trend WBCs and temperature curve. On Unasyn, grew GPC in his blood culture (4/29), MRSA culture pending. Febrile overnight, blood culture ordered. ID consulted. Metabolic: Daily BMP, mag, phos. Trend lytes and replace per protocol. Increase free water. Endocrine:Q6 glucoses. SSI. Avoid hypoglycemia. Musc/Skin: PT/OT/SLP  Full Code  Discussed in interdisciplinary rounds     Best practice :    Glycemic control  [facility] ICU bundles: Jin Bundle Followed    [facility] Vent patients-    VAP bundle-[place] tube to suction at 20-30 cm Hg, Maintain [place] tube with 5-10ml air every 4 hours, Routine oral care every 4 hours, Elevation of head > 45 degree, Daily sedation holiday and SBT evaluation starting at 6.00am.  Sress ulcer prophylaxis. Pepcid  DVT prophylaxis. SQH  Need for Lines, jin assessed. Palliative care evaluation. Restraints need. Attending Non-violent Restraint Reevaluation     I have reevaluated the patient one hour after initiation of intervention. The patient is comfortable, uninjured, but continues to pose an imminent risk of injury to self to themselves and/or serious disruption of medical treatment required to keep patient stable. The patient's current medical and behavioral conditions that warrant the use intervention include danger to self and Interference with medical equipment or treatment. Restraint or seclusion will be discontinued at the earliest possible time, regardless of the scheduled expiration of the order.     Based on my evaluation, restraints will be continued: YES         This care involved high complexity decision making to assess, manipulate, and support vital system functions, to treat this degreee vital organ system failure and to prevent further life threatening deterioration of the patients condition  The services I provided to this patient were to treat and/or prevent clinically significant deterioration that could result in the failure of one or more body systems and/or organ systems due to respiratory distress, hypoxia, cardiac dysrhythmia.        Ulysses Faden, DO   05/03/22  Pulmonary, Critical Care Medicine  [facility]

## 2022-05-03 NOTE — CONSULTS
Consult Note  Consult requested by: Gail Laureano is a 71 y.o. male [address] who is being seen on consult for TARI,baclofen toxicity  Chief Complaint   Patient presents with    Altered mental status       HPI:  71 yr old with hx of hemochromatosis(on serial phlebotomies),hypertension on lisinopril, bipolar disorder? Comes in for unresponsivness,hypothermia,bradycardia. Baclofen toxicity was suspected. Patient opens his eyes but doesn't communicate. EEG shows periodic discharges. Neurology raised the ? Of dialysis clearance  Appears to have some CKD  His cr appears to be stable and improving  Initially had TARI but now improved  He is currently intubated and on sedation  Past Medical History:   Diagnosis Date    Arthritis     Hemochromatosis     Ill-defined condition     GOUT      Past Surgical History:   Procedure Laterality Date    HX ORTHOPAEDIC      LEFT HAND       Social History     Socioeconomic History    Marital status: SINGLE     Spouse name: Not on file    Number of children: Not on file    Years of education: Not on file    Highest education level: Not on file   Occupational History    Not on file   Tobacco Use    Smoking status: Never Smoker    Smokeless tobacco: Never Used   Substance and Sexual Activity    Alcohol use: Yes     Alcohol/week: 6.0 standard drinks     Types: 6 Cans of beer per week     Comment: weekly on weekends    Drug use: Not on file    Sexual activity: Not on file   Other Topics Concern    Not on file   Social History Narrative    Not on file     Social Determinants of Health     Financial Resource Strain:     Difficulty of Paying Living Expenses: Not on file   Food Insecurity:     Worried About Running Out of Food in the Last Year: Not on file    [place] of Food in the Last Year: Not on file   Transportation Needs:     Lack of Transportation (Medical): Not on file    Lack of Transportation (Non-Medical):  Not on file Physical Activity:     Days of Exercise per Week: Not on file    Minutes of Exercise per Session: Not on file   Stress:     Feeling of Stress : Not on file   Social Connections:     Frequency of Communication with Friends and Family: Not on file    Frequency of Social Gatherings with Friends and Family: Not on file    Attends Hinduism Services: Not on file    Active Member of Clubs or Organizations: Not on file    Attends Club or Organization Meetings: Not on file    Marital Status: Not on file   Intimate Partner Violence:     Fear of Current or Ex-Partner: Not on file    Emotionally Abused: Not on file    Physically Abused: Not on file    Sexually Abused: Not on file   Housing Stability:     Unable to Pay for Housing in the Last Year: Not on file    Number of [place] in the Last Year: Not on file    Unstable Housing in the Last Year: Not on file       History reviewed. No pertinent family history. No Known Allergies     Home Medications:     Prior to Admission Medications   Prescriptions Last Dose Informant Patient Reported? Taking? BYSTOLIC 10 mg tablet   Yes No   Sig: take 1 tablet by mouth once daily   DULoxetine (CYMBALTA) 60 mg capsule   Yes Yes   Sig: Take 60 mg by mouth daily. allopurinol (ZYLOPRIM) 300 mg tablet   Yes No   Sig: Take 300 mg by mouth daily. baclofen (LIORESAL) 10 mg tablet   No No   Sig: Take 0.5-1 Tablets by mouth three (3) times daily as needed for Pain. Indications: for acute/episodic pain   colchicine 0.6 mg tablet   Yes No   Sig: Take 0.6 mg by mouth as needed (gout flare ups). lidocaine HCL-benzyl alcohoL (Salonpas Lidocaine Plus) 4-10 % crea   No No   Si Actuation(s) by Apply Externally route two (2) times daily as needed for Pain. lisinopriL (PRINIVIL, ZESTRIL) 10 mg tablet   Yes No   Sig: Take 10 mg by mouth daily. meloxicam (MOBIC) 15 mg tablet   Yes No   Sig: Take 15 mg by mouth daily.    nortriptyline (PAMELOR) 25 mg capsule   Yes Yes   Sig: Take 50 mg by mouth nightly. raNITIdine (Zantac) 150 mg tablet   Yes Yes   Sig: Take 150 mg by mouth once over twenty-four (24) hours.    tiZANidine (ZANAFLEX) 2 mg tablet   Yes No   Sig: take 1 tablet by mouth three times a day if needed   varicella-zoster recombinant, PF, (Shingrix, PF,) 50 mcg/0.5 mL susr injection   Yes No   Sig: Shingrix (PF) 50 mcg/0.5 mL intramuscular suspension, kit   zolpidem (AMBIEN) 10 mg tablet   Yes No   Sig: take 1 tablet by mouth at bedtime if needed      Facility-Administered Medications: None       Current Facility-Administered Medications   Medication Dose Route Frequency    vancomycin (VANCOCIN) 1250 mg in  ml infusion  1,250 mg IntraVENous Q24H    acetaminophen (TYLENOL) tablet 650 mg  650 mg Oral Q6H PRN    docusate (COLACE) 50 mg/5 mL oral liquid 100 mg  100 mg Oral DAILY    midazolam in normal saline (VERSED) 1 mg/mL infusion  0-10 mg/hr IntraVENous TITRATE    sodium chloride 3% hypertonic nebulizer soln  4 mL Nebulization Q6H RT    And    albuterol-ipratropium (DUO-NEB) 2.5 MG-0.5 MG/3 ML  3 mL Nebulization Q6H RT    chlorhexidine (PERIDEX) 0.12 % mouthwash 10 mL  10 mL Oral Q12H    albuterol-ipratropium (DUO-NEB) 2.5 MG-0.5 MG/3 ML  3 mL Nebulization Q4H PRN    pantoprazole (PROTONIX) 40 mg in 0.9% sodium chloride 10 mL injection  40 mg IntraVENous DAILY    midazolam (VERSED) injection 2 mg  2 mg IntraVENous Q10MIN PRN    fentaNYL citrate (PF) injection 100 mcg  100 mcg IntraVENous Q30MIN PRN    heparin (porcine) injection 5,000 Units  5,000 Units SubCUTAneous Q8H    insulin lispro (HUMALOG) injection   SubCUTAneous Q6H    glucose chewable tablet 16 g  4 Tablet Oral PRN    glucagon (GLUCAGEN) injection 1 mg  1 mg IntraMUSCular PRN    dextrose 10% infusion 0-250 mL  0-250 mL IntraVENous PRN    ampicillin-sulbactam (UNASYN) 3 g in 0.9% sodium chloride (MBP/ADV) 100 mL MBP  3 g IntraVENous Q6H       Review of Systems:     Not possible due to intubated status  Data Review:    Labs: Results:       Chemistry Recent Labs     05/03/22  1042 05/03/22  0231 05/02/22  0346 05/01/22  0251 05/01/22  0251   GLU  --  101* 115*  --  81   NA  --  147* 145  --  143   K 3.5 3.2* 4.0   < > 4.2   CL  --  117* 116*  --  115*   CO2  --  25 20*  --  19*   BUN  --  24* 32*  --  30*   CREA  --  1.64* 2.23*  --  2.55*   CA  --  8.7 8.9  --  8.3*   AGAP  --  5 9  --  9   BUCR  --  15 14  --  12   ALB  --  2.7* 3.0*  --  2.8*    < > = values in this interval not displayed. CBC w/Diff Recent Labs     05/03/22  0231 05/02/22 0346 05/01/22  0251   WBC 11.3 11.2 14.2*   RBC 3.51* 3.72* 3.53*   HGB 10.7* 11.4* 10.9*   HCT 33.0* 36.1 33.9*    243 272   GRANS 75* 78* 75*   LYMPH 13* 13* 11*   EOS 1 1 3      Coagulation No results for input(s): PTP, INR, APTT, INREXT in the last 72 hours. Iron/Ferritin No results for input(s): IRON in the last 72 hours. No lab exists for component: TIBCCALC   BNP No results for input(s): BNPP in the last 72 hours. Cardiac Enzymes No results for input(s): CPK, CKND1, MARLY in the last 72 hours.     No lab exists for component: CKRMB, TROIP   Liver Enzymes Recent Labs     05/03/22 0231   ALB 2.7*      Thyroid Studies Lab Results   Component Value Date/Time    TSH 3.78 (H) 04/29/2022 01:40 PM             Physical Assessment:     Visit Vitals  BP (!) 149/67 (BP 1 Location: Right upper arm, BP Patient Position: At rest)   Pulse 80   Temp 100.3 °F (37.9 °C)   Resp 17   Ht 6' (1.829 m)   Wt 93.2 kg (205 lb 7.5 oz)   SpO2 92%   BMI 27.87 kg/m²     Weight change: -0.066 kg (-2.3 oz)    Intake/Output Summary (Last 24 hours) at 5/3/2022 1259  Last data filed at 5/3/2022 1210  Gross per 24 hour   Intake 1954.31 ml   Output 2425 ml   Net -470.69 ml     Physical Exam:     CVS: S1S2 heard,  no rub  RS: + air entry b/l,   Abd: Soft,  Neuro: intubated  Extrm: no edema, no cyanosis, clubbing   Skin: no visible  Rash  Musculoskeletal: No gross joints or bone deformities     Procedures/imaging: see electronic medical records for all procedures, Xrays and details which were not copied into this note but were reviewed prior to creation of Plan      Impression & Plan:   IMPRESSION:   TARI due to ATN from initial BP fluctuations, ? AIN from vanco and zosyn combination, Pre renal etiology  Hypernatremia due to dehydration? Hypokalemia  Baclofen toxicity causing bradycardia, hypothermia, EEG discharges???? Hemochromatosis on serial phlebotomies as OP   PLAN:   Increase free water flushes via tube  Replete K  Dialysis is reasonable in this setting to improve the clearance of baclofen and such  I and O  Daily weights  Spoke with ICU team. Niranjan Hernández to reach family but voicemail is full.  ICU team going to speak with neurology and try to reach family  If dialysis is initiated then can try 2-3 sessions of dialysis         Sandeep Jimenez MD  May 3, 2022  [facility]  Office [phone]

## 2022-05-04 ENCOUNTER — APPOINTMENT (OUTPATIENT)
Dept: GENERAL RADIOLOGY | Age: 72
DRG: 917 | End: 2022-05-04
Attending: PHYSICIAN ASSISTANT
Payer: MEDICARE

## 2022-05-04 LAB
ALBUMIN SERPL-MCNC: 2.5 G/DL (ref 3.4–5)
ANION GAP SERPL CALC-SCNC: 5 MMOL/L (ref 3–18)
ARTERIAL PATENCY WRIST A: ABNORMAL
BASE EXCESS BLD CALC-SCNC: 6.8 MMOL/L
BASOPHILS # BLD: 0 K/UL (ref 0–0.1)
BASOPHILS NFR BLD: 0 % (ref 0–2)
BDY SITE: ABNORMAL
BUN SERPL-MCNC: 11 MG/DL (ref 7–18)
BUN/CREAT SERPL: 11 (ref 12–20)
CALCIUM SERPL-MCNC: 8.8 MG/DL (ref 8.5–10.1)
CHLORIDE SERPL-SCNC: 109 MMOL/L (ref 100–111)
CO2 SERPL-SCNC: 28 MMOL/L (ref 21–32)
CREAT SERPL-MCNC: 1.01 MG/DL (ref 0.6–1.3)
DIFFERENTIAL METHOD BLD: ABNORMAL
EOSINOPHIL # BLD: 0.3 K/UL (ref 0–0.4)
EOSINOPHIL NFR BLD: 3 % (ref 0–5)
ERYTHROCYTE [DISTWIDTH] IN BLOOD BY AUTOMATED COUNT: 13.7 % (ref 11.6–14.5)
GAS FLOW.O2 O2 DELIVERY SYS: ABNORMAL L/MIN
GAS FLOW.O2 SETTING OXYMISER: 14 BPM
GLUCOSE BLD STRIP.AUTO-MCNC: 100 MG/DL (ref 70–110)
GLUCOSE BLD STRIP.AUTO-MCNC: 118 MG/DL (ref 70–110)
GLUCOSE BLD STRIP.AUTO-MCNC: 90 MG/DL (ref 70–110)
GLUCOSE BLD STRIP.AUTO-MCNC: 91 MG/DL (ref 70–110)
GLUCOSE BLD STRIP.AUTO-MCNC: 96 MG/DL (ref 70–110)
GLUCOSE SERPL-MCNC: 103 MG/DL (ref 74–99)
HBV SURFACE AB SER QL IA: NEGATIVE
HBV SURFACE AB SERPL IA-ACNC: 3.32 MIU/ML
HCO3 BLD-SCNC: 29.8 MMOL/L (ref 22–26)
HCT VFR BLD AUTO: 33.2 % (ref 36–48)
HEP BS AB COMMENT,HBSAC: ABNORMAL
HGB BLD-MCNC: 10.9 G/DL (ref 13–16)
IMM GRANULOCYTES # BLD AUTO: 0.1 K/UL (ref 0–0.04)
IMM GRANULOCYTES NFR BLD AUTO: 0 % (ref 0–0.5)
LYMPHOCYTES # BLD: 1.9 K/UL (ref 0.9–3.6)
LYMPHOCYTES NFR BLD: 16 % (ref 21–52)
MAGNESIUM SERPL-MCNC: 2.7 MG/DL (ref 1.6–2.6)
MCH RBC QN AUTO: 30.8 PG (ref 24–34)
MCHC RBC AUTO-ENTMCNC: 32.8 G/DL (ref 31–37)
MCV RBC AUTO: 93.8 FL (ref 78–100)
MONOCYTES # BLD: 1.2 K/UL (ref 0.05–1.2)
MONOCYTES NFR BLD: 11 % (ref 3–10)
NEUTS SEG # BLD: 8.1 K/UL (ref 1.8–8)
NEUTS SEG NFR BLD: 70 % (ref 40–73)
NRBC # BLD: 0 K/UL (ref 0–0.01)
NRBC BLD-RTO: 0 PER 100 WBC
O2/TOTAL GAS SETTING VFR VENT: 28 %
PCO2 BLD: 35.8 MMHG (ref 35–45)
PEEP RESPIRATORY: 5 CMH2O
PH BLD: 7.53 [PH] (ref 7.35–7.45)
PHOSPHATE SERPL-MCNC: 2 MG/DL (ref 2.5–4.9)
PIP ISTAT,IPIP: 20
PLATELET # BLD AUTO: 188 K/UL (ref 135–420)
PMV BLD AUTO: 9 FL (ref 9.2–11.8)
PO2 BLD: 62 MMHG (ref 80–100)
POTASSIUM SERPL-SCNC: 3.3 MMOL/L (ref 3.5–5.5)
PROCALCITONIN SERPL-MCNC: 0.37 NG/ML
RBC # BLD AUTO: 3.54 M/UL (ref 4.35–5.65)
SAO2 % BLD: 93.9 % (ref 92–97)
SERVICE CMNT-IMP: ABNORMAL
SODIUM SERPL-SCNC: 142 MMOL/L (ref 136–145)
SPECIMEN TYPE: ABNORMAL
TOTAL RESP. RATE, ITRR: 14
VANCOMYCIN SERPL-MCNC: 14.4 UG/ML (ref 5–40)
VENTILATION MODE VENT: ABNORMAL
VT SETTING VENT: 520 ML
WBC # BLD AUTO: 11.7 K/UL (ref 4.6–13.2)

## 2022-05-04 PROCEDURE — 2709999900 HC NON-CHARGEABLE SUPPLY

## 2022-05-04 PROCEDURE — 94640 AIRWAY INHALATION TREATMENT: CPT

## 2022-05-04 PROCEDURE — 36600 WITHDRAWAL OF ARTERIAL BLOOD: CPT

## 2022-05-04 PROCEDURE — 82962 GLUCOSE BLOOD TEST: CPT

## 2022-05-04 PROCEDURE — 65610000006 HC RM INTENSIVE CARE

## 2022-05-04 PROCEDURE — 74011250636 HC RX REV CODE- 250/636: Performed by: INTERNAL MEDICINE

## 2022-05-04 PROCEDURE — 71045 X-RAY EXAM CHEST 1 VIEW: CPT

## 2022-05-04 PROCEDURE — 87449 NOS EACH ORGANISM AG IA: CPT

## 2022-05-04 PROCEDURE — 36415 COLL VENOUS BLD VENIPUNCTURE: CPT

## 2022-05-04 PROCEDURE — 90935 HEMODIALYSIS ONE EVALUATION: CPT

## 2022-05-04 PROCEDURE — 80202 ASSAY OF VANCOMYCIN: CPT

## 2022-05-04 PROCEDURE — 85025 COMPLETE CBC W/AUTO DIFF WBC: CPT

## 2022-05-04 PROCEDURE — 74011250636 HC RX REV CODE- 250/636: Performed by: PHYSICIAN ASSISTANT

## 2022-05-04 PROCEDURE — 83735 ASSAY OF MAGNESIUM: CPT

## 2022-05-04 PROCEDURE — 82803 BLOOD GASES ANY COMBINATION: CPT

## 2022-05-04 PROCEDURE — 74011000250 HC RX REV CODE- 250: Performed by: PHYSICIAN ASSISTANT

## 2022-05-04 PROCEDURE — 94762 N-INVAS EAR/PLS OXIMTRY CONT: CPT

## 2022-05-04 PROCEDURE — 74011000250 HC RX REV CODE- 250: Performed by: REGISTERED NURSE

## 2022-05-04 PROCEDURE — 84145 PROCALCITONIN (PCT): CPT

## 2022-05-04 PROCEDURE — 74011000258 HC RX REV CODE- 258: Performed by: INTERNAL MEDICINE

## 2022-05-04 PROCEDURE — C9113 INJ PANTOPRAZOLE SODIUM, VIA: HCPCS | Performed by: PHYSICIAN ASSISTANT

## 2022-05-04 PROCEDURE — 94003 VENT MGMT INPAT SUBQ DAY: CPT

## 2022-05-04 PROCEDURE — 80069 RENAL FUNCTION PANEL: CPT

## 2022-05-04 RX ORDER — POTASSIUM CHLORIDE 14.9 MG/ML
20 INJECTION INTRAVENOUS
Status: COMPLETED | OUTPATIENT
Start: 2022-05-04 | End: 2022-05-04

## 2022-05-04 RX ORDER — HEPARIN SODIUM 1000 [USP'U]/ML
2200 INJECTION, SOLUTION INTRAVENOUS; SUBCUTANEOUS
Status: DISCONTINUED | OUTPATIENT
Start: 2022-05-04 | End: 2022-05-08

## 2022-05-04 RX ADMIN — HEPARIN SODIUM 5000 UNITS: 5000 INJECTION INTRAVENOUS; SUBCUTANEOUS at 20:00

## 2022-05-04 RX ADMIN — PANTOPRAZOLE SODIUM 40 MG: 40 INJECTION, POWDER, FOR SOLUTION INTRAVENOUS at 09:18

## 2022-05-04 RX ADMIN — SODIUM CHLORIDE SOLN NEBU 3% 4 ML: 3 NEBU SOLN at 03:31

## 2022-05-04 RX ADMIN — PROPOFOL 50 MCG/KG/MIN: 10 INJECTION, EMULSION INTRAVENOUS at 01:38

## 2022-05-04 RX ADMIN — FENTANYL CITRATE 100 MCG: 50 INJECTION INTRAMUSCULAR; INTRAVENOUS at 19:55

## 2022-05-04 RX ADMIN — POTASSIUM CHLORIDE 20 MEQ: 14.9 INJECTION, SOLUTION INTRAVENOUS at 06:18

## 2022-05-04 RX ADMIN — POTASSIUM CHLORIDE 20 MEQ: 14.9 INJECTION, SOLUTION INTRAVENOUS at 05:05

## 2022-05-04 RX ADMIN — HEPARIN SODIUM 5000 UNITS: 5000 INJECTION INTRAVENOUS; SUBCUTANEOUS at 13:04

## 2022-05-04 RX ADMIN — CHLORHEXIDINE GLUCONATE 0.12% ORAL RINSE 10 ML: 1.2 LIQUID ORAL at 09:18

## 2022-05-04 RX ADMIN — IPRATROPIUM BROMIDE AND ALBUTEROL SULFATE 3 ML: .5; 2.5 SOLUTION RESPIRATORY (INHALATION) at 14:06

## 2022-05-04 RX ADMIN — SODIUM CHLORIDE SOLN NEBU 3% 4 ML: 3 NEBU SOLN at 07:23

## 2022-05-04 RX ADMIN — PROPOFOL 20 MCG/KG/MIN: 10 INJECTION, EMULSION INTRAVENOUS at 05:30

## 2022-05-04 RX ADMIN — CHLORHEXIDINE GLUCONATE 0.12% ORAL RINSE 10 ML: 1.2 LIQUID ORAL at 21:00

## 2022-05-04 RX ADMIN — IPRATROPIUM BROMIDE AND ALBUTEROL SULFATE 3 ML: .5; 2.5 SOLUTION RESPIRATORY (INHALATION) at 07:23

## 2022-05-04 RX ADMIN — IPRATROPIUM BROMIDE AND ALBUTEROL SULFATE 3 ML: .5; 2.5 SOLUTION RESPIRATORY (INHALATION) at 03:31

## 2022-05-04 RX ADMIN — PIPERACILLIN AND TAZOBACTAM 3.38 G: 3; .375 INJECTION, POWDER, LYOPHILIZED, FOR SOLUTION INTRAVENOUS at 18:44

## 2022-05-04 RX ADMIN — HEPARIN SODIUM 2200 UNITS: 1000 INJECTION INTRAVENOUS; SUBCUTANEOUS at 01:09

## 2022-05-04 RX ADMIN — HEPARIN SODIUM 5000 UNITS: 5000 INJECTION INTRAVENOUS; SUBCUTANEOUS at 03:18

## 2022-05-04 RX ADMIN — PIPERACILLIN AND TAZOBACTAM 3.38 G: 3; .375 INJECTION, POWDER, LYOPHILIZED, FOR SOLUTION INTRAVENOUS at 09:18

## 2022-05-04 RX ADMIN — PIPERACILLIN AND TAZOBACTAM 4.5 G: 4; .5 INJECTION, POWDER, FOR SOLUTION INTRAVENOUS at 02:02

## 2022-05-04 RX ADMIN — IPRATROPIUM BROMIDE AND ALBUTEROL SULFATE 3 ML: .5; 2.5 SOLUTION RESPIRATORY (INHALATION) at 20:46

## 2022-05-04 NOTE — PROGRESS NOTES
0655 sedation off, tube feeds off. Pt still sleeping, lethargic, coughs slightly . Report given to [employer] Loredo intact, IVs and dialysis cath with pig tail.  Bed in the lowest position with the wheels locked and bed alarm on

## 2022-05-04 NOTE — PROGRESS NOTES
SBT continued. Decreased PSV. RR has increased slightly, will continue to montior.      05/04/22 1029   Weaning Parameters   Resp Rate Observed 26   Ve 8.7      RSBI 81   PEF 27

## 2022-05-04 NOTE — PROGRESS NOTES
Per MD, pt is to go back onto full support tonight and then perform SBT again in the morning with the goal of extubation. I will leave him on 5/5 until shift change, or if things warrant otherwise.

## 2022-05-04 NOTE — PROGRESS NOTES
Problem: Ventilator Management  Goal: *Adequate oxygenation and ventilation  5/4/2022 0749 by Lattie Holter, RN  Outcome: Progressing Towards Goal  5/3/2022 2135 by Lattie Holter, RN  Outcome: Progressing Towards Goal  Goal: *Patient maintains clear airway/free of aspiration  5/4/2022 0749 by Lattie Holter, RN  Outcome: Progressing Towards Goal  5/3/2022 2135 by Lattie Holter, RN  Outcome: Progressing Towards Goal  Goal: *Absence of infection signs and symptoms  Outcome: Progressing Towards Goal  Goal: *Normal spontaneous ventilation  5/4/2022 0749 by Lattie Holter, RN  Outcome: Progressing Towards Goal  5/3/2022 2135 by Lattie Holter, RN  Outcome: Progressing Towards Goal     Problem: Patient Education: Go to Patient Education Activity  Goal: Patient/Family Education  Outcome: Progressing Towards Goal     Problem: Non-Violent Restraints  Goal: Removal from restraints as soon as assessed to be safe  5/4/2022 0749 by Lattie Holter, RN  Outcome: Progressing Towards Goal  5/3/2022 2135 by Lattie Holter, RN  Outcome: Progressing Towards Goal  Goal: No harm/injury to patient while restraints in use  5/4/2022 0749 by Lattie Holter, RN  Outcome: Progressing Towards Goal  5/3/2022 2135 by Lattie Holter, RN  Outcome: Progressing Towards Goal  Goal: Patient's dignity will be maintained  5/4/2022 0749 by Lattie Holter, RN  Outcome: Progressing Towards Goal  5/3/2022 2135 by Lattie Holter, RN  Outcome: Progressing Towards Goal  Goal: Patient Interventions  5/4/2022 0749 by Lattie Holter, RN  Outcome: Progressing Towards Goal  5/3/2022 2135 by Lattie Holter, RN  Outcome: Progressing Towards Goal     Problem: Non-Violent Restraints  Goal: Removal from restraints as soon as assessed to be safe  5/4/2022 0749 by Lattie Holter, RN  Outcome: Progressing Towards Goal  5/3/2022 2135 by Lattie Holter, RN  Outcome: Progressing Towards Goal  Goal: No harm/injury to patient while restraints in use  5/4/2022 0749 by Saba Mitchell RN  Outcome: Progressing Towards Goal  5/3/2022 2135 by Saba Mitchell RN  Outcome: Progressing Towards Goal  Goal: Patient's dignity will be maintained  5/4/2022 0749 by Saba Mitchell RN  Outcome: Progressing Towards Goal  5/3/2022 2135 by Saba Mitchell RN  Outcome: Progressing Towards Goal  Goal: Patient Interventions  5/4/2022 0749 by Saba Mitchell RN  Outcome: Progressing Towards Goal  5/3/2022 2135 by Saba Mitchell RN  Outcome: Progressing Towards Goal     Problem: Falls - Risk of  Goal: *Absence of Falls  Description: Document Johnny Saldana Fall Risk and appropriate interventions in the flowsheet. 5/4/2022 0749 by Saba Mitchell RN  Outcome: Progressing Towards Goal  Note: Fall Risk Interventions:       Mentation Interventions: Adequate sleep, hydration, pain control,Bed/chair exit alarm,Evaluate medications/consider consulting pharmacy    Medication Interventions: Bed/chair exit alarm    Elimination Interventions: Call light in reach,Toileting schedule/hourly rounds           5/3/2022 2135 by Saba Mitchell RN  Outcome: Progressing Towards Goal  Note: Fall Risk Interventions:       Mentation Interventions: Adequate sleep, hydration, pain control,Door open when patient unattended,Evaluate medications/consider consulting pharmacy    Medication Interventions: Assess postural VS orthostatic hypotension,Evaluate medications/consider consulting pharmacy    Elimination Interventions: Call light in reach,Toileting schedule/hourly rounds              Problem: Falls - Risk of  Goal: *Absence of Falls  Description: Document Johnny Saldana Fall Risk and appropriate interventions in the flowsheet.   5/4/2022 0749 by Saba Mitchell RN  Outcome: Progressing Towards Goal  Note: Fall Risk Interventions:       Mentation Interventions: Adequate sleep, hydration, pain control,Bed/chair exit alarm,Evaluate medications/consider consulting pharmacy    Medication Interventions: Bed/chair exit alarm    Elimination Interventions: Call light in reach,Toileting schedule/hourly rounds           5/3/2022 2135 by Casa Palmer RN  Outcome: Progressing Towards Goal  Note: Fall Risk Interventions:       Mentation Interventions: Adequate sleep, hydration, pain control,Door open when patient unattended,Evaluate medications/consider consulting pharmacy    Medication Interventions: Assess postural VS orthostatic hypotension,Evaluate medications/consider consulting pharmacy    Elimination Interventions: Call light in reach,Toileting schedule/hourly rounds              Problem: Pain  Goal: *Control of Pain  5/4/2022 0749 by Casa Palmer RN  Outcome: Progressing Towards Goal  5/3/2022 2135 by Casa Palmer RN  Outcome: Progressing Towards Goal  Goal: *PALLIATIVE CARE:  Alleviation of Pain  Outcome: Progressing Towards Goal     Problem: Patient Education: Go to Patient Education Activity  Goal: Patient/Family Education  Outcome: Progressing Towards Goal     Problem: Injury - Risk of, Adverse Drug Event  Goal: *Absence of adverse drug events  5/4/2022 0749 by Casa Palmer RN  Outcome: Progressing Towards Goal  5/3/2022 2135 by Casa Palmer RN  Outcome: Progressing Towards Goal     Problem: Delirium Treatment  Goal: *Absence of falls  Outcome: Progressing Towards Goal  Goal: Interventions  Outcome: Progressing Towards Goal     Problem: Nutrition Deficit  Goal: *Optimize nutritional status  5/4/2022 0749 by Casa Palmer RN  Outcome: Progressing Towards Goal  5/3/2022 2135 by Casa Palmer RN  Outcome: Progressing Towards Goal

## 2022-05-04 NOTE — PROGRESS NOTES
1930 bedside turnover given to me by RN Savana Walters. He is on the cardiac pulse ox and respiratory monitor/ bed is in the lowest position with the wheels locked and bed alarm on for safety. Assessed jin and lines. 2000 initial assessment pt follows commands of holding hands only. Intubated non-verbal  No abnormalities noted  2020 labs drawn from pig tail and transport taking down to the lab to recheck electrolytes. 2040 RT at bedside  2100 dialysis RN at bedside  2200 meds given mouth care and repositioned, restraints assessed  2300 vitals assessed and wiped down with chlorhexidine wipes  0000 bowel movement in bed, bathed and all linen changed, it was a very large amount. 0100 in bed, RT at bedside.  Pt on monitor no signs of pain, no abnormalities noted

## 2022-05-04 NOTE — PROGRESS NOTES
SBT started. Current results. Will continue to monitor.       05/04/22 0724   Weaning Parameters   Resp Rate Observed 20   Ve 10.8      RSBI 38   NIF -6.3   PEF 30

## 2022-05-04 NOTE — PROGRESS NOTES
attended the interdisciplinary rounds for Eboni Nolan, who is a 71 y.o.,male. Patients Primary Language is: [place]. According to the patients EMR [employer] Affiliation is: No preference. The reason the Patient came to the hospital is:   Patient Active Problem List    Diagnosis Date Noted    AMS (altered mental status) 04/29/2022    Neuropathy 03/11/2020    HNP (herniated nucleus pulposus), lumbar 04/25/2018    Neuritis 04/25/2018      Plan:  Onel Kaitommy will continue to follow and will provide pastoral care on an as needed/requested basis.  recommends bedside caregivers page  on duty if patient shows signs of acute spiritual or emotional distress.     [address] [facility]  Board Certified [address] [facility]   [phone]

## 2022-05-04 NOTE — DIALYSIS
ACUTE HEMODIALYSIS TREATMENT    HEMODIALYSIS ORDERS: Physician: Dr. Lizette Cannon     Dialyzer: Revaclear   Duration: 3.5 hr   BFR: 350   DFR: 700   Dialysate:  Temp 36-37*C   K+  3.5    Ca+ 2.5   Na 138   Bicarb 32   Wt Readings from Last 1 Encounters:   05/03/22 93.2 kg (205 lb 7.5 oz)    Patient Chart [x]   Unable to Obtain []  Dry weight/UF Goal: 0 ml    Heparin []  Bolus    Units    [] Hourly    Units    [x]None       Pre BP:   139/64   Pulse:  79   Respirations: 14   Temp:  98  [] Oral [] Axillary [] Esophageal   Labs: []  Pre  []  Post:   [x] N/A   Additional Orders(medications, blood products, hypotension management): [] Yes   [] No     [x]  [facility] Consent Verified     CATHETER ACCESS:  []N/A   []Right   [x]Left   [x]IJ   []Fem  []Chest wall  []TransHepatic   [] First use X-ray verified     []Tunnel    [x] Non Tunneled   [x]No S/S infection  []Redness  []Drainage []Cultured []Swelling []Pain   [x]Medical Aseptic Prep Utilized   []Dressing Changed  [x] Biopatch  Date: 5/3/22   []Clotted   [x]Patent   Flows: [x]Good  []Poor  []Reversed   If access problem,  notified: []Yes    [x]N/A          GENERAL ASSESSMENT:    LUNGS:  Resp Rate 14   [] Clear  [] Coarse  [] Crackles  [] Wheezing  [x] Diminished         Respirations:  [x]Easy  []Labored  []N/A  Cough:  []Productive  []Dry  []N/A      Therapy:  []RA  O2 Type:  []NC  Mask: []  NRB    [] BiPaP  Flow:  l/min                   [x] Ventilated  [x] Intubated  [] Trach     CARDIAC: [x] Regular      [] Irregular   [] Rhythm:          [x] Monitored   [] Bedside   [] Remotely monitored     EDEMA: [] None   [x]Generalized  [] Pitting [] 1+   [] 2 +   [] 3+    [] 4+  [] Pedal    SKIN:   [x] Warm  [] Hot     [] Cold   [x] Dry     [] Pale   [] Diaphoretic                  [] Flushed  [] Jaundiced  [] Cyanotic     LOC:    [] Alert      []Oriented:    [] Person     [] Place  []Time               [] Confused  [] Lethargic  [x] Medicated  [x] Non-responsive  [x] Non Verbal   GI / ABDOMEN:                     [] Flat    [] Distended    [x] Soft    [] Firm   []  Obese                   [] Diarrhea   [] FMS [x] Bowel Sounds  [] Nausea  [] Vomiting                   [] NGT  [] OGT    [] PEG  [] Tube Feedings @     / URINE ASSESSMENT:                   [] Voiding  [x]  Loredo  [] Oliguria  [] Anuria                     [] Incontinent  []  Incontinent Brief   []  PureWick     PAIN:  [x] 0 []1  []2   []3   []4   []5   []6   []7   []8   []9   []10                MOBILITY:  [x] Bed    [] Stretcher      All Vitals and Treatment Details on Attached [address]: [facility]          Room # 858/33    [x] Routine         [] 1st Time Acute/Chronic   [] Urgent      [] Stat            [] Acute Room   []  Bedside    [x] ICU/CCU     [] ER   Isolation Precautions:  [x] Dialysis    There are currently no Active Isolations     ALLERGIES:     No Known Allergies   Code Status:  Full Code     Hepatitis Status      Lab Results   Component Value Date/Time    Hepatitis B surface Ag <0.10 05/03/2022 08:21 PM    Hep B Core Ab, total NEGATIVE  09/13/2019 02:02 PM        Current Labs:      Lab Results   Component Value Date/Time    WBC 11.3 05/03/2022 02:31 AM    HGB 10.7 (L) 05/03/2022 02:31 AM    HCT 33.0 (L) 05/03/2022 02:31 AM    PLATELET 789 90/09/0657 02:31 AM    MCV 94.0 05/03/2022 02:31 AM     Lab Results   Component Value Date/Time    Sodium 147 (H) 05/03/2022 02:31 AM    Potassium 3.5 05/03/2022 10:42 AM    Chloride 117 (H) 05/03/2022 02:31 AM    CO2 25 05/03/2022 02:31 AM    Anion gap 5 05/03/2022 02:31 AM    Glucose 101 (H) 05/03/2022 02:31 AM    BUN 24 (H) 05/03/2022 02:31 AM    Creatinine 1.64 (H) 05/03/2022 02:31 AM    BUN/Creatinine ratio 15 05/03/2022 02:31 AM    GFR est AA 50 (L) 05/03/2022 02:31 AM    GFR est non-AA 42 (L) 05/03/2022 02:31 AM    Calcium 8.7 05/03/2022 02:31 AM          DIET:  DIET NPO  DIET ADULT TUBE FEEDING     PRIMARY NURSE REPORT:   Pre Dialysis: marino STEVENSON RN Time: 2040      EDUCATION:    [x] Patient           Knowledge Basis: [x]None []Minimal [] Substantial [] Unknown  Barriers to learning  []N/A  [x] Intubated/Trached/Ventilated  [x] Sedated/Paralyzed   [] Access Care     [] S&S of infection  [] Fluid Management  [] K+   [x] Procedural    [] Medications   [] Tx Options   [] Transplant   [] Diet      Teaching Tools:  [x] Explain  [] Demo  [] Handouts [] Video  Patient response: [] Verbalized understanding   [x] Requires follow up        [x] Time Out/Safety Check    [x] Extracorporeal Circuit Tested for integrity       RO/HEMODIALYSIS MACHINE SAFETY CHECKS - Before each treatment:        [facility]                                    [x] Unit Machine #  with centralized RO                                    [] Portable Machine #1/RO serial # [id number]                                  [] Portable Machine #2/RO serial # [id number]                                  [x] Portable Machine #4/RO serial # [id number]                                  [] Portable Machine #10/RO serial #  [id number]                                                                                                         Alarm Test:  Pass time 2039            [x] RO/Machine Log Complete    Machine Temp    36-37*C             Dialysate: pH 7.4    Conductivity: Meter 14.3   HD Machine  14.2     TCD: 14.1  Dialyzer Lot # [id number]     Blood Tubing Lot # [id number]     Saline Lot # [id number]     CHLORINE TESTING-Before each treatment and every 4 hours    Total Chlorine: [x] less than 0.1 ppm  Initial Time Check: 2000       4 Hr/2nd Check Time:    (if greater than 0.1 ppm from Primary then every 30 minutes from Secondary)     TREATMENT INITIATION - with Dialysis Precautions:   [x] All Connections Secured              [x] Saline Line Double Clamped   [x] Venous Parameters Set               [x] Arterial Parameters Set    [x] Prime Given 250ml NSS              [x]Air Foam Detector Engaged      Treatment Initiation Note:  Received  Patient in iCU bed sedated and intubated VS stable for treatment. Right IJ trialysis  Accessed red port sluggish with aspiration blue port flushes and aspirates well line reversed. Treatment initiated      During Treatment Notes:  2130  Vascular access visible with arterial and venous line connections intact. Pt resting comfortably. 2145  Vascular access visible with arterial and venous line connections intact. Pt resting comfortably. 2200  Vascular access visible with arterial and venous line connections intact. Pt resting comfortably. 2230  Vascular access visible with arterial and venous line connections intact. Pt resting comfortably. 2245  Vascular access visible with arterial and venous line connections intact. Pt resting comfortably. 2300  Vascular access visible with arterial and venous line connections intact. Pt resting comfortably. 2330  Vascular access visible with arterial and venous line connections intact. Pt resting comfortably. 2345  Vascular access visible with arterial and venous line connections intact. Pt resting comfortably. 0000  Vascular access visible with arterial and venous line connections intact. Pt resting comfortably. [id number]  Vascular access visible with arterial and venous line connections intact. Pt resting comfortably. 0030  Vascular access visible with arterial and venous line connections intact. Pt resting comfortably. 0045  Vascular access visible with arterial and venous line connections intact. Pt resting comfortably. 0100  Dialysis treatment complete.        Medication Dose Volume Route Time [place] Nurse, Title      OMID Andrea, RN      OMID Andrea, RN      OMID Andrea RN     Post Assessment  Dialyzer Cleared:   [] Good  [x] Fair  [] Poor  Blood processed:  68.4 L  UF Removed:  0 Ml    Post /54   Pulse  76 Resp  14  Temp 98 Lungs: [] Clear [] Course  [] Crackles                 []  Wheezing   [x] Diminished Post Tx Vascular Access: [] N/A Cardiac :[x] Regular   [] Irregular   Rhythm:  [x] Monitored   [] Not Monitored    CVC Catheter: [] N/A  Locking solution: Heparin 1:1000 U  Arterial port 1.1 ml   Venous port 1.1 ml   Edema:  [x] None  [] Generalized                     Skin:[x] Warm  [x] Dry [] Diaphoretic               [] Flushed  [] Pale [] Cyanotic Pain:  [x]0  []1 []2  []3 []4  []5  []6  []7 []8    []9  []10     Post Treatment Note:    Patient completed and  Tolerated 3.5 hrs dialysis. Zero fluid removed. Patient remains in ICU in stable condition. Catheter intact flushed and locked with heparin.      POST TREATMENT PRIMARY NURSE HANDOFF REPORT:   Post Dialysis: marino STEVENSON RN               Time:  0130       Abbreviations: AVG-arterial venous graft, AVF-arterial venous fistula, IJ-Internal Jugular, Subcl-Subclavian, Fem-Femoral, Tx-treatment, AP/HR-apical heart rate, VSS- Vital Signs Stable, CVC- Central Venous Catheter, DFR-dialysate flow rate, BFR-blood flow rate, AP-arterial pressure, -venous pressure, UF-ultrafiltrate, TMP-transmembrane pressure, [place]-Venous, Art-Arterial, RO-Reverse Osmosis

## 2022-05-04 NOTE — PROGRESS NOTES
[facility]. Pulmonary, Critical Care, and Sleep Medicine    Name: Mark Hartmann MRN: [id number]   : 1950 Hospital: [facility]   Date: 2022  Admission Date: 2022     Chart and notes reviewed. Data reviewed. I have evaluated all findings. [x]I have reviewed the flowsheet and previous days notes. []The patient is unable to give any meaningful history or review of systems because the patient is:  []Intubated []Sedated   []Unresponsive      []The patient is critically ill on      []Mechanical ventilation []Pressors   []BiPAP []         Interval HPI:  Patient is a 75 y. o. male w/ PMH of hemachromatosis presenting to [facility] from home via EMS from home unresponsive. History obtained from chart as patient is unresponsive. He was found by his step daughter who reported patient had been acting \"bipolar lately\" and would not let her in the house. She is not able to give a medication list at this time. Patients general UDS negative, CT head negative for acute issues. Poison control contacted by ED- recommend supportive care. Patient required intubation due to mental status. Remains unresponsive. Subjective 22  Hospital Day:  Vent Day:  Overnight events: Patient started on HD for clearance of baclofen. EEG yesterday did not show evidence of seizure activity and was more consistent with metabolic encephalopathy/hypoxic brain injury. PVL yesterday negative for DVT. Abx switched from unasyn to zosyn. Afebrile overnight. Mentation/Activity: Opens eyes to command, not withdrawing from pain, no command following. Respiratory/ Secretions:minimal vent settings  Hemodynamics:no vasopressor needs  Urine output, bowel: Adequate urine 0.7 ml/kg/hr through jin  Diet: tube feeds   Need for procedures:n/a              ROS:Pertinent items are noted in HPI. Events and notes from last 24 hours reviewed.      Patient Active Problem List   Diagnosis Code    HNP (herniated nucleus pulposus), lumbar M51.26    Neuritis M79.2    Neuropathy G62.9    AMS (altered mental status) R41.82       Vital Signs:  Visit Vitals  BP (!) 126/59   Pulse 73   Temp 97.8 °F (36.6 °C)   Resp 14   Ht 6' (1.829 m)   Wt 93.2 kg (205 lb 7.5 oz)   SpO2 96%   BMI 27.87 kg/m²       O2 Device: Endotracheal tube,Ventilator       Temp (24hrs), Av.3 °F (36.8 °C), Min:97.7 °F (36.5 °C), Max:100.3 °F (37.9 °C)       Intake/Output:   Last shift:      No intake/output data recorded.   Last 3 shifts:  1901 -  0700  In: 3189.4 [I.V.:839.4]  Out: 2675 [Urine:2675]    Intake/Output Summary (Last 24 hours) at 2022 0727  Last data filed at 2022 0115  Gross per 24 hour   Intake 1468.9 ml   Output 1550 ml   Net -81.1 ml          Current Facility-Administered Medications   Medication Dose Route Frequency    propofol (DIPRIVAN) 10 mg/mL infusion  5-50 mcg/kg/min IntraVENous TITRATE    piperacillin-tazobactam (ZOSYN) 3.375 g in 0.9% sodium chloride (MBP/ADV) 100 mL MBP  3.375 g IntraVENous Q8H    docusate (COLACE) 50 mg/5 mL oral liquid 100 mg  100 mg Oral DAILY    sodium chloride 3% hypertonic nebulizer soln  4 mL Nebulization Q6H RT    And    albuterol-ipratropium (DUO-NEB) 2.5 MG-0.5 MG/3 ML  3 mL Nebulization Q6H RT    chlorhexidine (PERIDEX) 0.12 % mouthwash 10 mL  10 mL Oral Q12H    pantoprazole (PROTONIX) 40 mg in 0.9% sodium chloride 10 mL injection  40 mg IntraVENous DAILY    heparin (porcine) injection 5,000 Units  5,000 Units SubCUTAneous Q8H    insulin lispro (HUMALOG) injection   SubCUTAneous Q6H         Telemetry: []Sinus []A-flutter []Paced    []A-fib []Multiple PVCs                  Physical Exam:      General: Intubated/sedated; no signs of distress  HEENT:  Anicteric sclerae; pink palpebral conjunctivae; mucosa moist  Resp:  Symmetrical chest expansion, no accessory muscle use; good airway entry; coarse breath sounds  CV:  S1, S2 present; regular rate and rhythm  GI: Abdomen soft, non-tender; (+) active bowel sounds  Extremities:  +2 pulses on all extremities; no edema/ cyanosis/ clubbing noted   Skin:  Warm; no rashes/ lesions noted, normal turgor/cap refill   Neurologic:  Non-focal, does not withdraw from pain, no command following  Devices:  NGT, ETT, [place], HD catheter      DATA:  MAR reviewed and pertinent medications noted or modified as needed    Labs:  Recent Labs     05/04/22 0415 05/03/22 0231 05/02/22 0346   WBC 11.7 11.3 11.2   HGB 10.9* 10.7* 11.4*   HCT 33.2* 33.0* 36.1    234 243     Recent Labs     05/04/22 0415 05/03/22 2021 05/03/22  1042 05/03/22 0231 05/03/22 0231 05/02/22 0346 05/02/22 0346     --   --   --  147*  --  145   K 3.3*  --  3.5  --  3.2*   < > 4.0     --   --   --  117*  --  116*   CO2 28  --   --   --  25  --  20*   *  --   --   --  101*  --  115*   BUN 11  --   --   --  24*  --  32*   CREA 1.01  --   --   --  1.64*  --  2.23*   CA 8.8  --   --   --  8.7  --  8.9   MG 2.7* 1.9  --   --  2.3   < > 2.9*   PHOS 2.0* 2.6 2.8   < > 2.2*   < > 3.7   ALB 2.5*  --   --   --  2.7*  --  3.0*    < > = values in this interval not displayed. No results for input(s): PH, PCO2, PO2, HCO3, FIO2 in the last 72 hours. Recent Labs     05/04/22 0332 05/03/22 0320 05/02/22  0401   FIO2I 28 28 28   HCO3I 29.8* 23.7 20.6*   PCO2I 35.8 36.8 36.2   PHI 7.53* 7.42 7.36   PO2I 62* 93 96       Imaging:  [x]   I have personally reviewed the patients radiographs and reports  XR Results (most recent):  XR Results (most recent):  Results from Hospital Encounter encounter on 04/29/22    XR CHEST PORT    Narrative  Portable Frontal Chest.    CLINICAL HISTORY: Cordis placement for hemodialysis. TECHNIQUE: Single frontal view of the chest, obtained portably. COMPARISONS: 5/3/2022 at 4:51 AM.    FINDINGS: Patient is intubated. Endotracheal tube tip is 4 centers above the  leticia. There is enteric tube with tip in the stomach.  There is a new right IJ  dialysis catheter with tip at the caval atrial junction. There is no  pneumothorax. Subsegmental opacities both bases. Cardiomediastinal silhouette stable. Blunting  left costophrenic sulcus. Impression  No pneumothorax after line placement. Bibasilar atelectasis with small left effusion. CT Results (most recent):  Results from Hospital Encounter encounter on 04/29/22    CT HEAD WO CONT    Narrative  CT HEAD UNENHANCED    CPT code: 74556    INDICATION: Altered mental status. TECHNIQUE: 5 mm collimation axial images obtained from the skull base through  the vertex without administration of nonionic intravenous contrast.    All CT scans at this facility are performed using dose optimization technique as  appropriate to this specific exam, to include automated exposure control,  adjustment of the mA and/or KP according to patient size or use of iterative  reconstruction techniques. COMPARISON: None    FINDINGS:  Study is mildly degraded by motion and streak artifact. No parenchymal hemorrhage identified. Patchy low attenuation throughout the periventricular and deep hemispheric white  matter bilaterally, most prevalent in the frontal and parietal regions, with  distribution most consistent with chronic ischemic small vessel disease change. Rounded 5 mm hypodensity in the anterior inferior right basal ganglia adjacent  to the ventral insular cortex axial image 17. No regional mass effect. This  appears more sharply marginated and cystic on sagittal reformatted images  however. Subjectively this appears more remote, but may reflect lacunar type  infarct. MRI would be much more sensitive for the detection of infarct or ischemia in the  acute setting. No midline shift of structures. No extra-axial fluid collections. Ventricles are symmetric and not enlarged for age. Calvarium intact to the extent included.   Partially included paranasal sinuses appear clear, with apparent left sided  nasal cannula in place. Impression  1. No hemorrhage identified. 2. Cystic lucency anterior inferior right basal ganglia region, subjectively  more remote appearing such as a prior lacunar type infarct. No comparison. 3. Moderate burden of presumed hemispheric small vessel disease change as above. IMPRESSION:   · Acute respiratory failure requiring mechnical ventilation in setting of encephalopathy with need for airway protection +/- CAP vs Aspiration PNA  · Acute toxic metabolic encephalopathy due to suspected medication overdose- ?tizanidine, baclofen, mobic, lisinopril, bystolic in chart. Negative UDS, ETOH, acetaminophen, salicylate. · SIRs vs sepsis - elevated white count, hypothermia, UA negative, procal negative. CXR (4/30) showed infiltrate vs atelectasis. · Bradycardia- likely from hypothermia, improved  · Hypothermia- Core temp 93F, resolved  · Elevated TSH but normal T4  · Acute on CKD stage 3- baseline crea (12.21) 1.69. prerenal azotemia vs ATN  · CT head 4/29 \"cystic lucency anterior inferior right basal ganglia region\"   · EEG 5/1: \"This is an abnormal EEG with the presence generalized periodic appearing discharges. The Generalized periodic discharges are non specific and can be seen in metabolic/toxic encephalopathy or hypoxic injury. Could also be seen in non convulsive seizure. \"   · Brain MRI (5/1): \"No acute infarct. Small vessel disease such as is seen in patients with diabetes or hypertension. \"  · Hx Hemochromatosis   · BMI 27.12            Patient Active Problem List   Diagnosis Code    HNP (herniated nucleus pulposus), lumbar M51.26    Neuritis M79.2    Neuropathy G62.9    AMS (altered mental status) R41.82        RECOMMENDATIONS:   Neuro:Titrate sedation for RASS goal 0 to -1, daily sedation holiday. Propofol for sedation. Neuro consulted. Pulm: Aspiration precautions, HOB>30'. VAP Bundle  head of the bed at 30' all times. Daily sedation holiday and assessment for weaning with SBT as tolerated.  Aggressive pulmonary toilet, duonebs, metaneb, and Chest PT ordered. Trial pressure support. Hypertonic nebs DC.  CVS:Monitor HD, MAP goal >65. GI: Continue tube feeds, Colace  Renal: Trend Cr, UOP. Jin for accurate I/O, Continue HD, nephrology consulted. Hem/Onc: Trend H/H, monitor for s/o active bleeding. Daily CBC. I/D:Trend WBCs and temperature curve. On Zosyn, grew GPC in his blood culture (4/29), MRSA culture pending. ID consulted. Blood cultures with no growth so far. Procal downtrending. Metabolic: Daily BMP, mag, phos. Trend lytes and replace per protocol. Endocrine:Q6 glucoses. SSI. Avoid hypoglycemia. Musc/Skin: PT/OT/SLP  Full Code  Discussed in interdisciplinary rounds     Best practice :    Glycemic control  IHI ICU bundles: Jin Bundle Followed    [facility] Vent patients-    VAP bundle-Rosie tube to suction at 20-30 cm Hg, Maintain Rosie tube with 5-10ml air every 4 hours, Routine oral care every 4 hours, Elevation of head > 45 degree, Daily sedation holiday and SBT evaluation starting at 6.00am.  Sress ulcer prophylaxis. Pepcid  DVT prophylaxis. SQH  Need for Lines, jin assessed. Palliative care evaluation. Restraints need. Attending Non-violent Restraint Reevaluation     I have reevaluated the patient one hour after initiation of intervention. The patient is comfortable, uninjured, but continues to pose an imminent risk of injury to self to themselves and/or serious disruption of medical treatment required to keep patient stable. The patient's current medical and behavioral conditions that warrant the use intervention include danger to self and Interference with medical equipment or treatment. Restraint or seclusion will be discontinued at the earliest possible time, regardless of the scheduled expiration of the order.     Based on my evaluation, restraints will be continued: YES         This care involved high complexity decision making to assess, manipulate, and support vital system functions, to treat this degreee vital organ system failure and to prevent further life threatening deterioration of the patients condition  The services I provided to this patient were to treat and/or prevent clinically significant deterioration that could result in the failure of one or more body systems and/or organ systems due to respiratory distress, hypoxia, cardiac dysrhythmia.        Ashley Aburto DO   05/04/22  Pulmonary, Critical Care Medicine  [facility]

## 2022-05-04 NOTE — PROGRESS NOTES
Infectious Disease progress Note        Reason: aspiration pneumonia, coagulase negative staph bacteremia, yeast in sputum cx    Current abx Prior abx     Zosyn since 5/3/22 Unasyn, vancomycin  4/29/22-5/3     Lines:       Assessment :  71 y. o. male w/ PMH of hemachromatosis presented to [facility] from home via EMS on 4/29/22 with unresponsiveness. Now with low grade fever, positive blood cx, persistent altered mentation, TARI, Periodic discharges noted on EEG 5/2/22    Patient presents with highly complex picture. Etiology of fever not entirely clear at this time. Differential diagnosis includes-partially treated aspiration pneumonia/baclofen withdrawal  Will monitor for occult infection  Agree with ruling out DVT    Persistent fevers 5/3 despite broad-spectrum antibiotics antibiotics could be due to partially treated pneumonia with ampicillin/sulbactam resistant gram-negative's versus baclofen withdrawal.  Resolved fevers status post Zosyn since 5/3/2022    Sputum cultures 4/30/22-methicillin susceptible Staph aureus, normal respiratory adam, yeast.  Yeast in sputum culture likely colonizer    Single positive blood culture for coagulase-negative Staphylococcus on 4/29-likely contaminant. No evidence of skin/soft tissue infection noted on today's exam.    Periodic discharges noted on EEG 5/2/2022. Neurology follow-up appreciated. Concern for baclofen toxicity. Acute kidney injury-nephrology follow-up appreciated. Improving creatinine. S/p dialysis for suspected baclofen toxicity     Improved mentation. On spontaneous mode of ventilator. Afebrile. Improving procalcitonin    Recommendations:  1. Continue piperacillin/tazobactam.  Discontinue vancomycin  2. We will hold off on antifungals. ff/u fungitell  3. Follow-up nephrology recommendations regarding dialysis  4. Follow-up neurology recommendations  5. Follow-up venous duplex lower extremity  6. Monitor procalcitonin. Send Fungitell  7.   Will need to obtain further imaging studies to rule out occult infection if persistent fevers noted on future exams    Above plan was discussed in details with ICU team. Please call me if any further questions or concerns. Will continue to participate in the care of this patient. HPI:      Detailed review of system not feasible since patient is intubated/nonverbal        Current Discharge Medication List      CONTINUE these medications which have NOT CHANGED    Details   raNITIdine (Zantac) 150 mg tablet Take 150 mg by mouth once over twenty-four (24) hours. DULoxetine (CYMBALTA) 60 mg capsule Take 60 mg by mouth daily. nortriptyline (PAMELOR) 25 mg capsule Take 50 mg by mouth nightly. lisinopriL (PRINIVIL, ZESTRIL) 10 mg tablet Take 10 mg by mouth daily. tiZANidine (ZANAFLEX) 2 mg tablet take 1 tablet by mouth three times a day if needed      colchicine 0.6 mg tablet Take 0.6 mg by mouth as needed (gout flare ups). varicella-zoster recombinant, PF, (Shingrix, PF,) 50 mcg/0.5 mL susr injection Shingrix (PF) 50 mcg/0.5 mL intramuscular suspension, kit      baclofen (LIORESAL) 10 mg tablet Take 0.5-1 Tablets by mouth three (3) times daily as needed for Pain. Indications: for acute/episodic pain  Qty: 60 Tablet, Refills: 0    Associated Diagnoses: Lumbar spondylosis      lidocaine HCL-benzyl alcohoL (Salonpas Lidocaine Plus) 4-10 % crea 1 Actuation(s) by Apply Externally route two (2) times daily as needed for Pain. Qty: 1 Each, Refills: 0      BYSTOLIC 10 mg tablet take 1 tablet by mouth once daily  Refills: 0      meloxicam (MOBIC) 15 mg tablet Take 15 mg by mouth daily. Refills: 0      allopurinol (ZYLOPRIM) 300 mg tablet Take 300 mg by mouth daily.   Refills: 0      zolpidem (AMBIEN) 10 mg tablet take 1 tablet by mouth at bedtime if needed  Refills: 0             Current Facility-Administered Medications   Medication Dose Route Frequency    heparin (porcine) 1,000 unit/mL injection 2,200 Units 2,200 Units Hemodialysis DIALYSIS PRN    acetaminophen (TYLENOL) tablet 650 mg  650 mg Oral Q6H PRN    propofol (DIPRIVAN) 10 mg/mL infusion  5-50 mcg/kg/min IntraVENous TITRATE    piperacillin-tazobactam (ZOSYN) 3.375 g in 0.9% sodium chloride (MBP/ADV) 100 mL MBP  3.375 g IntraVENous Q8H    docusate (COLACE) 50 mg/5 mL oral liquid 100 mg  100 mg Oral DAILY    sodium chloride 3% hypertonic nebulizer soln  4 mL Nebulization Q6H RT    And    albuterol-ipratropium (DUO-NEB) 2.5 MG-0.5 MG/3 ML  3 mL Nebulization Q6H RT    chlorhexidine (PERIDEX) 0.12 % mouthwash 10 mL  10 mL Oral Q12H    albuterol-ipratropium (DUO-NEB) 2.5 MG-0.5 MG/3 ML  3 mL Nebulization Q4H PRN    pantoprazole (PROTONIX) 40 mg in 0.9% sodium chloride 10 mL injection  40 mg IntraVENous DAILY    midazolam (VERSED) injection 2 mg  2 mg IntraVENous Q10MIN PRN    fentaNYL citrate (PF) injection 100 mcg  100 mcg IntraVENous Q30MIN PRN    heparin (porcine) injection 5,000 Units  5,000 Units SubCUTAneous Q8H    insulin lispro (HUMALOG) injection   SubCUTAneous Q6H    glucose chewable tablet 16 g  4 Tablet Oral PRN    glucagon (GLUCAGEN) injection 1 mg  1 mg IntraMUSCular PRN    dextrose 10% infusion 0-250 mL  0-250 mL IntraVENous PRN       Allergies: Patient has no known allergies. History reviewed. No pertinent family history. Social History     Socioeconomic History    Marital status: SINGLE     Spouse name: Not on file    Number of children: Not on file    Years of education: Not on file    Highest education level: Not on file   Occupational History    Not on file   Tobacco Use    Smoking status: Never Smoker    Smokeless tobacco: Never Used   Substance and Sexual Activity    Alcohol use:  Yes     Alcohol/week: 6.0 standard drinks     Types: 6 Cans of beer per week     Comment: weekly on weekends    Drug use: Not on file    Sexual activity: Not on file   Other Topics Concern    Not on file   Social History Narrative    Not on file     Social Determinants of Health     Financial Resource Strain:     Difficulty of Paying Living Expenses: Not on file   Food Insecurity:     Worried About Running Out of Food in the Last Year: Not on file    [place] of Food in the Last Year: Not on file   Transportation Needs:     Lack of Transportation (Medical): Not on file    Lack of Transportation (Non-Medical):  Not on file   Physical Activity:     Days of Exercise per Week: Not on file    Minutes of Exercise per Session: Not on file   Stress:     Feeling of Stress : Not on file   Social Connections:     Frequency of Communication with Friends and Family: Not on file    Frequency of Social Gatherings with Friends and Family: Not on file    Attends Congregational Services: Not on file    Active Member of [address] [place] [address] [employer] or Organizations: Not on file    Attends Club or Organization Meetings: Not on file    Marital Status: Not on file   Intimate Partner Violence:     Fear of Current or Ex-Partner: Not on file    Emotionally Abused: Not on file    Physically Abused: Not on file    Sexually Abused: Not on file   Housing Stability:     Unable to Pay for Housing in the Last Year: Not on file    Number of [place] in the Last Year: Not on file    Unstable Housing in the Last Year: Not on file     Social History     Tobacco Use   Smoking Status Never Smoker   Smokeless Tobacco Never Used        Temp (24hrs), Av.2 °F (36.8 °C), Min:97.7 °F (36.5 °C), Max:100.3 °F (37.9 °C)    Visit Vitals  /60   Pulse 91   Temp 97.8 °F (36.6 °C)   Resp 20   Ht 6' (1.829 m)   Wt 90.9 kg (200 lb 6.4 oz)   SpO2 97%   BMI 27.18 kg/m²       ROS: unable to obtain due to patient factors    Physical Exam:    General: Intubated/sedated; in no apparent distress  HEENT:  Anicteric sclerae; pink palpebral conjunctivae; mucosa moist  Resp:  Symmetrical chest expansion, no accessory muscle use; good airway entry; coarse breath sounds  CV:  S1, S2 present; regular rate and rhythm  GI:  Abdomen soft, non-tender; (+) active bowel sounds  Extremities:  +2 pulses on all extremities; no edema/ cyanosis/ clubbing noted   Skin:  Warm; no rashes/ lesions noted, normal turgor/cap refill   Neurologic:  Non-focal,  does not follow commands. Response to painful stimuli         Labs: Results:   Chemistry Recent Labs     05/04/22 0415 05/03/22  1042 05/03/22  0231 05/02/22  0346 05/02/22  0346   *  --  101*  --  115*     --  147*  --  145   K 3.3* 3.5 3.2*   < > 4.0     --  117*  --  116*   CO2 28  --  25  --  20*   BUN 11  --  24*  --  32*   CREA 1.01  --  1.64*  --  2.23*   CA 8.8  --  8.7  --  8.9   AGAP 5  --  5  --  9   BUCR 11*  --  15  --  14   ALB 2.5*  --  2.7*  --  3.0*    < > = values in this interval not displayed. CBC w/Diff Recent Labs     05/04/22 0415 05/03/22  0231 05/02/22  0346   WBC 11.7 11.3 11.2   RBC 3.54* 3.51* 3.72*   HGB 10.9* 10.7* 11.4*   HCT 33.2* 33.0* 36.1    234 243   GRANS 70 75* 78*   LYMPH 16* 13* 13*   EOS 3 1 1      Microbiology Recent Labs     05/03/22  1042 05/03/22  1034 05/01/22  2227   CULT NO GROWTH AFTER 19 HOURS NO GROWTH AFTER 19 HOURS CULTURE IN PROGRESS,FURTHER UPDATES TO FOLLOW          RADIOLOGY:    All available imaging studies/reports in connect care for this admission were reviewed      Disclaimer: Sections of this note are dictated utilizing voice recognition software, which may have resulted in some phonetic based errors in grammar and contents. Even though attempts were made to correct all the mistakes, some may have been missed, and remained in the body of the document. If questions arise, please contact our department.     Dr. Ernestine Benton, Infectious Disease Specialist  [phone]  May 4, 2022  4:57 PM

## 2022-05-04 NOTE — PROGRESS NOTES
Problem: Ventilator Management  Goal: *Adequate oxygenation and ventilation  Outcome: Progressing Towards Goal  Goal: *Patient maintains clear airway/free of aspiration  Outcome: Progressing Towards Goal  Goal: *Normal spontaneous ventilation  Outcome: Progressing Towards Goal     Problem: Patient Education: Go to Patient Education Activity  Goal: Patient/Family Education  Outcome: Progressing Towards Goal     Problem: Non-Violent Restraints  Goal: Removal from restraints as soon as assessed to be safe  Outcome: Progressing Towards Goal  Goal: No harm/injury to patient while restraints in use  Outcome: Progressing Towards Goal  Goal: Patient's dignity will be maintained  Outcome: Progressing Towards Goal  Goal: Patient Interventions  Outcome: Progressing Towards Goal     Problem: Falls - Risk of  Goal: *Absence of Falls  Description: Document Cindia Neigh Fall Risk and appropriate interventions in the flowsheet.   Outcome: Progressing Towards Goal  Note: Fall Risk Interventions:       Mentation Interventions: Adequate sleep, hydration, pain control,Door open when patient unattended,Evaluate medications/consider consulting pharmacy    Medication Interventions: Assess postural VS orthostatic hypotension,Evaluate medications/consider consulting pharmacy    Elimination Interventions: Call light in reach,Toileting schedule/hourly rounds              Problem: Pain  Goal: *Control of Pain  Outcome: Progressing Towards Goal     Problem: Pain  Goal: *Control of Pain  Outcome: Progressing Towards Goal     Problem: Injury - Risk of, Adverse Drug Event  Goal: *Absence of adverse drug events  Outcome: Progressing Towards Goal     Problem: Nutrition Deficit  Goal: *Optimize nutritional status  Outcome: Progressing Towards Goal     Problem: Delirium Treatment  Goal: *Absence of falls  Outcome: Progressing Towards Goal  Goal: Interventions  Outcome: Progressing Towards Goal     Problem: Patient Education: Go to Patient Education Activity  Goal: Patient/Family Education  Outcome: Progressing Towards Goal

## 2022-05-04 NOTE — PROGRESS NOTES
Pharmacy Note - Zosyn    3375 mg Zosyn q 6 h ordered for treatment of Aspiration Pneumonia. Per Ca [place] 10 will be changed to 4500 mg IVPB over 30 min x 1 to be followed by Zosyn 3375 mg q 8 h thereafter, each to be infused over 4 hours, to start 6 hours after initial dose. Initial dose tangela. To start at end of dialysis (see below)    Estimated Creatinine Clearance: Estimated Creatinine Clearance: 49 mL/min (A) (based on SCr of 1.64 mg/dL (H)). Dialysis Status, TARI, CKD: Dialysis for tx of Baclofen Toxicity, not for renal fx  started ~ 2200 tonight  BMI:  Body mass index is 27.87 kg/m². Rationale for Adjustment:  [facility] Extended Infusion Dosing Policy. Pharmacy will continue to monitor and adjust dose as necessary. Please call inpatient pharmacy with any questions. Leobardo Duran MS R. Ph  [phone]

## 2022-05-04 NOTE — ROUTINE PROCESS
Bedside and Verbal shift change report given to Feroz Ceja (oncoming nurse) by Moise Jacobs RN (offgoing nurse). Report included the following information SBAR, Intake/Output, MAR, Recent Results, Cardiac Rhythm SR and Quality Measures.

## 2022-05-04 NOTE — PROGRESS NOTES
RENAL DAILY PROGRESS NOTE              Subjective:       Complaint: following commands   intubated   Overnight events noted      IMPRESSION:   · TARI due to ATN from initial BP fluctuations, ? AIN from vanco and zosyn combination, Pre renal etiology  · Hypokalemia  · Baclofen toxicity causing bradycardia, hypothermia, EEG discharges? ???  · Hemochromatosis on serial phlebotomies as OP   PLAN:     · Dialysis on going for Baclofen toxicity. Improvement noted after first session yesterday. Will rpt again today.   · Will use 4k/2.5 maite bath   · I and O  · Daily weights  · D/w ICU team during rounds            Current Facility-Administered Medications   Medication Dose Route Frequency    heparin (porcine) 1,000 unit/mL injection 2,200 Units  2,200 Units Hemodialysis DIALYSIS PRN    acetaminophen (TYLENOL) tablet 650 mg  650 mg Oral Q6H PRN    propofol (DIPRIVAN) 10 mg/mL infusion  5-50 mcg/kg/min IntraVENous TITRATE    piperacillin-tazobactam (ZOSYN) 3.375 g in 0.9% sodium chloride (MBP/ADV) 100 mL MBP  3.375 g IntraVENous Q8H    docusate (COLACE) 50 mg/5 mL oral liquid 100 mg  100 mg Oral DAILY    sodium chloride 3% hypertonic nebulizer soln  4 mL Nebulization Q6H RT    And    albuterol-ipratropium (DUO-NEB) 2.5 MG-0.5 MG/3 ML  3 mL Nebulization Q6H RT    chlorhexidine (PERIDEX) 0.12 % mouthwash 10 mL  10 mL Oral Q12H    albuterol-ipratropium (DUO-NEB) 2.5 MG-0.5 MG/3 ML  3 mL Nebulization Q4H PRN    pantoprazole (PROTONIX) 40 mg in 0.9% sodium chloride 10 mL injection  40 mg IntraVENous DAILY    midazolam (VERSED) injection 2 mg  2 mg IntraVENous Q10MIN PRN    fentaNYL citrate (PF) injection 100 mcg  100 mcg IntraVENous Q30MIN PRN    heparin (porcine) injection 5,000 Units  5,000 Units SubCUTAneous Q8H    insulin lispro (HUMALOG) injection   SubCUTAneous Q6H    glucose chewable tablet 16 g  4 Tablet Oral PRN    glucagon (GLUCAGEN) injection 1 mg  1 mg IntraMUSCular PRN    dextrose 10% infusion 0-250 mL  0-250 mL IntraVENous PRN         Objective:     Patient Vitals for the past 24 hrs:   Temp Pulse Resp BP SpO2   05/04/22 0900 -- 87 20 (!) 147/65 95 %   05/04/22 0800 97.8 °F (36.6 °C) 98 19 (!) 144/74 97 %   05/04/22 0724 -- 91 20 -- 97 %   05/04/22 0700 -- 84 14 119/60 98 %   05/04/22 0625 -- 73 14 (!) 126/59 96 %   05/04/22 0600 -- 71 14 (!) 114/56 95 %   05/04/22 0532 -- 71 14 (!) 118/55 96 %   05/04/22 0500 -- 71 14 (!) 110/52 96 %   05/04/22 0432 -- 73 14 (!) 117/52 96 %   05/04/22 0400 97.8 °F (36.6 °C) 74 16 119/60 98 %   05/04/22 0332 -- -- -- -- 96 %   05/04/22 0327 -- 70 14 -- 100 %   05/04/22 0115 -- 76 14 (!) 119/54 98 %   05/04/22 0100 98 °F (36.7 °C) 75 16 (!) 129/59 97 %   05/04/22 0030 -- 71 14 (!) 112/56 98 %   05/04/22 0021 -- 71 14 -- 98 %   05/04/22 0015 -- 71 15 (!) 112/55 97 %   05/04/22 0000 97.8 °F (36.6 °C) 71 15 (!) 109/52 97 %   05/03/22 2345 -- 74 15 (!) 108/55 97 %   05/03/22 2330 -- 71 14 (!) 103/51 98 %   05/03/22 2315 -- 72 14 (!) 105/52 97 %   05/03/22 2300 -- 75 14 (!) 104/55 97 %   05/03/22 2245 -- 76 15 (!) 102/52 97 %   05/03/22 2230 -- 77 16 (!) 101/52 97 %   05/03/22 2215 -- 77 15 (!) 104/55 97 %   05/03/22 2201 98 °F (36.7 °C) 75 20 (!) 110/55 99 %   05/03/22 2200 -- 79 15 (!) 110/55 98 %   05/03/22 2155 -- 80 15 (!) 104/54 97 %   05/03/22 2145 -- 81 16 (!) 107/52 98 %   05/03/22 2130 -- 80 15 (!) 126/58 98 %   05/03/22 2115 -- 79 14 (!) 133/57 97 %   05/03/22 2100 -- 79 14 139/64 98 %   05/03/22 2056 -- 77 14 -- 99 %   05/03/22 2000 98 °F (36.7 °C) 74 14 (!) 136/59 98 %   05/03/22 1635 -- 76 26 -- 96 %   05/03/22 1600 97.7 °F (36.5 °C) 77 14 (!) 151/66 97 %   05/03/22 1539 97.7 °F (36.5 °C) -- -- -- --   05/03/22 1500 -- 88 18 (!) 148/59 91 %   05/03/22 1414 -- 84 20 -- 95 %   05/03/22 1400 -- 81 15 (!) 154/72 93 %   05/03/22 1300 -- 78 15 (!) 160/77 93 %   05/03/22 1200 100.3 °F (37.9 °C) 80 17 (!) 149/67 92 %   05/03/22 1100 -- 76 15 (!) 147/60 97 %        Weight change: -1.634 kg (-3 lb 9.6 oz)     05/02 1901 - 05/04 0700  In: 3814 [I.V.:1464]  Out: 3050 [Urine:3050]    Intake/Output Summary (Last 24 hours) at 5/4/2022 1014  Last data filed at 5/4/2022 0647  Gross per 24 hour   Intake 1719.7 ml   Output 1925 ml   Net -205.3 ml     Physical Exam:   HEENT sclera anicteric,  CVS: S1S2 heard,  no rub  RS: + air entry b/l,   Abd: Soft, Non tender  Neuro: lethargic,intubated,following commands  Extrm: no edema, no cyanosis, clubbing   Skin: no visible  Rash  Musculoskeletal: No gross joints or bone deformities         Data Review:     LABS:   Hematology:   Recent Labs     05/04/22 0415 05/03/22 0231 05/02/22  0346   WBC 11.7 11.3 11.2   HGB 10.9* 10.7* 11.4*   HCT 33.2* 33.0* 36.1     Chemistry:   Recent Labs     05/04/22 0415 05/03/22 2021 05/03/22  1042 05/03/22  0231 05/02/22  0346   BUN 11  --   --  24* 32*   CREA 1.01  --   --  1.64* 2.23*   CA 8.8  --   --  8.7 8.9   ALB 2.5*  --   --  2.7* 3.0*   K 3.3*  --  3.5 3.2* 4.0     --   --  147* 145     --   --  117* 116*   CO2 28  --   --  25 20*   PHOS 2.0* 2.6 2.8 2.2* 3.7   *  --   --  101* 115*            Procedures/imaging: see electronic medical records for all procedures, Xrays and details which were not copied into this note but were reviewed prior to creation of Plan          Assessment & Plan:     See above        Magaly White MD  5/4/2022  10:14 AM

## 2022-05-04 NOTE — PROGRESS NOTES
Problem: Ventilator Management  Goal: *Adequate oxygenation and ventilation  Outcome: Progressing Towards Goal  Goal: *Patient maintains clear airway/free of aspiration  Outcome: Progressing Towards Goal  Goal: *Absence of infection signs and symptoms  Outcome: Progressing Towards Goal  Goal: *Normal spontaneous ventilation  Outcome: Progressing Towards Goal     Problem: Non-Violent Restraints  Goal: Removal from restraints as soon as assessed to be safe  Outcome: Progressing Towards Goal  Goal: No harm/injury to patient while restraints in use  Outcome: Progressing Towards Goal  Goal: Patient's dignity will be maintained  Outcome: Progressing Towards Goal  Goal: Patient Interventions  Outcome: Progressing Towards Goal     Problem: Falls - Risk of  Goal: *Absence of Falls  Description: Document Nanad Sanchezklarissa Fall Risk and appropriate interventions in the flowsheet. Outcome: Progressing Towards Goal  Note: Fall Risk Interventions:       Mentation Interventions: Adequate sleep, hydration, pain control,Evaluate medications/consider consulting pharmacy    Medication Interventions: Assess postural VS orthostatic hypotension,Evaluate medications/consider consulting pharmacy    Elimination Interventions: Call light in reach,Toileting schedule/hourly rounds              Problem: Pain  Goal: *Control of Pain  Outcome: Progressing Towards Goal  Goal: *PALLIATIVE CARE:  Alleviation of Pain  Outcome: Progressing Towards Goal     Problem: Pressure Injury - Risk of  Goal: *Prevention of pressure injury  Description: Document Valente Scale and appropriate interventions in the flowsheet.   Outcome: Progressing Towards Goal  Variance Patient Condition  Impact: High  Note: Pressure Injury Interventions:  Sensory Interventions: Assess changes in LOC,Assess need for specialty bed,Check visual cues for pain,Float heels,Keep linens dry and wrinkle-free,Maintain/enhance activity level,Minimize linen layers,Monitor skin under medical devices,Pressure redistribution bed/mattress (bed type),Turn and reposition approx.  every two hours (pillows and wedges if needed),Use 30-degree side-lying position    Moisture Interventions: Absorbent underpads,Apply protective barrier, creams and emollients,Assess need for specialty bed,Check for incontinence Q2 hours and as needed,Internal/External urinary devices,Minimize layers,Moisture barrier,Offer toileting Q_hr    Activity Interventions: Assess need for specialty bed,Pressure redistribution bed/mattress(bed type),PT/OT evaluation    Mobility Interventions: Assess need for specialty bed,Float heels,Pressure redistribution bed/mattress (bed type),PT/OT evaluation    Nutrition Interventions: Document food/fluid/supplement intake,Discuss nutritional consult with provider    Friction and Shear Interventions: Apply protective barrier, creams and emollients,Foam dressings/transparent film/skin sealants,Lift team/patient mobility team,Minimize layers                Problem: Patient Education: Go to Patient Education Activity  Goal: Patient/Family Education  Outcome: Progressing Towards Goal

## 2022-05-04 NOTE — PROGRESS NOTES
PT sxn for moderate amount of thick yellow secretions ETT moved tp right of mouth patent and secured     05/03/22 2056   Patient Observations   Pulse (Heart Rate) 77   Resp Rate 14   O2 Sat (%) 99 %   Airway - Continuous Aspiration of Subglottic Secretions (DARVIN) Tube 04/29/22 Oral   Placement Date/Time: 04/29/22 1600   Number of Attempts: 1  Inserted By: Dr. Shima Frausto  Present on Admission/Arrival: No  Location: Oral  Placement Verified: Auscultation;BBS;EtCO2  Airway Types: Endotracheal, cuffed  Airway Tube Size: 8 mm   Insertion Depth (cm) 25 cm   Line Shahriar Lips   Side Secured Device; Right   Cuff Pressure   (MLT)   Site Assessment Clean, dry, & intact   Suction on Yes   Respiratory   Respiratory (WDL) X   Patient on Vent Yes - If patient is on vent, add Doc Flowsheet Ventilator ().    Respiratory Pattern Regular   Chest/Tracheal Assessment Chest expansion, symmetrical   Breath Sounds Bilateral Coarse;Diminished   Airway Clearance   Suction ET Tube   Suction Device Inline suction catheter   Sputum Method Obtained Endotracheal   Sputum Amount Moderate   Sputum Color/Odor White;Yellow   Sputum Consistency Thick   Ventilator Initiate/Discontinue   Bio-Med ID # [id number]   Vent Settings   FIO2 (%) 28 %   SpO2/FIO2 Ratio 353.57   CMV Rate Set 14   Back-Up Rate 14   Vt Set (ml) 520 ml   PEEP/VENT (cm H2O) 5 cm H20   Insp Time (sec) 0.9 sec   Insp Rise Time % 50 %   Flow Trigger 3   Ventilator Measurements   Resp Rate Observed 14   Vt Exhaled (Machine Breath) (ml) 540 ml   Ve Observed (l/min) 7.57 l/min   PIP Observed (cm H2O) 16 cm H2O   MAP (cm H2O) 7.6   I:E Ratio Actual 1:3.8   Safety & Alarms   Circuit Temperature 98.8 °F (37.1 °C)   Backup Mode Checked/Apnea Yes   Pressure Max 40 cm H2O   Pressure Min 11 cm H2O   Ve Min 2   Ve Max 20   Vt Min 200 ml   Vt Max 1200 ml   RR Max 40   Ambu Bag Yes   Ambu Mask Yes   Age Specific Ventilator Associated Pneumonia Bundle   Patient Age Group Adult   Adult Ventilator Associated Pneumonia Bundle   Elevation of Head to 30-45 Degrees (Unless Contraindicated) Yes   Oxygen Therapy   Skin Assessment Clean, dry, & intact   Skin Protection for O2 Device Yes   Vent Method/Mode   Ventilation Method Conventional   Ventilator Mode Assist control;VC+   Procedures   $$ Subsequent Procedure Aerosol   Delivery Source In-line nebulizer   Pulmonary Toilet   Pulmonary Toilet H. O.B elevated;Suction

## 2022-05-04 NOTE — PROGRESS NOTES
Treatment given. SBT continuing, 5/5 at 28%.      05/04/22 1407   Weaning Parameters   Resp Rate Observed 19   Ve 10.4      RSBI 46

## 2022-05-04 NOTE — DIALYSIS
ACUTE HEMODIALYSIS TREATMENT    HEMODIALYSIS ORDERS: Physician: Dr. Blunt Marine: Naomi   Duration: 3.5 hr   BFR: 350   DFR: 700   Dialysate:  Temp 36-37*C   K+  4    Ca+ 2.5   Na 138   Bicarb 28   Wt Readings from Last 1 Encounters:   05/04/22 90.9 kg (200 lb 6.4 oz)    Patient Chart [x]   Unable to Obtain []  Dry weight/UF Goal:50 0 ml    Heparin []  Bolus    Units    [] Hourly    Units    [x]None       Pre BP:   121/56   Pulse:  72   Respirations: 14   Temp:  97.5  [] Oral [x] Axillary [] Esophageal   Labs: []  Pre  []  Post:   [x] N/A   Additional Orders(medications, blood products, hypotension management): [] Yes   [] No     [x]  [facility] Consent Verified     CATHETER ACCESS:  []N/A   []Right   [x]Left   [x]IJ   []Fem  []Chest wall  []TransHepatic   [] First use X-ray verified     []Tunnel    [x] Non Tunneled   [x]No S/S infection  []Redness  []Drainage []Cultured []Swelling []Pain   [x]Medical Aseptic Prep Utilized   []Dressing Changed  [x] Biopatch  Date: 5/3/22   []Clotted   [x]Patent   Flows: [x]Good  []Poor  []Reversed   If access problem,  notified: []Yes    [x]N/A          GENERAL ASSESSMENT:    LUNGS:  Resp Rate 19   [] Clear  [] Coarse  [] Crackles  [] Wheezing  [x] Diminished         Respirations:  [x]Easy  []Labored  []N/A  Cough:  []Productive  []Dry  []N/A      Therapy:  []RA  O2 Type:  []NC  Mask: []  NRB    [] BiPaP  Flow:  l/min                   [x] Ventilated  [x] Intubated  [] Trach     CARDIAC: [x] Regular      [] Irregular   [] Rhythm:          [x] Monitored   [] Bedside   [] Remotely monitored     EDEMA: [] None   [x]Generalized  [] Pitting [] 1+   [] 2 +   [] 3+    [] 4+  [] Pedal    SKIN:   [x] Warm  [] Hot     [] Cold   [x] Dry     [] Pale   [] Diaphoretic                  [] Flushed  [] Jaundiced  [] Cyanotic     LOC:    [] Alert      []Oriented:    [] Person     [] Place  []Time               [] Confused  [] Lethargic  [] Medicated  [] Non-responsive  [x] Non Verbal   GI / ABDOMEN:                     [] Flat    [] Distended    [x] Soft    [] Firm   []  Obese                   [] Diarrhea   [] FMS [x] Bowel Sounds  [] Nausea  [] Vomiting                   [] NGT  [] OGT    [] PEG  [] Tube Feedings @     / URINE ASSESSMENT:                   [] Voiding  [x]  Loredo  [] Oliguria  [] Anuria                     [] Incontinent  []  Incontinent Brief   []  PureWick     PAIN:  [x] 0 []1  []2   []3   []4   []5   []6   []7   []8   []9   []10                MOBILITY:  [x] Bed    [] Stretcher      All Vitals and Treatment Details on Attached [address]: [facility]          Room # 760/62    [x] Routine         [] 1st Time Acute/Chronic   [] Urgent      [] Stat            [] Acute Room   []  Bedside    [x] ICU/CCU     [] ER   Isolation Precautions:  [x] Dialysis    There are currently no Active Isolations     ALLERGIES:     No Known Allergies   Code Status:  Full Code     Hepatitis Status      Lab Results   Component Value Date/Time    Hepatitis B surface Ag <0.10 05/03/2022 08:21 PM    Hepatitis B surface Ab 3.32 (L) 05/03/2022 08:21 PM    Hep B Core Ab, total NEGATIVE  09/13/2019 02:02 PM        Current Labs:      Lab Results   Component Value Date/Time    WBC 11.7 05/04/2022 04:15 AM    HGB 10.9 (L) 05/04/2022 04:15 AM    HCT 33.2 (L) 05/04/2022 04:15 AM    PLATELET 548 09/83/8256 04:15 AM    MCV 93.8 05/04/2022 04:15 AM     Lab Results   Component Value Date/Time    Sodium 142 05/04/2022 04:15 AM    Potassium 3.3 (L) 05/04/2022 04:15 AM    Chloride 109 05/04/2022 04:15 AM    CO2 28 05/04/2022 04:15 AM    Anion gap 5 05/04/2022 04:15 AM    Glucose 103 (H) 05/04/2022 04:15 AM    BUN 11 05/04/2022 04:15 AM    Creatinine 1.01 05/04/2022 04:15 AM    BUN/Creatinine ratio 11 (L) 05/04/2022 04:15 AM    GFR est AA >60 05/04/2022 04:15 AM    GFR est non-AA >60 05/04/2022 04:15 AM    Calcium 8.8 05/04/2022 04:15 AM          DIET:  DIET NPO  DIET ADULT TUBE FEEDING     PRIMARY NURSE REPORT:   Pre Dialysis: Kayla Bess RN    Time: 56     EDUCATION:    [x] Patient           Knowledge Basis: [x]None []Minimal [] Substantial [] Unknown  Barriers to learning  []N/A  [x] Intubated/Trached/Ventilated  [] Sedated/Paralyzed   [] Access Care     [] S&S of infection  [] Fluid Management  [] K+   [x] Procedural    [] Medications   [] Tx Options   [] Transplant   [] Diet      Teaching Tools:  [x] Explain  [] Demo  [] Handouts [] Video  Patient response: [] Verbalized understanding   [x] Requires follow up        [x] Time Out/Safety Check    [x] Extracorporeal Circuit Tested for integrity       RO/HEMODIALYSIS MACHINE SAFETY CHECKS - Before each treatment:        [address] [place] [address]                                     [] Unit Machine #  with centralized RO                                    [x] Portable Machine #1/RO serial # [id number]                                  [] Portable Machine #2/RO serial # [id number]                                  [] Portable Machine #4/RO serial # [id number]                                  [] Portable Machine #10/RO serial #  [id number]                                                                                                         Alarm Test:  Pass time 0341           [x] RO/Machine Log Complete    Machine Temp    36-37*C             Dialysate: pH 7.4    Conductivity: Meter 14.1   HD Machine  14.2     TCD: 14.1  Dialyzer Lot # [id number]     Blood Tubing Lot # [id number]     Saline Lot # [id number]     CHLORINE TESTING-Before each treatment and every 4 hours    Total Chlorine: [x] less than 0.1 ppm  Initial Time Check: 1300       4 Hr/2nd Check Time:    (if greater than 0.1 ppm from Primary then every 30 minutes from Secondary)     TREATMENT INITIATION - with Dialysis Precautions:   [x] All Connections Secured              [x] Saline Line Double Clamped   [x] Venous Parameters Set               [x] Arterial Parameters Set    [x] Prime Given 250ml NSS              [x]Air Foam Detector Engaged      Treatment Initiation Note:  Received Patient in iCU bed intubated no sedation opens eye spontaneously  VS stable for treatment. Right IJ trialysis  Accessed red port sluggish with aspiration blue port flushes and aspirates well line reversed. Treatment initiated      During Treatment Notes:  1330  Vascular access visible with arterial and venous line connections intact. Pt resting comfortably. 1345  Vascular access visible with arterial and venous line connections intact. Pt resting comfortably. 1400  Vascular access visible with arterial and venous line connections intact. Pt resting comfortably. 1430  Vascular access visible with arterial and venous line connections intact. Pt resting comfortably. 1445  Vascular access visible with arterial and venous line connections intact. Pt resting comfortably. 1500  Vascular access visible with arterial and venous line connections intact. Pt resting comfortably. 1530  Vascular access visible with arterial and venous line connections intact. Pt resting comfortably. 1545  Vascular access visible with arterial and venous line connections intact. Pt resting comfortably. 1600  Vascular access visible with arterial and venous line connections intact. Pt resting comfortably. 1615  Vascular access visible with arterial and venous line connections intact. Pt resting comfortably. 1630  Vascular access visible with arterial and venous line connections intact. Pt resting comfortably. 1645  Vascular access visible with arterial and venous line connections intact. Pt resting comfortably. 1700  Dialysis treatment complete.        Medication Dose Volume Route Time [place] Nurse, Title      OMID Cobb, RICKEY Robledo, RICKEY     Post Assessment  Dialyzer Cleared:   [] Good  [x] Fair  [] Poor  Blood processed:  66.1 L  UF Removed:  0 Ml    Post /74   Pulse  89 Resp  28  Temp 97.8 Lungs: [] Clear [] Course [] Crackles                 []  Wheezing   [x] Diminished   Post Tx Vascular Access: [] N/A Cardiac :[x] Regular   [] Irregular   Rhythm:  [x] Monitored   [] Not Monitored    CVC Catheter: [] N/A  Locking solution: Heparin 1:1000 U  Arterial port 1.1 ml   Venous port 1.1 ml   Edema:  [x] None  [] Generalized                     Skin:[x] Warm  [x] Dry [] Diaphoretic               [] Flushed  [] Pale [] Cyanotic Pain:  [x]0  []1 []2  []3 []4  []5  []6  []7 []8    []9  []10     Post Treatment Note:    Patient completed and  Tolerated 3.5 hrs dialysis. 500 ml of  fluid removed. Patient remains in ICU in stable condition. Catheter intact flushed and locked with heparin.      POST TREATMENT PRIMARY NURSE HANDOFF REPORT:   Post Dialysis: Ivan Mena RN               Time:  3819       Abbreviations: AVG-arterial venous graft, AVF-arterial venous fistula, IJ-Internal Jugular, Subcl-Subclavian, Fem-Femoral, Tx-treatment, AP/HR-apical heart rate, VSS- Vital Signs Stable, CVC- Central Venous Catheter, DFR-dialysate flow rate, BFR-blood flow rate, AP-arterial pressure, -venous pressure, UF-ultrafiltrate, TMP-transmembrane pressure, Kyaw-Venous, Art-Arterial, RO-Reverse Osmosis

## 2022-05-05 ENCOUNTER — APPOINTMENT (OUTPATIENT)
Dept: GENERAL RADIOLOGY | Age: 72
DRG: 917 | End: 2022-05-05
Attending: PHYSICIAN ASSISTANT
Payer: MEDICARE

## 2022-05-05 LAB
ALBUMIN SERPL-MCNC: 2.6 G/DL (ref 3.4–5)
ANION GAP SERPL CALC-SCNC: 6 MMOL/L (ref 3–18)
ARTERIAL PATENCY WRIST A: ABNORMAL
BACTERIA SPEC CULT: NORMAL
BASE EXCESS BLD CALC-SCNC: 3.3 MMOL/L
BASOPHILS # BLD: 0 K/UL (ref 0–0.1)
BASOPHILS NFR BLD: 0 % (ref 0–2)
BDY SITE: ABNORMAL
BUN SERPL-MCNC: 16 MG/DL (ref 7–18)
BUN/CREAT SERPL: 12 (ref 12–20)
CALCIUM SERPL-MCNC: 9 MG/DL (ref 8.5–10.1)
CHLORIDE SERPL-SCNC: 106 MMOL/L (ref 100–111)
CO2 SERPL-SCNC: 28 MMOL/L (ref 21–32)
CREAT SERPL-MCNC: 1.36 MG/DL (ref 0.6–1.3)
DIFFERENTIAL METHOD BLD: ABNORMAL
EOSINOPHIL # BLD: 0.5 K/UL (ref 0–0.4)
EOSINOPHIL NFR BLD: 5 % (ref 0–5)
ERYTHROCYTE [DISTWIDTH] IN BLOOD BY AUTOMATED COUNT: 13.6 % (ref 11.6–14.5)
GAS FLOW.O2 O2 DELIVERY SYS: ABNORMAL L/MIN
GAS FLOW.O2 SETTING OXYMISER: 14 BPM
GLUCOSE BLD STRIP.AUTO-MCNC: 105 MG/DL (ref 70–110)
GLUCOSE BLD STRIP.AUTO-MCNC: 94 MG/DL (ref 70–110)
GLUCOSE BLD STRIP.AUTO-MCNC: 99 MG/DL (ref 70–110)
GLUCOSE SERPL-MCNC: 106 MG/DL (ref 74–99)
HCO3 BLD-SCNC: 27 MMOL/L (ref 22–26)
HCT VFR BLD AUTO: 32.3 % (ref 36–48)
HGB BLD-MCNC: 10.4 G/DL (ref 13–16)
IMM GRANULOCYTES # BLD AUTO: 0 K/UL (ref 0–0.04)
IMM GRANULOCYTES NFR BLD AUTO: 0 % (ref 0–0.5)
LYMPHOCYTES # BLD: 1.4 K/UL (ref 0.9–3.6)
LYMPHOCYTES NFR BLD: 13 % (ref 21–52)
MAGNESIUM SERPL-MCNC: 1.8 MG/DL (ref 1.6–2.6)
MCH RBC QN AUTO: 30.4 PG (ref 24–34)
MCHC RBC AUTO-ENTMCNC: 32.2 G/DL (ref 31–37)
MCV RBC AUTO: 94.4 FL (ref 78–100)
MONOCYTES # BLD: 1.2 K/UL (ref 0.05–1.2)
MONOCYTES NFR BLD: 11 % (ref 3–10)
NEUTS SEG # BLD: 7.3 K/UL (ref 1.8–8)
NEUTS SEG NFR BLD: 70 % (ref 40–73)
NRBC # BLD: 0 K/UL (ref 0–0.01)
NRBC BLD-RTO: 0 PER 100 WBC
O2/TOTAL GAS SETTING VFR VENT: 28 %
PCO2 BLD: 37.3 MMHG (ref 35–45)
PEEP RESPIRATORY: 5 CMH2O
PH BLD: 7.47 [PH] (ref 7.35–7.45)
PHOSPHATE SERPL-MCNC: 2.6 MG/DL (ref 2.5–4.9)
PIP ISTAT,IPIP: 15
PLATELET # BLD AUTO: 148 K/UL (ref 135–420)
PMV BLD AUTO: 9.5 FL (ref 9.2–11.8)
PO2 BLD: 77 MMHG (ref 80–100)
POTASSIUM SERPL-SCNC: 3.8 MMOL/L (ref 3.5–5.5)
RBC # BLD AUTO: 3.42 M/UL (ref 4.35–5.65)
SAO2 % BLD: 96 % (ref 92–97)
SERVICE CMNT-IMP: ABNORMAL
SERVICE CMNT-IMP: NORMAL
SERVICE CMNT-IMP: NORMAL
SODIUM SERPL-SCNC: 140 MMOL/L (ref 136–145)
SPECIMEN TYPE: ABNORMAL
TOTAL RESP. RATE, ITRR: 19
VENTILATION MODE VENT: ABNORMAL
VT SETTING VENT: 520 ML
WBC # BLD AUTO: 10.5 K/UL (ref 4.6–13.2)

## 2022-05-05 PROCEDURE — 74011250637 HC RX REV CODE- 250/637: Performed by: PHYSICIAN ASSISTANT

## 2022-05-05 PROCEDURE — 74011000258 HC RX REV CODE- 258: Performed by: INTERNAL MEDICINE

## 2022-05-05 PROCEDURE — 80069 RENAL FUNCTION PANEL: CPT

## 2022-05-05 PROCEDURE — 99291 CRITICAL CARE FIRST HOUR: CPT | Performed by: INTERNAL MEDICINE

## 2022-05-05 PROCEDURE — 90935 HEMODIALYSIS ONE EVALUATION: CPT

## 2022-05-05 PROCEDURE — APPSS30 APP SPLIT SHARED TIME 16-30 MINUTES: Performed by: PHYSICIAN ASSISTANT

## 2022-05-05 PROCEDURE — C9113 INJ PANTOPRAZOLE SODIUM, VIA: HCPCS | Performed by: PHYSICIAN ASSISTANT

## 2022-05-05 PROCEDURE — 82962 GLUCOSE BLOOD TEST: CPT

## 2022-05-05 PROCEDURE — 36600 WITHDRAWAL OF ARTERIAL BLOOD: CPT

## 2022-05-05 PROCEDURE — 82803 BLOOD GASES ANY COMBINATION: CPT

## 2022-05-05 PROCEDURE — 74011250636 HC RX REV CODE- 250/636: Performed by: PHYSICIAN ASSISTANT

## 2022-05-05 PROCEDURE — 2709999900 HC NON-CHARGEABLE SUPPLY

## 2022-05-05 PROCEDURE — 71045 X-RAY EXAM CHEST 1 VIEW: CPT

## 2022-05-05 PROCEDURE — 94762 N-INVAS EAR/PLS OXIMTRY CONT: CPT

## 2022-05-05 PROCEDURE — 85025 COMPLETE CBC W/AUTO DIFF WBC: CPT

## 2022-05-05 PROCEDURE — 94003 VENT MGMT INPAT SUBQ DAY: CPT

## 2022-05-05 PROCEDURE — 74011250636 HC RX REV CODE- 250/636: Performed by: INTERNAL MEDICINE

## 2022-05-05 PROCEDURE — 74011000250 HC RX REV CODE- 250: Performed by: REGISTERED NURSE

## 2022-05-05 PROCEDURE — 83735 ASSAY OF MAGNESIUM: CPT

## 2022-05-05 PROCEDURE — 65610000006 HC RM INTENSIVE CARE

## 2022-05-05 PROCEDURE — 74011000250 HC RX REV CODE- 250: Performed by: PHYSICIAN ASSISTANT

## 2022-05-05 PROCEDURE — 77010033678 HC OXYGEN DAILY

## 2022-05-05 PROCEDURE — 94640 AIRWAY INHALATION TREATMENT: CPT

## 2022-05-05 RX ORDER — MAGNESIUM SULFATE HEPTAHYDRATE 40 MG/ML
2 INJECTION, SOLUTION INTRAVENOUS ONCE
Status: COMPLETED | OUTPATIENT
Start: 2022-05-05 | End: 2022-05-05

## 2022-05-05 RX ADMIN — IPRATROPIUM BROMIDE AND ALBUTEROL SULFATE 3 ML: .5; 2.5 SOLUTION RESPIRATORY (INHALATION) at 19:53

## 2022-05-05 RX ADMIN — IPRATROPIUM BROMIDE AND ALBUTEROL SULFATE 3 ML: .5; 2.5 SOLUTION RESPIRATORY (INHALATION) at 08:04

## 2022-05-05 RX ADMIN — PIPERACILLIN AND TAZOBACTAM 3.38 G: 3; .375 INJECTION, POWDER, LYOPHILIZED, FOR SOLUTION INTRAVENOUS at 16:52

## 2022-05-05 RX ADMIN — PIPERACILLIN AND TAZOBACTAM 3.38 G: 3; .375 INJECTION, POWDER, LYOPHILIZED, FOR SOLUTION INTRAVENOUS at 00:12

## 2022-05-05 RX ADMIN — PIPERACILLIN AND TAZOBACTAM 3.38 G: 3; .375 INJECTION, POWDER, LYOPHILIZED, FOR SOLUTION INTRAVENOUS at 08:28

## 2022-05-05 RX ADMIN — HEPARIN SODIUM 5000 UNITS: 5000 INJECTION INTRAVENOUS; SUBCUTANEOUS at 04:00

## 2022-05-05 RX ADMIN — DOCUSATE SODIUM LIQUID 100 MG: 100 LIQUID ORAL at 08:29

## 2022-05-05 RX ADMIN — IPRATROPIUM BROMIDE AND ALBUTEROL SULFATE 3 ML: .5; 2.5 SOLUTION RESPIRATORY (INHALATION) at 03:11

## 2022-05-05 RX ADMIN — HEPARIN SODIUM 5000 UNITS: 5000 INJECTION INTRAVENOUS; SUBCUTANEOUS at 20:00

## 2022-05-05 RX ADMIN — IPRATROPIUM BROMIDE AND ALBUTEROL SULFATE 3 ML: .5; 2.5 SOLUTION RESPIRATORY (INHALATION) at 14:19

## 2022-05-05 RX ADMIN — HEPARIN SODIUM 5000 UNITS: 5000 INJECTION INTRAVENOUS; SUBCUTANEOUS at 12:37

## 2022-05-05 RX ADMIN — CHLORHEXIDINE GLUCONATE 0.12% ORAL RINSE 10 ML: 1.2 LIQUID ORAL at 21:00

## 2022-05-05 RX ADMIN — PANTOPRAZOLE SODIUM 40 MG: 40 INJECTION, POWDER, FOR SOLUTION INTRAVENOUS at 08:28

## 2022-05-05 RX ADMIN — MAGNESIUM SULFATE 2 G: 2 INJECTION INTRAVENOUS at 07:06

## 2022-05-05 RX ADMIN — CHLORHEXIDINE GLUCONATE 0.12% ORAL RINSE 10 ML: 1.2 LIQUID ORAL at 08:29

## 2022-05-05 RX ADMIN — FENTANYL CITRATE 100 MCG: 50 INJECTION INTRAMUSCULAR; INTRAVENOUS at 00:12

## 2022-05-05 NOTE — PROGRESS NOTES
Physician Progress Note      PATIENTJose Griffith  CSN #:                  [id number]  :                       1950  ADMIT DATE:       2022 1:48 PM  [address] [place] DATE:  RESPONDING  PROVIDER #:        SHANNAN FERGUSON PA-C          QUERY TEXT:    Pt admitted with respiratory failure. Noted documentation of ATN on 5/3 by nephrology consultant. If possible, please document in progress notes and discharge summary:    The medical record reflects the following:  Risk Factors: 71 yr old with hx of hemochromatosis(on serial phlebotomies),hypertension on lisinopril, bipolar disorder? Comes in for unresponsiveness, hypothermia, bradycardia. Baclofen toxicity was suspected. Clinical Indicators: Per 5/3 Nephrology - TARI due to ATN from initial BP fluctuations, ? AIN from vanco and zosyn combination, Pre renal etiology  Treatment: Nephrology following, Dialysis    Thank you,  Kylah Sidhu RN, [email or web address]  .  Options provided:  -- TARI due to ATN confirmed  -- Defer to nephrology consultant documentation regarding ATN  -- Other - I will add my own diagnosis  -- Disagree - Not applicable / Not valid  -- Disagree - Clinically unable to determine / Unknown  -- Refer to Clinical Documentation Reviewer    PROVIDER RESPONSE TEXT:    I defer to nephrology consultant regarding documentation of ATN.     Query created by: Angie Cervantes on 2022 2:46 PM      Electronically signed by:  Iván Huang PA-C 2022 6:41 PM

## 2022-05-05 NOTE — PROGRESS NOTES
Received pre HD report from [address] Pt in bed, Alert but on ventilater, ESTEVAN orientation, no s/s of distress noted. Accessed right IJ per protocol. Tx initiated at 0835. Arterial port sluggish blood return. TDC flowing with ease after reversing CVC lines. For hemodynamic stability UF goal 500 ml. Offered assistance with repositioning every 2 hours. Vascular access visible at all times during treatment, line connections intact at all times. Tx completed at 1138, tolerated well 0L removed. De-accessed per protocol. Heparin indwell 1.1ml in arterial, and 1.1ml in venous catheter. Unit nurse given report.                       ACUTE HEMODIALYSIS FLOW SHEET    HEMODIALYSIS ORDERS: Physician: Dr. Mccullough Em: Revaclear   Duration: 3 hr   BFR: 350   DFR: 700   Dialysate:  Temp 36-37*C   K+  3   Ca+ 2.5   Na 138   Bicarb 28    Patient Chart [x]   Unable to Obtain []  Dry weight/  94.4kg     UF Goal: 500 ml    Heparin []  Bolus    Units    [] Hourly    Units    [x]None       Pre BP: 139/67  Pulse:   Respirations: 17 Temp: 98.8  [] Oral  [x] Ax  [] Esoph   Labs: []  Pre  []  Post:   [x] N/A   Additional Orders (medications, blood products, hypotension management): [] Yes   [x] No     [x]  [facility] Consent Verified     CATHETER ACCESS:  []N/A   [x]Right   []Left   [x]IJ   []Fem  []Chest wall  []TransHepatic   [] First use X-ray verified     []Tunnel    [x] Non Tunneled   [x]No S/S infection  []Redness  []Drainage []Cultured []Swelling []Pain   [x]Medical Aseptic Prep Utilized   []Dressing Changed  [] Biopatch  Date:    []Clotted   [x]Patent   Flows: []Good  []Poor  [x]Reversed   If access problem,  notified: []Yes    [x]N/A        GRAFT/FISTULA ACCESS:   [x]N/A     []Right     []Left     []UE     []LE   []AVG   []AVF       [x]Medical Aseptic Prep Utilized   [x]No S/S infection  []Redness  []Drainage [] Cultured  [] Swelling  [] Pain  Bruit:   [x] Strong    [] Weak       Thrill :   [x] Strong    [] Weak Needle Gauge: 15   Length: 1 inch   If access problem,  notified: []Yes     [x]N/A          GENERAL ASSESSMENT:    LUNGS:  Resp Rate 17   [] Clear  [] Coarse  [] Crackles  [] Wheezing  [] Diminished                                                           [] RLL   [] LLL  [] RUL   [] LILI            Respirations:  []Easy  []Labored  []N/A  Cough:  []Productive  []Dry  []N/A               Therapy:  []RA   [x] Ventilated   [] Intubated   [] Trach            O2 Device:  [] NC   [] NRB  [] Trach Mask  [] BiPaP  Flow:   l/min                                                    CARDIAC: [x] Regular      [] Irregular   [] Rhythm:          [x] Monitored   [x] Bedside   [] Remotely monitored       EDEMA: [x] None   []Generalized  [] Pitting [] 1+   [] 2 +   [] 3+    [] 4+        SKIN:   [] Hot     [] Cold    [x] Warm   [x] Dry    [] Diaphoretic                 [] Flushed  [] Jaundiced  [] Cyanotic  [] Pale      LOC:    [x] Alert      [x]Oriented:    [] Person     [] Place   []Time               [] Confused  [] Lethargic  [] Medicated  [] Non-responsive  [] Non-Verbal     GI / ABDOMEN:                     [] Flat    [] Distended    [] Soft    [] Firm   []  Obese                   [] Diarrhea   [] FMS [] Bowel Sounds  [] Nausea  [] Vomiting                   [] NGT  [] OGT  [] PEG  [] Tube Feedings @     mL/hr     / URINE ASSESSMENT:                   [] Voiding    [] Oliguria  [] Anuria                     [x]  Loredo   [] Incontinent  []  Incontinent Brief   []  PureWick     PAIN:  [x] 0 []1  []2   []3   []4   []5   []6   []7   []8   []9   []10                MOBILITY:  [x] Bed    [] Stretcher      All Vitals and Treatment Details on Attached [facility]: [facility]          Room # 306/01    [x] Routine         [] 1st Time Acute/Chronic   [] Urgent      [] Stat            [] Acute Room   []  Bedside    [] ICU/CCU     [] ER     Isolation Precautions:  [x] Dialysis    There are currently no Active Isolations ALLERGIES:     No Known Allergies     Code Status:  Full Code     Hepatitis Status      Lab Results   Component Value Date/Time    Hepatitis B surface Ag <0.10 05/03/2022 08:21 PM    Hepatitis B surface Ab 3.32 (L) 05/03/2022 08:21 PM    Hep B Core Ab, total NEGATIVE  09/13/2019 02:02 PM        Current Labs:      Lab Results   Component Value Date/Time    WBC 10.5 05/05/2022 06:00 AM    HGB 10.4 (L) 05/05/2022 06:00 AM    HCT 32.3 (L) 05/05/2022 06:00 AM    PLATELET 074 37/36/5194 06:00 AM    MCV 94.4 05/05/2022 06:00 AM     Lab Results   Component Value Date/Time    Sodium 140 05/05/2022 06:00 AM    Potassium 3.8 05/05/2022 06:00 AM    Chloride 106 05/05/2022 06:00 AM    CO2 28 05/05/2022 06:00 AM    Anion gap 6 05/05/2022 06:00 AM    Glucose 106 (H) 05/05/2022 06:00 AM    BUN 16 05/05/2022 06:00 AM    Creatinine 1.36 (H) 05/05/2022 06:00 AM    BUN/Creatinine ratio 12 05/05/2022 06:00 AM    GFR est AA >60 05/05/2022 06:00 AM    GFR est non-AA 52 (L) 05/05/2022 06:00 AM    Calcium 9.0 05/05/2022 06:00 AM          DIET:  DIET NPO  DIET ADULT TUBE FEEDING     PRIMARY NURSE REPORT:   Pre Dialysis: Sean Alejo RN    Time: 0815      EDUCATION:    [x] Patient           Knowledge Basis: [x]None []Minimal [] Substantial [] Unknown  Barriers to learning  []None  [x] Intubated/Trached/Ventilated  [] Sedated/Paralyzed   [] Access Care     [] S&S of infection  [x] Fluid Management  [] K+   [x] Procedural    [] Medications   [] Tx Options   [] Transplant   [] Diet      Teaching Tools:  [x] Explain  [] Demo  [] Handouts [] Video  Patient response: [] Verbalized understanding   [] Requires follow up        [x] Time Out/Safety Check    [x] Extracorporeal Circuit Tested for integrity       RO/HEMODIALYSIS MACHINE SAFETY CHECKS - Before each treatment:        [address] [place] [address]                                     [] Unit Machine #   with centralized RO                                  [x] Portable Machine #3/RO serial # [id number]                                  [] Portable Machine #2/RO serial # [id number]                                  [] Portable Machine #4/RO serial # [id number]                                  [] Portable Machine #10/RO serial # [id number]                                                                                                       Alarm Test:  Pass time 0830            [x] RO/Machine Log Complete    Machine Temp    36-37*C             Dialysate: pH  7.4    Conductivity: Meter 14.0    HD Machine  14.4     TCD:    Dialyzer Lot # [id number]     Blood Tubing Lot # [id number]     Saline Lot # [id number]     CHLORINE TESTING-Before each treatment and every 4 hours    Total Chlorine: [x] less than 0.1 ppm  Initial Time Check: 0833       4 Hr/2nd Check Time:     (if greater than 0.1 ppm from Primary then every 30 minutes from Secondary)     TREATMENT INITIATION - with Dialysis Precautions:   [x] All Connections Secured              [x] Saline Line Double Clamped   [x] Venous Parameters Set               [x] Arterial Parameters Set    [x] Prime Given 250ml NSS              [x]Air Foam Detector Engaged        Treatment Initiation Note:  See above note    During Treatment Notes:     Face & Vascular access visible with art and tamra line connections intact. Pt tolerating dialysis. Face & Vascular access visible with art and tamra line connections intact. Pt tolerating dialysis. Face & Vascular access visible with art and tamra line connections intact. Pt tolerating dialysis. Face & Vascular access visible with art and tamra line connections intact. Pt tolerating dialysis. Face & Vascular access visible with art and tamra line connections intact. Pt tolerating dialysis. Face & Vascular access visible with art and tamra line connections intact. Pt tolerating dialysis. Face & Vascular access visible with art and tamra line connections intact. Pt tolerating dialysis.    Face & Vascular access visible with art and tamra line connections intact. Pt tolerating dialysis. Face & Vascular access visible with art and tamra line connections intact. Pt tolerating dialysis. Face & Vascular access visible with art and tamra line connections intact. Pt tolerating dialysis. Face & Vascular access visible with art and tamra line connections intact. Pt tolerating dialysis. Face & Vascular access visible with art and tamra line connections intact. Pt tolerating dialysis. Face & Vascular access visible with art and tamra line connections intact. Pt tolerating dialysis. 1138  Dialysis treatment complete.        Medication    Dose    Volume Route      Time       [facility] Nurse      HD         HD        HD       Post Assessment  Dialyzer Cleared:   [] Good  [x] Fair  [] Poor  Blood processed:  58.0 L  UF Removed:  0 Ml    Post BP: 136/67  Pulse: 89  Respirations: 14  Temp:    [] Oral  [] Ax  [] Esophageal   Lungs: [] Clear                [x] No change from initial assessment   Post Tx Vascular Access: [x] N/A  AVF/AVG: Bleeding stopped with  Arterial Pressure for   min   Venous Pressure for   min      Cardiac:  [x] Regular   [] Irregular   Rhythm:  [x] Monitored   [] Not Monitored    CVC Catheter: [] N/A  Locking solution: Heparin 1:1000 U  Arterial port 1.1 ml   Venous port 1.1 ml   Edema:  [x] None  [] Generalized                     Skin:[x] Warm  [x] Dry [] Diaphoretic               [] Flushed  [] Pale [] Cyanotic Pain:  [x]0  []1-2  []3-4  []5-6   []7-8  []9-10         Post Treatment Note:  See above note     POST TREATMENT PRIMARY NURSE HANDOFF REPORT:   Post Dialysis: Jenny Alvarado RN        Time: 1200       Abbreviations: AVG-arterial venous graft, AVF-arterial venous fistula, IJ-Internal Jugular, Subcl-Subclavian, Fem-Femoral, Tx-treatment, AP/HR-apical heart rate, VSS- Vital Signs Stable, CVC- Central Venous Catheter, DFR-dialysate flow rate, BFR-blood flow rate, AP-arterial pressure, -venous pressure, UF-ultrafiltrate, TMP-transmembrane pressure, Kyaw-Venous, Art-Arterial, RO-Reverse Osmosis

## 2022-05-05 NOTE — PROGRESS NOTES
Pt extubated to 4 LPM humidified NC following successful SBT and positive cuff- leak test. Suctioned airway pre and post procedure. No complications, no signs of stridor or distress.

## 2022-05-05 NOTE — PROGRESS NOTES
[facility]. Pulmonary, Critical Care, and Sleep Medicine    Name: Caron Quinn MRN: [id number]   : 1950 Hospital: [address] [place] [address] Dr   Date: 2022  Admission Date: 2022     Chart and notes reviewed. Data reviewed. I have evaluated all findings. [x]I have reviewed the flowsheet and previous days notes. []The patient is unable to give any meaningful history or review of systems because the patient is:  []Intubated []Sedated   []Unresponsive      []The patient is critically ill on      []Mechanical ventilation []Pressors   []BiPAP []         Interval HPI:  Patient is a 75 y. o. male w/ PMH of hemachromatosis presenting to [facility] from home via EMS from home unresponsive. History obtained from chart as patient is unresponsive. He was found by his step daughter who reported patient had been acting \"bipolar lately\" and would not let her in the house. She is not able to give a medication list at this time. Patients general UDS negative, CT head negative for acute issues. Poison control contacted by ED- recommend supportive care. Patient required intubation due to mental status. HD initiated 5/3 due to suspected baclofen overdose. Subjective 22  Hospital Day:  Vent Day:  Mentation much improved- awake, alert, following commands. No overnight events              ROS:Review of systems not obtained due to patient factors. Events and notes from last 24 hours reviewed.      Patient Active Problem List   Diagnosis Code    HNP (herniated nucleus pulposus), lumbar M51.26    Neuritis M79.2    Neuropathy G62.9    AMS (altered mental status) R41.82       Vital Signs:  Visit Vitals  /67   Pulse 87   Temp 98.4 °F (36.9 °C)   Resp 14   Ht 6' (1.829 m)   Wt 90.9 kg (200 lb 6.4 oz)   SpO2 96%   BMI 27.18 kg/m²       O2 Device: Endotracheal tube       Temp (24hrs), Av.2 °F (36.8 °C), Min:97.8 °F (36.6 °C), Max:98.8 °F (37.1 °C)       Intake/Output:   Last shift:      05/05 0701 - 05/05 1900  In: 235   Out: 440 [Urine:440]  Last 3 shifts: 05/03 1901 - 05/05 0700  In: 1349.6 [I.V.:624.6]  Out: 1500 [Urine:1000]    Intake/Output Summary (Last 24 hours) at 5/5/2022 1331  Last data filed at 5/5/2022 1141  Gross per 24 hour   Intake 810 ml   Output 1515 ml   Net -705 ml          Current Facility-Administered Medications   Medication Dose Route Frequency    piperacillin-tazobactam (ZOSYN) 3.375 g in 0.9% sodium chloride (MBP/ADV) 100 mL MBP  3.375 g IntraVENous Q8H    docusate (COLACE) 50 mg/5 mL oral liquid 100 mg  100 mg Oral DAILY    albuterol-ipratropium (DUO-NEB) 2.5 MG-0.5 MG/3 ML  3 mL Nebulization Q6H RT    chlorhexidine (PERIDEX) 0.12 % mouthwash 10 mL  10 mL Oral Q12H    pantoprazole (PROTONIX) 40 mg in 0.9% sodium chloride 10 mL injection  40 mg IntraVENous DAILY    heparin (porcine) injection 5,000 Units  5,000 Units SubCUTAneous Q8H    insulin lispro (HUMALOG) injection   SubCUTAneous Q6H         Telemetry: [x]Sinus []A-flutter []Paced    []A-fib []Multiple PVCs                  Physical Exam:      General: Intubated no signs of distress, appears stated age   HEENT:  Anicteric sclerae; pink palpebral conjunctivae; mucosa moist  Resp:  Symmetrical chest expansion, no accessory muscle use; good airway entry; coarse breath sounds  CV:  S1, S2 present; regular rate and rhythm  GI:  Abdomen soft, non-tender; (+) active bowel sounds  Extremities:  +2 pulses on all extremities; no edema/ cyanosis/ clubbing noted   Skin:  Warm; no rashes/ lesions noted, normal turgor/cap refill   Neurologic:  awake, alert, following commands   Devices:  NGT, ETT, Loredo, HD catheter      DATA:  MAR reviewed and pertinent medications noted or modified as needed    Labs:  Recent Labs     05/05/22  0600 05/04/22  0415 05/03/22  0231   WBC 10.5 11.7 11.3   HGB 10.4* 10.9* 10.7*   HCT 32.3* 33.2* 33.0*    188 234     Recent Labs     05/05/22  0600 05/04/22  0415 05/03/22 2021 05/03/22  1042 05/03/22  1042 05/03/22  0231 05/03/22  0231    142  --   --   --   --  147*   K 3.8 3.3*  --   --  3.5   < > 3.2*    109  --   --   --   --  117*   CO2 28 28  --   --   --   --  25   * 103*  --   --   --   --  101*   BUN 16 11  --   --   --   --  24*   CREA 1.36* 1.01  --   --   --   --  1.64*   CA 9.0 8.8  --   --   --   --  8.7   MG 1.8 2.7* 1.9  --   --    < > 2.3   PHOS 2.6 2.0* 2.6   < > 2.8   < > 2.2*   ALB 2.6* 2.5*  --   --   --   --  2.7*    < > = values in this interval not displayed. No results for input(s): PH, PCO2, PO2, HCO3, FIO2 in the last 72 hours. Recent Labs     05/05/22  0332 05/04/22  0332 05/03/22  0320   FIO2I 28 28 28   HCO3I 27.0* 29.8* 23.7   PCO2I 37.3 35.8 36.8   PHI 7.47* 7.53* 7.42   PO2I 77* 62* 93       Imaging:  [x]   I have personally reviewed the patients radiographs and reports  XR Results (most recent):  XR Results (most recent):  Results from Hospital Encounter encounter on 04/29/22    XR CHEST PORT    Narrative  Portable Frontal Chest.    CLINICAL HISTORY: Cordis placement for hemodialysis. TECHNIQUE: Single frontal view of the chest, obtained portably. COMPARISONS: 5/3/2022 at 4:51 AM.    FINDINGS: Patient is intubated. Endotracheal tube tip is 4 centers above the  leticia. There is enteric tube with tip in the stomach. There is a new right IJ  dialysis catheter with tip at the caval atrial junction. There is no  pneumothorax. Subsegmental opacities both bases. Cardiomediastinal silhouette stable. Blunting  left costophrenic sulcus. Impression  No pneumothorax after line placement. Bibasilar atelectasis with small left effusion. CT Results (most recent):  Results from Hospital Encounter encounter on 04/29/22    CT HEAD WO CONT    Narrative  CT HEAD UNENHANCED    CPT code: 42902    INDICATION: Altered mental status.     TECHNIQUE: 5 mm collimation axial images obtained from the skull base through  the vertex without administration of nonionic intravenous contrast.    All CT scans at this facility are performed using dose optimization technique as  appropriate to this specific exam, to include automated exposure control,  adjustment of the mA and/or KP according to patient size or use of iterative  reconstruction techniques. COMPARISON: None    FINDINGS:  Study is mildly degraded by motion and streak artifact. No parenchymal hemorrhage identified. Patchy low attenuation throughout the periventricular and deep hemispheric white  matter bilaterally, most prevalent in the frontal and parietal regions, with  distribution most consistent with chronic ischemic small vessel disease change. Rounded 5 mm hypodensity in the anterior inferior right basal ganglia adjacent  to the ventral insular cortex axial image 17. No regional mass effect. This  appears more sharply marginated and cystic on sagittal reformatted images  however. Subjectively this appears more remote, but may reflect lacunar type  infarct. MRI would be much more sensitive for the detection of infarct or ischemia in the  acute setting. No midline shift of structures. No extra-axial fluid collections. Ventricles are symmetric and not enlarged for age. Calvarium intact to the extent included. Partially included paranasal sinuses appear clear, with apparent left sided  nasal cannula in place. Impression  1. No hemorrhage identified. 2. Cystic lucency anterior inferior right basal ganglia region, subjectively  more remote appearing such as a prior lacunar type infarct. No comparison. 3. Moderate burden of presumed hemispheric small vessel disease change as above.             IMPRESSION:   · Acute respiratory failure requiring mechnical ventilation in setting of encephalopathy with need for airway protection +/- CAP vs Aspiration PNA  · Acute toxic metabolic encephalopathy due to suspected medication overdose- ?tizanidine, baclofen, mobic, lisinopril, bystolic in chart. Negative UDS, ETOH, acetaminophen, salicylate. · Sepsis - elevated white count, hypothermia, UA negative, procal negative. CXR (4/30) showed infiltrate vs atelectasis. · Elevated TSH but normal T4  · Acute on CKD stage 3-  prerenal azotemia vs ATN- appears to be at baseline   · CT head 4/29 \"cystic lucency anterior inferior right basal ganglia region\"   · EEG 5/1: \"This is an abnormal EEG with the presence generalized periodic appearing discharges. The Generalized periodic discharges are non specific and can be seen in metabolic/toxic encephalopathy or hypoxic injury. Could also be seen in non convulsive seizure. \"   · Brain MRI (5/1): \"No acute infarct. Small vessel disease such as is seen in patients with diabetes or hypertension. \"   · Hx Hemochromatosis   · BMI 27.12            Patient Active Problem List   Diagnosis Code    HNP (herniated nucleus pulposus), lumbar M51.26    Neuritis M79.2    Neuropathy G62.9    AMS (altered mental status) R41.82        RECOMMENDATIONS:   Neuro:Titrate sedation for RASS goal 0 to -1, daily sedation holiday. Propofol for sedation. Neuro following    Pulm: Aspiration precautions, HOB>30'. VAP Bundle  head of the bed at 30' all times. Daily sedation holiday and assessment for weaning with SBT as tolerated.  Aggressive pulmonary toilet, duonebs, metaneb, and Chest PT ordered. Trial pressure support. Hypertonic nebs DC. Plan for SAT/SBT possible extubation today   CVS:Monitor HD, MAP goal >65. GI: Continue tube feeds, Colace  Renal: Trend Cr, UOP. Loredo for accurate I/O, Continue HD, nephrology following, last day HD planned for today 5/5  Hem/Onc: Trend H/H, monitor for s/o active bleeding. Daily CBC. I/D:Trend WBCs and temperature curve. On Zosyn, grew GPC in his blood culture (4/29), MRSA culture pending. ID consulted. Blood cultures with no growth so far. Procal downtrending. Metabolic: Daily BMP, mag, phos. Trend lytes and replace per protocol. Endocrine:Q6 glucoses. SSI. Avoid hypoglycemia. Musc/Skin: PT/OT/SLP once extubated   Full Code  Discussed in interdisciplinary rounds     Best practice :    Glycemic control  [facility] ICU bundles: Jin Bundle Followed    [facility] Vent patients-    VAP bundle-[place] tube to suction at 20-30 cm Hg, Maintain [place] tube with 5-10ml air every 4 hours, Routine oral care every 4 hours, Elevation of head > 45 degree, Daily sedation holiday and SBT evaluation starting at 6.00am.  Sress ulcer prophylaxis. Pepcid  DVT prophylaxis. SQH  Need for Lines, jin assessed. Palliative care evaluation. Restraints need. Attending Non-violent Restraint Reevaluation     I have reevaluated the patient one hour after initiation of intervention. The patient is comfortable, uninjured, but continues to pose an imminent risk of injury to self to themselves and/or serious disruption of medical treatment required to keep patient stable. The patient's current medical and behavioral conditions that warrant the use intervention include danger to self and Interference with medical equipment or treatment. Restraint or seclusion will be discontinued at the earliest possible time, regardless of the scheduled expiration of the order. Based on my evaluation, restraints will be continued: YES- SBT/SAT         This care involved high complexity decision making to assess, manipulate, and support vital system functions, to treat this degreee vital organ system failure and to prevent further life threatening deterioration of the patients condition  The services I provided to this patient were to treat and/or prevent clinically significant deterioration that could result in the failure of one or more body systems and/or organ systems due to respiratory distress, hypoxia, cardiac dysrhythmia.   CAMERON time 20 minutes     Ector Chacko PA-C   05/05/22  Pulmonary, Critical Care Medicine  [facility]

## 2022-05-05 NOTE — PROGRESS NOTES
Started SBT at 7:45AM. Current weaning parameters.      05/05/22 0806   Weaning Parameters   Resp Rate Observed 21   Ve 9.1      RSBI 52   PEF 28

## 2022-05-05 NOTE — PROGRESS NOTES
Infectious Disease progress Note        Reason: aspiration pneumonia, coagulase negative staph bacteremia, yeast in sputum cx    Current abx Prior abx     Zosyn since 5/3/22 Unasyn, vancomycin  4/29/22-5/3     Lines:       Assessment :  71 y. o. male w/ PMH of hemachromatosis presented to [facility] from home via EMS on 4/29/22 with unresponsiveness. Now with low grade fever, positive blood cx, persistent altered mentation, TARI, Periodic discharges noted on EEG 5/2/22    Patient presents with highly complex picture. Etiology of fever not entirely clear at this time. Differential diagnosis includes-partially treated aspiration pneumonia/baclofen withdrawal  Will monitor for occult infection  Agree with ruling out DVT    Persistent fevers 5/3 despite broad-spectrum antibiotics antibiotics could be due to partially treated pneumonia with ampicillin/sulbactam resistant gram-negative's versus baclofen withdrawal.  Resolved fevers status post Zosyn since 5/3/2022    Sputum cultures 4/30/22-methicillin susceptible Staph aureus, normal respiratory adam, yeast.  Yeast in sputum culture likely colonizer    Single positive blood culture for coagulase-negative Staphylococcus on 4/29-likely contaminant. No evidence of skin/soft tissue infection noted on today's exam.    Periodic discharges noted on EEG 5/2/2022. Neurology follow-up appreciated. Concern for baclofen toxicity. Acute kidney injury-nephrology follow-up appreciated. Improving creatinine. S/p dialysis for suspected baclofen toxicity     Improved mentation. On spontaneous mode of ventilator. Afebrile. Improving procalcitonin    Recommendations:  1. Continue piperacillin/tazobactam till 5/7  2. We will hold off on antifungals. f/u fungitell  3. Follow-up nephrology recommendations regarding dialysis  4. Follow-up neurology recommendations  5. F/u  Fungitell  6.   Weaning from vent per ICU team    Above plan was discussed in details with ICU team. Please call me if any further questions or concerns. Will continue to participate in the care of this patient. HPI:      Detailed review of system not feasible since patient is intubated/nonverbal. Communicates by nodding his head        Current Discharge Medication List      CONTINUE these medications which have NOT CHANGED    Details   raNITIdine (Zantac) 150 mg tablet Take 150 mg by mouth once over twenty-four (24) hours. DULoxetine (CYMBALTA) 60 mg capsule Take 60 mg by mouth daily. nortriptyline (PAMELOR) 25 mg capsule Take 50 mg by mouth nightly. lisinopriL (PRINIVIL, ZESTRIL) 10 mg tablet Take 10 mg by mouth daily. tiZANidine (ZANAFLEX) 2 mg tablet take 1 tablet by mouth three times a day if needed      colchicine 0.6 mg tablet Take 0.6 mg by mouth as needed (gout flare ups). varicella-zoster recombinant, PF, (Shingrix, PF,) 50 mcg/0.5 mL susr injection Shingrix (PF) 50 mcg/0.5 mL intramuscular suspension, kit      baclofen (LIORESAL) 10 mg tablet Take 0.5-1 Tablets by mouth three (3) times daily as needed for Pain. Indications: for acute/episodic pain  Qty: 60 Tablet, Refills: 0    Associated Diagnoses: Lumbar spondylosis      lidocaine HCL-benzyl alcohoL (Salonpas Lidocaine Plus) 4-10 % crea 1 Actuation(s) by Apply Externally route two (2) times daily as needed for Pain. Qty: 1 Each, Refills: 0      BYSTOLIC 10 mg tablet take 1 tablet by mouth once daily  Refills: 0      meloxicam (MOBIC) 15 mg tablet Take 15 mg by mouth daily. Refills: 0      allopurinol (ZYLOPRIM) 300 mg tablet Take 300 mg by mouth daily.   Refills: 0      zolpidem (AMBIEN) 10 mg tablet take 1 tablet by mouth at bedtime if needed  Refills: 0             Current Facility-Administered Medications   Medication Dose Route Frequency    magnesium sulfate 2 g/50 ml IVPB (premix or compounded)  2 g IntraVENous ONCE    heparin (porcine) 1,000 unit/mL injection 2,200 Units  2,200 Units Hemodialysis DIALYSIS PRN    acetaminophen (TYLENOL) tablet 650 mg  650 mg Oral Q6H PRN    propofol (DIPRIVAN) 10 mg/mL infusion  5-50 mcg/kg/min IntraVENous TITRATE    piperacillin-tazobactam (ZOSYN) 3.375 g in 0.9% sodium chloride (MBP/ADV) 100 mL MBP  3.375 g IntraVENous Q8H    docusate (COLACE) 50 mg/5 mL oral liquid 100 mg  100 mg Oral DAILY    albuterol-ipratropium (DUO-NEB) 2.5 MG-0.5 MG/3 ML  3 mL Nebulization Q6H RT    chlorhexidine (PERIDEX) 0.12 % mouthwash 10 mL  10 mL Oral Q12H    albuterol-ipratropium (DUO-NEB) 2.5 MG-0.5 MG/3 ML  3 mL Nebulization Q4H PRN    pantoprazole (PROTONIX) 40 mg in 0.9% sodium chloride 10 mL injection  40 mg IntraVENous DAILY    midazolam (VERSED) injection 2 mg  2 mg IntraVENous Q10MIN PRN    fentaNYL citrate (PF) injection 100 mcg  100 mcg IntraVENous Q30MIN PRN    heparin (porcine) injection 5,000 Units  5,000 Units SubCUTAneous Q8H    insulin lispro (HUMALOG) injection   SubCUTAneous Q6H    glucose chewable tablet 16 g  4 Tablet Oral PRN    glucagon (GLUCAGEN) injection 1 mg  1 mg IntraMUSCular PRN    dextrose 10% infusion 0-250 mL  0-250 mL IntraVENous PRN       Allergies: Patient has no known allergies. History reviewed. No pertinent family history. Social History     Socioeconomic History    Marital status: SINGLE     Spouse name: Not on file    Number of children: Not on file    Years of education: Not on file    Highest education level: Not on file   Occupational History    Not on file   Tobacco Use    Smoking status: Never Smoker    Smokeless tobacco: Never Used   Substance and Sexual Activity    Alcohol use:  Yes     Alcohol/week: 6.0 standard drinks     Types: 6 Cans of beer per week     Comment: weekly on weekends    Drug use: Not on file    Sexual activity: Not on file   Other Topics Concern    Not on file   Social History Narrative    Not on file     Social Determinants of Health     Financial Resource Strain:     Difficulty of Paying Living Expenses: Not on file   Food Insecurity:     Worried About Running Out of Food in the Last Year: Not on file    [place] of Food in the Last Year: Not on file   Transportation Needs:     Lack of Transportation (Medical): Not on file    Lack of Transportation (Non-Medical):  Not on file   Physical Activity:     Days of Exercise per Week: Not on file    Minutes of Exercise per Session: Not on file   Stress:     Feeling of Stress : Not on file   Social Connections:     Frequency of Communication with Friends and Family: Not on file    Frequency of Social Gatherings with Friends and Family: Not on file    Attends Scientologist Services: Not on file    Active Member of [address] [place] [address] [employer] or Organizations: Not on file    Attends Club or Organization Meetings: Not on file    Marital Status: Not on file   Intimate Partner Violence:     Fear of Current or Ex-Partner: Not on file    Emotionally Abused: Not on file    Physically Abused: Not on file    Sexually Abused: Not on file   Housing Stability:     Unable to Pay for Housing in the Last Year: Not on file    Number of [place] in the Last Year: Not on file    Unstable Housing in the Last Year: Not on file     Social History     Tobacco Use   Smoking Status Never Smoker   Smokeless Tobacco Never Used        Temp (24hrs), Av.8 °F (36.6 °C), Min:97.5 °F (36.4 °C), Max:98 °F (36.7 °C)    Visit Vitals  /63   Pulse 90   Temp 97.8 °F (36.6 °C)   Resp 15   Ht 6' (1.829 m)   Wt 90.9 kg (200 lb 6.4 oz)   SpO2 94%   BMI 27.18 kg/m²       ROS: unable to obtain due to patient factors    Physical Exam:    General: Intubated, alert, attempts to communicate  HEENT:  Anicteric sclerae; pink palpebral conjunctivae; mucosa moist  Resp:  Symmetrical chest expansion, no accessory muscle use; good airway entry; coarse breath sounds  CV:  S1, S2 present; regular rate and rhythm  GI:  Abdomen soft, non-tender; (+) active bowel sounds  Extremities:  +2 pulses on all extremities; no edema/ cyanosis/ clubbing noted   Skin:  Warm; no rashes/ lesions noted, normal turgor/cap refill   Neurologic:  Non-focal,  follows commands. More alert and communicative compared to prior exam         Labs: Results:   Chemistry Recent Labs     05/05/22  0600 05/04/22  0415 05/03/22  1042 05/03/22  0231 05/03/22  0231   * 103*  --   --  101*    142  --   --  147*   K 3.8 3.3* 3.5   < > 3.2*    109  --   --  117*   CO2 28 28  --   --  25   BUN 16 11  --   --  24*   CREA 1.36* 1.01  --   --  1.64*   CA 9.0 8.8  --   --  8.7   AGAP 6 5  --   --  5   BUCR 12 11*  --   --  15   ALB 2.6* 2.5*  --   --  2.7*    < > = values in this interval not displayed. CBC w/Diff Recent Labs     05/05/22  0600 05/04/22  0415 05/03/22  0231   WBC 10.5 11.7 11.3   RBC 3.42* 3.54* 3.51*   HGB 10.4* 10.9* 10.7*   HCT 32.3* 33.2* 33.0*    188 234   GRANS 70 70 75*   LYMPH 13* 16* 13*   EOS 5 3 1      Microbiology Recent Labs     05/03/22  1042 05/03/22  1034   CULT NO GROWTH 2 DAYS NO GROWTH 2 DAYS          RADIOLOGY:    All available imaging studies/reports in [facility] care for this admission were reviewed      Disclaimer: Sections of this note are dictated utilizing voice recognition software, which may have resulted in some phonetic based errors in grammar and contents. Even though attempts were made to correct all the mistakes, some may have been missed, and remained in the body of the document. If questions arise, please contact our department.     Dr. Gael Martínez, Infectious Disease Specialist  [phone]  May 5, 2022  4:57 PM

## 2022-05-05 NOTE — PROGRESS NOTES
RENAL DAILY PROGRESS NOTE              Subjective:       Complaint: following commands and awake  intubated   Overnight events noted      IMPRESSION:   · TARI due to ATN from initial BP fluctuations, ? AIN from vanco and zosyn combination, Pre renal etiology  · Hypokalemia  · Baclofen toxicity causing bradycardia, hypothermia, EEG discharges? ???  · Hemochromatosis on serial phlebotomies as OP   PLAN:     · Dialysis on going for Baclofen toxicity. Great improvement!!. Getting ready for extubation. He is awake and following commands. · Today is the last session for his dialysis.  Can d/c temporary HD cath on Saturday if stable and doesn't require dialysis  · Will use 3k/2.5 maite bath   · I and O  · Daily weights  · D/w ICU team during rounds            Current Facility-Administered Medications   Medication Dose Route Frequency    heparin (porcine) 1,000 unit/mL injection 2,200 Units  2,200 Units Hemodialysis DIALYSIS PRN    acetaminophen (TYLENOL) tablet 650 mg  650 mg Oral Q6H PRN    propofol (DIPRIVAN) 10 mg/mL infusion  5-50 mcg/kg/min IntraVENous TITRATE    piperacillin-tazobactam (ZOSYN) 3.375 g in 0.9% sodium chloride (MBP/ADV) 100 mL MBP  3.375 g IntraVENous Q8H    docusate (COLACE) 50 mg/5 mL oral liquid 100 mg  100 mg Oral DAILY    albuterol-ipratropium (DUO-NEB) 2.5 MG-0.5 MG/3 ML  3 mL Nebulization Q6H RT    chlorhexidine (PERIDEX) 0.12 % mouthwash 10 mL  10 mL Oral Q12H    albuterol-ipratropium (DUO-NEB) 2.5 MG-0.5 MG/3 ML  3 mL Nebulization Q4H PRN    pantoprazole (PROTONIX) 40 mg in 0.9% sodium chloride 10 mL injection  40 mg IntraVENous DAILY    midazolam (VERSED) injection 2 mg  2 mg IntraVENous Q10MIN PRN    fentaNYL citrate (PF) injection 100 mcg  100 mcg IntraVENous Q30MIN PRN    heparin (porcine) injection 5,000 Units  5,000 Units SubCUTAneous Q8H    insulin lispro (HUMALOG) injection   SubCUTAneous Q6H    glucose chewable tablet 16 g  4 Tablet Oral PRN    glucagon (GLUCAGEN) injection 1 mg  1 mg IntraMUSCular PRN    dextrose 10% infusion 0-250 mL  0-250 mL IntraVENous PRN         Objective:     Patient Vitals for the past 24 hrs:   Temp Pulse Resp BP SpO2   05/05/22 1045 -- 85 16 (!) 141/69 96 %   05/05/22 1030 -- 88 16 134/68 94 %   05/05/22 1015 -- 90 19 124/70 93 %   05/05/22 1000 -- 90 18 130/66 93 %   05/05/22 0945 -- 96 20 124/67 94 %   05/05/22 0930 -- 93 19 132/69 94 %   05/05/22 0915 -- 94 17 133/65 94 %   05/05/22 0900 -- 98 18 133/69 94 %   05/05/22 0845 -- 99 15 139/70 94 %   05/05/22 0835 -- (!) 106 17 (!) 153/73 95 %   05/05/22 0830 -- (!) 108 19 (!) 153/73 97 %   05/05/22 0816 98.8 °F (37.1 °C) 91 21 139/72 96 %   05/05/22 0806 -- 87 18 -- 96 %   05/05/22 0800 98.1 °F (36.7 °C) 87 22 139/72 96 %   05/05/22 0700 -- 90 15 138/63 94 %   05/05/22 0600 -- 88 15 (!) 146/71 96 %   05/05/22 0500 -- 93 19 139/72 96 %   05/05/22 0400 -- 90 14 131/76 94 %   05/05/22 0313 -- 90 17 -- 99 %   05/05/22 0300 -- 85 16 (!) 140/71 97 %   05/05/22 0200 -- 92 16 127/70 98 %   05/05/22 0100 -- 82 15 133/69 97 %   05/05/22 0027 -- 86 15 -- 98 %   05/05/22 0000 -- 91 18 139/67 96 %   05/04/22 2300 -- 97 18 (!) 140/77 98 %   05/04/22 2200 -- 94 16 131/64 95 %   05/04/22 2100 -- (!) 101 19 132/72 96 %   05/04/22 2050 -- 84 20 -- 98 %   05/04/22 2000 -- (!) 108 14 128/67 97 %   05/04/22 1720 97.8 °F (36.6 °C) 89 28 132/74 93 %   05/04/22 1700 -- 89 26 125/69 94 %   05/04/22 1645 -- 80 18 109/62 (!) 88 %   05/04/22 1630 -- 87 20 124/62 93 %   05/04/22 1615 -- 94 22 117/76 97 %   05/04/22 1600 98 °F (36.7 °C) 93 26 129/79 97 %   05/04/22 1545 -- 82 19 102/77 99 %   05/04/22 1530 -- 93 21 128/67 100 %   05/04/22 1515 -- 75 20 128/65 99 %   05/04/22 1500 -- 74 17 122/67 98 %   05/04/22 1445 -- 78 22 119/62 98 %   05/04/22 1430 -- 80 23 111/61 97 %   05/04/22 1407 -- 73 16 -- 98 %   05/04/22 1400 -- 65 21 (!) 130/58 98 %   05/04/22 1345 -- 67 20 129/61 98 %   05/04/22 1330 -- 67 20 134/66 99 %   05/04/22 1325 97.5 °F (36.4 °C) 72 19 (!) 121/56 98 %   05/04/22 1315 -- 71 23 128/61 99 %   05/04/22 1300 -- 67 22 130/61 98 %   05/04/22 1200 97.8 °F (36.6 °C) 71 26 (!) 164/78 98 %        Weight change:      05/03 1901 - 05/05 0700  In: 1349.6 [I.V.:624.6]  Out: 1500 [Urine:1000]    Intake/Output Summary (Last 24 hours) at 5/5/2022 1102  Last data filed at 5/5/2022 0800  Gross per 24 hour   Intake 960 ml   Output 1515 ml   Net -555 ml     Physical Exam:   HEENT sclera anicteric,  CVS: S1S2 heard,  no rub  RS: + air entry b/l,   Abd: Soft, Non tender  Neuro: intubated,following commands  Extrm: upper ext edema, no cyanosis, clubbing   Skin: no visible  Rash  Musculoskeletal: No gross joints or bone deformities         Data Review:     LABS:   Hematology:   Recent Labs     05/05/22  0600 05/04/22  0415 05/03/22  0231   WBC 10.5 11.7 11.3   HGB 10.4* 10.9* 10.7*   HCT 32.3* 33.2* 33.0*     Chemistry:   Recent Labs     05/05/22  0600 05/04/22  0415 05/03/22 2021 05/03/22  1042 05/03/22  0231   BUN 16 11  --   --  24*   CREA 1.36* 1.01  --   --  1.64*   CA 9.0 8.8  --   --  8.7   ALB 2.6* 2.5*  --   --  2.7*   K 3.8 3.3*  --  3.5 3.2*    142  --   --  147*    109  --   --  117*   CO2 28 28  --   --  25   PHOS 2.6 2.0* 2.6 2.8 2.2*   * 103*  --   --  101*            Procedures/imaging: see electronic medical records for all procedures, Xrays and details which were not copied into this note but were reviewed prior to creation of Plan          Assessment & Plan:     See above        Jasmeet Adame MD  5/5/2022

## 2022-05-05 NOTE — PROGRESS NOTES
attended the interdisciplinary rounds for Ishmael Bennett, who is a 71 y.o.,male. Patients Primary Language is: [place]. According to the patients EMR [employer] Affiliation is: No preference. The reason the Patient came to the hospital is:   Patient Active Problem List    Diagnosis Date Noted    AMS (altered mental status) 04/29/2022    Neuropathy 03/11/2020    HNP (herniated nucleus pulposus), lumbar 04/25/2018    Neuritis 04/25/2018        Plan:  Philipp Hanson will continue to follow and will provide pastoral care on an as needed/requested basis.  recommends bedside caregivers page  on duty if patient shows signs of acute spiritual or emotional distress.     [address] [facility]  Board Certified [address] [place] [address]   [phone]

## 2022-05-05 NOTE — INTERDISCIPLINARY ROUNDS
[facility]  Pulmonary, Critical Care, and Sleep Medicine  Interdisciplinary and Ventilator Weaning Rounds    Patient discussed in morning walking rounds and interdisciplinary rounds. ICU day: 4/29/22    Overnight events:    Mentation much improved     Assessments and best practice:   Ventilator  o Ventilator day 4/29/22  o Vent settings: FiO2 of 25 and PEEP of 6  o VAP bundle, aim to keep peak plateau pressure 23-34YZ H2O  o Weaning assessed and documented   - Patient does meet criteria for SBT. - Patient is on sedation holiday. - Plan to wean with PS.  - Outcome: will SBT if he begins following commands  - Final plan: possible extubation pending SBT   Sedation  o none   Other pertinent drips  o n/a   Lines noted  o peripherals   Critical labs assessed  o Yes   Antibiotics  o Zosyn   Medications reviewed  o Yes   Pending imaging  o none   Pending send out labs  o No   Pending Procedures  o None   Glycemic control   Stress ulcer prophylaxis. o Protonix   DVT prophylaxis. o SQH   Need for Lines, jin assessed.  o Yes   Restraint Reevaluation   o Yes  o I have reevaluated the patient one hour after initiation of intervention. The patient is comfortable, uninjured, but continues to pose an imminent risk of injury to self to themselves and/or serious disruption of medical treatment required to keep patient stable. The patient's current medical and behavioral conditions that warrant the use intervention include danger to self and Interference with medical equipment or treatment. Restraint or seclusion will be discontinued at the earliest possible time, regardless of the scheduled expiration of the order. Based on my evaluation, restraints will be continued: YES - on SAT/SBT       Family contact/MPOA: daughterSu  Family to be updated?  updated by nursing this am     Palliative consult within 3 days of admission to ICU-  Ethics Guidance: 21 days      Daily Plans:   Hopeful SBT/extubation today    Discuss stopping HD with nephro     CAMERON time 15 minutes        Tristen Orlando PA-C  05/05/22  Pulmonary, Critical Care Medicine  [facility]

## 2022-05-05 NOTE — PROGRESS NOTES
INTERVENTION:  HEMODYNAMIC STABILIZATION  MAINTAIN BP WNL WHILE ON HD. INTERVENTION:  FLUID MANAGEMENT  WILL ATTEMPT 500 ML TOTAL FLUID REMOVAL AS TOLERATED. INTERVENTION:  METABOLIC/ELECTROLYTE MANAGEMENT  3.0 POTASSIUM DIALYSATE USED WITH HD TODAY. INTERVENTION:  HEMODIALYSIS ACCESS SITE MANAGEMENT  RIGHT SIDE TDC ACCESSED USING ASEPTIC TECHNIQUE. GOAL:  SIGNS AND SYMPTOMS OF LISTED POTENTIAL PROBLEMS WILL BE ABSENT OR MANAGEABLE. OUTCOME:  PROGRESSING. HD PLANNED FOR 3 HOURS TODAY.

## 2022-05-05 NOTE — PROGRESS NOTES
ETT moved to center of mouth   05/04/22 2050   Patient Observations   Pulse (Heart Rate) 84   Resp Rate 20   O2 Sat (%) 98 %   Airway - Continuous Aspiration of Subglottic Secretions (DARVIN) Tube 04/29/22 Oral   Placement Date/Time: 04/29/22 1600   Number of Attempts: 1  Inserted By: Dr. Dov Crowder  Present on Admission/Arrival: No  Location: Oral  Placement Verified: Auscultation;BBS;EtCO2  Airway Types: Endotracheal, cuffed  Airway Tube Size: 8 mm   Insertion Depth (cm) 26 cm   Line Shahriar Lips   Side Secured Centered;Device   Cuff Pressure   (MLT)   Site Assessment Clean, dry, & intact   Suction on Yes   Respiratory   Respiratory (WDL) X   Patient on Vent Yes - If patient is on vent, add Doc Flowsheet Ventilator (). Respiratory Pattern Regular   Upper Airway Sounds Coarse   Chest/Tracheal Assessment Chest expansion, symmetrical   Breath Sounds Bilateral Coarse   Cough Productive;Cough with suction   Airway Clearance   Suction ET Tube;Oral   Suction Device John; Inline suction catheter   Sputum Method Obtained Endotracheal   Sputum Amount Moderate   Sputum Color/Odor White   Sputum Consistency Thick   Ventilator Initiate/Discontinue   Bio-Med ID # [id number]   Vent Settings   FIO2 (%) 28 %   SpO2/FIO2 Ratio 350   CMV Rate Set 14   Back-Up Rate 14   Vt Set (ml) 520 ml   PEEP/VENT (cm H2O) 5 cm H20   Insp Time (sec) 0.9 sec   Insp Rise Time % 50 %   Flow Trigger 3   Ventilator Measurements   Resp Rate Observed 20   Vt Exhaled (Machine Breath) (ml) 535 ml   Ve Observed (l/min) 10.5 l/min   PIP Observed (cm H2O) 14 cm H2O   Plateau Pressure (cm H2O) 13 cm H2O   Driving Pressure 8 [id number]   MAP (cm H2O) 8.6   I:E Ratio Actual 1:3.2   Static Compliance (ml/cm H20) 58 ml/cm H20   Safety & Alarms   Circuit Temperature 98.8 °F (37.1 °C)   Backup Mode Checked/Apnea Yes   Pressure Max 40 cm H2O   Pressure Min 11 cm H2O   Ve Min 2   Ve Max 20   Vt Min 200 ml   Vt Max 1200 ml   RR Max 40   Ambu Bag Yes   Ambu Mask Yes   Age Specific Ventilator Associated Pneumonia Bundle   Patient Age Group Adult   Adult Ventilator Associated Pneumonia Bundle   Elevation of Head to 30-45 Degrees (Unless Contraindicated) Yes   Oxygen Therapy   Skin Assessment Clean, dry, & intact   Vent Method/Mode   Ventilation Method Conventional   Ventilator Mode Assist control;VC+   Procedures   $$ Subsequent Procedure Aerosol   Delivery Source In-line nebulizer   Pulmonary Toilet   Pulmonary Toilet H. O.B elevated;Suction

## 2022-05-05 NOTE — PROGRESS NOTES
Nutrition Note      Pt discussed during interdisciplinary rounds. Was tolerating tube feeds at goal rate per RN, but then briefly held this morning due to possible extubation; per RN, no plan for pt to be extubated, will resume tube feeds. Pt had HD on 5/4; had HD this morning (5/5) only for filtration, not fluid removal per report. BM 5/5. progressing towards nutrition goals. Nutrition Recommendations/Plan:   1.  Resume/ continue tube feeds of Vital High Protein at goal rate of 85 mL/hr with water flushes of 100 mL q 4 hours               (goal EN regimen provides:   2040 kcal, 179 gm protein, 1705 mL free water, 100% RDIs)          Electronically signed by Paul Presctot RD on 5/5/2022 at 12:03 PM    Contact: [phone]

## 2022-05-05 NOTE — PROGRESS NOTES
Pt got tired, placed him back on settings     05/05/22 0829   Vent Settings   FIO2 (%) 28 %   CMV Rate Set 14   Back-Up Rate 14   Vt Set (ml) 520 ml   PEEP/VENT (cm H2O) 5 cm H20   Insp Rise Time % 50 %   Flow Trigger 3

## 2022-05-06 LAB
ALBUMIN SERPL-MCNC: 2.6 G/DL (ref 3.4–5)
ANION GAP SERPL CALC-SCNC: 6 MMOL/L (ref 3–18)
BASOPHILS # BLD: 0 K/UL (ref 0–0.1)
BASOPHILS NFR BLD: 0 % (ref 0–2)
BUN SERPL-MCNC: 14 MG/DL (ref 7–18)
BUN/CREAT SERPL: 11 (ref 12–20)
CALCIUM SERPL-MCNC: 8.9 MG/DL (ref 8.5–10.1)
CHLORIDE SERPL-SCNC: 108 MMOL/L (ref 100–111)
CO2 SERPL-SCNC: 27 MMOL/L (ref 21–32)
CREAT SERPL-MCNC: 1.22 MG/DL (ref 0.6–1.3)
DIFFERENTIAL METHOD BLD: ABNORMAL
EOSINOPHIL # BLD: 0.6 K/UL (ref 0–0.4)
EOSINOPHIL NFR BLD: 6 % (ref 0–5)
ERYTHROCYTE [DISTWIDTH] IN BLOOD BY AUTOMATED COUNT: 13.6 % (ref 11.6–14.5)
GLUCOSE BLD STRIP.AUTO-MCNC: 119 MG/DL (ref 70–110)
GLUCOSE BLD STRIP.AUTO-MCNC: 79 MG/DL (ref 70–110)
GLUCOSE BLD STRIP.AUTO-MCNC: 86 MG/DL (ref 70–110)
GLUCOSE BLD STRIP.AUTO-MCNC: 87 MG/DL (ref 70–110)
GLUCOSE SERPL-MCNC: 88 MG/DL (ref 74–99)
HCT VFR BLD AUTO: 31.3 % (ref 36–48)
HGB BLD-MCNC: 10.1 G/DL (ref 13–16)
IMM GRANULOCYTES # BLD AUTO: 0.1 K/UL (ref 0–0.04)
IMM GRANULOCYTES NFR BLD AUTO: 1 % (ref 0–0.5)
LYMPHOCYTES # BLD: 1.7 K/UL (ref 0.9–3.6)
LYMPHOCYTES NFR BLD: 16 % (ref 21–52)
MAGNESIUM SERPL-MCNC: 2.2 MG/DL (ref 1.6–2.6)
MCH RBC QN AUTO: 30.5 PG (ref 24–34)
MCHC RBC AUTO-ENTMCNC: 32.3 G/DL (ref 31–37)
MCV RBC AUTO: 94.6 FL (ref 78–100)
MONOCYTES # BLD: 1 K/UL (ref 0.05–1.2)
MONOCYTES NFR BLD: 10 % (ref 3–10)
NEUTS SEG # BLD: 7 K/UL (ref 1.8–8)
NEUTS SEG NFR BLD: 67 % (ref 40–73)
NRBC # BLD: 0 K/UL (ref 0–0.01)
NRBC BLD-RTO: 0 PER 100 WBC
PHOSPHATE SERPL-MCNC: 2.9 MG/DL (ref 2.5–4.9)
PLATELET # BLD AUTO: 144 K/UL (ref 135–420)
PMV BLD AUTO: 9.6 FL (ref 9.2–11.8)
POTASSIUM SERPL-SCNC: 3.7 MMOL/L (ref 3.5–5.5)
RBC # BLD AUTO: 3.31 M/UL (ref 4.35–5.65)
SODIUM SERPL-SCNC: 141 MMOL/L (ref 136–145)
WBC # BLD AUTO: 10.4 K/UL (ref 4.6–13.2)

## 2022-05-06 PROCEDURE — APPSS45 APP SPLIT SHARED TIME 31-45 MINUTES: Performed by: NURSE PRACTITIONER

## 2022-05-06 PROCEDURE — 92610 EVALUATE SWALLOWING FUNCTION: CPT

## 2022-05-06 PROCEDURE — 94762 N-INVAS EAR/PLS OXIMTRY CONT: CPT

## 2022-05-06 PROCEDURE — 74011250637 HC RX REV CODE- 250/637: Performed by: NURSE PRACTITIONER

## 2022-05-06 PROCEDURE — C9113 INJ PANTOPRAZOLE SODIUM, VIA: HCPCS | Performed by: PHYSICIAN ASSISTANT

## 2022-05-06 PROCEDURE — 94640 AIRWAY INHALATION TREATMENT: CPT

## 2022-05-06 PROCEDURE — 51798 US URINE CAPACITY MEASURE: CPT

## 2022-05-06 PROCEDURE — 80069 RENAL FUNCTION PANEL: CPT

## 2022-05-06 PROCEDURE — 74011000258 HC RX REV CODE- 258: Performed by: INTERNAL MEDICINE

## 2022-05-06 PROCEDURE — 97535 SELF CARE MNGMENT TRAINING: CPT

## 2022-05-06 PROCEDURE — 74011250636 HC RX REV CODE- 250/636: Performed by: PHYSICIAN ASSISTANT

## 2022-05-06 PROCEDURE — 2709999900 HC NON-CHARGEABLE SUPPLY

## 2022-05-06 PROCEDURE — 97161 PT EVAL LOW COMPLEX 20 MIN: CPT

## 2022-05-06 PROCEDURE — 74011000250 HC RX REV CODE- 250: Performed by: REGISTERED NURSE

## 2022-05-06 PROCEDURE — 97116 GAIT TRAINING THERAPY: CPT

## 2022-05-06 PROCEDURE — 99291 CRITICAL CARE FIRST HOUR: CPT | Performed by: INTERNAL MEDICINE

## 2022-05-06 PROCEDURE — 99232 SBSQ HOSP IP/OBS MODERATE 35: CPT | Performed by: NURSE PRACTITIONER

## 2022-05-06 PROCEDURE — 82962 GLUCOSE BLOOD TEST: CPT

## 2022-05-06 PROCEDURE — 65270000029 HC RM PRIVATE

## 2022-05-06 PROCEDURE — 74011250636 HC RX REV CODE- 250/636: Performed by: INTERNAL MEDICINE

## 2022-05-06 PROCEDURE — 83735 ASSAY OF MAGNESIUM: CPT

## 2022-05-06 PROCEDURE — 99222 1ST HOSP IP/OBS MODERATE 55: CPT | Performed by: PSYCHIATRY & NEUROLOGY

## 2022-05-06 PROCEDURE — 85025 COMPLETE CBC W/AUTO DIFF WBC: CPT

## 2022-05-06 PROCEDURE — 74011000250 HC RX REV CODE- 250: Performed by: PHYSICIAN ASSISTANT

## 2022-05-06 PROCEDURE — 97165 OT EVAL LOW COMPLEX 30 MIN: CPT

## 2022-05-06 PROCEDURE — 92526 ORAL FUNCTION THERAPY: CPT

## 2022-05-06 RX ORDER — QUETIAPINE FUMARATE 25 MG/1
50 TABLET, FILM COATED ORAL
Status: DISCONTINUED | OUTPATIENT
Start: 2022-05-06 | End: 2022-05-09 | Stop reason: HOSPADM

## 2022-05-06 RX ORDER — PANTOPRAZOLE SODIUM 40 MG/1
40 TABLET, DELAYED RELEASE ORAL
Status: DISCONTINUED | OUTPATIENT
Start: 2022-05-07 | End: 2022-05-09 | Stop reason: HOSPADM

## 2022-05-06 RX ORDER — AMLODIPINE BESYLATE 5 MG/1
5 TABLET ORAL DAILY
Status: DISCONTINUED | OUTPATIENT
Start: 2022-05-07 | End: 2022-05-08

## 2022-05-06 RX ORDER — DOCUSATE SODIUM 100 MG/1
100 CAPSULE, LIQUID FILLED ORAL DAILY
Status: DISCONTINUED | OUTPATIENT
Start: 2022-05-07 | End: 2022-05-09 | Stop reason: HOSPADM

## 2022-05-06 RX ADMIN — QUETIAPINE FUMARATE 50 MG: 25 TABLET ORAL at 21:47

## 2022-05-06 RX ADMIN — PANTOPRAZOLE SODIUM 40 MG: 40 INJECTION, POWDER, FOR SOLUTION INTRAVENOUS at 08:51

## 2022-05-06 RX ADMIN — HEPARIN SODIUM 5000 UNITS: 5000 INJECTION INTRAVENOUS; SUBCUTANEOUS at 03:53

## 2022-05-06 RX ADMIN — IPRATROPIUM BROMIDE AND ALBUTEROL SULFATE 3 ML: .5; 2.5 SOLUTION RESPIRATORY (INHALATION) at 01:52

## 2022-05-06 RX ADMIN — ACETAMINOPHEN 650 MG: 325 TABLET ORAL at 21:47

## 2022-05-06 RX ADMIN — PIPERACILLIN AND TAZOBACTAM 3.38 G: 3; .375 INJECTION, POWDER, LYOPHILIZED, FOR SOLUTION INTRAVENOUS at 23:14

## 2022-05-06 RX ADMIN — IPRATROPIUM BROMIDE AND ALBUTEROL SULFATE 3 ML: .5; 2.5 SOLUTION RESPIRATORY (INHALATION) at 15:40

## 2022-05-06 RX ADMIN — PIPERACILLIN AND TAZOBACTAM 3.38 G: 3; .375 INJECTION, POWDER, LYOPHILIZED, FOR SOLUTION INTRAVENOUS at 08:51

## 2022-05-06 RX ADMIN — HEPARIN SODIUM 5000 UNITS: 5000 INJECTION INTRAVENOUS; SUBCUTANEOUS at 12:50

## 2022-05-06 RX ADMIN — IPRATROPIUM BROMIDE AND ALBUTEROL SULFATE 3 ML: .5; 2.5 SOLUTION RESPIRATORY (INHALATION) at 07:27

## 2022-05-06 RX ADMIN — PIPERACILLIN AND TAZOBACTAM 3.38 G: 3; .375 INJECTION, POWDER, LYOPHILIZED, FOR SOLUTION INTRAVENOUS at 16:34

## 2022-05-06 RX ADMIN — PIPERACILLIN AND TAZOBACTAM 3.38 G: 3; .375 INJECTION, POWDER, LYOPHILIZED, FOR SOLUTION INTRAVENOUS at 00:00

## 2022-05-06 RX ADMIN — HEPARIN SODIUM 5000 UNITS: 5000 INJECTION INTRAVENOUS; SUBCUTANEOUS at 20:59

## 2022-05-06 NOTE — ROUTINE PROCESS
Gave nursing report to RICKEY Dominguez on [address]. Patient transferring to room 411. Will call daughter and inform of transfer.

## 2022-05-06 NOTE — PROGRESS NOTES
Nutrition Assessment     Type and Reason for Visit: Reassess,NPO/clear liquid    Nutrition Recommendations/Plan:   1. Discontinue tube feeding order now that pt on po diet and NGT removed. 2. Add supplement: Ensure Enlive once daily  3. Continue all other nutrition interventions. Monitor po intake of meals and supplements     Nutrition Assessment:  Pt was receiving tube feeds of vital high protein at 85 mL/hr (goal rate). SBT was attempted on 5/5; pt tolerated, was extubated on same day. NGT was removed. Pt started on po diet this morning following SLP evaluation. Pt with TARI; was requiring dialysis; noted plan for discontinuing temporary dialysis catheter on 5/7 if pt remains stable per Nephrology note. Pt had HD on 5/4; had HD on 5/5, but only for filtration, not fluid removal     Malnutrition Assessment:  Malnutrition Status: Insufficient data     Estimated Daily Nutrient Needs:  Energy (kcal):  4559-2275  Protein (g):  75-94       Fluid (ml/day):  500 mL + total output    Nutrition Related Findings:  BM 5/6. No edema. Pt alert per RN report. Current Nutrition Therapies:  ADULT TUBE FEEDING Nasogastric; Peptide Based High Protein; Delivery Method: Continuous; Continuous Initial Rate (mL/hr): 40; Continuous Advance Tube Feeding: Yes; Advancement Volume (mL/hr): 15; Advancement Frequency: Q 8 hours; Continuous Goal R. ..   ADULT DIET Regular    Anthropometric Measures:  Height:  6' (182.9 cm)  Current Body Wt:  93.5 kg (206 lb 2.1 oz)  BMI: 28    Nutrition Diagnosis:   · Predicted inadequate energy intake related to cognitive or neurological impairment as evidenced by  (recently started on po diet; previously having been NPO)      Nutrition Interventions:   Food and/or Nutrient Delivery: Continue current diet,Start oral nutrition supplement  Nutrition Education/Counseling: No recommendations at this time,Education not indicated  Coordination of Nutrition Care: Continue to monitor while inpatient,Interdisciplinary rounds  Plan of Care discussed with: interdisciplinary team    Goals:  Previous Goal Met: Goal(s) achieved  Goals: Meet at least 75% of estimated needs,PO intake 75% or greater,by next RD assessment       Nutrition Monitoring and Evaluation:   Behavioral-Environmental Outcomes: None identified  Food/Nutrient Intake Outcomes: Diet advancement/tolerance,Food and nutrient intake,Supplement intake  Physical Signs/Symptoms Outcomes: Biochemical data,Chewing or swallowing,Meal time behavior    Discharge Planning:     Too soon to determine    Douglas Alan, [address] [place] [address]  Contact: [phone]

## 2022-05-06 NOTE — PROGRESS NOTES
[facility] Hospitalist Group  Progress Note    Patient: Shazia Guzman Age: 71 y.o. : 1950 MR#: [id number] SSN: [id number]  Date: 2022     Subjective:   ICU stepdown transfer   Interval HPI:  Patient is [phone] y. o. male w/ PMH of hemachromatosis presenting to [facility] from home via EMS from home unresponsive. History obtained from chart as patient is unresponsive. He was found by his step daughter who reported patient had been acting \"bipolar lately\" and would not let her in the house. She is not able to give a medication list at this time. Patients general UDS negative, CT head negative for acute issues. Poison control contacted by ED- recommend supportive care. Patient required intubation due to mental status. HD initiated 5/3 due to suspected baclofen overdose. Consults  Nephrology   Psychiatry   ID   Speech, PT/OT   Nutrition     Alert and oriented x4. NAD. No complaints. Aware of updated plan of care with transfer from ICU. Assessment/Plan:   1. Acute respiratory failure requiring mechanical ventilation in setting of encephalopathy with need for airway protection. 2. Acute toxic metabolic encephalopathy due to suspected medication overdose. Suspect SI. Negative UDS, ETOH, acetaminophen, salicylate  3. Sepsis, hypothermia. Chest xray  infiltrate v atelectasis. GPC blood culture   4. Elevated TSG. Normal T4  5. TARI on CKD3. Pre renal azotemia v ATN  6. Incidental finding CT head  cystic lucency anterior inferior right basal ganglia region  7. Abnormal EEG  \"This is an abnormal EEG with the presence generalized periodic appearing discharges. The Generalized periodic discharges are non specific and can be seen in metabolic/toxic encephalopathy or hypoxic injury. Could also be seen in non convulsive seizure. \"     Plan  1. Continue zosyn til . Repeat cultures NGTD  2. Nephrology recommendations to discontinue HD cath if stable.  Monitor urine output and renal function   3. PT/OT recommends rehab   4. Speech recommends reg solid and thin liquids  5. Psych evaluation today with recommendations start seroquel 50 mg at bedtime when primary team feels appropriate and OP psych follow up.  Sitter can be discontinued       Case discussed with:  [x]Patient  []Family  [x]Nursing  []Case Management  DVT Prophylaxis:  []Lovenox  []Hep SQ  []SCDs  []Coumadin   []On Heparin gtt    Objective:   VS:   Visit Vitals  BP (!) 148/82   Pulse 78   Temp 98.2 °F (36.8 °C)   Resp 14   Ht 6' (1.829 m)   Wt 93.5 kg (206 lb 2.1 oz)   SpO2 92%   BMI 27.96 kg/m²      Tmax/24hrs: Temp (24hrs), Av.2 °F (36.8 °C), Min:98.1 °F (36.7 °C), Max:98.2 °F (36.8 °C)      Intake/Output Summary (Last 24 hours) at 2022 1434  Last data filed at 2022 0800  Gross per 24 hour   Intake --   Output 1310 ml   Net -1310 ml       General:  Alert, NAD  HEENT: normocephalic, pupils equal and reactive, anicteric sclera, trachea midline   Cardiovascular:  RRR  Pulmonary:  LSC throughout; respiratory effort WNL  GI:  +BS in all four quadrants, soft, non-tender  Extremities:  No edema; 2+ dorsalis pedis pulses bilaterally  Neuro: alert and oriented x4  HD cath          Labs:    Recent Results (from the past 24 hour(s))   GLUCOSE, POC    Collection Time: 22  6:00 PM   Result Value Ref Range    Glucose (POC) 94 70 - 110 mg/dL   GLUCOSE, POC    Collection Time: 22 11:43 PM   Result Value Ref Range    Glucose (POC) 87 70 - 110 mg/dL   RENAL FUNCTION PANEL    Collection Time: 22  4:00 AM   Result Value Ref Range    Sodium 141 136 - 145 mmol/L    Potassium 3.7 3.5 - 5.5 mmol/L    Chloride 108 100 - 111 mmol/L    CO2 27 21 - 32 mmol/L    Anion gap 6 3.0 - 18 mmol/L    Glucose 88 74 - 99 mg/dL    BUN 14 7.0 - 18 MG/DL    Creatinine 1.22 0.6 - 1.3 MG/DL    BUN/Creatinine ratio 11 (L) 12 - 20      GFR est AA >60 >60 ml/min/1.73m2    GFR est non-AA 59 (L) >60 ml/min/1.73m2    Calcium 8.9 8.5 - 10.1 MG/DL Phosphorus 2.9 2.5 - 4.9 MG/DL    Albumin 2.6 (L) 3.4 - 5.0 g/dL   CBC WITH AUTOMATED DIFF    Collection Time: 05/06/22  4:00 AM   Result Value Ref Range    WBC 10.4 4.6 - 13.2 K/uL    RBC 3.31 (L) 4.35 - 5.65 M/uL    HGB 10.1 (L) 13.0 - 16.0 g/dL    HCT 31.3 (L) 36.0 - 48.0 %    MCV 94.6 78.0 - 100.0 FL    MCH 30.5 24.0 - 34.0 PG    MCHC 32.3 31.0 - 37.0 g/dL    RDW 13.6 11.6 - 14.5 %    PLATELET 204 292 - 320 K/uL    MPV 9.6 9.2 - 11.8 FL    NRBC 0.0 0  WBC    ABSOLUTE NRBC 0.00 0.00 - 0.01 K/uL    NEUTROPHILS 67 40 - 73 %    LYMPHOCYTES 16 (L) 21 - 52 %    MONOCYTES 10 3 - 10 %    EOSINOPHILS 6 (H) 0 - 5 %    BASOPHILS 0 0 - 2 %    IMMATURE GRANULOCYTES 1 (H) 0.0 - 0.5 %    ABS. NEUTROPHILS 7.0 1.8 - 8.0 K/UL    ABS. LYMPHOCYTES 1.7 0.9 - 3.6 K/UL    ABS. MONOCYTES 1.0 0.05 - 1.2 K/UL    ABS. EOSINOPHILS 0.6 (H) 0.0 - 0.4 K/UL    ABS. BASOPHILS 0.0 0.0 - 0.1 K/UL    ABS. IMM.  GRANS. 0.1 (H) 0.00 - 0.04 K/UL    DF AUTOMATED     MAGNESIUM    Collection Time: 05/06/22  4:00 AM   Result Value Ref Range    Magnesium 2.2 1.6 - 2.6 mg/dL   GLUCOSE, POC    Collection Time: 05/06/22  6:49 AM   Result Value Ref Range    Glucose (POC) 86 70 - 110 mg/dL   GLUCOSE, POC    Collection Time: 05/06/22 11:00 AM   Result Value Ref Range    Glucose (POC) 119 (H) 70 - 110 mg/dL       Signed By: Suni Renteria NP     May 6, 2022

## 2022-05-06 NOTE — PROGRESS NOTES
Problem: Mobility Impaired (Adult and Pediatric)  Goal: *Acute Goals and Plan of Care (Insert Text)  Description: Physical Therapy Goals  Initiated 5/6/2022 and to be accomplished within 7 day(s)  1. Patient will move from supine to sit and sit to supine , scoot up and down, and roll side to side in bed with modified independence. 2.  Patient will transfer from bed to chair and chair to bed with modified independence using the least restrictive device. 3.  Patient will perform sit to stand with modified independence. 4.  Patient will ambulate with modified independence for 100 feet with the least restrictive device. 5.  Assess stairs as needed or appropriate for discharge. PLOF: Pt reporting living with daughter and 3year old grandson, pt watches grandson when his daughter works. Previously independent without AD, lives in 1 story house with 3 MARLINE without handrails. Outcome: Progressing Towards Goal   PHYSICAL THERAPY EVALUATION    Patient: Morgan Santiago (23 y.o. male)  Date: 5/6/2022  Primary Diagnosis: AMS (altered mental status) [R41.82]        Precautions:   Aspiration,Fall (suicide)    ASSESSMENT :  Pt cleared to participate in PT session, pt received semi-reclined in bed and agreeable to therapy session. Completing with OT to maximize safety and mobility. Based on the objective data described below, the patient presents with decreased endurance, decreased strength, decreased balance reactions, gait deviations, and decreased independence in functional mobility. Pt completing bed mobility with SBA. Sitting EOB with fair balance. Pt standing with CGA and RW. Pt ambulating with Delmy and RW, slow shuffled gait pattern. Pt needing increased time for ambulation taking >10 minutes for ambulating 30 feet in room. Pt able to stand while drinking water with Delmy. Verbal and tactile cues for safety and RW management. Pt sitting in recliner with sitter present.  Pt positioned for comfort and educated to call for assist before getting up, pt verbalized understanding. Pt left with all needs met and call bell in reach. RN notified of position and participation. Patient will benefit from skilled intervention to address the above impairments. Patient's rehabilitation potential is considered to be Fair  Factors which may influence rehabilitation potential include:   []         None noted  []         Mental ability/status  [x]         Medical condition  [x]         Home/family situation and support systems  []         Safety awareness  []         Pain tolerance/management  []         Other:      PLAN :  Recommendations and Planned Interventions:   [x]           Bed Mobility Training             []    Neuromuscular Re-Education  [x]           Transfer Training                   []    Orthotic/Prosthetic Training  [x]           Gait Training                          []    Modalities  [x]           Therapeutic Exercises           []    Edema Management/Control  [x]           Therapeutic Activities            [x]    Family Training/Education  [x]           Patient Education  []           Other (comment):    Frequency/Duration: Patient will be followed by physical therapy 1-2 times per day/4-7 days per week to address goals. Discharge Recommendations: Rehab  Further Equipment Recommendations for Discharge: rolling walker and N/A     SUBJECTIVE:   Patient stated I want to get up and get moving.     OBJECTIVE DATA SUMMARY:     Past Medical History:   Diagnosis Date    Arthritis     Hemochromatosis     Ill-defined condition     GOUT     Past Surgical History:   Procedure Laterality Date    HX ORTHOPAEDIC      LEFT HAND     Barriers to Learning/Limitations: None  Compensate with: N/A  Home Situation:  Home Situation  Home Environment: Private residence  # Steps to Enter: 3  Rails to Enter: No  Wheelchair Ramp: No  One/Two Story Residence: One story  Living Alone: No  Support Systems: Child(rae)  Patient Expects to be Discharged to[de-identified] Unable to determine at this time  Current DME Used/Available at Home: None  Tub or Shower Type: Shower  Critical Behavior:  Neurologic State: Alert;Confused  Orientation Level: Disoriented to person  Cognition: Follows commands  Safety/Judgement: Fall prevention  Psychosocial  Patient Behaviors: Calm; Cooperative  Purposeful Interaction: Yes  Pt Identified Daily Priority: Clinical issues (comment)  Caritas Process: Nurture loving kindness;Enable michael/hope;Establish trust;Nurture spiritual self;Teaching/learning; Attend basic human needs;Create healing environment  Caring Interventions: Reassure; Therapeutic modalities  Reassure: Informing; Instilling michael and hope;Support family  Therapeutic Modalities: Intentional therapeutic touch    Strength:    Strength: Generally decreased, functional    Tone & Sensation:   Tone: Normal    Sensation: Intact    Range Of Motion:  AROM: Within functional limits    Posture:  Posture (WDL): Exceptions to WDL  Posture Assessment: Trunk flexion  Functional Mobility:  Bed Mobility:     Supine to Sit: Stand-by assistance     Scooting: Stand-by assistance  Transfers:  Sit to Stand: Contact guard assistance  Stand to Sit: Contact guard assistance    Balance:   Sitting: Impaired; With support  Sitting - Static: Good (unsupported)  Sitting - Dynamic: Fair (occasional) (+)  Standing: Impaired; With support  Standing - Static: Fair  Standing - Dynamic : Fair    Ambulation/Gait Training:  Distance (ft): 30 Feet (ft)  Assistive Device: Walker, rolling  Ambulation - Level of Assistance: Minimal assistance     Gait Description (WDL): Exceptions to WDL  Gait Abnormalities: Decreased step clearance    Base of Support: Center of gravity altered;Narrowed     Speed/Monica: Shuffled;Slow;Pace decreased (<100 feet/min)  Step Length: Right shortened;Left shortened    Pain:  Pain level pre-treatment: 8/10   Pain level post-treatment: 8/10   Pain Intervention(s) : Rest, Repositioning  Response to intervention: Nurse notified    Activity Tolerance:   Fair activity tolerance    Please refer to the flowsheet for vital signs taken during this treatment. After treatment:   [x]         Patient left in no apparent distress sitting up in chair  []         Patient left in no apparent distress in bed  [x]         Call bell left within reach  [x]         Nursing notified  [x]         sitter present  []         Bed alarm activated  []         SCDs applied    COMMUNICATION/EDUCATION:   [x]         Role of Physical Therapy in the acute care setting. [x]         Fall prevention education was provided and the patient/caregiver indicated understanding. [x]         Patient/family have participated as able in goal setting and plan of care. [x]         Patient/family agree to work toward stated goals and plan of care. []         Patient understands intent and goals of therapy, but is neutral about his/her participation. []         Patient is unable to participate in goal setting/plan of care: ongoing with therapy staff.  []         Other:     Thank you for this referral.  Roldan Albarado, PT   Time Calculation: 38 mins      Eval Complexity: History: MEDIUM  Complexity : 1-2 comorbidities / personal factors will impact the outcome/ POC Exam:LOW Complexity : 1-2 Standardized tests and measures addressing body structure, function, activity limitation and / or participation in recreation  Presentation: LOW Complexity : Stable, uncomplicated  Clinical Decision Making:Low Complexity low  Overall Complexity:LOW

## 2022-05-06 NOTE — CONSULTS
[address] [facility] [address]  Consultation Note    Date of Service:  05/06/22    Historian(s): Patient, daughter, medical record  Referral Source: ICU    Chief Complaint     Psychiatric evaluation for suicidal ideation  History of Present Illness     Hi Bledsoe is a 71 y.o. WHITE/NON- male  with a history of hemochromatosis, gout and prediabetes who was admitted for acute mental status changes. The patient was brought to the hospital via EMS after he was found unresponsive at home. The patient required intubation and mechanical ventilation but was extubated yesterday and is currently awake, alert and able to converse. The patient was alert and oriented to self, place and time and was able to engage in interview. He could not really remember the details or the events that led to his hospitalization. He says he may have overdosed on his medications but it was not a suicide attempt. He says he is prescribed a pain medication and is supposed to take half to 1 tablet 3 times a day but was taking 2 tablets 3 times a day for pain control. He says he was taking this dose for a couple of months. He denies overdosing on any other prescription medications. Again, he denies ever having suicidal ideation and says the thought has never crossed his mind. He says he likes living life and he loves his grandson very much. He denies problems with sleep, appetite or energy level prior to admission. He denies anhedonia or feelings of hopelessness and worthlessness. The patient mentions that he retired about 2 years ago and was feeling bored and \"a bit low\" recently due to not having much to do at home. He misses having to get up early in the morning and go to work and feels that now he does not have much of a purpose. He has to come up with something to keep him occupied every day which he says he tries to spend time with his grandson, cook for him and do other things with him.   The patient denied any prior history of depression, bipolar disorder or mental illness and denies any history of suicide attempts. Collateral information was obtained from his daughter who reported that the patient was diagnosed with bipolar disorder several years ago and used to have outpatient treatment and take medications until  when he stopped doing so after his wife . He has not  taken any mood stabilizers or seen any mental health providers since . The daughter denied any prior history of suicide attempts or suicidal ideation or inpatient psychiatric hospitalizations. She says she has never known her father to be suicidal.  She reports that he has been more irritable and angry in the past month, and also more disorganized and scattered at home. She also mentioned that he has many guns and is a gun  as they are \" a gun loving family. \"  His guns are locked up in a safe that he has access to. The patient reported that he drinks a couple of beers every 3 days but his daughter stated that he drinks alcohol daily, 8 to 12 cans of 16 ounce beer. She denies use of recreational drugs such as marijuana or cocaine. Psychiatric Treatment History     Self-injurious behavior/risky thoughts or behaviors (past suicidal ideation/attempt):   Denies any prior history of thoughts of self-harm or suicidal actions. Violence/Risk to others (past homicidal ideation/attempt):   Denies any prior history of violence or homicidal ideation. Previous psychiatric medication trials: Unknown, the patient does not remember. Previous psychiatric hospitalizations: None    Current psychiatric provider: None    Allergies    No Known Allergies    Medical History     Past Medical History:   Diagnosis Date    Arthritis     Hemochromatosis     Ill-defined condition     GOUT       Medication(s)     No current facility-administered medications on file prior to encounter.      Current Outpatient Medications on File Prior to Encounter   Medication Sig Dispense Refill    raNITIdine (Zantac) 150 mg tablet Take 150 mg by mouth once over twenty-four (24) hours.  DULoxetine (CYMBALTA) 60 mg capsule Take 60 mg by mouth daily.  nortriptyline (PAMELOR) 25 mg capsule Take 50 mg by mouth nightly.  lisinopriL (PRINIVIL, ZESTRIL) 10 mg tablet Take 10 mg by mouth daily.  tiZANidine (ZANAFLEX) 2 mg tablet take 1 tablet by mouth three times a day if needed      colchicine 0.6 mg tablet Take 0.6 mg by mouth as needed (gout flare ups).  varicella-zoster recombinant, PF, (Shingrix, PF,) 50 mcg/0.5 mL susr injection Shingrix (PF) 50 mcg/0.5 mL intramuscular suspension, kit      baclofen (LIORESAL) 10 mg tablet Take 0.5-1 Tablets by mouth three (3) times daily as needed for Pain. Indications: for acute/episodic pain 60 Tablet 0    lidocaine HCL-benzyl alcohoL (Salonpas Lidocaine Plus) 4-10 % crea 1 Actuation(s) by Apply Externally route two (2) times daily as needed for Pain. 1 Each 0    BYSTOLIC 10 mg tablet take 1 tablet by mouth once daily  0    meloxicam (MOBIC) 15 mg tablet Take 15 mg by mouth daily. 0    allopurinol (ZYLOPRIM) 300 mg tablet Take 300 mg by mouth daily. 0    zolpidem (AMBIEN) 10 mg tablet take 1 tablet by mouth at bedtime if needed  0           Family History     History reviewed. No pertinent family history. Psychiatric Family History  The patient denies family history of mental illness    Social History     The patient states he was born and raised in [place]. He has been  2  times and has 3 adult children. He worked in a [employer]previously [employer] for 43 years and retired 2 years ago. His daughter and grandson live with him.     Vitals/Labs     Visit Vitals  BP (!) 148/73   Pulse 90   Temp 98.2 °F (36.8 °C)   Resp 18   Ht 6' (1.829 m)   Wt 93.5 kg (206 lb 2.1 oz)   SpO2 96%   BMI 27.96 kg/m²       Mental Status Examination     Appearance/Hygiene  fair, dressed in hospital gown Attitude/Behavior/Social Relatedness Appropriate   Musculoskeletal Gait/Station: Not tested  Tone (flaccid, cogwheeling, spastic): not assessed  Psychomotor (hyperkinetic, hypokinetic): calm  Involuntary movements (tics, dyskinesias, akathisa, stereotypies): none   Speech   Rate, rhythm, volume, fluency and articulation are appropriate   Mood   euthymic   Affect    congruent   Thought Process Linear and goal directed   Thought Content and Perceptual Disturbances Denies self-injurious behavior (SIB), suicidal ideation (SI), aggressive behavior or homicidal ideation (HI). Denies auditory and visual hallucinations   Sensorium and Cognition  A&Ox4, attention intact, memory intact, language use appropriate, and fund of knowledge age appropriate   Insight  Limited   Judgment  fair     Assessment and Plan     Psychiatric Diagnoses: History of Bipolar disorder, alcohol use disorder    Morgan Santiago is a 71 y.o. who is currently admitted for respiratory failure, encephalopathy and AKA who is doing better. Based on evaluation, the patient does not appear depressed and denies depressed mood. He denies suicidal ideation or that drug overdose was a suicide attempt. He does not meet criteria for inpatient psychiatric hospitalization. However he would benefit from being on a mood stabilizer given history of bipolar disorder and recent irritability and anger outbursts reported by his daughter. The patient is willing to take a mood stabilizer. Recommend starting Seroquel 50 at bedtime when primary team feels it is appropriate. Recommend outpatient psychiatric follow-up. Patient counseled on alcohol cessation. S  itter can be discontinued and patient can be placed on routine precautions as he denies suicidal ideation. Thank you for the consultation.         Saima Rossi MD  Psychiatrist  [facility]

## 2022-05-06 NOTE — ROUTINE PROCESS
1703-patient is eating dinner in his room. No evidence of distress. 1751-admission assessment completed. Telemetry monitoring applied. 1935-/Bedside shift change report given to Reza Sneed (oncoming nurse) by Galen Rich (offgoing nurse). Report included the following information SBAR, MAR and Recent Results.

## 2022-05-06 NOTE — PROGRESS NOTES
Infectious Disease progress Note        Reason: aspiration pneumonia, coagulase negative staph bacteremia, yeast in sputum cx    Current abx Prior abx     Zosyn since 5/3/22 Unasyn, vancomycin  4/29/22-5/3     Lines:       Assessment :  71 y. o. male w/ PMH of hemachromatosis presented to [facility] from home via EMS on 4/29/22 with unresponsiveness. Now with low grade fever, positive blood cx, persistent altered mentation, TARI, Periodic discharges noted on EEG 5/2/22    Patient presents with highly complex picture. Etiology of fever not entirely clear at this time. Differential diagnosis includes-partially treated aspiration pneumonia/baclofen withdrawal  Will monitor for occult infection  Agree with ruling out DVT    Persistent fevers 5/3 despite broad-spectrum antibiotics antibiotics could be due to partially treated pneumonia with ampicillin/sulbactam resistant gram-negative's versus baclofen withdrawal.  Resolved fevers status post Zosyn since 5/3/2022    Sputum cultures 4/30/22-methicillin susceptible Staph aureus, normal respiratory adam, yeast.  Yeast in sputum culture likely colonizer    Single positive blood culture for coagulase-negative Staphylococcus on 4/29-likely contaminant. No evidence of skin/soft tissue infection noted on today's exam.    Periodic discharges noted on EEG 5/2/2022. Neurology follow-up appreciated. Concern for baclofen toxicity. Acute kidney injury-nephrology follow-up appreciated. Improving creatinine. S/p dialysis for suspected baclofen toxicity     Improved mentation. extubated. Afebrile. Improving procalcitonin    Recommendations:  1. Continue piperacillin/tazobactam till 5/7  2.  f/u fungitell  3. Follow-up nephrology recommendations regarding dialysis  4. Follow-up neurology recommendations  5. Monitor clinically    Above plan was discussed in details with ICU team. Please call me if any further questions or concerns.  Will continue to participate in the care of this patient. HPI:      Feels better. Happy that he is extubated & off restraints. No CP, sob, abdominal pain      Current Discharge Medication List      CONTINUE these medications which have NOT CHANGED    Details   raNITIdine (Zantac) 150 mg tablet Take 150 mg by mouth once over twenty-four (24) hours. DULoxetine (CYMBALTA) 60 mg capsule Take 60 mg by mouth daily. nortriptyline (PAMELOR) 25 mg capsule Take 50 mg by mouth nightly. lisinopriL (PRINIVIL, ZESTRIL) 10 mg tablet Take 10 mg by mouth daily. tiZANidine (ZANAFLEX) 2 mg tablet take 1 tablet by mouth three times a day if needed      colchicine 0.6 mg tablet Take 0.6 mg by mouth as needed (gout flare ups). varicella-zoster recombinant, PF, (Shingrix, PF,) 50 mcg/0.5 mL susr injection Shingrix (PF) 50 mcg/0.5 mL intramuscular suspension, kit      baclofen (LIORESAL) 10 mg tablet Take 0.5-1 Tablets by mouth three (3) times daily as needed for Pain. Indications: for acute/episodic pain  Qty: 60 Tablet, Refills: 0    Associated Diagnoses: Lumbar spondylosis      lidocaine HCL-benzyl alcohoL (Salonpas Lidocaine Plus) 4-10 % crea 1 Actuation(s) by Apply Externally route two (2) times daily as needed for Pain. Qty: 1 Each, Refills: 0      BYSTOLIC 10 mg tablet take 1 tablet by mouth once daily  Refills: 0      meloxicam (MOBIC) 15 mg tablet Take 15 mg by mouth daily. Refills: 0      allopurinol (ZYLOPRIM) 300 mg tablet Take 300 mg by mouth daily.   Refills: 0      zolpidem (AMBIEN) 10 mg tablet take 1 tablet by mouth at bedtime if needed  Refills: 0             Current Facility-Administered Medications   Medication Dose Route Frequency    heparin (porcine) 1,000 unit/mL injection 2,200 Units  2,200 Units Hemodialysis DIALYSIS PRN    acetaminophen (TYLENOL) tablet 650 mg  650 mg Oral Q6H PRN    piperacillin-tazobactam (ZOSYN) 3.375 g in 0.9% sodium chloride (MBP/ADV) 100 mL MBP  3.375 g IntraVENous Q8H    docusate (COLACE) 50 mg/5 mL oral liquid 100 mg  100 mg Oral DAILY    albuterol-ipratropium (DUO-NEB) 2.5 MG-0.5 MG/3 ML  3 mL Nebulization Q6H RT    chlorhexidine (PERIDEX) 0.12 % mouthwash 10 mL  10 mL Oral Q12H    albuterol-ipratropium (DUO-NEB) 2.5 MG-0.5 MG/3 ML  3 mL Nebulization Q4H PRN    pantoprazole (PROTONIX) 40 mg in 0.9% sodium chloride 10 mL injection  40 mg IntraVENous DAILY    heparin (porcine) injection 5,000 Units  5,000 Units SubCUTAneous Q8H    insulin lispro (HUMALOG) injection   SubCUTAneous Q6H    glucose chewable tablet 16 g  4 Tablet Oral PRN    glucagon (GLUCAGEN) injection 1 mg  1 mg IntraMUSCular PRN    dextrose 10% infusion 0-250 mL  0-250 mL IntraVENous PRN       Allergies: Patient has no known allergies. History reviewed. No pertinent family history. Social History     Socioeconomic History    Marital status: SINGLE     Spouse name: Not on file    Number of children: Not on file    Years of education: Not on file    Highest education level: Not on file   Occupational History    Not on file   Tobacco Use    Smoking status: Never Smoker    Smokeless tobacco: Never Used   Substance and Sexual Activity    Alcohol use: Yes     Alcohol/week: 6.0 standard drinks     Types: 6 Cans of beer per week     Comment: weekly on weekends    Drug use: Not on file    Sexual activity: Not on file   Other Topics Concern    Not on file   Social History Narrative    Not on file     Social Determinants of Health     Financial Resource Strain:     Difficulty of Paying Living Expenses: Not on file   Food Insecurity:     Worried About Running Out of Food in the Last Year: Not on file    [place] of Food in the Last Year: Not on file   Transportation Needs:     Lack of Transportation (Medical): Not on file    Lack of Transportation (Non-Medical):  Not on file   Physical Activity:     Days of Exercise per Week: Not on file    Minutes of Exercise per Session: Not on file   Stress:     Feeling of Stress : Not on file   Social Connections:     Frequency of Communication with Friends and Family: Not on file    Frequency of Social Gatherings with Friends and Family: Not on file    Attends [employer] Services: Not on file    Active Member of [address] [place] [address] or Organizations: Not on file    Attends Club or Organization Meetings: Not on file    Marital Status: Not on file   Intimate Partner Violence:     Fear of Current or Ex-Partner: Not on file    Emotionally Abused: Not on file    Physically Abused: Not on file    Sexually Abused: Not on file   Housing Stability:     Unable to Pay for Housing in the Last Year: Not on file    Number of [place] in the Last Year: Not on file    Unstable Housing in the Last Year: Not on file     Social History     Tobacco Use   Smoking Status Never Smoker   Smokeless Tobacco Never Used        Temp (24hrs), Av.3 °F (36.8 °C), Min:98.2 °F (36.8 °C), Max:98.4 °F (36.9 °C)    Visit Vitals  BP (!) 156/88   Pulse 79   Temp 98.2 °F (36.8 °C)   Resp 16   Ht 6' (1.829 m)   Wt 93.5 kg (206 lb 2.1 oz)   SpO2 100%   BMI 27.96 kg/m²       ROS: unable to obtain due to patient factors    Physical Exam:    General: alert, NAD  HEENT:  Anicteric sclerae; pink palpebral conjunctivae; mucosa moist  Resp:  Symmetrical chest expansion, no accessory muscle use; good airway entry;   CV:  S1, S2 present; regular rate and rhythm  GI:  Abdomen soft, non-tender; (+) active bowel sounds  Extremities:  +2 pulses on all extremities; no edema/ cyanosis/ clubbing noted   Skin:  Warm; no rashes/ lesions noted, normal turgor/cap refill   Neurologic:  Non-focal,  follows commands.         Labs: Results:   Chemistry Recent Labs     22  0400 22  0600 22  0415   GLU 88 106* 103*    140 142   K 3.7 3.8 3.3*    106 109   CO2 27 28 28   BUN 14 16 11   CREA 1.22 1.36* 1.01   CA 8.9 9.0 8.8   AGAP 6 6 5   BUCR 11* 12 11*   ALB 2.6* 2.6* 2.5*      CBC w/Diff Recent Labs     22  0346 05/05/22  0600 05/04/22  0415   WBC 10.4 10.5 11.7   RBC 3.31* 3.42* 3.54*   HGB 10.1* 10.4* 10.9*   HCT 31.3* 32.3* 33.2*    148 188   GRANS 67 70 70   LYMPH 16* 13* 16*   EOS 6* 5 3      Microbiology Recent Labs     05/03/22  1042 05/03/22  1034   CULT NO GROWTH 3 DAYS NO GROWTH 3 DAYS          RADIOLOGY:    All available imaging studies/reports in [facility] for this admission were reviewed      Disclaimer: Sections of this note are dictated utilizing voice recognition software, which may have resulted in some phonetic based errors in grammar and contents. Even though attempts were made to correct all the mistakes, some may have been missed, and remained in the body of the document. If questions arise, please contact our department.     Dr. Kayla Aguilar, Infectious Disease Specialist  [phone]  May 6, 2022  4:57 PM

## 2022-05-06 NOTE — PROGRESS NOTES
attended the interdisciplinary rounds for Eboni Nolan, who is a 71 y.o.,male. Patients Primary Language is: [place]. According to the patients EMR [employer] Affiliation is: No preference. The reason the Patient came to the hospital is:   Patient Active Problem List    Diagnosis Date Noted    AMS (altered mental status) 04/29/2022    Neuropathy 03/11/2020    HNP (herniated nucleus pulposus), lumbar 04/25/2018    Neuritis 04/25/2018      Plan:  Onel Madison will continue to follow and will provide pastoral care on an as needed/requested basis.  recommends bedside caregivers page  on duty if patient shows signs of acute spiritual or emotional distress.     [address]  Board Certified [address] [facility]   [phone]

## 2022-05-06 NOTE — INTERDISCIPLINARY ROUNDS
[facility]  Pulmonary, Critical Care, and Sleep Medicine  Interdisciplinary and Ventilator Weaning Rounds    Patient discussed in morning walking rounds and interdisciplinary rounds. ICU day: 4/29/22    Overnight events:    Doing well no overnight events     Assessments and best practice:   Ventilator  - na   Sedation  o none   Other pertinent drips  o n/a   Lines noted  o peripherals   Critical labs assessed  o Yes   Antibiotics  o Zosyn   Medications reviewed  o Yes   Pending imaging  o none   Pending send out labs  o No   Pending Procedures  o None   Glycemic control   Stress ulcer prophylaxis. o Protonix   DVT prophylaxis. o SQH   Need for Lines, jin assessed.  o Yes   Restraint Reevaluation   o n/a      Family contact/MPOA: daughter, Evette Rg  Family to be updated?  updated by nursing this am     Palliative consult within 3 days of admission to ICU-  Ethics Guidance: 21 days      Daily Plans:   Downgrade to medical    PT/OT/speech   Psych to see today     CAMERON time 15 minutes        Sabrina Isaacs PA-C  05/06/22  Pulmonary, [address] [place] [address],[address] [facility]

## 2022-05-06 NOTE — PROGRESS NOTES
Problem: Self Care Deficits Care Plan (Adult)  Goal: *Acute Goals and Plan of Care (Insert Text)  Description: Occupational Therapy Goals  Initiated 5/6/2022 within 7 day(s). 1.  Patient will perform functional activity standing > 4 minutes with modified independence,F+ balance. 2.  Patient will perform lower body dressing with modified independence seated and standing. 3.  Patient will perform toilet transfers with supervision  4. Patient will perform all aspects of toileting with modified independence. 5.  Patient will utilize energy conservation techniques during functional activities with verbal cues. Prior Level of Function: Patient was independent with self-care and functional mobility PTA. Outcome: Progressing Towards Goal         OCCUPATIONAL THERAPY EVALUATION    Patient: Stevenson Rodriguez (83 y.o. male)  Date: 5/6/2022  Primary Diagnosis: AMS (altered mental status) [R41.82]  Precautions: Aspiration,Fall,Seizure,Skin (suicide)      ASSESSMENT :  Patient cleared to participate in OT evaluation by RN. Upon entering the room, the patient was supine in bed, alert, and agreeable to participate in OT evaluation with sitter present. Patient was seen with PT to maximize patient safety, participation, and functional mobility in preparation for self-care tasks. Patient stand by assist for bed mobility, contact guard assist for functional transfers in preparation for ADLs, and min assist for donning socks. Patient with increased time for functional mobility in room in preparation for ADLs, vcs needed for upright posture. Based on the objective data described below, the patient presents with decreased strength, decreased independence, decreased activity tolerance, decreased safety awareness, decreased functional balance, and decreased functional mobility, which impedes pt performance in basic self-care/ADL tasks.  Patient would benefit from skilled OT to restore PLOF and maximize function. Patient will benefit from skilled intervention to address the above impairments. Patient's rehabilitation potential is considered to be Fair  Factors which may influence rehabilitation potential include:   []             None noted  [x]             Mental ability/status  [x]             Medical condition  [x]             Home/family situation and support systems  [x]             Safety awareness  []             Pain tolerance/management  []             Other:      PLAN :  Recommendations and Planned Interventions:   [x]               Self Care Training                  [x]      Therapeutic Activities  [x]               Functional Mobility Training   [x]      Cognitive Retraining  [x]               Therapeutic Exercises           [x]      Endurance Activities  [x]               Balance Training                    [x]      Neuromuscular Re-Education  []               Visual/Perceptual Training     [x]      Home Safety Training  [x]               Patient Education                   [x]      Family Training/Education  []               Other (comment):    Frequency/Duration: Patient will be followed by occupational therapy 3 - 5 times a week to address goals.   Discharge Recommendations: Rehab  Further Equipment Recommendations for Discharge: shower chair and rolling walker     SUBJECTIVE:   Patient stated I needed to close that door leading outside as pt reaching outside of TERRY closing bathroom door    OBJECTIVE DATA SUMMARY:     Past Medical History:   Diagnosis Date    Arthritis     Hemochromatosis     Ill-defined condition     GOUT     Past Surgical History:   Procedure Laterality Date    HX ORTHOPAEDIC      LEFT HAND     Barriers to Learning/Limitations: yes;  confusion  Compensate with: visual, verbal, tactile, kinesthetic cues/model    Home Situation:   Home Situation  Home Environment: Private residence  # Steps to Enter: 3  Rails to Enter: No  Wheelchair Ramp: No  One/Two Story Residence: One story  Living Alone: No  Support Systems: Child(rae)  Patient Expects to be Discharged to[de-identified] Unable to determine at this time  Current DME Used/Available at Home: None  Tub or Shower Type: Shower  [x]  Right hand dominant   []  Left hand dominant    Cognitive/Behavioral Status:  Neurologic State: Alert;Confused  Orientation Level: Oriented to person;Disoriented to place; Disoriented to time;Disoriented to situation (reports 2008 as year and [place] as place)  Cognition: Follows commands  Safety/Judgement: Fall prevention    Skin: Intact  Edema: none noted    Vision/Perceptual:    Acuity: Within Defined Limits        Coordination: BUE  Coordination: Generally decreased, functional  Fine Motor Skills-Upper: Left Intact; Right Intact    Gross Motor Skills-Upper: Left Intact; Right Intact    Balance:  Sitting: Impaired; With support  Sitting - Static: Good (unsupported)  Sitting - Dynamic: Fair (occasional) (+)  Standing: Impaired; With support  Standing - Static: Fair  Standing - Dynamic : Fair    Strength: BUE  Strength: Generally decreased, functional     Tone & Sensation: BUE  Tone: Normal  Sensation: Intact      Range of Motion: BUE  AROM: Within functional limits    Functional Mobility and Transfers for ADLs:  Bed Mobility:  Supine to Sit: Stand-by assistance  Scooting: Stand-by assistance    Transfers:  Sit to Stand: Contact guard assistance  Stand to Sit: Contact guard assistance   Toilet Transfer : Contact guard assistance (simulation with recliner)    ADL Assessment:   Feeding: Independent    Oral Facial Hygiene/Grooming: Modified Independent;Contact guard assistance (CGA standing)    Bathing: Minimum assistance    Upper Body Dressing: Modified independent    Lower Body Dressing: Minimum assistance    Toileting: Contact guard assistance                ADL Intervention:  Feeding  Feeding Assistance: Independent  Drink to Mouth:  Independent                   Upper Body Dressing Assistance  Dressing Assistance: [facility] Gown: [facility]    Lower Body Dressing Assistance  Dressing Assistance: Minimum assistance  Socks: Minimum assistance  Leg Crossed Method Used: Yes  Position Performed: Supine         Cognitive Retraining  Safety/Judgement: Fall prevention    Pain:  Pain level pre-treatment: 3-4/10 , knees  Pain level post-treatment: 3-4/10   Pain Intervention(s): Medication (see MAR); Rest, Ice, Repositioning   Response to intervention: Nurse notified, See doc flow    Activity Tolerance:   Fair    Please refer to the flowsheet for vital signs taken during this treatment. After treatment:   [x] Patient left in no apparent distress sitting up in chair  [] Patient left in no apparent distress in bed  [x] Call bell left within reach  [x] Nursing notified  [] Caregiver present  [] Bed alarm activated    COMMUNICATION/EDUCATION:   [x] Role of Occupational Therapy in the acute care setting  [x] Home safety education was provided and the patient/caregiver indicated understanding. [x] Patient/family have participated as able in goal setting and plan of care. [x] Patient/family agree to work toward stated goals and plan of care. [] Patient understands intent and goals of therapy, but is neutral about his/her participation. [] Patient is unable to participate in goal setting and plan of care. Thank you for this referral.  Ronal Beltran, OTR/L  Time Calculation: 33 mins    Eval Complexity: History: MEDIUM Complexity : Expanded review of history including physical, cognitive and psychosocial  history ; Examination: MEDIUM Complexity : 3-5 performance deficits relating to physical, cognitive , or psychosocial skils that result in activity limitations and / or participation restrictions; Decision Making:MEDIUM Complexity : Patient may present with comorbidities that affect occupational performnce.  Miniml to moderate modification of tasks or assistance (eg, physical or verbal ) with assesment(s) is necessary to enable patient to complete evaluation

## 2022-05-06 NOTE — PROGRESS NOTES
Problem: Dysphagia (Adult)  Goal: *Acute Goals and Plan of Care (Insert Text)  Description:   Patient will:  1. Tolerate PO trials with 0 s/s overt distress in 4/5 trials  2. Utilize compensatory swallow strategies/maneuvers (decrease bite/sip, size/rate, alt. liq/sol) with min cues in 4/5 trials  3. Perform oral-motor/laryngeal exercises to increase oropharyngeal swallow function with min cues  4. Complete an objective swallow study (i.e., MBSS) to assess swallow integrity, r/o aspiration, and determine of safest LRD, min A as indicated/ordered by MD     Rec:     Regular solid with thin liquids  Aspiration precautions  HOB >45 during po intake, remain >30 for 30-45 minutes after po   Small bites/sips; alternate liquid/solid with slow feeding rate   Oral care TID  Meds per pt preference  Outcome: Progressing Towards Goal   SPEECH LANGUAGE PATHOLOGY BEDSIDE SWALLOW EVALUATION/TREATMENT    Patient: Caron Quinn (43 y.o. male)  Date: 5/6/2022  Primary Diagnosis: AMS (altered mental status) [R41.82]        Precautions: aspiration     PLOF: As per H&P    ASSESSMENT :  Based on the objective data described below, the patient presents with oropharyngeal swallow fxn essentially [facility] [place] s/p extubation yesterday PM. Pt was intubated for ~6 days. He was confused throughout evaluation. Strength, ROM, and coordination of the orofacial musculature were all found to be [place] [place]. Pt tolerated reg solid, puree, and thin liquids +/- straw consecutive swallows without any overt s/sx of aspiration. Mastication was appropriate with timely a-p transit. Positive oral clearance observed across all trials. Pt safe for initiation of reg solid diet with thin liquids, aspiration precautions, oral care TID, and meds as tolerated.      TREATMENT :  Skilled therapy initiated; Educated pt on aspiration precautions and importance of compensatory swallow techniques to decrease aspiration risk (decrease rate of intake & sip/bite size, upright @HOB for all po intake and ~30 minutes after po); verbalized comprehension. ST will continue to follow x 1-2 visits to ensure safety of diet tolerance. Patient will benefit from skilled intervention to address the above impairments. Patient's rehabilitation potential is considered to be Good  Factors which may influence rehabilitation potential include:   []            None noted  [x]            Mental ability/status  []            Medical condition  []            Home/family situation and support systems  []            Safety awareness  []            Pain tolerance/management  []            Other:      PLAN :  Recommendations and Planned Interventions: See above  Frequency/Duration: Patient will be followed by speech-language pathology x 1-2 more visits address goals. Discharge Recommendations: None     SUBJECTIVE:   Patient stated Pff, straws are for sissies! .     OBJECTIVE:     Past Medical History:   Diagnosis Date    Arthritis     Hemochromatosis     Ill-defined condition     GOUT     Past Surgical History:   Procedure Laterality Date    HX ORTHOPAEDIC      LEFT HAND     Prior Level of Function/Home Situation: see below  Home Situation  Home Environment: Private residence  One/Two Story Residence: Other (Comment) (pt intubated and unable to answer)  Living Alone: Yes  Support Systems: Child(rae),Other Family Member(s)  Patient Expects to be Discharged to[de-identified] Unable to determine at this time  Current DME Used/Available at Home: Other (comment) (unknown - pt unable to answer)    Diet prior to admission: regular solid with thin  Current Diet:  regular solid with thin      Cognitive and Communication Status:  Neurologic State: Alert,Confused  Orientation Level: Oriented to person,Oriented to place  Cognition: Follows commands  Oral Assessment:  Oral Assessment  Labial: No impairment  Dentition: Natural;Intact  Oral Hygiene: adequate  Lingual: No impairment  Velum: No impairment  Mandible: No impairment  P.O. Trials:  Patient Position: [facility] at [facility]  Vocal quality prior to P.O.: No impairment  Consistency Presented: Thin liquid; Solid;Puree  How Presented: Self-fed/presented;Cup/sip;Spoon; Successive swallows  Bolus Acceptance: No impairment  Bolus Formation/Control: No impairment  Propulsion: No impairment  Oral Residue: None  Initiation of Swallow: No impairment  Laryngeal Elevation: Functional  Aspiration Signs/Symptoms: None  Pharyngeal Phase Characteristics: No impairment, issues, or problems   Effective Modifications: None  Cues for Modifications: None  Oral Phase Severity: No impairment  Pharyngeal Phase Severity : No impairment    PAIN:  Start of Eval: 0  End of Eval: 0     After treatment:   []            Patient left in no apparent distress sitting up in chair  [x]            Patient left in no apparent distress in bed  [x]            Call bell left within reach  [x]            Nursing notified  []            Family present  []            Caregiver present  []            Bed alarm activated    COMMUNICATION/EDUCATION:   [x]            Aspiration precautions; swallow safety; compensatory techniques. [x]            Patient/family have participated as able in goal setting and plan of care. []            Patient/family agree to work toward stated goals and plan of care. []            Patient understands intent and goals of therapy; neutral about participation. []            Patient unable to participate in goal setting/plan of care; educ ongoing with interdisciplinary staff  [x]         Posted safety precautions in patient's room.     Thank you for this referral.    Pratibha Bowers M.S. CCC-SLP/L  Speech-Language Pathologist

## 2022-05-07 LAB
1,3 BETA GLUCAN SER-MCNC: <31 PG/ML
ALBUMIN SERPL-MCNC: 2.8 G/DL (ref 3.4–5)
ANION GAP SERPL CALC-SCNC: 10 MMOL/L (ref 3–18)
BACTERIA SPEC CULT: ABNORMAL
BASOPHILS # BLD: 0 K/UL (ref 0–0.1)
BASOPHILS NFR BLD: 0 % (ref 0–2)
BUN SERPL-MCNC: 16 MG/DL (ref 7–18)
BUN/CREAT SERPL: 12 (ref 12–20)
CALCIUM SERPL-MCNC: 9 MG/DL (ref 8.5–10.1)
CHLORIDE SERPL-SCNC: 107 MMOL/L (ref 100–111)
CO2 SERPL-SCNC: 25 MMOL/L (ref 21–32)
CREAT SERPL-MCNC: 1.35 MG/DL (ref 0.6–1.3)
DIFFERENTIAL METHOD BLD: ABNORMAL
EOSINOPHIL # BLD: 0.6 K/UL (ref 0–0.4)
EOSINOPHIL NFR BLD: 6 % (ref 0–5)
ERYTHROCYTE [DISTWIDTH] IN BLOOD BY AUTOMATED COUNT: 13.4 % (ref 11.6–14.5)
GLUCOSE BLD STRIP.AUTO-MCNC: 86 MG/DL (ref 70–110)
GLUCOSE BLD STRIP.AUTO-MCNC: 92 MG/DL (ref 70–110)
GLUCOSE BLD STRIP.AUTO-MCNC: 93 MG/DL (ref 70–110)
GLUCOSE BLD STRIP.AUTO-MCNC: 96 MG/DL (ref 70–110)
GLUCOSE SERPL-MCNC: 89 MG/DL (ref 74–99)
GRAM STN SPEC: ABNORMAL
GRAM STN SPEC: ABNORMAL
HCT VFR BLD AUTO: 31.5 % (ref 36–48)
HGB BLD-MCNC: 10.5 G/DL (ref 13–16)
IMM GRANULOCYTES # BLD AUTO: 0.1 K/UL (ref 0–0.04)
IMM GRANULOCYTES NFR BLD AUTO: 1 % (ref 0–0.5)
LYMPHOCYTES # BLD: 1.7 K/UL (ref 0.9–3.6)
LYMPHOCYTES NFR BLD: 16 % (ref 21–52)
MAGNESIUM SERPL-MCNC: 2 MG/DL (ref 1.6–2.6)
MCH RBC QN AUTO: 30.9 PG (ref 24–34)
MCHC RBC AUTO-ENTMCNC: 33.3 G/DL (ref 31–37)
MCV RBC AUTO: 92.6 FL (ref 78–100)
MONOCYTES # BLD: 0.8 K/UL (ref 0.05–1.2)
MONOCYTES NFR BLD: 8 % (ref 3–10)
NEUTS SEG # BLD: 7.3 K/UL (ref 1.8–8)
NEUTS SEG NFR BLD: 69 % (ref 40–73)
NRBC # BLD: 0 K/UL (ref 0–0.01)
NRBC BLD-RTO: 0 PER 100 WBC
PHOSPHATE SERPL-MCNC: 2.3 MG/DL (ref 2.5–4.9)
PLATELET # BLD AUTO: 165 K/UL (ref 135–420)
PMV BLD AUTO: 10.2 FL (ref 9.2–11.8)
POTASSIUM SERPL-SCNC: 3.3 MMOL/L (ref 3.5–5.5)
RBC # BLD AUTO: 3.4 M/UL (ref 4.35–5.65)
SERVICE CMNT-IMP: ABNORMAL
SODIUM SERPL-SCNC: 142 MMOL/L (ref 136–145)
WBC # BLD AUTO: 10.6 K/UL (ref 4.6–13.2)

## 2022-05-07 PROCEDURE — 85025 COMPLETE CBC W/AUTO DIFF WBC: CPT

## 2022-05-07 PROCEDURE — 74011000258 HC RX REV CODE- 258: Performed by: INTERNAL MEDICINE

## 2022-05-07 PROCEDURE — 74011250637 HC RX REV CODE- 250/637: Performed by: INTERNAL MEDICINE

## 2022-05-07 PROCEDURE — 82962 GLUCOSE BLOOD TEST: CPT

## 2022-05-07 PROCEDURE — 74011250636 HC RX REV CODE- 250/636: Performed by: PHYSICIAN ASSISTANT

## 2022-05-07 PROCEDURE — 80069 RENAL FUNCTION PANEL: CPT

## 2022-05-07 PROCEDURE — 74011000250 HC RX REV CODE- 250: Performed by: PHYSICIAN ASSISTANT

## 2022-05-07 PROCEDURE — 74011250637 HC RX REV CODE- 250/637: Performed by: NURSE PRACTITIONER

## 2022-05-07 PROCEDURE — 83735 ASSAY OF MAGNESIUM: CPT

## 2022-05-07 PROCEDURE — 65270000046 HC RM TELEMETRY

## 2022-05-07 PROCEDURE — 99232 SBSQ HOSP IP/OBS MODERATE 35: CPT | Performed by: INTERNAL MEDICINE

## 2022-05-07 PROCEDURE — 74011250636 HC RX REV CODE- 250/636: Performed by: INTERNAL MEDICINE

## 2022-05-07 PROCEDURE — 2709999900 HC NON-CHARGEABLE SUPPLY

## 2022-05-07 PROCEDURE — 51798 US URINE CAPACITY MEASURE: CPT

## 2022-05-07 PROCEDURE — 36415 COLL VENOUS BLD VENIPUNCTURE: CPT

## 2022-05-07 RX ORDER — NORTRIPTYLINE HYDROCHLORIDE 25 MG/1
25 CAPSULE ORAL
Status: DISCONTINUED | OUTPATIENT
Start: 2022-05-07 | End: 2022-05-07

## 2022-05-07 RX ORDER — BACITRACIN ZINC AND POLYMYXIN B SULFATE 500; 1000 [USP'U]/G; [USP'U]/G
OINTMENT TOPICAL 2 TIMES DAILY
Status: DISCONTINUED | OUTPATIENT
Start: 2022-05-07 | End: 2022-05-09 | Stop reason: HOSPADM

## 2022-05-07 RX ORDER — DULOXETIN HYDROCHLORIDE 60 MG/1
60 CAPSULE, DELAYED RELEASE ORAL DAILY
Status: DISCONTINUED | OUTPATIENT
Start: 2022-05-08 | End: 2022-05-07

## 2022-05-07 RX ORDER — METOPROLOL TARTRATE 25 MG/1
12.5 TABLET, FILM COATED ORAL 2 TIMES DAILY
Status: DISCONTINUED | OUTPATIENT
Start: 2022-05-07 | End: 2022-05-09 | Stop reason: HOSPADM

## 2022-05-07 RX ORDER — ALLOPURINOL 100 MG/1
100 TABLET ORAL DAILY
Status: DISCONTINUED | OUTPATIENT
Start: 2022-05-08 | End: 2022-05-09 | Stop reason: HOSPADM

## 2022-05-07 RX ORDER — ALLOPURINOL 300 MG/1
300 TABLET ORAL DAILY
Status: DISCONTINUED | OUTPATIENT
Start: 2022-05-08 | End: 2022-05-07

## 2022-05-07 RX ADMIN — BACITRACIN ZINC AND POLYMYXIN B SULFATE: 500; 10000 OINTMENT TOPICAL at 11:48

## 2022-05-07 RX ADMIN — PIPERACILLIN AND TAZOBACTAM 3.38 G: 3; .375 INJECTION, POWDER, LYOPHILIZED, FOR SOLUTION INTRAVENOUS at 09:06

## 2022-05-07 RX ADMIN — DIBASIC SODIUM PHOSPHATE, MONOBASIC POTASSIUM PHOSPHATE AND MONOBASIC SODIUM PHOSPHATE 2 TABLET: 852; 155; 130 TABLET ORAL at 23:04

## 2022-05-07 RX ADMIN — HEPARIN SODIUM 5000 UNITS: 5000 INJECTION INTRAVENOUS; SUBCUTANEOUS at 03:46

## 2022-05-07 RX ADMIN — PANTOPRAZOLE SODIUM 40 MG: 40 TABLET, DELAYED RELEASE ORAL at 09:02

## 2022-05-07 RX ADMIN — AMLODIPINE BESYLATE 5 MG: 5 TABLET ORAL at 09:02

## 2022-05-07 RX ADMIN — DIBASIC SODIUM PHOSPHATE, MONOBASIC POTASSIUM PHOSPHATE AND MONOBASIC SODIUM PHOSPHATE 2 TABLET: 852; 155; 130 TABLET ORAL at 17:25

## 2022-05-07 RX ADMIN — QUETIAPINE FUMARATE 50 MG: 25 TABLET ORAL at 23:05

## 2022-05-07 RX ADMIN — DOCUSATE SODIUM 100 MG: 100 CAPSULE, LIQUID FILLED ORAL at 09:02

## 2022-05-07 RX ADMIN — HEPARIN SODIUM 5000 UNITS: 5000 INJECTION INTRAVENOUS; SUBCUTANEOUS at 11:47

## 2022-05-07 RX ADMIN — METOPROLOL TARTRATE 12.5 MG: 25 TABLET, FILM COATED ORAL at 17:25

## 2022-05-07 RX ADMIN — PIPERACILLIN AND TAZOBACTAM 3.38 G: 3; .375 INJECTION, POWDER, LYOPHILIZED, FOR SOLUTION INTRAVENOUS at 17:15

## 2022-05-07 RX ADMIN — BACITRACIN ZINC AND POLYMYXIN B SULFATE: 500; 10000 OINTMENT TOPICAL at 17:26

## 2022-05-07 RX ADMIN — HEPARIN SODIUM 5000 UNITS: 5000 INJECTION INTRAVENOUS; SUBCUTANEOUS at 23:05

## 2022-05-07 NOTE — PROGRESS NOTES
[facility] Hospitalist Group  Progress Note    Patient: Miranda Abraham Age: 71 y.o. : 1950 MR#: [id number] [id number]: [id number]  Date: 2022     Subjective: Interval HPI:  Patient is a 75 y. o. male w/ PMH of hemachromatosis presenting to [facility] from home via EMS from home unresponsive. History obtained from chart as patient is unresponsive. He was found by his step daughter who reported patient had been acting \"bipolar lately\" and would not let her in the house. She is not able to give a medication list at this time. Patients general UDS negative, CT head negative for acute issues. Poison control contacted by ED- recommend supportive care. Patient required intubation due to mental status. HD initiated 5/3 due to suspected baclofen overdose. Patient was seen in presence of nursing. Patient feels fine. No headaches or dizziness. No chest pain or abdominal pain. No nausea or vomiting. He says he lives with family member, grandson and daughter but they are out of town currently. He knows his date of birth, he knows [facility] president's name. Assessment/Plan:     1. Acute respiratory failure requiring mechanical ventilation in setting of encephalopathy with need for airway protection. Resolved. 2. Acute toxic metabolic encephalopathy due to suspected medication overdose. 3. Sepsis with hypothermia s/p treatment. Resolved currently  4. Elevated TSH. Normal T4  5. TARI on CKD3, back to baseline  6. Incidental cystic lucency anterior inferior right basal ganglia region on CT head. 7. 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 8.  Acute debility  9. Hypokalemia and low phosphorus  10. Hypertension    Plan:    Patient will be completing Zosyn today. Nephrology to follow. Replace potassium and phosphorus today. Psych input noted. Restart home Cymbalta and amitriptyline  Restart Bystolic in addition to Norvasc for hypertension.   Repeat thyroid panel in the morning  PT, OT and speech therapist to follow this patient.     Anticipated date of discharge: May 9, 2022 if remains stable    Case discussed with:  [x]Patient  []Family  [x]Nursing  []Case Management  DVT Prophylaxis:  []Lovenox  []Hep SQ  []SCDs  []Coumadin   []On Heparin gtt    Objective:   VS:   Visit Vitals  BP (!) 151/67   Pulse 76   Temp 98.6 °F (37 °C)   Resp 21   Ht 6' (1.829 m)   Wt 92.1 kg (203 lb)   SpO2 91%   BMI 27.53 kg/m²      Tmax/24hrs: Temp (24hrs), Av.7 °F (37.1 °C), Min:98.2 °F (36.8 °C), Max:99 °F (37.2 °C)      Intake/Output Summary (Last 24 hours) at 2022 1227  Last data filed at 2022 1148  Gross per 24 hour   Intake 1410 ml   Output 1250 ml   Net 160 ml       General appearance - alert, well appearing, and in no distress  Chest -clear air entry noted in bases, no wheezes  Heart - S1 and S2 normal  Abdomen - soft, nontender, nondistended, Bowel sounds present  Neurological - alert, oriented, normal speech, no focal findings noted, no tremors noted  Musculoskeletal - no joint tenderness or erythema of knees bilaterally  Extremities - no pedal edema noted      Labs:    Recent Results (from the past 24 hour(s))   GLUCOSE, POC    Collection Time: 22  9:11 PM   Result Value Ref Range    Glucose (POC) 79 70 - 110 mg/dL   RENAL FUNCTION PANEL    Collection Time: 22  1:27 AM   Result Value Ref Range    Sodium 142 136 - 145 mmol/L    Potassium 3.3 (L) 3.5 - 5.5 mmol/L    Chloride 107 100 - 111 mmol/L    CO2 25 21 - 32 mmol/L    Anion gap 10 3.0 - 18 mmol/L    Glucose 89 74 - 99 mg/dL    BUN 16 7.0 - 18 MG/DL    Creatinine 1.35 (H) 0.6 - 1.3 MG/DL    BUN/Creatinine ratio 12 12 - 20      GFR est AA >60 >60 ml/min/1.73m2    GFR est non-AA 52 (L) >60 ml/min/1.73m2    Calcium 9.0 8.5 - 10.1 MG/DL    Phosphorus 2.3 (L) 2.5 - 4.9 MG/DL    Albumin 2.8 (L) 3.4 - 5.0 g/dL   CBC WITH AUTOMATED DIFF    Collection Time: 22  1:27 AM   Result Value Ref Range    WBC 10.6 4.6 - 13.2 K/uL    RBC 3.40 (L) 4.35 - 5.65 M/uL    HGB 10.5 (L) 13.0 - 16.0 g/dL    HCT 31.5 (L) 36.0 - 48.0 %    MCV 92.6 78.0 - 100.0 FL    MCH 30.9 24.0 - 34.0 PG    MCHC 33.3 31.0 - 37.0 g/dL    RDW 13.4 11.6 - 14.5 %    PLATELET 873 773 - 921 K/uL    MPV 10.2 9.2 - 11.8 FL    NRBC 0.0 0  WBC    ABSOLUTE NRBC 0.00 0.00 - 0.01 K/uL    NEUTROPHILS 69 40 - 73 %    LYMPHOCYTES 16 (L) 21 - 52 %    MONOCYTES 8 3 - 10 %    EOSINOPHILS 6 (H) 0 - 5 %    BASOPHILS 0 0 - 2 %    IMMATURE GRANULOCYTES 1 (H) 0.0 - 0.5 %    ABS. NEUTROPHILS 7.3 1.8 - 8.0 K/UL    ABS. LYMPHOCYTES 1.7 0.9 - 3.6 K/UL    ABS. MONOCYTES 0.8 0.05 - 1.2 K/UL    ABS. EOSINOPHILS 0.6 (H) 0.0 - 0.4 K/UL    ABS. BASOPHILS 0.0 0.0 - 0.1 K/UL    ABS. IMM. GRANS. 0.1 (H) 0.00 - 0.04 K/UL    DF AUTOMATED     MAGNESIUM    Collection Time: 05/07/22  1:27 AM   Result Value Ref Range    Magnesium 2.0 1.6 - 2.6 mg/dL   GLUCOSE, POC    Collection Time: 05/07/22  7:06 AM   Result Value Ref Range    Glucose (POC) 92 70 - 110 mg/dL   GLUCOSE, POC    Collection Time: 05/07/22 11:19 AM   Result Value Ref Range    Glucose (POC) 93 70 - 110 mg/dL     Disclaimer: Sections of this note are dictated using utilizing voice recognition software, which may have resulted in some phonetic based errors in grammar and contents. Even though attempts were made to correct all the mistakes, some may have been missed, and remained in the body of the document. If questions arise, please contact our department.     Signed By: Aimee Khan MD     May 7, 2022

## 2022-05-07 NOTE — PROGRESS NOTES
RENAL DAILY PROGRESS NOTE              Subjective:       Complaint:HD cath access is out  Overnight events noted      IMPRESSION:   TARI resolved now  · CKD stage 3a due to hypertension  · Hypokalemia  · Baclofen toxicity causing bradycardia, hypothermia, EEG discharges? ???  · Hemochromatosis on serial phlebotomies as OP   PLAN:     · Cr at baseline  · I and O  · Daily weights  · Needs OP follow up  · Will sign off at this point            Current Facility-Administered Medications   Medication Dose Route Frequency    bacitracin zinc-polymyxin b (POLYSPORIN) 500-10,000 unit/gram ointment   Topical BID    pantoprazole (PROTONIX) tablet 40 mg  40 mg Oral ACB    docusate sodium (COLACE) capsule 100 mg  100 mg Oral DAILY    QUEtiapine (SEROquel) tablet 50 mg  50 mg Oral QHS    amLODIPine (NORVASC) tablet 5 mg  5 mg Oral DAILY    heparin (porcine) 1,000 unit/mL injection 2,200 Units  2,200 Units Hemodialysis DIALYSIS PRN    acetaminophen (TYLENOL) tablet 650 mg  650 mg Oral Q6H PRN    piperacillin-tazobactam (ZOSYN) 3.375 g in 0.9% sodium chloride (MBP/ADV) 100 mL MBP  3.375 g IntraVENous Q8H    albuterol-ipratropium (DUO-NEB) 2.5 MG-0.5 MG/3 ML  3 mL Nebulization Q4H PRN    heparin (porcine) injection 5,000 Units  5,000 Units SubCUTAneous Q8H         Objective:     Patient Vitals for the past 24 hrs:   Temp Pulse Resp BP SpO2   05/07/22 0719 98.2 °F (36.8 °C) 75 18 (!) 151/77 97 %   05/07/22 0403 98.7 °F (37.1 °C) 81 18 (!) 158/78 95 %   05/07/22 0002 99 °F (37.2 °C) 88 18 (!) 162/75 97 %   05/06/22 1950 99 °F (37.2 °C) 78 17 (!) 142/79 97 %   05/06/22 1620 98.8 °F (37.1 °C) 88 16 (!) 157/90 98 %   05/06/22 1540 -- -- -- -- 97 %   05/06/22 1400 -- 78 14 -- 92 %   05/06/22 1300 -- 81 21 -- 92 %   05/06/22 1200 98.2 °F (36.8 °C) 89 16 (!) 148/82 94 %        Weight change: -1.42 kg (-3 lb 2.1 oz)     05/05 1901 - 05/07 0700  In: 1290 [P.O.:480; I.V.:810]  Out: 1750 [Urine:1750]    Intake/Output Summary (Last 24 hours) at 5/7/2022 1141  Last data filed at 5/7/2022 0244  Gross per 24 hour   Intake 1290 ml   Output 650 ml   Net 640 ml     Physical Exam:   HEENT sclera anicteric,  CVS: S1S2 heard,  no rub  RS: + air entry b/l,   Abd: Soft, Non tender  Neuro: alert and oriented times 3  Extrm:  no cyanosis, clubbing   Skin: no visible  Rash  Musculoskeletal: No gross joints or bone deformities         Data Review:     LABS:   Hematology:   Recent Labs     05/07/22  0127 05/06/22  0400 05/05/22  0600   WBC 10.6 10.4 10.5   HGB 10.5* 10.1* 10.4*   HCT 31.5* 31.3* 32.3*     Chemistry:   Recent Labs     05/07/22 0127 05/06/22  0400 05/05/22  0600   BUN 16 14 16   CREA 1.35* 1.22 1.36*   CA 9.0 8.9 9.0   ALB 2.8* 2.6* 2.6*   K 3.3* 3.7 3.8    141 140    108 106   CO2 25 27 28   PHOS 2.3* 2.9 2.6   GLU 89 88 106*            Procedures/imaging: see electronic medical records for all procedures, Xrays and details which were not copied into this note but were reviewed prior to creation of Plan          Assessment & Plan:     See above        Magaly White MD  5/7/2022

## 2022-05-07 NOTE — PROGRESS NOTES
05/06/22 5065   RT Protocol   History of Pulmonary Disease 0   Respiratory Pattern 0   Breath Sounds 0   Cough 0   Indication for Bronchodilator Therapy None   Bronchodilator Assessment Score 0

## 2022-05-07 NOTE — RT PROTOCOL NOTE
RT Nebulizer Bronchodilator Protocol Note    There is a bronchodilator order in the chart from a provider indicating to follow the RT Bronchodilator Protocol and there is an Initiate RT Bronchodilator Protocol order as well (see protocol at bottom of note). CXR Findings:  Recent Results (from the past 2 days)    XR CHEST PORT 05/05/2022    Impression  1. Tubes and lines without evidence of complication. 2.  Small left pleural effusion. The findings from the last RT Protocol Assessment were as follows:  History of Pulmonary Disease: None or smoker <15 pack years  Respiratory Pattern: Regular pattern and RR 12-20 bpm  Breath Sounds: Clear breath sounds  Cough: Strong, spontaneous, non-productive  Indication for Bronchodilator Therapy: None  Bronchodilator Assessment Score: 0     Aerosolized bronchodilator medication orders have been revised according to the RT Nebulizer Bronchodilator Protocol below. Respiratory Therapist to perform RT Therapy Protocol Assessment initially then follow the protocol. Repeat RT Therapy Protocol Assessment PRN for score 0-3 or on second treatment, BID, and PRN for scores above 3. No Indications - adjust the frequency to every 6 hours PRN wheezing or bronchospasm, if no treatments needed after 48 hours then discontinue using Per Protocol order mode. If indication present, adjust the RT bronchodilator orders based on the Bronchodilator Assessment Score as indicated below. If a patient is on this medication at home then do not decrease Frequency below that used at home. 0-3 - enter or revise RT bronchodilator order(s) to equivalent RT Bronchodilator order with Frequency of every 4 hours PRN for wheezing or increased work of breathing using Per Protocol order mode.         4-6 - enter or revise RT Bronchodilator order(s) to two equivalent RT bronchodilator orders with one order with BID Frequency and one order with Frequency of every 4 hours PRN wheezing or increased work of breathing using Per Protocol order mode. 7-10 - enter or revise RT Bronchodilator order(s) to two equivalent RT bronchodilator orders with one order with TID Frequency and one order with Frequency of every 4 hours PRN wheezing or increased work of breathing using Per Protocol order mode. 11-13 - enter or revise RT Bronchodilator order(s) to one equivalent RT bronchodilator order with QID Frequency and an Albuterol order with Frequency of every 4 hours PRN wheezing or increased work of breathing using Per Protocol order mode. Greater than 13 - enter or revise RT Bronchodilator order(s) to one equivalent RT bronchodilator order with every 4 hours Frequency and an Albuterol order with Frequency of every 2 hours PRN wheezing or increased work of breathing using Per Protocol order mode. RT to enter RT Home Evaluation for COPD & MDI Assessment order using Per Protocol order mode.     Electronically signed by RT Khai on 5/6/2022 at 9:19 PM

## 2022-05-08 LAB
ANION GAP SERPL CALC-SCNC: 8 MMOL/L (ref 3–18)
BUN SERPL-MCNC: 18 MG/DL (ref 7–18)
BUN/CREAT SERPL: 13 (ref 12–20)
CALCIUM SERPL-MCNC: 9 MG/DL (ref 8.5–10.1)
CHLORIDE SERPL-SCNC: 106 MMOL/L (ref 100–111)
CO2 SERPL-SCNC: 25 MMOL/L (ref 21–32)
CREAT SERPL-MCNC: 1.39 MG/DL (ref 0.6–1.3)
GLUCOSE BLD STRIP.AUTO-MCNC: 81 MG/DL (ref 70–110)
GLUCOSE BLD STRIP.AUTO-MCNC: 83 MG/DL (ref 70–110)
GLUCOSE BLD STRIP.AUTO-MCNC: 87 MG/DL (ref 70–110)
GLUCOSE BLD STRIP.AUTO-MCNC: 88 MG/DL (ref 70–110)
GLUCOSE SERPL-MCNC: 74 MG/DL (ref 74–99)
PHOSPHATE SERPL-MCNC: 3.7 MG/DL (ref 2.5–4.9)
POTASSIUM SERPL-SCNC: 3.1 MMOL/L (ref 3.5–5.5)
POTASSIUM SERPL-SCNC: 3.7 MMOL/L (ref 3.5–5.5)
SODIUM SERPL-SCNC: 139 MMOL/L (ref 136–145)
T4 FREE SERPL-MCNC: 1 NG/DL (ref 0.7–1.5)
TSH SERPL DL<=0.05 MIU/L-ACNC: 2.2 UIU/ML (ref 0.36–3.74)
URATE SERPL-MCNC: 5.1 MG/DL (ref 2.6–7.2)

## 2022-05-08 PROCEDURE — 2709999900 HC NON-CHARGEABLE SUPPLY

## 2022-05-08 PROCEDURE — 82962 GLUCOSE BLOOD TEST: CPT

## 2022-05-08 PROCEDURE — 84100 ASSAY OF PHOSPHORUS: CPT

## 2022-05-08 PROCEDURE — 84439 ASSAY OF FREE THYROXINE: CPT

## 2022-05-08 PROCEDURE — 74011250637 HC RX REV CODE- 250/637: Performed by: NURSE PRACTITIONER

## 2022-05-08 PROCEDURE — 84132 ASSAY OF SERUM POTASSIUM: CPT

## 2022-05-08 PROCEDURE — 36415 COLL VENOUS BLD VENIPUNCTURE: CPT

## 2022-05-08 PROCEDURE — 65270000046 HC RM TELEMETRY

## 2022-05-08 PROCEDURE — 74011250637 HC RX REV CODE- 250/637: Performed by: INTERNAL MEDICINE

## 2022-05-08 PROCEDURE — 84443 ASSAY THYROID STIM HORMONE: CPT

## 2022-05-08 PROCEDURE — 74011250636 HC RX REV CODE- 250/636: Performed by: PHYSICIAN ASSISTANT

## 2022-05-08 PROCEDURE — 84550 ASSAY OF BLOOD/URIC ACID: CPT

## 2022-05-08 PROCEDURE — 99232 SBSQ HOSP IP/OBS MODERATE 35: CPT | Performed by: INTERNAL MEDICINE

## 2022-05-08 RX ORDER — AMLODIPINE BESYLATE 5 MG/1
5 TABLET ORAL
Status: COMPLETED | OUTPATIENT
Start: 2022-05-08 | End: 2022-05-08

## 2022-05-08 RX ORDER — AMLODIPINE BESYLATE 10 MG/1
10 TABLET ORAL DAILY
Status: DISCONTINUED | OUTPATIENT
Start: 2022-05-09 | End: 2022-05-09 | Stop reason: HOSPADM

## 2022-05-08 RX ORDER — POTASSIUM CHLORIDE 20 MEQ/1
40 TABLET, EXTENDED RELEASE ORAL EVERY 4 HOURS
Status: COMPLETED | OUTPATIENT
Start: 2022-05-08 | End: 2022-05-08

## 2022-05-08 RX ADMIN — HEPARIN SODIUM 5000 UNITS: 5000 INJECTION INTRAVENOUS; SUBCUTANEOUS at 23:00

## 2022-05-08 RX ADMIN — POTASSIUM CHLORIDE 40 MEQ: 20 TABLET, EXTENDED RELEASE ORAL at 06:58

## 2022-05-08 RX ADMIN — DOCUSATE SODIUM 100 MG: 100 CAPSULE, LIQUID FILLED ORAL at 09:00

## 2022-05-08 RX ADMIN — ALLOPURINOL 100 MG: 100 TABLET ORAL at 09:00

## 2022-05-08 RX ADMIN — QUETIAPINE FUMARATE 50 MG: 25 TABLET ORAL at 23:00

## 2022-05-08 RX ADMIN — MULTIPLE VITAMINS W/ MINERALS TAB 1 TABLET: TAB at 09:00

## 2022-05-08 RX ADMIN — POTASSIUM CHLORIDE 40 MEQ: 20 TABLET, EXTENDED RELEASE ORAL at 11:53

## 2022-05-08 RX ADMIN — HEPARIN SODIUM 5000 UNITS: 5000 INJECTION INTRAVENOUS; SUBCUTANEOUS at 05:26

## 2022-05-08 RX ADMIN — METOPROLOL TARTRATE 12.5 MG: 25 TABLET, FILM COATED ORAL at 17:11

## 2022-05-08 RX ADMIN — METOPROLOL TARTRATE 12.5 MG: 25 TABLET, FILM COATED ORAL at 09:00

## 2022-05-08 RX ADMIN — BACITRACIN ZINC AND POLYMYXIN B SULFATE: 500; 10000 OINTMENT TOPICAL at 17:12

## 2022-05-08 RX ADMIN — AMLODIPINE BESYLATE 5 MG: 5 TABLET ORAL at 09:00

## 2022-05-08 RX ADMIN — HEPARIN SODIUM 5000 UNITS: 5000 INJECTION INTRAVENOUS; SUBCUTANEOUS at 11:56

## 2022-05-08 RX ADMIN — PANTOPRAZOLE SODIUM 40 MG: 40 TABLET, DELAYED RELEASE ORAL at 06:58

## 2022-05-08 RX ADMIN — AMLODIPINE BESYLATE 5 MG: 5 TABLET ORAL at 13:00

## 2022-05-08 RX ADMIN — BACITRACIN ZINC AND POLYMYXIN B SULFATE: 500; 10000 OINTMENT TOPICAL at 09:03

## 2022-05-08 NOTE — PROGRESS NOTES
Bedside shift report give to [facility]. Chapito LANG from  [address] . Report including the following information SBAR, Kardex, recent results, MAR, intake/output and cardiac rhythm.

## 2022-05-08 NOTE — PROGRESS NOTES
[facility] Hospitalist Group  Progress Note    Patient: Mark Hartmann Age: 71 y.o. : 1950 MR#: [id number] SSN: [id number]  Date: 2022     Subjective: Interval HPI:  Patient is a 75 y. o. male w/ PMH of hemachromatosis presenting to [facility] from home via EMS from home unresponsive. History obtained from chart as patient is unresponsive. He was found by his step daughter who reported patient had been acting \"bipolar lately\" and would not let her in the house. She is not able to give a medication list at this time. Patients general UDS negative, CT head negative for acute issues. Poison control contacted by ED- recommend supportive care. Patient required intubation due to mental status. HD initiated 5/3 due to suspected baclofen overdose. Patient feels fine. He says he has walked outside the room today. Denies any chest pain or abdominal pain. No nausea or vomiting. He stated he has not heard anything from his family members. He requested me to talk to his daughter if possible. I called patient's daughter at [phone]: They are coming back in town. Their plan is to take patient home when here is ready. She also mentioned that they will stop by to the hospital later today to see him. Assessment/Plan:     1. Acute respiratory failure requiring mechanical ventilation in setting of encephalopathy with need for airway protection. Resolved. 2. Acute toxic metabolic encephalopathy due to suspected medication overdose. Seems to back to the baseline. 3. Sepsis with hypothermia s/p treatment. Resolved currently  4. Elevated TSH. Normal T4, resolved. 5. TARI on CKD3, back to baseline  6. Incidental cystic lucency anterior inferior right basal ganglia region on CT head. 7. 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 8.  Acute debility  9. Hypokalemia   10. Hypertension    Plan:    Patient completed Zosyn on May 7, 2022.   Nephrology input noted that they will sign off. Replace potassium and monitor it. Psych input noted. Patient was not taking Cymbalta or amitriptyline at home. They were stopped yesterday. Continue Bystolic and increase the dose of Norvasc for hypertension. Repeat thyroid panel report reviewed  PT, OT and speech therapist to follow this patient. Anticipated date of discharge: May 9, 2022 if remains stable clinically.     Case discussed with:  [x]Patient  [x]Family  [x]Nursing  []Case Management  DVT Prophylaxis:  []Lovenox  []Hep SQ  []SCDs  []Coumadin   []On Heparin gtt    Objective:   VS:   Visit Vitals  BP (!) 146/76   Pulse 83   Temp 99.5 °F (37.5 °C)   Resp 20   Ht 6' (1.829 m)   Wt 90.5 kg (199 lb 9.6 oz)   SpO2 96%   BMI 27.07 kg/m²      Tmax/24hrs: Temp (24hrs), Av.8 °F (37.1 °C), Min:98.4 °F (36.9 °C), Max:99.5 °F (37.5 °C)      Intake/Output Summary (Last 24 hours) at 2022 1103  Last data filed at 2022 0856  Gross per 24 hour   Intake 360 ml   Output 1700 ml   Net -1340 ml       General appearance - alert, well appearing, and in no distress  Chest -clear air entry noted in bases, no wheezes  Heart - S1 and S2 normal  Abdomen - soft, nontender, nondistended, Bowel sounds present  Neurological - alert, oriented, normal speech, no focal findings noted, no tremors noted  Musculoskeletal - no joint tenderness or erythema of knees bilaterally  Extremities - no pedal edema noted      Labs:    Recent Results (from the past 24 hour(s))   GLUCOSE, POC    Collection Time: 22 11:19 AM   Result Value Ref Range    Glucose (POC) 93 70 - 110 mg/dL   GLUCOSE, POC    Collection Time: 22  5:31 PM   Result Value Ref Range    Glucose (POC) 96 70 - 110 mg/dL   GLUCOSE, POC    Collection Time: 22  9:25 PM   Result Value Ref Range    Glucose (POC) 86 70 - 583 mg/dL   METABOLIC PANEL, BASIC    Collection Time: 22  2:31 AM   Result Value Ref Range    Sodium 139 136 - 145 mmol/L    Potassium 3.1 (L) 3.5 - 5.5 mmol/L    Chloride 106 100 - 111 mmol/L    CO2 25 21 - 32 mmol/L    Anion gap 8 3.0 - 18 mmol/L    Glucose 74 74 - 99 mg/dL    BUN 18 7.0 - 18 MG/DL    Creatinine 1.39 (H) 0.6 - 1.3 MG/DL    BUN/Creatinine ratio 13 12 - 20      GFR est AA >60 >60 ml/min/1.73m2    GFR est non-AA 50 (L) >60 ml/min/1.73m2    Calcium 9.0 8.5 - 10.1 MG/DL   PHOSPHORUS    Collection Time: 05/08/22  2:31 AM   Result Value Ref Range    Phosphorus 3.7 2.5 - 4.9 MG/DL   URIC ACID    Collection Time: 05/08/22  2:31 AM   Result Value Ref Range    Uric acid 5.1 2.6 - 7.2 MG/DL   TSH 3RD GENERATION    Collection Time: 05/08/22  2:31 AM   Result Value Ref Range    TSH 2.20 0.36 - 3.74 uIU/mL   T4, FREE    Collection Time: 05/08/22  2:31 AM   Result Value Ref Range    T4, Free 1.0 0.7 - 1.5 NG/DL   GLUCOSE, POC    Collection Time: 05/08/22  7:48 AM   Result Value Ref Range    Glucose (POC) 81 70 - 110 mg/dL     Disclaimer: Sections of this note are dictated using utilizing voice recognition software, which may have resulted in some phonetic based errors in grammar and contents. Even though attempts were made to correct all the mistakes, some may have been missed, and remained in the body of the document. If questions arise, please contact our department.     Signed By: Christian Abreu MD     May 8, 2022

## 2022-05-08 NOTE — PROGRESS NOTES
Tele tech called this nurse to inform RN that pt had 17 beats of VTACH. Pt in bed sleep with no s/s of distress. An order for 40mEq of Kelli Moss was already written because K was 3.1 on AM labs  AM nurse made aware and will notify attending.

## 2022-05-09 ENCOUNTER — HOME HEALTH ADMISSION (OUTPATIENT)
Dept: HOME HEALTH SERVICES | Facility: HOME HEALTH | Age: 72
End: 2022-05-09

## 2022-05-09 VITALS
HEART RATE: 78 BPM | OXYGEN SATURATION: 95 % | TEMPERATURE: 98.4 F | SYSTOLIC BLOOD PRESSURE: 154 MMHG | WEIGHT: 199.6 LBS | BODY MASS INDEX: 27.04 KG/M2 | HEIGHT: 72 IN | DIASTOLIC BLOOD PRESSURE: 80 MMHG | RESPIRATION RATE: 18 BRPM

## 2022-05-09 LAB
ANION GAP SERPL CALC-SCNC: 7 MMOL/L (ref 3–18)
BACTERIA SPEC CULT: NORMAL
BACTERIA SPEC CULT: NORMAL
BUN SERPL-MCNC: 16 MG/DL (ref 7–18)
BUN/CREAT SERPL: 12 (ref 12–20)
CALCIUM SERPL-MCNC: 9.2 MG/DL (ref 8.5–10.1)
CHLORIDE SERPL-SCNC: 107 MMOL/L (ref 100–111)
CO2 SERPL-SCNC: 24 MMOL/L (ref 21–32)
CREAT SERPL-MCNC: 1.36 MG/DL (ref 0.6–1.3)
GLUCOSE BLD STRIP.AUTO-MCNC: 88 MG/DL (ref 70–110)
GLUCOSE BLD STRIP.AUTO-MCNC: 89 MG/DL (ref 70–110)
GLUCOSE SERPL-MCNC: 84 MG/DL (ref 74–99)
PHOSPHATE SERPL-MCNC: 2.7 MG/DL (ref 2.5–4.9)
POTASSIUM SERPL-SCNC: 3.5 MMOL/L (ref 3.5–5.5)
SERVICE CMNT-IMP: NORMAL
SERVICE CMNT-IMP: NORMAL
SODIUM SERPL-SCNC: 138 MMOL/L (ref 136–145)

## 2022-05-09 PROCEDURE — 82962 GLUCOSE BLOOD TEST: CPT

## 2022-05-09 PROCEDURE — 97116 GAIT TRAINING THERAPY: CPT

## 2022-05-09 PROCEDURE — 97110 THERAPEUTIC EXERCISES: CPT

## 2022-05-09 PROCEDURE — 74011250637 HC RX REV CODE- 250/637: Performed by: INTERNAL MEDICINE

## 2022-05-09 PROCEDURE — 80048 BASIC METABOLIC PNL TOTAL CA: CPT

## 2022-05-09 PROCEDURE — 36415 COLL VENOUS BLD VENIPUNCTURE: CPT

## 2022-05-09 PROCEDURE — 99239 HOSP IP/OBS DSCHRG MGMT >30: CPT | Performed by: INTERNAL MEDICINE

## 2022-05-09 PROCEDURE — 84100 ASSAY OF PHOSPHORUS: CPT

## 2022-05-09 PROCEDURE — 74011250637 HC RX REV CODE- 250/637: Performed by: NURSE PRACTITIONER

## 2022-05-09 RX ORDER — POTASSIUM CHLORIDE 20 MEQ/1
20 TABLET, EXTENDED RELEASE ORAL
Status: COMPLETED | OUTPATIENT
Start: 2022-05-09 | End: 2022-05-09

## 2022-05-09 RX ORDER — AMLODIPINE BESYLATE 5 MG/1
5 TABLET ORAL DAILY
Qty: 30 TABLET | Refills: 0 | Status: SHIPPED | OUTPATIENT
Start: 2022-05-10

## 2022-05-09 RX ORDER — QUETIAPINE FUMARATE 50 MG/1
50 TABLET, FILM COATED ORAL
Qty: 30 TABLET | Refills: 0 | Status: SHIPPED | OUTPATIENT
Start: 2022-05-09 | End: 2022-07-18

## 2022-05-09 RX ORDER — PANTOPRAZOLE SODIUM 40 MG/1
40 TABLET, DELAYED RELEASE ORAL
Qty: 15 TABLET | Refills: 0 | Status: SHIPPED | OUTPATIENT
Start: 2022-05-10

## 2022-05-09 RX ADMIN — AMLODIPINE BESYLATE 10 MG: 10 TABLET ORAL at 08:48

## 2022-05-09 RX ADMIN — ALLOPURINOL 100 MG: 100 TABLET ORAL at 08:48

## 2022-05-09 RX ADMIN — PANTOPRAZOLE SODIUM 40 MG: 40 TABLET, DELAYED RELEASE ORAL at 08:48

## 2022-05-09 RX ADMIN — DOCUSATE SODIUM 100 MG: 100 CAPSULE, LIQUID FILLED ORAL at 08:48

## 2022-05-09 RX ADMIN — POTASSIUM CHLORIDE 20 MEQ: 1500 TABLET, EXTENDED RELEASE ORAL at 12:19

## 2022-05-09 RX ADMIN — MULTIPLE VITAMINS W/ MINERALS TAB 1 TABLET: TAB at 08:48

## 2022-05-09 RX ADMIN — METOPROLOL TARTRATE 12.5 MG: 25 TABLET, FILM COATED ORAL at 08:48

## 2022-05-09 RX ADMIN — BACITRACIN ZINC AND POLYMYXIN B SULFATE: 500; 10000 OINTMENT TOPICAL at 08:50

## 2022-05-09 NOTE — DISCHARGE INSTRUCTIONS
Patient Education     Altered Mental Status: Care Instructions  Your Care Instructions  Altered mental status is a change in how well your brain is working. As a result, you may be confused, be less alert than usual, or act in odd ways. This may include seeing or hearing things that aren't really there (hallucinations). A mental status change has many possible causes. For example, it may be the result of an infection, an imbalance of chemicals in the body, or a chronic disease such as diabetes or COPD. It can also be caused by things such as a head injury, taking certain medicines, or using alcohol or drugs. The doctor may do tests to look for the cause. These tests may include urine tests, blood tests, and imaging tests such as a CT scan. Sometimes a clear cause isn't found. But tests can help the doctor rule out a serious cause of your symptoms. A change in mental status can be scary. But mental status will often return to normal when the cause is treated. So it is important to get any follow-up testing or treatment the doctor has suggested. The doctor has checked you carefully, but problems can develop later. If you notice any problems or new symptoms, get medical treatment right away. Follow-up care is a key part of your treatment and safety. Be sure to make and go to all appointments, and call your doctor if you are having problems. It's also a good idea to know your test results and keep a list of the medicines you take. How can you care for yourself at home? · Be safe with medicines. Take your medicines exactly as prescribed. Call your doctor if you think you are having a problem with your medicine. · Have another adult stay with you until you are better. This can help keep you safe. Ask that person to watch for signs that your mental status is getting worse. When should you call for help? Call 911 anytime you think you may need emergency care.  For example, call if:  · You passed out (lost consciousness). Call your doctor now or seek immediate medical care if:  · Your mental status is getting worse. · You have new symptoms, such as a fever, chills, or shortness of breath. · You do not feel safe. Watch closely for changes in your health, and be sure to contact your doctor if:  · You do not get better as expected. Where can you learn more? Go to [employer]  Enter J452 in the search box to learn more about \"Altered Mental Status: Care Instructions. \"   © 2345-7564 Healthwise, Incorporated. Care instructions adapted under license by [facility] [employer] (which disclaims liability or warranty for this information). This care instruction is for use with your licensed healthcare professional. If you have questions about a medical condition or this instruction, always ask your healthcare professional. [address] any warranty or liability for your use of this information. Content Version: 31.1.146710; Current as of: November 20, 2015         Patient armband removed and shredded    DISCHARGE SUMMARY from Nurse    PATIENT INSTRUCTIONS:    What to do at Home:  Recommended activity: Activity as tolerated,     If you experience any of the following symptoms change in mental status,confusion or any untoward effects, please follow up with nearest emergency room or primary care physician . *  Please give a list of your current medications to your Primary Care Provider. *  Please update this list whenever your medications are discontinued, doses are      changed, or new medications (including over-the-counter products) are added. *  Please carry medication information at all times in case of emergency situations. These are general instructions for a healthy lifestyle:    No smoking/ No tobacco products/ Avoid exposure to second hand smoke  Surgeon General's Warning:  Quitting smoking now greatly reduces serious risk to your health.     Obesity, smoking, and sedentary lifestyle greatly increases your risk for illness    A healthy diet, regular physical exercise & weight monitoring are important for maintaining a healthy lifestyle    You may be retaining fluid if you have a history of heart failure or if you experience any of the following symptoms:  Weight gain of 3 pounds or more overnight or 5 pounds in a week, increased swelling in our hands or feet or shortness of breath while lying flat in bed. Please call your doctor as soon as you notice any of these symptoms; do not wait until your next office visit. The discharge information has been reviewed with the patient. The patient verbalized understanding. Discharge medications reviewed with the patient and appropriate educational materials and side effects teaching were provided.   ___________________________________________________________________________________________________________________________________

## 2022-05-09 NOTE — HOME CARE
Received home health referral for [facility] for (SN, PT, OT). Spoke with patient's daughter via phone;  patient identifiers verified. Explained home care services and routines. Demographics verified including insurance, phone and address confirmed. Patient has the following DME: received rolling walker prior to discharge. Caregivers available daughter lives in same residence and will be primary caregiver.   Orders noted and arranged to be processed to central intake.      ----   Sola Jones Age, LPN  [address]

## 2022-05-09 NOTE — PROGRESS NOTES
[facility] from Mauricio morton. Chapito LANG to includes SBAR, Kardex, recent results, MAR, intake/output.

## 2022-05-09 NOTE — DISCHARGE SUMMARY
Physician Discharge Summary       Patient: Binta Locke MRN: [id number]  SSN: [id number]    YOB: 1950  Age: 71 y.o. Sex: male    PCP: Fanny Barreto DO    Allergies: Patient has no known allergies. Admit date: 4/29/2022  Admitting Provider: Mrak Knight DO    Discharge date: 5/9/2022  Discharging Provider: Cesar Hernández MD    * Admission Diagnoses: AMS (altered mental status) [R41.82]    * Discharge Diagnoses:      1. Acute respiratory failure requiring mechanical ventilation in setting of encephalopathy with need for airway protection. Resolved. 2. Acute toxic metabolic encephalopathy due to suspected medication overdose. Seems to back to the baseline. 3. Sepsis with hypothermia s/p treatment. Resolved currently  4. Elevated TSH. Normal T4, resolved. 5. TARI on CKD3, back to baseline  6. Incidental cystic lucency anterior inferior right basal ganglia region on CT head. Not reported on MRI. 7. 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 8.  Acute debility  9. Hypokalemia   10. Hypertension    * Hospital Course: Patient is a 75 y. o. male w/ PMH of hemachromatosis presenting to [address] [place] [address] from home via EMS from home unresponsive. History obtained from chart as patient is unresponsive. He was found by his step daughter who reported patient had been acting \"bipolar lately\" and would not let her in the house. She is not able to give a medication list at this time. Patients general UDS negative, CT head negative for acute issues. Poison control contacted by ED- recommend supportive care. Patient required intubation due to mental status. HD initiated 5/3 due to suspected baclofen overdose. His mental status started getting better. He was subsequently extubated and doing well on room air. He was transferred out out of ICU and his dialysis was stopped. He had an EEG that was reported nonconvulsive seizures per neurologist.  He was not on antiepileptic medications. He was seen by psych recommended Seroquel at bedtime and outpatient follow-up with psychiatrist.  MRI of the brain was negative. Echocardiogram showed ejection fraction 55-60 percentage. He was restarted back on his home medications for other comorbidities. He was doing much better. He was able to walk around without any issues. Will be discharged home on the following medications today. * Procedures: EEG  * No surgery found *      Consults: Pulmonary/Intensive care, ID, Nephrology and Neurology    Significant Diagnostic Studies: CT head, MRI brain, chest x-rays and echocardiogram    Discharge Exam:  Please refer to my progress note from May 9, 2022 for further detail    * Discharge Condition: improved  * Disposition: Home    Discharge Medications:  Discharge Medication List as of 5/9/2022 10:44 AM      START taking these medications    Details   QUEtiapine (SEROquel) 50 mg tablet Take 1 Tablet by mouth nightly. , Print, Disp-30 Tablet, R-0      pantoprazole (PROTONIX) 40 mg tablet Take 1 Tablet by mouth Daily (before breakfast). , Print, Disp-15 Tablet, R-0      multivitamin, tx-iron-ca-min (THERA-M w/ IRON) 9 mg iron-400 mcg tab tablet Take 1 Tablet by mouth daily. , Print, Disp-30 Tablet, R-0      amLODIPine (NORVASC) 5 mg tablet Take 1 Tablet by mouth daily. , Print, Disp-30 Tablet, R-0      OTHER BMP on May 13, 2022  Please call report to PCP and nephrologist [dr. Kala Krishnan  Diagnosis: Recent renal failure. Stage III CKD. , Print, Disp-1 Each, R-0         CONTINUE these medications which have NOT CHANGED    Details   colchicine 0.6 mg tablet Take 0.6 mg by mouth as needed (gout flare ups). , Historical Med      varicella-zoster recombinant, PF, (Shingrix, PF,) 50 mcg/0.5 mL susr injection Shingrix (PF) 50 mcg/0.5 mL intramuscular suspension, kit, Historical Med      lidocaine HCL-benzyl alcohoL (Salonpas Lidocaine Plus) 4-10 % crea 1 Actuation(s) by Apply Externally route two (2) times daily as needed for Pain., Normal, Disp-1 Each, R-0      BYSTOLIC 10 mg tablet take 1 tablet by mouth once daily, Historical Med, R-0, VALENCIA         STOP taking these medications       raNITIdine (Zantac) 150 mg tablet Comments:   Reason for Stopping:         DULoxetine (CYMBALTA) 60 mg capsule Comments:   Reason for Stopping:         nortriptyline (PAMELOR) 25 mg capsule Comments:   Reason for Stopping:         lisinopriL (PRINIVIL, ZESTRIL) 10 mg tablet Comments:   Reason for Stopping:         tiZANidine (ZANAFLEX) 2 mg tablet Comments:   Reason for Stopping:         baclofen (LIORESAL) 10 mg tablet Comments:   Reason for Stopping:         meloxicam (MOBIC) 15 mg tablet Comments:   Reason for Stopping:         allopurinol (ZYLOPRIM) 300 mg tablet Comments:   Reason for Stopping:         zolpidem (AMBIEN) 10 mg tablet Comments:   Reason for Stopping:               * Follow-up Care/Patient Instructions:   Activity: PT/OT per Home Health  Diet: Cardiac Diet  Wound Care: None needed    Follow-up Information     Follow up With Specialties Details Why Contact Info    Mikayla Coppola, [address]   Patricia Faustin 121  [facility] [address] [address] [phone]      [address]  Your preferred agency, Chosen to continue managing healthcare needs [facility] [address]  [address] [facility]  Your preferred agency, Chosen to continue managing healthcare needs for [facility] needs [address]    Mikayla Coppola, [address]   Patricia Faustin [address]  [facility] [place]   [phone]          Follow-up Appointments   Procedures    FOLLOW UP VISIT Appointment in: Other ([address]) With PCP in 5 days With Dr. Sarah Brito, nephrologist, in 10 days With dr. Mehul Aguirre or outpatient psych in 10-14 days     With PCP in 5 days  With Dr. Sarah Brito, nephrologist, in 10 days  With dr. Jimmy Lamas or outpatient psych in 10-14 days     Standing Status:   Standing     Number of Occurrences:   1     Order Specific Question:   Appointment in     Answer: Other (Specify)     Total time of discharge is greater than 35 minutes    Disclaimer: Sections of this note are dictated using utilizing voice recognition software, which may have resulted in some phonetic based errors in grammar and contents. Even though attempts were made to correct all the mistakes, some may have been missed, and remained in the body of the document. If questions arise, please contact our department.       Signed:  Nicole Rahman MD  5/9/2022  2:23 PM

## 2022-05-09 NOTE — PROGRESS NOTES
New PT evaluation seen and acknowledged. Patient is already on PT caseload. Patient will bee seen as schedule allows.  Thank you for this referral.    Jim Conway, PT, DPT

## 2022-05-09 NOTE — PROGRESS NOTES
Reason for Admission:  AMS (altered mental status) [R41.82]                 RUR Score:    11%            Plan for utilizing home health:    Yes, FOC verbal consent given for [facility]. Likelihood of Readmission:   LOW                         Transition of Care Plan:              Initial assessment completed with patient. Cognitive status of patient: oriented to time, place, person and situation. Face sheet information confirmed:  yes. The patient designates his daughter Mariela Au [phone] to participate in his discharge plan and to receive any needed information. This patient lives in a single family home with his daughter, and grandkids, with 3 steps to enter. Patient was able to navigate steps as needed. Prior to hospitalization, patient was considered to be independent with ADLs/IADLS : yes . Patient has a current ACP document on file: no.      Healthcare Decision Maker:     Click here to complete [address] including selection of the Healthcare Decision Maker Relationship (ie \"Primary\")      The patient's  daughter will be available to transport patient home upon discharge. The patient already has none reported,  medical equipment available in the home. Patient is not currently active with home health. Patient has not stayed in a skilled nursing facility or rehab. This patient is on dialysis :no.      List of available Home Health agencies were provided and reviewed with the patient prior to discharge. [place] of choice verbal consent given: yes, for [employer] [facility] [employer]. Currently, the discharge plan is Home with [facility]. The patient states that he can obtain his medications from the pharmacy, and take his medications as directed. Patient's current insurance is [facility] [facility] and [employer]. Care Management Interventions  PCP Verified by CM:  Yes  Mode of Transport at Discharge: Self (Patient's daughter will be tranporting patient home at time of discharge. )  Transition of Care Consult (CM Consult): [address]: Yes  Discharge Durable Medical Equipment: Yes  Physical Therapy Consult: Yes  Occupational Therapy Consult: Yes  Speech Therapy Consult: Yes  Support Systems: Child(rae),Other Family Member(s) (Patient lives with his daughter and grandkids.)  Confirm Follow Up Transport: Family  The Plan for Transition of Care is Related to the Following Treatment Goals : Home with [address] [place] [address]  The Patient and/or Patient Representative was Provided with a Choice of Provider and Agrees with the Discharge Plan?: Yes  Freedom of Choice List was Provided with Basic Dialogue that Supports the Patient's Individualized Plan of Care/Goals, Treatment Preferences and Shares the Quality Data Associated with the Providers?: Yes  Discharge Location  Patient Expects to be Discharged to[de-identified] Home with home health        Carissa Augustin RN  Case Management [phone]

## 2022-05-09 NOTE — DISCHARGE SUMMARY
[address] [place] [address]  Face to Face Encounter    Patients Name: Ruthie Calix    YOB: 1950    Primary Diagnosis:     1. Acute respiratory failure requiring mechanical ventilation in setting of encephalopathy with need for airway protection. Resolved. 2. Acute toxic metabolic encephalopathy due to suspected medication overdose. Seems to back to the baseline. 3. Sepsis with hypothermia s/p treatment. Resolved currently  4. Elevated TSH. Normal T4, resolved. 5. TARI on CKD3, back to baseline  6. Incidental cystic lucency anterior inferior right basal ganglia region on CT head. 7. 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 8.  Acute debility  9. Hypokalemia   10. Hypertension    Date of Face to Face:   5/9/2022                                  Face to Face Encounter findings are related to primary reason for home care:   yes    1. I certify that the patient needs intermittent skilled nursing care, physical therapy and/or speech therapy. I will not be following this patient in the Community and Dr. Bobby Ibarra, DO   will be responsible for signing the [address] 2. Initial Orders for Care: Must be completed only if Face to Face MD will not be signing the [address] Skilled Nursing, Physical Therapy and Occupational Therapy    3. I certify that this patient is homebound for the following reason(s): Requires the assistance of 1 or more persons to leave the home  and Decreased mental status requiring 24 hour supervision     4. I certify that this patient is under my care and that I, or a nurse practitioner or  [phone] working with me, had a Face-to-Face Encounter that meets the physician Face-to-Face Encounter requirements.   Document the physical findings from the Face-to-Face Encounter that support the need for skilled services: Has new medications that requires skilled nursing teaching and monitoring for understanding and compliance  and Has new finding of weakness and altered mobility that requires skilled physical/occupational and/or speech therapy services for evaluation and interventions.      Devorah Ewing MD  5/9/2022

## 2022-05-09 NOTE — PROGRESS NOTES
New OT order received and chart reviewed. Patient evaluated and currently on skilled OT caseload. Will acknowledge the order.   Thank you for the referral.  Stacey Lowery MS OTR/L

## 2022-05-09 NOTE — PROGRESS NOTES
Infectious Disease progress Note        Reason: aspiration pneumonia, coagulase negative staph bacteremia, yeast in sputum cx    Current abx Prior abx     Zosyn since 5/3/22-5/7 Unasyn, vancomycin  4/29/22-5/3     Lines:       Assessment :  71 y.o. male w/ PMH of hemachromatosis presented to [facility] from home via EMS on 4/29/22 with unresponsiveness. Now with low grade fever, positive blood cx, persistent altered mentation, TARI, Periodic discharges noted on EEG 5/2/22    Patient presents with highly complex picture. Etiology of fever not entirely clear at this time. Differential diagnosis includes-partially treated aspiration pneumonia/baclofen withdrawal  Will monitor for occult infection  Agree with ruling out DVT    Persistent fevers 5/3 despite broad-spectrum antibiotics antibiotics could be due to partially treated pneumonia with ampicillin/sulbactam resistant gram-negative's versus baclofen withdrawal.  Resolved fevers status post Zosyn since 5/3/2022    Sputum cultures 4/30/22-methicillin susceptible Staph aureus, normal respiratory adam, yeast.  Yeast in sputum culture likely colonizer    Single positive blood culture for coagulase-negative Staphylococcus on 4/29-likely contaminant. No evidence of skin/soft tissue infection noted on today's exam.    Periodic discharges noted on EEG 5/2/2022. Neurology follow-up appreciated. Concern for baclofen toxicity. Acute kidney injury-nephrology follow-up appreciated. Improving creatinine. S/p dialysis for suspected baclofen toxicity     Clinically better. Remains afebrile    Recommendations:  1. Hold further antibiotics  2. Follow-up nephrology recommendations   3. Follow-up neurology recommendations    We will sign off. Please call if any new questions or concerns. Thanks    Above plan was discussed in details with patient, primary team.     HPI:      Feels better.   Uriah Lovings to go home no CP, sob, abdominal pain      Current Discharge Medication List CONTINUE these medications which have NOT CHANGED    Details   raNITIdine (Zantac) 150 mg tablet Take 150 mg by mouth once over twenty-four (24) hours. DULoxetine (CYMBALTA) 60 mg capsule Take 60 mg by mouth daily. nortriptyline (PAMELOR) 25 mg capsule Take 50 mg by mouth nightly. lisinopriL (PRINIVIL, ZESTRIL) 10 mg tablet Take 10 mg by mouth daily. tiZANidine (ZANAFLEX) 2 mg tablet take 1 tablet by mouth three times a day if needed      colchicine 0.6 mg tablet Take 0.6 mg by mouth as needed (gout flare ups). varicella-zoster recombinant, PF, (Shingrix, PF,) 50 mcg/0.5 mL susr injection Shingrix (PF) 50 mcg/0.5 mL intramuscular suspension, kit      baclofen (LIORESAL) 10 mg tablet Take 0.5-1 Tablets by mouth three (3) times daily as needed for Pain. Indications: for acute/episodic pain  Qty: 60 Tablet, Refills: 0    Associated Diagnoses: Lumbar spondylosis      lidocaine HCL-benzyl alcohoL (Salonpas Lidocaine Plus) 4-10 % crea 1 Actuation(s) by Apply Externally route two (2) times daily as needed for Pain. Qty: 1 Each, Refills: 0      BYSTOLIC 10 mg tablet take 1 tablet by mouth once daily  Refills: 0      meloxicam (MOBIC) 15 mg tablet Take 15 mg by mouth daily. Refills: 0      allopurinol (ZYLOPRIM) 300 mg tablet Take 300 mg by mouth daily.   Refills: 0      zolpidem (AMBIEN) 10 mg tablet take 1 tablet by mouth at bedtime if needed  Refills: 0             Current Facility-Administered Medications   Medication Dose Route Frequency    amLODIPine (NORVASC) tablet 10 mg  10 mg Oral DAILY    bacitracin zinc-polymyxin b (POLYSPORIN) 500-10,000 unit/gram ointment   Topical BID    multivitamin, tx-iron-ca-min (THERA-M w/ IRON) tablet 1 Tablet  1 Tablet Oral DAILY    metoprolol tartrate (LOPRESSOR) tablet 12.5 mg  12.5 mg Oral BID    allopurinoL (ZYLOPRIM) tablet 100 mg  100 mg Oral DAILY    pantoprazole (PROTONIX) tablet 40 mg  40 mg Oral ACB    docusate sodium (COLACE) capsule 100 mg 100 mg Oral DAILY    QUEtiapine (SEROquel) tablet 50 mg  50 mg Oral QHS    acetaminophen (TYLENOL) tablet 650 mg  650 mg Oral Q6H PRN    albuterol-ipratropium (DUO-NEB) 2.5 MG-0.5 MG/3 ML  3 mL Nebulization Q4H PRN    heparin (porcine) injection 5,000 Units  5,000 Units SubCUTAneous Q8H       Allergies: Patient has no known allergies. History reviewed. No pertinent family history. Social History     Socioeconomic History    Marital status: SINGLE     Spouse name: Not on file    Number of children: Not on file    Years of education: Not on file    Highest education level: Not on file   Occupational History    Not on file   Tobacco Use    Smoking status: Never Smoker    Smokeless tobacco: Never Used   Substance and Sexual Activity    Alcohol use: Yes     Alcohol/week: 6.0 standard drinks     Types: 6 Cans of beer per week     Comment: weekly on weekends    Drug use: Not on file    Sexual activity: Not on file   Other Topics Concern    Not on file   Social History Narrative    Not on file     Social Determinants of Health     Financial Resource Strain:     Difficulty of Paying Living Expenses: Not on file   Food Insecurity:     Worried About [address] [employer] in the Last Year: Not on file    Ran Out of Food in the Last Year: Not on file   Transportation Needs:     Lack of Transportation (Medical): Not on file    Lack of Transportation (Non-Medical):  Not on file   Physical Activity:     Days of Exercise per Week: Not on file    Minutes of Exercise per Session: Not on file   Stress:     Feeling of Stress : Not on file   Social Connections:     Frequency of Communication with Friends and Family: Not on file    Frequency of Social Gatherings with Friends and Family: Not on file    Attends Yazidism Services: Not on file    Active Member of Clubs or Organizations: Not on file    Attends Club or Organization Meetings: Not on file    Marital Status: Not on file   Intimate Partner Violence:     Fear of Current or Ex-Partner: Not on file    Emotionally Abused: Not on file    Physically Abused: Not on file    Sexually Abused: Not on file   Housing Stability:     Unable to Pay for Housing in the Last Year: Not on file    Number of Places Lived in the Last Year: Not on file    Unstable Housing in the Last Year: Not on file     Social History     Tobacco Use   Smoking Status Never Smoker   Smokeless Tobacco Never Used        Temp (24hrs), Av.7 °F (37.1 °C), Min:98.2 °F (36.8 °C), Max:99.5 °F (37.5 °C)    Visit Vitals  /86 (BP 1 Location: Right upper arm, BP Patient Position: Sitting; At rest)   Pulse 86   Temp 98.2 °F (36.8 °C)   Resp 18   Ht 6' (1.829 m)   Wt 90.5 kg (199 lb 9.6 oz)   SpO2 95%   BMI 27.07 kg/m²       ROS: unable to obtain due to patient factors    Physical Exam:    General: alert, NAD  HEENT:  Anicteric sclerae; pink palpebral conjunctivae; mucosa moist  Resp:  Symmetrical chest expansion, no accessory muscle use; good airway entry;   CV:  S1, S2 present; regular rate and rhythm  GI:  Abdomen soft, non-tender; (+) active bowel sounds  Extremities:  +2 pulses on all extremities; no edema/ cyanosis/ clubbing noted   Skin:  Warm; no rashes/ lesions noted, normal turgor/cap refill   Neurologic:  Non-focal,  follows commands. Labs: Results:   Chemistry Recent Labs     22  0313 22  1305 22  0231 22  0127 22  0127   GLU 84  --  74  --  89     --  139  --  142   K 3.5 3.7 3.1*   < > 3.3*     --  106  --  107   CO2 24  --  25  --  25   BUN 16  --  18  --  16   CREA 1.36*  --  1.39*  --  1.35*   CA 9.2  --  9.0  --  9.0   AGAP 7  --  8  --  10   BUCR 12  --  13  --  12   ALB  --   --   --   --  2.8*    < > = values in this interval not displayed. CBC w/Diff Recent Labs     22  0127   WBC 10.6   RBC 3.40*   HGB 10.5*   HCT 31.5*      GRANS 69   LYMPH 16*   EOS 6*      Microbiology No results for input(s): CULT in the last 72 hours. RADIOLOGY:    All available imaging studies/reports in [facility] for this admission were reviewed      Disclaimer: Sections of this note are dictated utilizing voice recognition software, which may have resulted in some phonetic based errors in grammar and contents. Even though attempts were made to correct all the mistakes, some may have been missed, and remained in the body of the document. If questions arise, please contact our department.     Dr. Tmaica Peterson, Infectious Disease Specialist  [phone]  May 9, 2022  4:57 PM

## 2022-05-09 NOTE — PROGRESS NOTES
Problem: Mobility Impaired (Adult and Pediatric)  Goal: *Acute Goals and Plan of Care (Insert Text)  Description: Physical Therapy Goals  Initiated 5/6/2022 and to be accomplished within 7 day(s)  1. Patient will move from supine to sit and sit to supine , scoot up and down, and roll side to side in bed with modified independence. 2.  Patient will transfer from bed to chair and chair to bed with modified independence using the least restrictive device. 3.  Patient will perform sit to stand with modified independence. 4.  Patient will ambulate with modified independence for 100 feet with the least restrictive device. 5.  Assess stairs as needed or appropriate for discharge. PLOF: Pt reporting living with daughter and 3year old grandson, pt watches grandson when his daughter works. Previously independent without AD, lives in 1 story house with 3 MARLINE without handrails. Outcome: Progressing Towards Goal     PHYSICAL THERAPY TREATMENT    Patient: Ruthie Calix (80 y.o. male)  Date: 5/9/2022  Diagnosis: AMS (altered mental status) [R41.82] <principal problem not specified>       Precautions: Aspiration,Fall,Seizure,Skin    ASSESSMENT:  RN cleared for mobility. Pt found sitting up in recliner, in NAD, willing to work with PT. He reports feeling a lot better, ready to go home today. Pt initially stood with CGA and no AD, slight unsteadiness until he was given a RW. Edu pt on sing RW at home. He amb 120 ft around the me[address] way RW. Very slow but steady gait. He returned sitting up in recliner for exercises per flow sheet. Pt left with call bell and all needs met. Progression toward goals:   []      Improving appropriately and progressing toward goals  [x]      Improving slowly and progressing toward goals  []      Not making progress toward goals and plan of care will be adjusted     PLAN:  Patient continues to benefit from skilled intervention to address the above impairments.   Continue treatment per established plan of care. Discharge Recommendations:  Home Health with close supervision and increased assist from daughter  Further Equipment Recommendations for Discharge:  rolling walker     SUBJECTIVE:   Patient stated Jose Beavers I told my daughter I'm Eric Weems need more help.     OBJECTIVE DATA SUMMARY:   Critical Behavior:  Neurologic State: Alert  Orientation Level: Oriented X4  Cognition: Follows commands  Safety/Judgement: Fall prevention  Functional Mobility Training:    Transfers:  Sit to Stand: Contact guard assistance  Stand to Sit: Contact guard assistance         Balance:  Sitting: Intact; With support  Standing: Impaired; With support  Standing - Static: Good  Standing - Dynamic : Fair       Ambulation/Gait Training:  Distance (ft): 120 Feet (ft)  Assistive Device: Walker, rolling  Ambulation - Level of Assistance: Stand-by assistance        Gait Abnormalities: Decreased step clearance; Step to gait        Base of Support: Narrowed     Speed/Monica: Slow;Shuffled  Step Length: Left shortened;Right shortened   Therapeutic Exercises:   Pt performed exercises per flow sheet sitting on EOB      EXERCISE   Sets   Reps   Active Active Assist   Passive Self ROM   Comments   Ankle Pumps 1 15  [x] [] [] []    Quad Sets/Glut Sets    [] [] [] [] Hold for 5 secs   Hamstring Sets   [] [] [] []    Short Arc Quads   [] [] [] []    Heel Slides   [] [] [] []    Straight Leg Raises   [] [] [] []    Hip Add   [] [] [] [] Hold for 5 secs, w/ pillow squeeze   Long Arc Quads 1 15 [x] [] [] []    Seated Marching 1 15 [x] [] [] []    Standing Marching   [] [] [] []       [] [] [] []        Pain:  Pain level pre-treatment: 0/10  Pain level post-treatment: 0/10   Pain Intervention(s): Medication (see MAR); Rest, Ice, Repositioning   Response to intervention: Nurse notified, See doc flow    Activity Tolerance:   Pt tolerated mobility well  Please refer to the flowsheet for vital signs taken during this treatment.   After treatment: [x] Patient left in no apparent distress sitting up in chair  [] Patient left in no apparent distress in bed  [x] Call bell left within reach  [x] Nursing notified  [] Caregiver present  [] Bed alarm activated  [] SCDs applied      COMMUNICATION/EDUCATION:   [x]         Role of Physical Therapy in the acute care setting. [x]         Fall prevention education was provided and the patient/caregiver indicated understanding. [x]         Patient/family have participated as able in working toward goals and plan of care. []         Patient/family agree to work toward stated goals and plan of care. []         Patient understands intent and goals of therapy, but is neutral about his/her participation.   []         Patient is unable to participate in stated goals/plan of care: ongoing with therapy staff.  []         Other:        Kaila Mittal   Time Calculation: 23 mins

## 2022-05-09 NOTE — PROGRESS NOTES
[facility] Hospitalist Group  Progress Note    Patient: Inocencia Pulliam Age: 71 y.o. : 1950 MR#: [id number] SSN: [id number]  Date: 2022     Subjective: Interval HPI:  Patient is a 75 y. o. male w/ PMH of hemachromatosis presenting to [facility] from home via EMS from home unresponsive. History obtained from chart as patient is unresponsive. He was found by his step daughter who reported patient had been acting \"bipolar lately\" and would not let her in the house. She is not able to give a medication list at this time. Patients general UDS negative, CT head negative for acute issues. Poison control contacted by ED- recommend supportive care. Patient required intubation due to mental status. HD initiated 5/3 due to suspected baclofen overdose. Patient is sitting up in a chair without any issues. He stated he walk today. Patient ambulated with me with the help of walker in hallway about 150 steps without any issues. Denies any headaches or dizziness. No chest pain or abdominal pain. No nausea or vomiting. Patient stated his daughter and grandson came to visit him last night. Assessment/Plan:     1. Acute respiratory failure requiring mechanical ventilation in setting of encephalopathy with need for airway protection. Resolved. 2. Acute toxic metabolic encephalopathy due to suspected medication overdose. 3. Sepsis with hypothermia s/p treatment. Resolved currently  4. Elevated TSH. Normal T4  5. TARI on CKD3, back to baseline  6. Incidental cystic lucency anterior inferior right basal ganglia region on CT head. 7. 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 8.  Acute debility, much better  9. Hypokalemia and low phosphorus, improved  10. Hypertension    Plan:    Nephrology to follow this patient as an outpatient. Continue Seroquel as recommended by psychiatrist  Kurtis Beltran in addition to Norvasc for hypertension.   Normal thyroid panel  We will discharge patient home with home health care. Discussed with patient's daughter over the phone and she verbalized understanding about discharge plan. Anticipated date of discharge: May 9, 2022     Case discussed with:  [x]Patient  []Family  [x]Nursing  []Case Management  DVT Prophylaxis:  []Lovenox  []Hep SQ  []SCDs  []Coumadin   []On Heparin gtt    Objective:   VS:   Visit Vitals  /86 (BP 1 Location: Right upper arm, BP Patient Position: Sitting; At rest)   Pulse 86   Temp 98.2 °F (36.8 °C)   Resp 18   Ht 6' (1.829 m)   Wt 90.5 kg (199 lb 9.6 oz)   SpO2 95%   BMI 27.07 kg/m²      Tmax/24hrs: Temp (24hrs), Av.6 °F (37 °C), Min:98.2 °F (36.8 °C), Max:99.3 °F (37.4 °C)    No intake or output data in the 24 hours ending 22 1010    General appearance - alert, well appearing, and in no distress  Chest -clear air entry noted in bases, no wheezes  Heart - S1 and S2 normal  Abdomen - soft, nontender, nondistended, Bowel sounds present  Neurological - alert, oriented, normal speech, no focal findings noted, no tremors noted  Musculoskeletal - no joint tenderness or erythema of knees bilaterally  Extremities - no pedal edema noted      Labs:    Recent Results (from the past 24 hour(s))   GLUCOSE, POC    Collection Time: 22 11:42 AM   Result Value Ref Range    Glucose (POC) 88 70 - 110 mg/dL   POTASSIUM    Collection Time: 22  1:05 PM   Result Value Ref Range    Potassium 3.7 3.5 - 5.5 mmol/L   GLUCOSE, POC    Collection Time: 22  4:18 PM   Result Value Ref Range    Glucose (POC) 87 70 - 110 mg/dL   GLUCOSE, POC    Collection Time: 22  9:27 PM   Result Value Ref Range    Glucose (POC) 83 70 - 178 mg/dL   METABOLIC PANEL, BASIC    Collection Time: 22  3:13 AM   Result Value Ref Range    Sodium 138 136 - 145 mmol/L    Potassium 3.5 3.5 - 5.5 mmol/L    Chloride 107 100 - 111 mmol/L    CO2 24 21 - 32 mmol/L    Anion gap 7 3.0 - 18 mmol/L    Glucose 84 74 - 99 mg/dL    BUN 16 7.0 - 18 MG/DL    Creatinine 1.36 (H) 0.6 - 1.3 MG/DL    BUN/Creatinine ratio 12 12 - 20      GFR est AA >60 >60 ml/min/1.73m2    GFR est non-AA 52 (L) >60 ml/min/1.73m2    Calcium 9.2 8.5 - 10.1 MG/DL   PHOSPHORUS    Collection Time: 05/09/22  3:13 AM   Result Value Ref Range    Phosphorus 2.7 2.5 - 4.9 MG/DL   GLUCOSE, POC    Collection Time: 05/09/22  6:54 AM   Result Value Ref Range    Glucose (POC) 88 70 - 110 mg/dL     Disclaimer: Sections of this note are dictated using utilizing voice recognition software, which may have resulted in some phonetic based errors in grammar and contents. Even though attempts were made to correct all the mistakes, some may have been missed, and remained in the body of the document. If questions arise, please contact our department.     Signed By: Lashawn Chance MD     May 9, 2022

## 2022-05-09 NOTE — ROUTINE PROCESS
As part of the discharge instructions, medications already given today were discussed with the patient. The next dose due of all ordered meds was highlighted as part of the medication discharge instructions. Discussed with the patient the importance of taking medications as directed, as well as the side effects and adverse reactions to medications ordered. All questions answered,acknowledged understanding. Waiting on ride.

## 2022-05-09 NOTE — PROGRESS NOTES
Problem: Ventilator Management  Goal: *Adequate oxygenation and ventilation  Outcome: Progressing Towards Goal  Goal: *Patient maintains clear airway/free of aspiration  Outcome: Progressing Towards Goal  Goal: *Absence of infection signs and symptoms  Outcome: Progressing Towards Goal  Goal: *Normal spontaneous ventilation  Outcome: Progressing Towards Goal     Problem: Patient Education: Go to Patient Education Activity  Goal: Patient/Family Education  Outcome: Progressing Towards Goal     Problem: Non-Violent Restraints  Goal: Removal from restraints as soon as assessed to be safe  Outcome: Progressing Towards Goal  Goal: No harm/injury to patient while restraints in use  Outcome: Progressing Towards Goal  Goal: Patient's dignity will be maintained  Outcome: Progressing Towards Goal  Goal: Patient Interventions  Outcome: Progressing Towards Goal     Problem: Falls - Risk of  Goal: *Absence of Falls  Description: Document Aakash Dario Fall Risk and appropriate interventions in the flowsheet.   Outcome: Progressing Towards Goal  Note: Fall Risk Interventions:  Mobility Interventions: Bed/chair exit alarm    Mentation Interventions: More frequent rounding,Update white board,Door open when patient unattended,Room close to nurse's station    Medication Interventions: Patient to call before getting OOB,Teach patient to arise slowly    Elimination Interventions: Call light in reach,Patient to call for help with toileting needs              Problem: Patient Education: Go to Patient Education Activity  Goal: Patient/Family Education  Outcome: Progressing Towards Goal     Problem: Pain  Goal: *Control of Pain  Outcome: Progressing Towards Goal  Goal: *PALLIATIVE CARE:  Alleviation of Pain  Outcome: Progressing Towards Goal     Problem: Patient Education: Go to Patient Education Activity  Goal: Patient/Family Education  Outcome: Progressing Towards Goal     Problem: Pressure Injury - Risk of  Goal: *Prevention of pressure injury  Description: Document Valente Scale and appropriate interventions in the flowsheet.   Outcome: Progressing Towards Goal  Note: Pressure Injury Interventions:  Sensory Interventions: Assess changes in LOC,Minimize linen layers    Moisture Interventions: Absorbent underpads,Minimize layers    Activity Interventions: Assess need for specialty bed,Pressure redistribution bed/mattress(bed type),PT/OT evaluation    Mobility Interventions: Pressure redistribution bed/mattress (bed type)    Nutrition Interventions: Document food/fluid/supplement intake    Friction and Shear Interventions: HOB 30 degrees or less,Minimize layers                Problem: Patient Education: Go to Patient Education Activity  Goal: Patient/Family Education  Outcome: Progressing Towards Goal     Problem: Injury - Risk of, Adverse Drug Event  Goal: *Absence of adverse drug events  Outcome: Progressing Towards Goal  Goal: *Absence of medication errors  Outcome: Progressing Towards Goal  Goal: *Knowledge of prescribed medications  Outcome: Progressing Towards Goal     Problem: Patient Education: Go to Patient Education Activity  Goal: Patient/Family Education  Outcome: Progressing Towards Goal     Problem: Nutrition Deficit  Goal: *Optimize nutritional status  Outcome: Progressing Towards Goal     Problem: Delirium Treatment  Goal: *Level of consciousness restored to baseline  Outcome: Progressing Towards Goal  Goal: *Level of environmental perceptions restored to baseline  Outcome: Progressing Towards Goal  Goal: *Sensory perception restored to baseline  Outcome: Progressing Towards Goal  Goal: *Emotional stability restored to baseline  Outcome: Progressing Towards Goal  Goal: *Functional assessment restored to baseline  Outcome: Progressing Towards Goal  Goal: *Absence of falls  Outcome: Progressing Towards Goal  Goal: *Will remain free of delirium, CAM Score negative  Outcome: Progressing Towards Goal  Goal: *Cognitive status will be restored to baseline  Outcome: Progressing Towards Goal  Goal: Interventions  Outcome: Progressing Towards Goal     Problem: Patient Education: Go to Patient Education Activity  Goal: Patient/Family Education  Outcome: Progressing Towards Goal     Problem: Patient Education: Go to Patient Education Activity  Goal: Patient/Family Education  Outcome: Progressing Towards Goal     Problem: Acute Renal Failure: Day 1  Goal: Off Pathway (Use only if patient is Off Pathway)  Outcome: Progressing Towards Goal  Goal: Activity/Safety  Outcome: Progressing Towards Goal  Goal: Consults, if ordered  Outcome: Progressing Towards Goal  Goal: Diagnostic Test/Procedures  Outcome: Progressing Towards Goal  Goal: Nutrition/Diet  Outcome: Progressing Towards Goal  Goal: Discharge Planning  Outcome: Progressing Towards Goal  Goal: Medications  Outcome: Progressing Towards Goal  Goal: Respiratory  Outcome: Progressing Towards Goal  Goal: Treatments/Interventions/Procedures  Outcome: Progressing Towards Goal  Goal: Psychosocial  Outcome: Progressing Towards Goal  Goal: *Optimal pain control at patient's stated goal  Outcome: Progressing Towards Goal  Goal: *Urinary output within identified parameters  Outcome: Progressing Towards Goal  Goal: *Hemodynamically stable  Outcome: Progressing Towards Goal  Goal: *Tolerating diet  Outcome: Progressing Towards Goal     Problem: Acute Renal Failure: Day 2  Goal: Off Pathway (Use only if patient is Off Pathway)  Outcome: Progressing Towards Goal  Goal: Activity/Safety  Outcome: Progressing Towards Goal  Goal: Consults, if ordered  Outcome: Progressing Towards Goal  Goal: Diagnostic Test/Procedures  Outcome: Progressing Towards Goal  Goal: Nutrition/Diet  Outcome: Progressing Towards Goal  Goal: Discharge Planning  Outcome: Progressing Towards Goal  Goal: Medications  Outcome: Progressing Towards Goal  Goal: Respiratory  Outcome: Progressing Towards Goal  Goal: Treatments/Interventions/Procedures  Outcome: Progressing Towards Goal  Goal: Psychosocial  Outcome: Progressing Towards Goal  Goal: *Optimal pain control at patient's stated goal  Outcome: Progressing Towards Goal  Goal: *Urinary output within identified parameters  Outcome: Progressing Towards Goal  Goal: *Hemodynamically stable  Outcome: Progressing Towards Goal  Goal: *Tolerating diet  Outcome: Progressing Towards Goal  Goal: *Lab values improving  Outcome: Progressing Towards Goal     Problem: Acute Renal Failure: Day 3  Goal: Off Pathway (Use only if patient is Off Pathway)  Outcome: Progressing Towards Goal  Goal: Activity/Safety  Outcome: Progressing Towards Goal  Goal: Consults, if ordered  Outcome: Progressing Towards Goal  Goal: Diagnostic Test/Procedures  Outcome: Progressing Towards Goal  Goal: Nutrition/Diet  Outcome: Progressing Towards Goal  Goal: Discharge Planning  Outcome: Progressing Towards Goal  Goal: Medications  Outcome: Progressing Towards Goal  Goal: Respiratory  Outcome: Progressing Towards Goal  Goal: Treatments/Interventions/Procedures  Outcome: Progressing Towards Goal  Goal: Psychosocial  Outcome: Progressing Towards Goal  Goal: *Optimal pain control at patient's stated goal  Outcome: Progressing Towards Goal  Goal: *Urinary output within identified parameters  Outcome: Progressing Towards Goal  Goal: *Hemodynamically stable  Outcome: Progressing Towards Goal  Goal: *Tolerating diet  Outcome: Progressing Towards Goal  Goal: *Lab values improving  Outcome: Progressing Towards Goal     Problem: Acute Renal Failure: Day 4  Goal: Off Pathway (Use only if patient is Off Pathway)  Outcome: Progressing Towards Goal  Goal: Activity/Safety  Outcome: Progressing Towards Goal  Goal: Consults, if ordered  Outcome: Progressing Towards Goal  Goal: Diagnostic Test/Procedures  Outcome: Progressing Towards Goal  Goal: Nutrition/Diet  Outcome: Progressing Towards Goal  Goal: Discharge Planning  Outcome: Progressing Towards Goal  Goal: Medications  Outcome: Progressing Towards Goal  Goal: Respiratory  Outcome: Progressing Towards Goal  Goal: Treatments/Interventions/Procedures  Outcome: Progressing Towards Goal  Goal: Psychosocial  Outcome: Progressing Towards Goal  Goal: *Optimal pain control at patient's stated goal  Outcome: Progressing Towards Goal  Goal: *Urinary output within identified parameters  Outcome: Progressing Towards Goal  Goal: *Hemodynamically stable  Outcome: Progressing Towards Goal  Goal: *Tolerating diet  Outcome: Progressing Towards Goal  Goal: *Lab values improving  Outcome: Progressing Towards Goal     Problem: Acute Renal Failure: Day 5  Goal: Off Pathway (Use only if patient is Off Pathway)  Outcome: Progressing Towards Goal  Goal: Activity/Safety  Outcome: Progressing Towards Goal  Goal: Diagnostic Test/Procedures  Outcome: Progressing Towards Goal  Goal: Nutrition/Diet  Outcome: Progressing Towards Goal  Goal: Discharge Planning  Outcome: Progressing Towards Goal  Goal: Medications  Outcome: Progressing Towards Goal  Goal: Respiratory  Outcome: Progressing Towards Goal  Goal: Treatments/Interventions/Procedures  Outcome: Progressing Towards Goal  Goal: Psychosocial  Outcome: Progressing Towards Goal  Goal: *Optimal pain control at patient's stated goal  Outcome: Progressing Towards Goal  Goal: *Urinary output within identified parameters  Outcome: Progressing Towards Goal  Goal: *Hemodynamically stable  Outcome: Progressing Towards Goal  Goal: *Tolerating diet  Outcome: Progressing Towards Goal  Goal: *Lab values improving  Outcome: Progressing Towards Goal     Problem: Acute Renal Failure: Day 6  Goal: Off Pathway (Use only if patient is Off Pathway)  Outcome: Progressing Towards Goal  Goal: Activity/Safety  Outcome: Progressing Towards Goal  Goal: Diagnostic Test/Procedures  Outcome: Progressing Towards Goal  Goal: Nutrition/Diet  Outcome: Progressing Towards Goal  Goal: Discharge Planning  Outcome: Progressing Towards Goal  Goal: Medications  Outcome: Progressing Towards Goal  Goal: Respiratory  Outcome: Progressing Towards Goal  Goal: Treatments/Interventions/Procedures  Outcome: Progressing Towards Goal  Goal: Psychosocial  Outcome: Progressing Towards Goal     Problem: Acute Renal Failure: Discharge Outcomes  Goal: *Optimal pain control at patient's stated goal  Outcome: Progressing Towards Goal  Goal: *Urinary output within identified parameters  Outcome: Progressing Towards Goal  Goal: *Hemodynamically stable  Outcome: Progressing Towards Goal  Goal: *Tolerating diet  Outcome: Progressing Towards Goal  Goal: *Lab values stabilized  Outcome: Progressing Towards Goal  Goal: *Verbalizes understanding and describes medication purposes and frequencies  Outcome: Progressing Towards Goal  Goal: *Medication reconciliation  Outcome: Progressing Towards Goal

## 2022-05-09 NOTE — PROGRESS NOTES
DME noted for [facility]. CM delivered RW to patient, signed 2 forms for [facility], signed copies given to patient. FOC verbal consent given for [facility]. Patient's daughter to transport patient home today at time of discharge. Patient asked that therapy come adjust RW for home. CM called therapy office and spoke with Vangie Saucedo, updated her that patient would like his RW adjusted for him, before discharging today. CM spoke with Sola Vaz Cea with [facility], they can accept patient. CM put patient in the queue for [facility].            Deborah Summers, RN  Case Management [phone]

## 2022-05-09 NOTE — PROGRESS NOTES
Patient has transitional care with Dr. Mando Hui on 5/13/2022 at 11:30 am. Call made to home number, no answer, voice mail box is full.

## 2022-05-09 NOTE — PROGRESS NOTES
Bedside shift report given to Hyun Phillips. Chapito LANG from [facility]. Report including the following information SBAR, Kardex, recent results, MAR, intake/output and cardiac rhythm NSR.

## 2022-05-09 NOTE — PROGRESS NOTES
Discharge order noted for today. Pt has been accepted to [facility] agency. Met with patient and he is  agreeable to the transition plan today. Transport has been arranged through patient's daughter. Patient's home health  orders have been forwarded to [address] [place] [address] health  agency via [facility]   Discharge information has been documented on the AVS.             Tory Lawton RN  Case Management [phone]

## 2022-05-10 NOTE — PROGRESS NOTES
Physician Progress Note      Saeed Renner  CSN #:                  [id number]  :                       1950  ADMIT DATE:       2022 1:48 PM  Anil Farris DATE:        2022 1:27 PM  RESPONDING  PROVIDER #:        Vanesa Swenson MD          QUERY TEXT:    Patient admitted with respiratory failure. Noted documentation of sepsis on discharge summary. If possible, please document in progress notes and discharge summary the source of sepsis:      The medical record reflects the following:  Risk Factors: 71 y.o. male w/ PMH of hemachromatosis presenting to [facility] from home via EMS from home unresponsive. Clinical Indicators: Per DC summary - Sepsis with hypothermia s/p treatment. Resolved currently  Per ID  - Persistent fevers 5/3 despite broad-spectrum antibiotics antibiotics could be due to partially treated pneumonia with ampicillin/sulbactam resistant gram-negative's versus baclofen withdrawal.  Resolved fevers status post Zosyn since 5/3/2022;  Sputum cultures 22-methicillin susceptible Staph aureus, normal respiratory adam, yeast.  Yeast in sputum culture likely colonizer; Single positive blood culture for coagulase-negative Staphylococcus on -likely contaminant.  critical care - Sepsis: elevated white count, hypothermia, UA negative, procal negative. CXR () showed infiltrate vs atelectasis. Treatment: ID consulted, Zosyn, Vancomycin    Thank you,  Tarun Renteria RN, [email or web address]  .  Options provided:  -- Sepsis, present on admission, due to, Please document source.   -- SIRS of non-infectious origin without acute organ dysfunction  -- SIRS of non-infectious origin with associated acute organ dysfunction of respiratory failure  -- Other - I will add my own diagnosis  -- Disagree - Not applicable / Not valid  -- Disagree - Clinically unable to determine / Unknown  -- Refer to Clinical Documentation Reviewer    PROVIDER RESPONSE TEXT:    This patient has SIRS of non-infectious origin without acute organ dysfunction.     Query created by: Baldomero Garza on 5/10/2022 8:49 AM      Electronically signed by:  Erum Ivan MD 5/10/2022 12:43 PM

## 2022-05-11 ENCOUNTER — HOME CARE VISIT (OUTPATIENT)
Dept: HOME HEALTH SERVICES | Facility: HOME HEALTH | Age: 72
End: 2022-05-11

## 2022-07-17 ENCOUNTER — HOSPITAL ENCOUNTER (INPATIENT)
Age: 72
LOS: 1 days | Discharge: HOME | DRG: 897 | End: 2022-07-18
Attending: STUDENT IN AN ORGANIZED HEALTH CARE EDUCATION/TRAINING PROGRAM | Admitting: INTERNAL MEDICINE
Payer: MEDICARE

## 2022-07-17 ENCOUNTER — APPOINTMENT (OUTPATIENT)
Dept: CT IMAGING | Age: 72
DRG: 897 | End: 2022-07-17
Attending: STUDENT IN AN ORGANIZED HEALTH CARE EDUCATION/TRAINING PROGRAM
Payer: MEDICARE

## 2022-07-17 DIAGNOSIS — R56.9 SEIZURE (HCC): Primary | ICD-10-CM

## 2022-07-17 DIAGNOSIS — D58.2 ELEVATED HEMOGLOBIN (HCC): ICD-10-CM

## 2022-07-17 DIAGNOSIS — E87.1 HYPONATREMIA: ICD-10-CM

## 2022-07-17 DIAGNOSIS — Z78.9 ALCOHOL USE: ICD-10-CM

## 2022-07-17 DIAGNOSIS — E87.6 HYPOKALEMIA: ICD-10-CM

## 2022-07-17 DIAGNOSIS — I10 HYPERTENSION, UNSPECIFIED TYPE: ICD-10-CM

## 2022-07-17 DIAGNOSIS — R74.8 ELEVATED ALKALINE PHOSPHATASE LEVEL: ICD-10-CM

## 2022-07-17 LAB
ALBUMIN SERPL-MCNC: 3.9 G/DL (ref 3.4–5)
ALBUMIN/GLOB SERPL: 1 {RATIO} (ref 0.8–1.7)
ALP SERPL-CCNC: 146 U/L (ref 45–117)
ALT SERPL-CCNC: 32 U/L (ref 16–61)
ANION GAP SERPL CALC-SCNC: 15 MMOL/L (ref 3–18)
AST SERPL-CCNC: 43 U/L (ref 10–38)
BASOPHILS # BLD: 0 K/UL (ref 0–0.1)
BASOPHILS NFR BLD: 0 % (ref 0–2)
BILIRUB DIRECT SERPL-MCNC: 0.3 MG/DL (ref 0–0.2)
BILIRUB SERPL-MCNC: 0.9 MG/DL (ref 0.2–1)
BUN SERPL-MCNC: 14 MG/DL (ref 7–18)
BUN/CREAT SERPL: 10 (ref 12–20)
CALCIUM SERPL-MCNC: 9 MG/DL (ref 8.5–10.1)
CHLORIDE SERPL-SCNC: 96 MMOL/L (ref 100–111)
CO2 SERPL-SCNC: 19 MMOL/L (ref 21–32)
CREAT SERPL-MCNC: 1.46 MG/DL (ref 0.6–1.3)
DIFFERENTIAL METHOD BLD: ABNORMAL
EOSINOPHIL # BLD: 0.1 K/UL (ref 0–0.4)
EOSINOPHIL NFR BLD: 1 % (ref 0–5)
ERYTHROCYTE [DISTWIDTH] IN BLOOD BY AUTOMATED COUNT: 12.7 % (ref 11.6–14.5)
ETHANOL SERPL-MCNC: <3 MG/DL (ref 0–3)
GLOBULIN SER CALC-MCNC: 4 G/DL (ref 2–4)
GLUCOSE SERPL-MCNC: 148 MG/DL (ref 74–99)
HCT VFR BLD AUTO: 36.1 % (ref 36–48)
HGB BLD-MCNC: 12.9 G/DL (ref 13–16)
IMM GRANULOCYTES # BLD AUTO: 0.1 K/UL (ref 0–0.04)
IMM GRANULOCYTES NFR BLD AUTO: 1 % (ref 0–0.5)
LYMPHOCYTES # BLD: 1.3 K/UL (ref 0.9–3.6)
LYMPHOCYTES NFR BLD: 14 % (ref 21–52)
MAGNESIUM SERPL-MCNC: 2.1 MG/DL (ref 1.6–2.6)
MCH RBC QN AUTO: 31.9 PG (ref 24–34)
MCHC RBC AUTO-ENTMCNC: 35.7 G/DL (ref 31–37)
MCV RBC AUTO: 89.4 FL (ref 78–100)
MONOCYTES # BLD: 0.7 K/UL (ref 0.05–1.2)
MONOCYTES NFR BLD: 7 % (ref 3–10)
NEUTS SEG # BLD: 7.3 K/UL (ref 1.8–8)
NEUTS SEG NFR BLD: 77 % (ref 40–73)
NRBC # BLD: 0 K/UL (ref 0–0.01)
NRBC BLD-RTO: 0 PER 100 WBC
PLATELET # BLD AUTO: 172 K/UL (ref 135–420)
PMV BLD AUTO: 8.7 FL (ref 9.2–11.8)
POTASSIUM SERPL-SCNC: 3.2 MMOL/L (ref 3.5–5.5)
PROT SERPL-MCNC: 7.9 G/DL (ref 6.4–8.2)
RBC # BLD AUTO: 4.04 M/UL (ref 4.35–5.65)
SODIUM SERPL-SCNC: 130 MMOL/L (ref 136–145)
TROPONIN-HIGH SENSITIVITY: 9 NG/L (ref 0–78)
WBC # BLD AUTO: 9.6 K/UL (ref 4.6–13.2)

## 2022-07-17 PROCEDURE — 99285 EMERGENCY DEPT VISIT HI MDM: CPT

## 2022-07-17 PROCEDURE — 80048 BASIC METABOLIC PNL TOTAL CA: CPT

## 2022-07-17 PROCEDURE — 82077 ASSAY SPEC XCP UR&BREATH IA: CPT

## 2022-07-17 PROCEDURE — 99223 1ST HOSP IP/OBS HIGH 75: CPT | Performed by: INTERNAL MEDICINE

## 2022-07-17 PROCEDURE — 85025 COMPLETE CBC W/AUTO DIFF WBC: CPT

## 2022-07-17 PROCEDURE — 80076 HEPATIC FUNCTION PANEL: CPT

## 2022-07-17 PROCEDURE — 70450 CT HEAD/BRAIN W/O DYE: CPT

## 2022-07-17 PROCEDURE — 65270000046 HC RM TELEMETRY

## 2022-07-17 PROCEDURE — 96374 THER/PROPH/DIAG INJ IV PUSH: CPT

## 2022-07-17 PROCEDURE — 93005 ELECTROCARDIOGRAM TRACING: CPT

## 2022-07-17 PROCEDURE — 84484 ASSAY OF TROPONIN QUANT: CPT

## 2022-07-17 PROCEDURE — 74011250636 HC RX REV CODE- 250/636: Performed by: STUDENT IN AN ORGANIZED HEALTH CARE EDUCATION/TRAINING PROGRAM

## 2022-07-17 PROCEDURE — 83735 ASSAY OF MAGNESIUM: CPT

## 2022-07-17 RX ORDER — SODIUM CHLORIDE 0.9 % (FLUSH) 0.9 %
5-40 SYRINGE (ML) INJECTION AS NEEDED
Status: DISCONTINUED | OUTPATIENT
Start: 2022-07-17 | End: 2022-07-18 | Stop reason: HOSPADM

## 2022-07-17 RX ORDER — LORAZEPAM 1 MG/1
1 TABLET ORAL
Status: DISCONTINUED | OUTPATIENT
Start: 2022-07-17 | End: 2022-07-18 | Stop reason: HOSPADM

## 2022-07-17 RX ORDER — LORAZEPAM 2 MG/ML
3 INJECTION, SOLUTION INTRAMUSCULAR; INTRAVENOUS
Status: DISCONTINUED | OUTPATIENT
Start: 2022-07-17 | End: 2022-07-18 | Stop reason: HOSPADM

## 2022-07-17 RX ORDER — LORAZEPAM 1 MG/1
2 TABLET ORAL
Status: DISCONTINUED | OUTPATIENT
Start: 2022-07-17 | End: 2022-07-18 | Stop reason: HOSPADM

## 2022-07-17 RX ORDER — LANOLIN ALCOHOL/MO/W.PET/CERES
100 CREAM (GRAM) TOPICAL DAILY
Status: DISCONTINUED | OUTPATIENT
Start: 2022-07-18 | End: 2022-07-18 | Stop reason: HOSPADM

## 2022-07-17 RX ORDER — ONDANSETRON 2 MG/ML
4 INJECTION INTRAMUSCULAR; INTRAVENOUS
Status: DISCONTINUED | OUTPATIENT
Start: 2022-07-17 | End: 2022-07-18 | Stop reason: HOSPADM

## 2022-07-17 RX ORDER — ACETAMINOPHEN 325 MG/1
650 TABLET ORAL
Status: DISCONTINUED | OUTPATIENT
Start: 2022-07-17 | End: 2022-07-18 | Stop reason: HOSPADM

## 2022-07-17 RX ORDER — LORAZEPAM 2 MG/ML
2 INJECTION, SOLUTION INTRAMUSCULAR; INTRAVENOUS
Status: DISCONTINUED | OUTPATIENT
Start: 2022-07-17 | End: 2022-07-18 | Stop reason: HOSPADM

## 2022-07-17 RX ORDER — ACETAMINOPHEN 650 MG/1
650 SUPPOSITORY RECTAL
Status: DISCONTINUED | OUTPATIENT
Start: 2022-07-17 | End: 2022-07-18 | Stop reason: HOSPADM

## 2022-07-17 RX ORDER — SODIUM CHLORIDE 9 MG/ML
500 INJECTION, SOLUTION INTRAVENOUS
Status: COMPLETED | OUTPATIENT
Start: 2022-07-17 | End: 2022-07-17

## 2022-07-17 RX ORDER — SODIUM CHLORIDE 0.9 % (FLUSH) 0.9 %
5-40 SYRINGE (ML) INJECTION EVERY 8 HOURS
Status: DISCONTINUED | OUTPATIENT
Start: 2022-07-17 | End: 2022-07-18 | Stop reason: HOSPADM

## 2022-07-17 RX ORDER — ONDANSETRON 4 MG/1
4 TABLET, ORALLY DISINTEGRATING ORAL
Status: DISCONTINUED | OUTPATIENT
Start: 2022-07-17 | End: 2022-07-18 | Stop reason: HOSPADM

## 2022-07-17 RX ORDER — ENOXAPARIN SODIUM 100 MG/ML
40 INJECTION SUBCUTANEOUS DAILY
Status: DISCONTINUED | OUTPATIENT
Start: 2022-07-18 | End: 2022-07-18 | Stop reason: HOSPADM

## 2022-07-17 RX ORDER — AMLODIPINE BESYLATE 5 MG/1
5 TABLET ORAL DAILY
Status: DISCONTINUED | OUTPATIENT
Start: 2022-07-18 | End: 2022-07-18 | Stop reason: HOSPADM

## 2022-07-17 RX ORDER — LORAZEPAM 2 MG/ML
1 INJECTION, SOLUTION INTRAMUSCULAR; INTRAVENOUS
Status: DISCONTINUED | OUTPATIENT
Start: 2022-07-17 | End: 2022-07-18 | Stop reason: HOSPADM

## 2022-07-17 RX ORDER — POLYETHYLENE GLYCOL 3350 17 G/17G
17 POWDER, FOR SOLUTION ORAL DAILY PRN
Status: DISCONTINUED | OUTPATIENT
Start: 2022-07-17 | End: 2022-07-18 | Stop reason: HOSPADM

## 2022-07-17 RX ORDER — SODIUM CHLORIDE 0.9 % (FLUSH) 0.9 %
5-40 SYRINGE (ML) INJECTION EVERY 8 HOURS
Status: DISCONTINUED | OUTPATIENT
Start: 2022-07-18 | End: 2022-07-18 | Stop reason: HOSPADM

## 2022-07-17 RX ADMIN — LORAZEPAM 1 MG: 2 INJECTION INTRAMUSCULAR; INTRAVENOUS at 19:04

## 2022-07-17 RX ADMIN — SODIUM CHLORIDE 500 ML/HR: 9 INJECTION, SOLUTION INTRAVENOUS at 19:30

## 2022-07-17 NOTE — ED NOTES
Assumed care of pt. Pt lying on bed and resting quietly. Pt A&O x4 Respirations are even and unlabored. No tremors noted at this time. No distress noted. Will continue to monitor.

## 2022-07-17 NOTE — ED TRIAGE NOTES
Per EMS, Patient from home, daughter states about an hour ago she was going to the grocery store, patient wasn't feeling good so he didn't go. Came home found patient on the floor. Patient was breathing on his own, but slow. Stroke scale negative, BS was good, daughter states he has never had a seizure. He is acting slightly postictal. x6 mo ago patient OD on medication. Patient is still on [place].  Patient was incontinent and has a cut in his mouth

## 2022-07-17 NOTE — ED NOTES
Patient mouth is bloody, he is alert and oriented x3 talking to physician and answering all questions appropriately.

## 2022-07-17 NOTE — ED PROVIDER NOTES
60-year-old with history of hemochromatosis, alcoholism, stopped drinking 3 days ago, brought in by EMS due to suspected seizure after being found down by family, laceration on his tongue, incontinence, reportedly postictal by EMS, improved mental status upon arrival, ANO x2, does not know who the president is. Patient states he has no pain throughout his body at this time, denies any recent illnesses fevers prior seizures, denies alcohol withdrawals previously. Past Medical History:   Diagnosis Date    Arthritis     Hemochromatosis     Ill-defined condition     GOUT       Past Surgical History:   Procedure Laterality Date    HX ORTHOPAEDIC      LEFT HAND         No family history on file. Social History     Socioeconomic History    Marital status: SINGLE     Spouse name: Not on file    Number of children: Not on file    Years of education: Not on file    Highest education level: Not on file   Occupational History    Not on file   Tobacco Use    Smoking status: Never Smoker    Smokeless tobacco: Never Used   Substance and Sexual Activity    Alcohol use: Yes     Alcohol/week: 6.0 standard drinks     Types: 6 Cans of beer per week     Comment: weekly on weekends    Drug use: Not on file    Sexual activity: Not on file   Other Topics Concern    Not on file   Social History Narrative    Not on file     Social Determinants of Health     Financial Resource Strain:     Difficulty of Paying Living Expenses: Not on file   Food Insecurity:     Worried About Running Out of Food in the Last Year: Not on file    [place] of Food in the Last Year: Not on file   Transportation Needs:     Lack of Transportation (Medical): Not on file    Lack of Transportation (Non-Medical):  Not on file   Physical Activity:     Days of Exercise per Week: Not on file    Minutes of Exercise per Session: Not on file   Stress:     Feeling of Stress : Not on file   Social Connections:     Frequency of Communication with Friends and Family: Not on file    Frequency of Social Gatherings with Friends and Family: Not on file    Attends Hindu Services: Not on file    Active Member of Clubs or Organizations: Not on file    Attends Club or Organization Meetings: Not on file    Marital Status: Not on file   Intimate Partner Violence:     Fear of Current or Ex-Partner: Not on file    Emotionally Abused: Not on file    Physically Abused: Not on file    Sexually Abused: Not on file   Housing Stability:     Unable to Pay for Housing in the Last Year: Not on file    Number of [place] in the Last Year: Not on file    Unstable Housing in the Last Year: Not on file         ALLERGIES: Patient has no known allergies. Review of Systems   Constitutional: Negative for chills and fever. HENT: Negative for sore throat. Eyes: Negative for pain and visual disturbance. Respiratory: Negative for cough, chest tightness, shortness of breath and wheezing. Cardiovascular: Negative for chest pain and palpitations. Gastrointestinal: Negative for abdominal pain, blood in stool, constipation, diarrhea, nausea and vomiting. Musculoskeletal: Negative for neck pain. Neurological: Negative for weakness and headaches. Psychiatric/Behavioral: Positive for confusion. Vitals:    07/17/22 1717   BP: (!) 161/84   Pulse: 97   Resp: 15   Temp: 98.4 °F (36.9 °C)   SpO2: 93%   Weight: 87.1 kg (192 lb)   Height: 5' 11\" (1.803 m)            Physical Exam  Vitals and nursing note reviewed. Constitutional:       General: He is not in acute distress. Appearance: Normal appearance. He is normal weight. He is not ill-appearing, toxic-appearing or diaphoretic. HENT:      Head: Normocephalic. No Pittman's sign. Comments: Approximately half a centimeter linear laceration at the right distal aspect of the tongue. No other trauma noted throughout the mouth.      Mouth/Throat:      Mouth: Mucous membranes are moist.   Eyes: General:         Right eye: No discharge. Left eye: No discharge. Cardiovascular:      Rate and Rhythm: Normal rate and regular rhythm. Pulses: Normal pulses. Abdominal:      General: Abdomen is flat. Palpations: Abdomen is soft. Tenderness: There is no abdominal tenderness. There is no guarding or rebound. Genitourinary:     Comments: Appearance of urinary incontinence present. Musculoskeletal:         General: No swelling, tenderness or deformity. Cervical back: Normal range of motion. Right lower leg: No edema. Left lower leg: No edema. Comments: Mild tenderness to palpation with hypertonicity at the right cervical paraspinals, no tenderness to midline cervical region. Mild soreness on range of motion of the neck, neuro exam reassuring throughout. Skin:     General: Skin is warm. Neurological:      Mental Status: He is alert. Cranial Nerves: No cranial nerve deficit. Motor: No weakness. Comments: Alert and oriented x2, does not know who the president is. Psychiatric:         Mood and Affect: Mood normal.          MDM  Number of Diagnoses or Management Options  Alcohol use  Elevated alkaline phosphatase level  Elevated hemoglobin (HCC)  Hypertension, unspecified type  Hypokalemia  Hyponatremia  Seizure ([facility] [address]  Diagnosis management comments: 25-year-old male with a history of alcoholism/chronic Ambien use/hemochromatosis here today with reportedly first episode of seizure. Patient states he is unsure if he had seizure before, states that he has blacked out a few times previously when he has been withdrawing from alcohol but is unsure if he has had DTs or seizures, he states he has not seen anyone after these episodes.      Patient initially not oriented and appeared postictal, postictal state appears to have cleared as he is alert and oriented now, CIWA of 9 however, Ativan administered, vitals show hypertension otherwise reassuring, exam notable for laceration and incontinence suggestive of likely seizure, neuro exam overall symmetrical and reassuring otherwise. Labs show elevated hemoglobin/slightly hypokalemic/hyponatremic, elevated creatinine seems to be at baseline. Head CT reassuring. Patient warrants admission for a period of observation at least to ensure no further seizures while CIWA protocol in place. Hospitalist Dr Korey Mazariegos accepted for admission.        Amount and/or Complexity of Data Reviewed  Decide to obtain previous medical records or to obtain history from someone other than the patient: yes      ED Course as of 07/17/22 1911   Em Akbar Jul 17, 2022 1901 Discussed the case with the hospitalist Dr. Dianna Huffman, relayed the concerns and findings for seizures likely related to alcohol/Ambien withdrawal. [BN]      ED Course User Index  [BN] DO Mirtha Cai

## 2022-07-18 VITALS
DIASTOLIC BLOOD PRESSURE: 73 MMHG | TEMPERATURE: 99.3 F | OXYGEN SATURATION: 96 % | WEIGHT: 192 LBS | HEART RATE: 69 BPM | BODY MASS INDEX: 26.88 KG/M2 | HEIGHT: 71 IN | SYSTOLIC BLOOD PRESSURE: 138 MMHG | RESPIRATION RATE: 17 BRPM

## 2022-07-18 LAB
ANION GAP SERPL CALC-SCNC: 10 MMOL/L (ref 3–18)
ATRIAL RATE: 80 BPM
BASOPHILS # BLD: 0 K/UL (ref 0–0.1)
BASOPHILS NFR BLD: 0 % (ref 0–2)
BUN SERPL-MCNC: 12 MG/DL (ref 7–18)
BUN/CREAT SERPL: 10 (ref 12–20)
CALCIUM SERPL-MCNC: 9.1 MG/DL (ref 8.5–10.1)
CALCULATED P AXIS, ECG09: -10 DEGREES
CALCULATED R AXIS, ECG10: -20 DEGREES
CALCULATED T AXIS, ECG11: 1 DEGREES
CHLORIDE SERPL-SCNC: 102 MMOL/L (ref 100–111)
CO2 SERPL-SCNC: 23 MMOL/L (ref 21–32)
CREAT SERPL-MCNC: 1.18 MG/DL (ref 0.6–1.3)
DIAGNOSIS, 93000: NORMAL
DIFFERENTIAL METHOD BLD: ABNORMAL
EOSINOPHIL # BLD: 0.1 K/UL (ref 0–0.4)
EOSINOPHIL NFR BLD: 1 % (ref 0–5)
ERYTHROCYTE [DISTWIDTH] IN BLOOD BY AUTOMATED COUNT: 12.9 % (ref 11.6–14.5)
GLUCOSE SERPL-MCNC: 92 MG/DL (ref 74–99)
HCT VFR BLD AUTO: 34.7 % (ref 36–48)
HGB BLD-MCNC: 12.1 G/DL (ref 13–16)
IMM GRANULOCYTES # BLD AUTO: 0.1 K/UL (ref 0–0.04)
IMM GRANULOCYTES NFR BLD AUTO: 1 % (ref 0–0.5)
LYMPHOCYTES # BLD: 1.1 K/UL (ref 0.9–3.6)
LYMPHOCYTES NFR BLD: 11 % (ref 21–52)
MAGNESIUM SERPL-MCNC: 2.2 MG/DL (ref 1.6–2.6)
MCH RBC QN AUTO: 31.7 PG (ref 24–34)
MCHC RBC AUTO-ENTMCNC: 34.9 G/DL (ref 31–37)
MCV RBC AUTO: 90.8 FL (ref 78–100)
MONOCYTES # BLD: 1.5 K/UL (ref 0.05–1.2)
MONOCYTES NFR BLD: 15 % (ref 3–10)
NEUTS SEG # BLD: 7.1 K/UL (ref 1.8–8)
NEUTS SEG NFR BLD: 72 % (ref 40–73)
NRBC # BLD: 0 K/UL (ref 0–0.01)
NRBC BLD-RTO: 0 PER 100 WBC
P-R INTERVAL, ECG05: 188 MS
PLATELET # BLD AUTO: 173 K/UL (ref 135–420)
PMV BLD AUTO: 8.8 FL (ref 9.2–11.8)
POTASSIUM SERPL-SCNC: 3.7 MMOL/L (ref 3.5–5.5)
Q-T INTERVAL, ECG07: 392 MS
QRS DURATION, ECG06: 92 MS
QTC CALCULATION (BEZET), ECG08: 452 MS
RBC # BLD AUTO: 3.82 M/UL (ref 4.35–5.65)
SODIUM SERPL-SCNC: 135 MMOL/L (ref 136–145)
VENTRICULAR RATE, ECG03: 80 BPM
WBC # BLD AUTO: 9.9 K/UL (ref 4.6–13.2)

## 2022-07-18 PROCEDURE — 36415 COLL VENOUS BLD VENIPUNCTURE: CPT

## 2022-07-18 PROCEDURE — 97165 OT EVAL LOW COMPLEX 30 MIN: CPT

## 2022-07-18 PROCEDURE — 97535 SELF CARE MNGMENT TRAINING: CPT

## 2022-07-18 PROCEDURE — 74011250636 HC RX REV CODE- 250/636: Performed by: INTERNAL MEDICINE

## 2022-07-18 PROCEDURE — 97161 PT EVAL LOW COMPLEX 20 MIN: CPT

## 2022-07-18 PROCEDURE — 83735 ASSAY OF MAGNESIUM: CPT

## 2022-07-18 PROCEDURE — 85025 COMPLETE CBC W/AUTO DIFF WBC: CPT

## 2022-07-18 PROCEDURE — 74011250637 HC RX REV CODE- 250/637: Performed by: INTERNAL MEDICINE

## 2022-07-18 PROCEDURE — 80048 BASIC METABOLIC PNL TOTAL CA: CPT

## 2022-07-18 PROCEDURE — 95816 EEG AWAKE AND DROWSY: CPT | Performed by: INTERNAL MEDICINE

## 2022-07-18 RX ORDER — LANOLIN ALCOHOL/MO/W.PET/CERES
100 CREAM (GRAM) TOPICAL DAILY
Qty: 30 TABLET | Refills: 0 | Status: SHIPPED | OUTPATIENT
Start: 2022-07-19 | End: 2022-08-18

## 2022-07-18 RX ADMIN — ENOXAPARIN SODIUM 40 MG: 100 INJECTION SUBCUTANEOUS at 09:26

## 2022-07-18 RX ADMIN — Medication 100 MG: at 09:27

## 2022-07-18 RX ADMIN — AMLODIPINE BESYLATE 5 MG: 5 TABLET ORAL at 09:27

## 2022-07-18 NOTE — PROCEDURES
This EEG was done using the standard 10-20 system of electrode placement. Reason for the EE-year-old man with a history of alcohol abuse and unwitnessed episode of altered consciousness, rule out seizure activity. Medications: Amlodipine, thiamine orally, as needed Ativan    Posterior rhythm: There is a well-formed alpha rhythm with a frequency of between 8 and 9 Hz. Background: There is no evidence of diffuse slowing. There is occasional minimal artifact. Sleep: None    Other: There is no epileptiform activity or focal slowing. Impression: This is a normal awake EEG.

## 2022-07-18 NOTE — ROUTINE PROCESS
TRANSFER - IN REPORT:    Verbal report received from Paul Nuñez RN(name) on Rivas Beaulieu  being received from ED(unit) for routine progression of care      Report consisted of patients Situation, Background, Assessment and   Recommendations(SBAR). Information from the following report(s) Recent Results was reviewed with the receiving nurse. Opportunity for questions and clarification was provided. Assessment completed upon patients arrival to unit and care assumed. 2100  Arrived on floor via stretcher with transport team member      Bedside and Verbal shift change report given to Garrick Sales RN (oncoming nurse) by Gary Carranza RN (offgoing nurse). Report given with SBAR, Kardex, Intake/Output, MAR, Accordion and Recent Results.

## 2022-07-18 NOTE — PROGRESS NOTES
Reason for Admission:  Seizure ([facility] [address] [R56.9]                 RUR Score:   13%           Plan for utilizing home health:   TBD                    Likelihood of Readmission:   LOW                         Transition of Care Plan:              Initial assessment completed with patient. Cognitive status of patient: oriented to time, place, person and situation. Face sheet information confirmed:  yes. This patient lives in a single family home with patient and daughter and grand-son. Patient is able to navigate steps as needed. Prior to hospitalization, patient was considered to be independent with ADLs/IADLS : yes . Patient has a current ACP document on file: no      Healthcare Decision Maker:     Click here to complete [address] including selection of the Healthcare Decision Maker Relationship (ie \"Primary\")    The patient's daughter will be available to transport patient home upon discharge. The patient already has [address] equipment available in the home. Patient is not currently active with home health. Patient has not stayed in a skilled nursing facility or rehab. Was  stay within last 60 days : no. This patient is on dialysis :no    Freedom of choice signed: no.   Currently, the discharge plan is Home. The patient states that he can obtain his medications from the pharmacy, and take his medications as directed. Patient's current insurance is [place]. Medicare Part A & B/[facility]      Care Management Interventions  PCP Verified by CM: Yes  Last Visit to PCP: 06/30/22  Mode of Transport at Discharge:  Other (see comment) (Daughter)  Transition of Care Consult (CM Consult): Discharge Planning  MyChart Signup: No  Discharge Durable Medical Equipment: No  Physical Therapy Consult: No  Occupational Therapy Consult: Yes  Speech Therapy Consult: No  Support Systems: Child(rae)  Discharge Location  Patient Expects to be Discharged to[de-identified] Home    ALEXEI Velez, QMHP

## 2022-07-18 NOTE — PROGRESS NOTES
conducted an initial consultation and Spiritual Assessment for Ike Salvador, who is a 71 y.o.,male. Patient's Primary Language is: [place]. According to the patient's EMR Mosque Affiliation is: No preference. The reason the Patient came to the hospital is:   Patient Active Problem List    Diagnosis Date Noted    Seizure [facility]) 07/17/2022    AMS (altered mental status) 04/29/2022    Neuropathy 03/11/2020    HNP (herniated nucleus pulposus), lumbar 04/25/2018    Neuritis 04/25/2018        The  provided the following Interventions:  Initiated a relationship of care and support with this introductory visit. Explored for current spiritual needs. Listened empathically as he shared about his recent fall and related concerns. Offered assurance of prayer on patient's behalf. Chart reviewed. The following outcomes where achieved:  Patient shared limited information about both their medical narrative. Patient expressed gratitude for 's visit. Assessment:  There are no spiritual or Worship issues which require intervention at this time. Plan:  Patient expects to be discharged today so no follow up is necessary.      [address] [place]   [phone]

## 2022-07-18 NOTE — PROGRESS NOTES
Problem: Mobility Impaired (Adult and Pediatric)  Goal: *Acute Goals and Plan of Care (Insert Text)  Outcome: Resolved/Met   PHYSICAL THERAPY EVALUATION AND DISCHARGE    Patient: Renita Bernal (48 y.o. male)  Date: 7/18/2022  Primary Diagnosis: Seizure ([address] [R56.9]        Precautions: Fall  PLOF: Amb without AD. Lives in [facility] with 2STE with daughter and grandson. ASSESSMENT :  Pt reclined in bed, NAD. Educated on role of PT in hospital, agreeable to session. Pt is independent with all bed mobility and transfers. Ambulated around room (15ftx2) independently with no AD. Observed good standing balance with item retrieval from floor with independence. Able to perform x6 toe taps with high marches to glove box with unilateral UE support to simulate [place] home. Pt denies any mobility concerns upon discharge and is safe and independent with functional mobility. Pt left supine in bed, all needs met and call bell in reach. Skilled physical therapy is not indicated at this time. PLAN :  Discharge Recommendations: None (Home)  Further Equipment Recommendations for Discharge: None    [facility] Basic Mobility Inpatient Short Form:  24/24   This [facility] score should be considered in conjunction with interdisciplinary team recommendations to determine the most appropriate discharge setting. Patient's social support, diagnosis, medical stability, and prior level of function should also be taken into consideration. AM-PAC score of 18/24 (14/20 with stairs omitted) or greater is typically associated with a discharge to the patient's home setting. At this time, no further PT is recommended upon discharge due to pt is at functional baseline. Please see assessment section for further patient specific details. SUBJECTIVE:   Patient stated i watch cartoons all day with my grandson.     OBJECTIVE DATA SUMMARY:     Past Medical History:   Diagnosis Date    Arthritis     Hemochromatosis     Ill-defined condition     GOUT Past Surgical History:   Procedure Laterality Date    HX ORTHOPAEDIC      LEFT HAND     Barriers to Learning/Limitations: None  Compensate with: Visual Cues, Verbal Cues, Tactile Cues and Kinesthetic Cues  Home Situation:   Home Situation  Home Environment: Private residence  # Steps to Enter: 2  Rails to Enter: Yes  One/Two Story Residence: One story  Living Alone: No  Support Systems: Child(rae),Other Family Member(s)  Patient Expects to be Discharged to[de-identified] Home  Current DME Used/Available at Home: Walker, rolling (does not use, in garage)  Tub or Shower Type: Tub/Shower combination  Critical Behavior:  Neurologic State: Alert  Orientation Level: Oriented X4  Cognition: Follows commands; Appropriate safety awareness  Safety/Judgement: Fall prevention; Awareness of environment  Psychosocial  Patient Behaviors: Calm; Cooperative  Purposeful Interaction: Yes  Pt Identified Daily Priority: Clinical issues (comment)  Caritas Process: Establish trust  Caring Interventions: Reassure  Reassure: Caring rounds    Strength:    Manual Muscle Testing (LE)         R     L    Hip Flexion:   4/5  4/5  Knee EXT:   4/5  4/5  Knee FLEX:   4/5  4/5  Ankle DF:   4/5  4/5  _________________________________________________   Range Of Motion:  BLE WFL  Functional Mobility:  Bed Mobility:  Supine to Sit: Independent  Sit to Supine: Independent  Transfers:  Sit to Stand: Independent  Stand to Sit: Independent  Balance:   Sitting: Intact  Standing: Intact  Ambulation/Gait Training:  Distance (ft):  (15ftx2)   Ambulation - Level of Assistance: Independent  Stairs:   Level of Assistance: Independent  Assistive Device:  None  Rail Use: left  Number of Stairs: 3 simulated by toe taps to glove box / high marches  Pain:  Pain level pre-treatment: 0/10   Pain level post-treatment: 0/10    Activity Tolerance:   Good    After treatment:   []         Patient left in no apparent distress sitting up in chair  [x]         Patient left in no apparent distress in bed  [x]         Call bell left within reach  [x]         Nursing notified  []         Caregiver present  []         Bed alarm activated  []         SCDs applied    COMMUNICATION/EDUCATION:   [x]         Role of physical therapy in the acute care setting. [x]         Fall prevention education was provided and the patient/caregiver indicated understanding. [x]         Patient/family have participated as able in goal setting and plan of care. [x]         Patient/family agree to work toward stated goals and plan of care. []         Patient understands intent and goals of therapy, but is neutral about his/her participation. []         Patient is unable to participate in goal setting/plan of care: ongoing with therapy staff. Thank you for this referral.  Zana Ordoñez, VIOLETTE   Time Calculation: 8 mins    Eval Complexity: History: MEDIUM  Complexity : 1-2 comorbidities / personal factors will impact the outcome/ POC Exam:MEDIUM Complexity : 3 Standardized tests and measures addressing body structure, function, activity limitation and / or participation in recreation  Presentation: LOW Complexity : Stable, uncomplicated  Clinical Decision Making:Low Complexity   Clinical judgement; ROM, MMT, functional mobility Overall Complexity:LOW     [facility] AM-PAC® Basic Mobility Inpatient Short Form (6-Clicks) Version 2    How much HELP from another person does the patient currently need    (If the patient hasn't done an activity recently, how much help from another person do you think he/she would need if he/she tried?)   Total (Total A or Dep)   A Lot  (Mod to Max A)   A Little (Sup or Min A)   None (Mod I to I)   Turning from your back to your side while in a flat bed without using bedrails? [] 1 [] 2 [] 3 [x] 4   2. Moving from lying on your back to sitting on the side of a flat bed without using bedrails? [] 1 [] 2 [] 3 [x] 4   3.  Moving to and from a bed to a chair (including a wheelchair)? [] 1 [] 2 [] 3 [x] 4   4. Standing up from a chair using your arms (e.g., wheelchair, or bedside chair)? [] 1 [] 2 [] 3 [x] 4   5. Walking in hospital room? [] 1 [] 2 [] 3 [x] 4   6. Climbing 3-5 steps with a railing?+   [] 1 [] 2 [] 3 [x] 4   +If stair climbing cannot be assessed, skip item #6. Sum responses from items 1-5.

## 2022-07-18 NOTE — DISCHARGE INSTRUCTIONS
You should not drive for the next 6 months due to the possibility of seizure activity you might have had that led to your admission. Please follow up with neurology in the outpatient setting. Patient armband removed and shredded  MyChart Activation    Thank you for requesting access to [employer]. Please follow the instructions below to securely access and download your online medical record. [employer] allows you to send messages to your doctor, view your test results, renew your prescriptions, schedule appointments, and more. How Do I Sign Up? 1. In your internet browser, go to [email or web address]  2. Click on the First Time User? Click Here link in the Sign In box. You will be redirect to the New Member Sign Up page. 3. Enter your [employer] Access Code exactly as it appears below. You will not need to use this code after youve completed the sign-up process. If you do not sign up before the expiration date, you must request a new code. [employer] Access Code: [email or web address]  Expires: 2022  5:23 PM (This is the date your [employer] access code will )    4. Enter the last four digits of your Social Security Number (xxxx) and Date of Birth (mm/dd/yyyy) as indicated and click Submit. You will be taken to the next sign-up page. 5. Create a [employer] ID. This will be your [employer] login ID and cannot be changed, so think of one that is secure and easy to remember. 6. Create a [employer] password. You can change your password at any time. 7. Enter your Password Reset Question and Answer. This can be used at a later time if you forget your password. 8. Enter your e-mail address. You will receive e-mail notification when new information is available in [address] 9. Click Sign Up. You can now view and download portions of your medical record. 10. Click the Download Summary menu link to download a portable copy of your medical information.     Additional Information    If you have questions, please visit the Frequently Asked Questions section of the [employer] website at [email or web address] Remember, [employer] is NOT to be used for urgent needs. For medical emergencies, dial 911. As part of the discharge instructions, medications already given today were discussed with the patient. The next dose due of all ordered meds was highlighted as part of the medication discharge instructions. Discussed with the patient the importance of taking medications as directed, as well as the side effects and adverse reactions to medications ordered. DISCHARGE SUMMARY from Nurse    PATIENT INSTRUCTIONS:    After general anesthesia or intravenous sedation, for 24 hours or while taking prescription Narcotics:  · Limit your activities  · Do not drive and operate hazardous machinery  · Do not make important personal or business decisions  · Do  not drink alcoholic beverages  · If you have not urinated within 8 hours after discharge, please contact your surgeon on call. Report the following to your surgeon:  · Excessive pain, swelling, redness or odor of or around the surgical area  · Temperature over 100.5  · Nausea and vomiting lasting longer than 4 hours or if unable to take medications  · Any signs of decreased circulation or nerve impairment to extremity: change in color, persistent  numbness, tingling, coldness or increase pain  · Any questions    What to do at Home:  Recommended activity: Activity as tolerated,     If you experience any of the following symptoms signs of seizures, loss of consciousness, tremer of arms or legs, twitching of face, arms or legs, please follow up with Emergency Department or Primary Md. *  Please give a list of your current medications to your Primary Care Provider. *  Please update this list whenever your medications are discontinued, doses are      changed, or new medications (including over-the-counter products) are added.     *  Please carry medication information at all times in case of emergency situations. These are general instructions for a healthy lifestyle:    No smoking/ No tobacco products/ Avoid exposure to second hand smoke  Surgeon General's Warning:  Quitting smoking now greatly reduces serious risk to your health. Obesity, smoking, and sedentary lifestyle greatly increases your risk for illness    A healthy diet, regular physical exercise & weight monitoring are important for maintaining a healthy lifestyle    You may be retaining fluid if you have a history of heart failure or if you experience any of the following symptoms:  Weight gain of 3 pounds or more overnight or 5 pounds in a week, increased swelling in our hands or feet or shortness of breath while lying flat in bed. Please call your doctor as soon as you notice any of these symptoms; do not wait until your next office visit. The discharge information has been reviewed with the patient. The patient verbalized understanding. Discharge medications reviewed with the patient and appropriate educational materials and side effects teaching were provided.   ___________________________________________________________________________________________________________________________________

## 2022-07-18 NOTE — PROGRESS NOTES
Problem: Falls - Risk of  Goal: *Absence of Falls  Description: Document Alexey German Fall Risk and appropriate interventions in the flowsheet.   Outcome: Progressing Towards Goal  Note: Fall Risk Interventions:                                Problem: Patient Education: Go to Patient Education Activity  Goal: Patient/Family Education  Outcome: Progressing Towards Goal     Problem: Pain  Goal: *Control of Pain  Outcome: Progressing Towards Goal  Goal: *PALLIATIVE CARE:  Alleviation of Pain  Outcome: Progressing Towards Goal     Problem: Patient Education: Go to Patient Education Activity  Goal: Patient/Family Education  Outcome: Progressing Towards Goal     Problem: Patient Education: Go to Patient Education Activity  Goal: Patient/Family Education  Outcome: Progressing Towards Goal

## 2022-07-18 NOTE — PROGRESS NOTES
New OT orders received with patient already evaluated and discharged on 7/18/2022 from caseload. No indication for re-evaluation at this time. Acknowledged new orders.           Thank you,   Gay Ramires MS, OTR/L

## 2022-07-18 NOTE — H&P
Hospitalist Admission History and Physical    NAME:  Stanley Easley   :   1950   MRN:   [id number]     PCP:  Lynne Blankenship DO  Date/Time:  2022 10:11 PM  Subjective:   CHIEF COMPLAINT:  \"I don't know\"    HISTORY OF PRESENT ILLNESS:     Martha Dunn is a 71 y.o.   male with h/o EtOH abuse, admission for encephalopathy requiring ventilatory support, possible ambien dependence  who presents after being found on the floor at home by daughter. Pt reported that he has been trying to cut down on his EtOH intake and had recently run out of Ambien. Daughter states that he had filled his Ambien a few wks back and expressed her thought that pt may have taken more Ambien than prescribed. Pt stated that he does not know why he's here at the hospital. He says the last thing he remembers is watching TV today. He stated that he had been having light headed feeling the whole day prior to that. No reports of cp, sob. In ED noted to have stable hemodynamics w/ exception of elevated bp. He has mild hyponatremia, hypoK, mild elevation in creat and we have been asked to see the pt    Past Medical History:   Diagnosis Date    Arthritis     Hemochromatosis     Ill-defined condition     GOUT        Past Surgical History:   Procedure Laterality Date    HX ORTHOPAEDIC      LEFT HAND       Social History     Tobacco Use    Smoking status: Never Smoker    Smokeless tobacco: Never Used   Substance Use Topics    Alcohol use: Yes     Alcohol/week: 6.0 standard drinks     Types: 6 Cans of beer per week     Comment: weekly on weekends        No family history on file. No Known Allergies     Prior to Admission Medications   Per daugther he is on zestril, asa          REVIEW OF SYSTEMS:    Please see above otw 10 point ROS checked and negative.       Pertinent Positives include :    Objective:   VITALS:    Visit Vitals  BP (!) 161/84   Pulse 81   Temp 97.7 °F (36.5 °C)   Resp 18   Ht 5' 11\" (1.803 m)   Wt 87.1 kg (192 lb)   SpO2 92%   BMI 26.78 kg/m²     Temp (24hrs), Av.1 °F (36.7 °C), Min:97.7 °F (36.5 °C), Max:98.4 °F (36.9 °C)      PHYSICAL EXAM:   General:    Alert, cooperative, no distress, appears stated age. Head:   Normocephalic, without obvious abnormality, inner lip mucosa with a few cuts, has drid blood on outer lips  Eyes:   Conjunctivae clear, anicteric sclerae. Pupils are equal  Nose:  Nares normal. No drainage. Throat:    Lips, mucosa, and tongue normal.  No Thrush  Neck:  Supple, symmetrical,  no adenopathy,      and no JVD. Lungs:   Clear to auscultation bilaterally. No Wheezing or Rhonchi. No rales. Chest wall:  No tenderness or deformity. No Accessory muscle use. Heart:   Regular rate and rhythm,  no murmur, rub or gallop. Abdomen:   Soft, non-tender. Not distended. Bowel sounds normal. No masses  Extremities: Extremities normal, atraumatic, No cyanosis. No edema. No clubbing  Skin:     Texture, turgor normal. No rashes or lesions. Not Jaundiced  Lymph nodes: Cervical, supraclavicular normal.  Psych:  Good insight. Not depressed. Not anxious or agitated. Neurologic: EOMs intact. No facial asymmetry. No aphasia or slurred speech. Normal   strength, Alert and oriented X 3.      oriented X 3. LAB DATA REVIEWED:    No components found for: [place]  Recent Labs     22  1720   *   K 3.2*   CL 96*   CO2 19*   BUN 14   CREA 1.46*   *   CA 9.0   ALB 3.9   WBC 9.6   HGB 12.9*   HCT 36.1            IMAGING RESULTS:   []  I have personally reviewed the actual   []CXR  []CT scan    CXR:  CT head:  IMPRESSION     No acute abnormalities.     Small vessel disease such as is seen in patients with diabetes or hypertension.     Assessment/Plan:      Active Problems:    Seizure ([address] (2022)    -Syncope vs seizure: likely seizure due to EtOH and ambien withdrawal.   -HypoNa likely due to beer potomania  -HypoK, nl mag  -EtOH and Ambien dependence/abuse    ___________________________________________________  PLAN:    -EtOH withdrawal pathway  -Replace K, follow mag  -Follow Na  -Seizure precautions  -CIWA protocol  -PT/OT  -Daily thiamine        Risk of deterioration:  []Low    [x]Moderate  []High        FULL CODE    Prophylaxis:  [x]Lovenox  []Coumadin  []Hep SQ  []SCDs  []H2B/PPI    Disposition:  [x]Home w/ Family   [] PT,OT,RN   []SNF/LTC   []SAH/Rehab    Discussed Code Status:    [x]Full Code      []DNR     ___________________________________________________    Care Plan discussed with:    [x]Patient   []Family    []ED Care Manager  []ED Doc   []Specialist :    Total Time Coordinating Admission:      minutes    []Total Critical Care Time:     ___________________________________________________  Admitting Physician: Toma Ford MD

## 2022-07-18 NOTE — DISCHARGE SUMMARY
[facility]  Discharge Summary       Patient: Clary Ruiz Age: 71 y.o. : 1950 MR#: [id number] [id number]: [id number]  PCP on record: Mary Jane Costa DO  Admit date: 2022  Discharge date: 2022    Consults:    -   Procedures: EEG :  -  Impression: This is a normal awake EEG. Significant Diagnostic Studies: -Ct head wo cont 22: IMPRESSION     No acute abnormalities. Small vessel disease such as is seen in patients with diabetes or hypertension.  -    Discharge Diagnoses:   Syncope vs seizure  -Hyponatremia  -Hypokalemia  -EtOH and ambien dependence                                        Patient Active Problem List   Diagnosis Code    HNP (herniated nucleus pulposus), lumbar M51.26    Neuritis M79.2    Neuropathy G62.9    AMS (altered mental status) R41.82    Seizure [facility]) R56.9       Hospital Course by Problem   [address] [facility] Canelo is a 71 y.o.   male with h/o EtOH abuse, admission for encephalopathy requiring ventilatory support, possible ambien dependence  who presents after being found on the floor at home by daughter. He had been cutting down on his EtOH intake and had ran out of Ambien and it is unclear if he had withdrawal seizure vs syncope. Initial work up including cardiac enzyme, was not indicative of ACS. His case was informally discussed w/ neurologist who recommended EEG which was done and read as normal. He did not have seizure episodes during his stay, no arrhythmia reported. He did not have significant withdrawal symptoms. The next day after d/c, pt reported that he thought he rolled on a small car toy before coming to the ED and he might have lost consciousness likely due to head trauma when he fell. On date of dc, pt had no complaints and wanted to go home.         Today's examination of the patient revealed:     Subjective:     Objective:   VS:   Visit Vitals  /72 (BP 1 Location: Left arm, BP Patient Position: At rest) Pulse 74   Temp 98.8 °F (37.1 °C)   Resp 16   Ht 5' 11\" (1.803 m)   Wt 87.1 kg (192 lb)   SpO2 97%   BMI 26.78 kg/m²      Tmax/24hrs: Temp (24hrs), Av.2 °F (36.8 °C), Min:97.4 °F (36.3 °C), Max:98.8 °F (37.1 °C)     Input/Output:     Intake/Output Summary (Last 24 hours) at 2022 1540  Last data filed at 2022 0400  Gross per 24 hour   Intake --   Output 1800 ml   Net -1800 ml       General:  alert, awake, in nad  Cardiovascular:  rrr, no murmurs  Pulmonary:  ctab  GI:  soft, nt, nd  Extremities:  no edema  Additional:      Labs:    Recent Results (from the past 24 hour(s))   CBC WITH AUTOMATED DIFF    Collection Time: 22  5:20 PM   Result Value Ref Range    WBC 9.6 4.6 - 13.2 K/uL    RBC 4.04 (L) 4.35 - 5.65 M/uL    HGB 12.9 (L) 13.0 - 16.0 g/dL    HCT 36.1 36.0 - 48.0 %    MCV 89.4 78.0 - 100.0 FL    MCH 31.9 24.0 - 34.0 PG    MCHC 35.7 31.0 - 37.0 g/dL    RDW 12.7 11.6 - 14.5 %    PLATELET 949 084 - 643 K/uL    MPV 8.7 (L) 9.2 - 11.8 FL    NRBC 0.0 0  WBC    ABSOLUTE NRBC 0.00 0.00 - 0.01 K/uL    NEUTROPHILS 77 (H) 40 - 73 %    LYMPHOCYTES 14 (L) 21 - 52 %    MONOCYTES 7 3 - 10 %    EOSINOPHILS 1 0 - 5 %    BASOPHILS 0 0 - 2 %    IMMATURE GRANULOCYTES 1 (H) 0.0 - 0.5 %    ABS. NEUTROPHILS 7.3 1.8 - 8.0 K/UL    ABS. LYMPHOCYTES 1.3 0.9 - 3.6 K/UL    ABS. MONOCYTES 0.7 0.05 - 1.2 K/UL    ABS. EOSINOPHILS 0.1 0.0 - 0.4 K/UL    ABS. BASOPHILS 0.0 0.0 - 0.1 K/UL    ABS. IMM.  GRANS. 0.1 (H) 0.00 - 0.04 K/UL    DF AUTOMATED     METABOLIC PANEL, BASIC    Collection Time: 22  5:20 PM   Result Value Ref Range    Sodium 130 (L) 136 - 145 mmol/L    Potassium 3.2 (L) 3.5 - 5.5 mmol/L    Chloride 96 (L) 100 - 111 mmol/L    CO2 19 (L) 21 - 32 mmol/L    Anion gap 15 3.0 - 18 mmol/L    Glucose 148 (H) 74 - 99 mg/dL    BUN 14 7.0 - 18 MG/DL    Creatinine 1.46 (H) 0.6 - 1.3 MG/DL    BUN/Creatinine ratio 10 (L) 12 - 20      GFR est AA 58 (L) >60 ml/min/1.73m2    GFR est non-AA 48 (L) >60 ml/min/1.73m2    Calcium 9.0 8.5 - 10.1 MG/DL   ETHYL ALCOHOL    Collection Time: 07/17/22  5:20 PM   Result Value Ref Range    ALCOHOL(ETHYL),SERUM <3 0 - 3 MG/DL   TROPONIN-HIGH SENSITIVITY    Collection Time: 07/17/22  5:20 PM   Result Value Ref Range    Troponin-High Sensitivity 9 0 - 78 ng/L   HEPATIC FUNCTION PANEL    Collection Time: 07/17/22  5:20 PM   Result Value Ref Range    Protein, total 7.9 6.4 - 8.2 g/dL    Albumin 3.9 3.4 - 5.0 g/dL    Globulin 4.0 2.0 - 4.0 g/dL    A-G Ratio 1.0 0.8 - 1.7      Bilirubin, total 0.9 0.2 - 1.0 MG/DL    Bilirubin, direct 0.3 (H) 0.0 - 0.2 MG/DL    Alk.  phosphatase 146 (H) 45 - 117 U/L    AST (SGOT) 43 (H) 10 - 38 U/L    ALT (SGPT) 32 16 - 61 U/L   MAGNESIUM    Collection Time: 07/17/22  5:20 PM   Result Value Ref Range    Magnesium 2.1 1.6 - 2.6 mg/dL   EKG, 12 LEAD, INITIAL    Collection Time: 07/17/22  7:07 PM   Result Value Ref Range    Ventricular Rate 80 BPM    Atrial Rate 80 BPM    P-R Interval 188 ms    QRS Duration 92 ms    Q-T Interval 392 ms    QTC Calculation (Bezet) 452 ms    Calculated P Axis -10 degrees    Calculated R Axis -20 degrees    Calculated T Axis 1 degrees    Diagnosis       Normal sinus rhythm  Normal ECG  When compared with ECG of 29-APR-2022 16:30,  No significant change was found  Confirmed by Nayan Bowels ([id number]) on 7/18/2022 7:73:31 AM     METABOLIC PANEL, BASIC    Collection Time: 07/18/22  1:36 AM   Result Value Ref Range    Sodium 135 (L) 136 - 145 mmol/L    Potassium 3.7 3.5 - 5.5 mmol/L    Chloride 102 100 - 111 mmol/L    CO2 23 21 - 32 mmol/L    Anion gap 10 3.0 - 18 mmol/L    Glucose 92 74 - 99 mg/dL    BUN 12 7.0 - 18 MG/DL    Creatinine 1.18 0.6 - 1.3 MG/DL    BUN/Creatinine ratio 10 (L) 12 - 20      GFR est AA >60 >60 ml/min/1.73m2    GFR est non-AA >60 >60 ml/min/1.73m2    Calcium 9.1 8.5 - 10.1 MG/DL   MAGNESIUM    Collection Time: 07/18/22  1:36 AM   Result Value Ref Range    Magnesium 2.2 1.6 - 2.6 mg/dL   CBC WITH AUTOMATED DIFF    Collection Time: 07/18/22  1:36 AM   Result Value Ref Range    WBC 9.9 4.6 - 13.2 K/uL    RBC 3.82 (L) 4.35 - 5.65 M/uL    HGB 12.1 (L) 13.0 - 16.0 g/dL    HCT 34.7 (L) 36.0 - 48.0 %    MCV 90.8 78.0 - 100.0 FL    MCH 31.7 24.0 - 34.0 PG    MCHC 34.9 31.0 - 37.0 g/dL    RDW 12.9 11.6 - 14.5 %    PLATELET 658 579 - 642 K/uL    MPV 8.8 (L) 9.2 - 11.8 FL    NRBC 0.0 0  WBC    ABSOLUTE NRBC 0.00 0.00 - 0.01 K/uL    NEUTROPHILS 72 40 - 73 %    LYMPHOCYTES 11 (L) 21 - 52 %    MONOCYTES 15 (H) 3 - 10 %    EOSINOPHILS 1 0 - 5 %    BASOPHILS 0 0 - 2 %    IMMATURE GRANULOCYTES 1 (H) 0.0 - 0.5 %    ABS. NEUTROPHILS 7.1 1.8 - 8.0 K/UL    ABS. LYMPHOCYTES 1.1 0.9 - 3.6 K/UL    ABS. MONOCYTES 1.5 (H) 0.05 - 1.2 K/UL    ABS. EOSINOPHILS 0.1 0.0 - 0.4 K/UL    ABS. BASOPHILS 0.0 0.0 - 0.1 K/UL    ABS. IMM. GRANS. 0.1 (H) 0.00 - 0.04 K/UL    DF AUTOMATED       Additional Data Reviewed:     Condition on discharge: stable   Disposition:    []Home   [x]Home with Home Health   []SNF/NH   []Rehab   []Home with family   []Alternate Facility:____________________      Discharge Medications:     Current Discharge Medication List        START taking these medications    Details   thiamine HCL (B-1) 100 mg tablet Take 1 Tablet by mouth daily for 30 days. Qty: 30 Tablet, Refills: 0           CONTINUE these medications which have NOT CHANGED    Details   pantoprazole (PROTONIX) 40 mg tablet Take 1 Tablet by mouth Daily (before breakfast). Qty: 15 Tablet, Refills: 0      multivitamin, tx-iron-ca-min (THERA-M w/ IRON) 9 mg iron-400 mcg tab tablet Take 1 Tablet by mouth daily. Qty: 30 Tablet, Refills: 0      amLODIPine (NORVASC) 5 mg tablet Take 1 Tablet by mouth daily. Qty: 30 Tablet, Refills: 0      colchicine 0.6 mg tablet Take 0.6 mg by mouth as needed (gout flare ups).       varicella-zoster recombinant, PF, (Shingrix, PF,) 50 mcg/0.5 mL susr injection Shingrix (PF) 50 mcg/0.5 mL intramuscular suspension, kit      lidocaine HCL-benzyl alcohoL (Salonpas Lidocaine Plus) 4-10 % crea 1 Actuation(s) by Apply Externally route two (2) times daily as needed for Pain. Qty: 1 Each, Refills: 0           STOP taking these medications       QUEtiapine (SEROquel) 50 mg tablet Comments:   Reason for Stopping:         BYSTOLIC 10 mg tablet Comments:   Reason for Stopping: Follow-up Appointments:   1.  Your PCP: Ramiro Bell DO, within 7-10days      >30 minutes spent coordinating this discharge (review instructions/follow-up, prescriptions, preparing report for sign off)    Signed:  Vanna Isaac MD  7/18/2022  3:40 PM

## 2022-07-18 NOTE — PROGRESS NOTES
Dr. Frederica Ahumada asked CM to give patient Substance Abuse Rehab resources. CM went to see patient, gave him AA meeting for [place], [place] and Substance Abuse [facility] in [place]. Patient said his daughter will be transporting him home today at time of discharge.              Marlene Suero, RN  Case Management [phone]

## 2022-07-18 NOTE — PROGRESS NOTES
D/C order noted for today. Orders reviewed. No needs identified at this time. CM spoke with patient, his daughter will be transporting him home at time of discharge. CM remains available if needed.              Jaylen Knight, [phone]

## 2022-07-18 NOTE — ED NOTES
TRANSFER - OUT REPORT:    Verbal report given to Sabiha Ritter RN(name) on [facility]  being transferred to (unit) for routine progression of care       Report consisted of patients Situation, Background, Assessment and   Recommendations(SBAR). Information from the following report(s) SBAR, Kardex and ED Summary was reviewed with the receiving nurse. Lines:   Peripheral IV 07/17/22 Left Antecubital (Active)       Peripheral IV 07/17/22 Right Antecubital (Active)        Opportunity for questions and clarification was provided.       Patient transported with:   [employer]

## 2022-07-18 NOTE — ROUTINE PROCESS
Orthostatic Vitals     Lying   HR: 69   BP: 138/73   O2: 96%    Sitting  HR: 83  BP: 157/78  O2: 96    Standing   HR: 92  BP: 141/82  O2: 94

## 2022-07-18 NOTE — PROGRESS NOTES
Problem: Self Care Deficits Care Plan (Adult)  Goal: *Acute Goals and Plan of Care (Insert Text)  Outcome: Resolved/Met     OCCUPATIONAL THERAPY EVALUATION/DISCHARGE    Patient: Jose Peters (82 y.o. male)  Date: 7/18/2022  Primary Diagnosis: Seizure ([address] [R56.9]  Precautions Seizure  PLOF: Patient was independent with self-care and functional mobility PTA. ASSESSMENT AND RECOMMENDATIONS:  Based on the objective data described below, the patient presents with no deficits that impede pt function with ADLs, functional transfers, and functional mobility in preparation for selfcare tasks. At this time patient is safe to d/c home with family support from selfcare standpoint. OT to d/c from caseload. Skilled occupational therapy is not indicated at this time. Discharge Recommendations: Home   Further Equipment Recommendations for Discharge: N/A    [facility]: Jose Peters scored a 24/24 on the -[facility] ADL Inpatient form. At this time, no further OT is recommended upon discharge due to patient at functional baseline.      SUBJECTIVE:   Patient stated my drinking brought me here I guess, I dont have any issues getting around    OBJECTIVE DATA SUMMARY:     Past Medical History:   Diagnosis Date    Arthritis     Hemochromatosis     Ill-defined condition     GOUT     Past Surgical History:   Procedure Laterality Date    HX ORTHOPAEDIC      LEFT HAND     Barriers to Learning/Limitations: None  Compensate with: visual, verbal, tactile, kinesthetic cues/model    Home Situation:   Home Situation  Home Environment: Private residence  # Steps to Enter: 3  Rails to Enter: Yes  One/Two Story Residence: One story  Living Alone: No  Support Systems: Child(rae)  Patient Expects to be Discharged to[de-identified] Home  Current DME Used/Available at Home: Walker, rolling (does not use, in garage)  Tub or Shower Type: Tub/Shower combination  [x]     Right hand dominant   []     Left hand dominant    Cognitive/Behavioral Status:  Neurologic State: Alert  Orientation Level: Oriented X4  Cognition: Follows commands  Safety/Judgement: Fall prevention; Awareness of environment    Skin: Intact  Edema: None noted    Vision/Perceptual:    Acuity: Within Defined Limits;Able to read clock/calendar on wall without difficulty      Coordination: BUE  Fine Motor Skills-Upper: Left Intact; Right Intact    Gross Motor Skills-Upper: Left Intact; Right Intact    Balance:  Sitting: Intact  Standing: Intact    Strength: BUE  Strength: Within functional limits     Tone & Sensation: BUE  Tone: Normal  Sensation: Intact    Range of Motion: BUE  AROM: Within functional limits    Functional Mobility and Transfers for ADLs:  Bed Mobility:  Supine to Sit: Modified independent  Sit to Supine: Modified independent     Transfers:  Sit to Stand: Modified independent  Stand to Sit: Modified independent    ADL Assessment:  Feeding: Independent    Oral Facial Hygiene/Grooming: Independent    Bathing: Independent    Upper Body Dressing: Independent    Lower Body Dressing: Independent    Toileting: Independent    ADL Intervention:  Feeding  Feeding Assistance: Independent  Container Management: Independent  Drink to Mouth: Independent    Upper Body Dressing Assistance  Dressing Assistance: [address] with hospital gown: Independent    Lower Body Dressing Assistance  Dressing Assistance: Independent  Socks: Independent  Leg Crossed Method Used: Yes  Position Performed: Seated edge of bed      Cognitive Retraining  Safety/Judgement: Fall prevention; Awareness of environment    Pain:  Pain level pre-treatment: 0/10   Pain level post-treatment: 0/10   Pain Intervention(s): Medication (see MAR); Rest, Ice, Repositioning   Response to intervention: Nurse notified, See doc flow    Activity Tolerance:   Good      Please refer to the flowsheet for vital signs taken during this treatment.   After treatment:   []  Patient left in no apparent distress sitting up in chair  [x]  Patient left in no apparent distress in bed  [x]  Call bell left within reach  [x]  Nursing notified  []  Caregiver present  [x]  Bed alarm activated    COMMUNICATION/EDUCATION:   [x]      Role of Occupational Therapy in the acute care setting  [x]      Home safety education was provided and the patient/caregiver indicated understanding. [x]      Patient/family have participated as able and agree with findings and recommendations. []      Patient is unable to participate in plan of care at this time. Thank you for this referral.  Jessica Alfaro, OTR/L  Time Calculation: 16 mins      Eval Complexity: History: MEDIUM Complexity : Expanded review of history including physical, cognitive and psychosocial  history ; Examination: LOW Complexity : 1-3 performance deficits relating to physical, cognitive , or psychosocial skils that result in activity limitations and / or participation restrictions ; Decision Making:LOW Complexity : No comorbidities that affect functional and no verbal or physical assistance needed to complete eval tasks     Jacques Jiménez AM-PAC® Daily Activity Inpatient Short Form (6-Clicks)*    How much HELP from another person does the patient currently need    (If the patient hasn't done an activity recently, how much help from another person do you think he/she would need if he/she tried?)   Total (Total A or Dep)   A Lot  (Mod to Max A)   A Little (Sup or Min A)   None (Mod I to I)   Putting on and taking off regular lower body clothing? [] 1 [] 2 [] 3 [x] 4   2. Bathing (including washing, rinsing,      drying)? [] 1 [] 2 [] 3 [x] 4   3. Toileting, which includes using toilet, bedpan or urinal?   [] 1 [] 2 [] 3 [x] 4   4. Putting on and taking off regular upper body clothing? [] 1 [] 2 [] 3 [x] 4   5. Taking care of personal grooming such as brushing teeth? [] 1 [] 2 [] 3 [x] 4   6. Eating meals?    [] 1 [] 2 [] 3 [x] 4

## 2022-12-21 ENCOUNTER — APPOINTMENT (OUTPATIENT)
Dept: GENERAL RADIOLOGY | Age: 72
DRG: 854 | End: 2022-12-21
Payer: MEDICARE

## 2022-12-21 ENCOUNTER — HOSPITAL ENCOUNTER (EMERGENCY)
Age: 72
Discharge: HOME | DRG: 854 | End: 2022-12-21
Attending: EMERGENCY MEDICINE
Payer: MEDICARE

## 2022-12-21 ENCOUNTER — APPOINTMENT (OUTPATIENT)
Dept: MRI IMAGING | Age: 72
DRG: 854 | End: 2022-12-21
Attending: PHYSICIAN ASSISTANT
Payer: MEDICARE

## 2022-12-21 ENCOUNTER — HOSPITAL ENCOUNTER (EMERGENCY)
Dept: GENERAL RADIOLOGY | Age: 72
Discharge: HOME | DRG: 854 | End: 2022-12-21
Attending: PHYSICIAN ASSISTANT
Payer: MEDICARE

## 2022-12-21 ENCOUNTER — HOSPITAL ENCOUNTER (INPATIENT)
Age: 72
LOS: 6 days | Discharge: HOME HEALTH | DRG: 854 | End: 2022-12-27
Attending: EMERGENCY MEDICINE | Admitting: INTERNAL MEDICINE
Payer: MEDICARE

## 2022-12-21 VITALS
DIASTOLIC BLOOD PRESSURE: 81 MMHG | HEIGHT: 71 IN | WEIGHT: 200 LBS | OXYGEN SATURATION: 100 % | SYSTOLIC BLOOD PRESSURE: 143 MMHG | RESPIRATION RATE: 18 BRPM | TEMPERATURE: 97.6 F | HEART RATE: 98 BPM | BODY MASS INDEX: 28 KG/M2

## 2022-12-21 DIAGNOSIS — M79.2 NEURITIS: ICD-10-CM

## 2022-12-21 DIAGNOSIS — S90.416A INFECTED ABRASION OF THIRD TOE: ICD-10-CM

## 2022-12-21 DIAGNOSIS — M86.9 OSTEOMYELITIS OF THIRD TOE OF RIGHT FOOT (HCC): Primary | ICD-10-CM

## 2022-12-21 DIAGNOSIS — L08.9 INFECTED ABRASION OF THIRD TOE: ICD-10-CM

## 2022-12-21 DIAGNOSIS — R03.0 ELEVATED BLOOD PRESSURE READING: ICD-10-CM

## 2022-12-21 DIAGNOSIS — L03.031 CELLULITIS OF TOE OF RIGHT FOOT: Primary | ICD-10-CM

## 2022-12-21 LAB
ABO + RH BLD: NORMAL
ALBUMIN SERPL-MCNC: 3.6 G/DL (ref 3.4–5)
ALBUMIN/GLOB SERPL: 0.8 (ref 0.8–1.7)
ALP SERPL-CCNC: 221 U/L (ref 45–117)
ALT SERPL-CCNC: 21 U/L (ref 16–61)
ANION GAP SERPL CALC-SCNC: 6 MMOL/L (ref 3–18)
ANION GAP SERPL CALC-SCNC: 9 MMOL/L (ref 3–18)
AST SERPL-CCNC: 22 U/L (ref 10–38)
BASOPHILS # BLD: 0.1 K/UL (ref 0–0.1)
BASOPHILS NFR BLD: 1 % (ref 0–2)
BILIRUB SERPL-MCNC: 0.5 MG/DL (ref 0.2–1)
BLOOD GROUP ANTIBODIES SERPL: NORMAL
BUN SERPL-MCNC: 21 MG/DL (ref 7–18)
BUN SERPL-MCNC: 23 MG/DL (ref 7–18)
BUN/CREAT SERPL: 13 (ref 12–20)
BUN/CREAT SERPL: 13 (ref 12–20)
CALCIUM SERPL-MCNC: 10.1 MG/DL (ref 8.5–10.1)
CALCIUM SERPL-MCNC: 9.5 MG/DL (ref 8.5–10.1)
CHLORIDE SERPL-SCNC: 101 MMOL/L (ref 100–111)
CHLORIDE SERPL-SCNC: 102 MMOL/L (ref 100–111)
CO2 SERPL-SCNC: 24 MMOL/L (ref 21–32)
CO2 SERPL-SCNC: 25 MMOL/L (ref 21–32)
CREAT SERPL-MCNC: 1.63 MG/DL (ref 0.6–1.3)
CREAT SERPL-MCNC: 1.8 MG/DL (ref 0.6–1.3)
CRP SERPL-MCNC: 12.1 MG/DL (ref 0–0.3)
DIFFERENTIAL METHOD BLD: ABNORMAL
EOSINOPHIL # BLD: 0.5 K/UL (ref 0–0.4)
EOSINOPHIL NFR BLD: 4 % (ref 0–5)
ERYTHROCYTE [DISTWIDTH] IN BLOOD BY AUTOMATED COUNT: 13 % (ref 11.6–14.5)
ERYTHROCYTE [SEDIMENTATION RATE] IN BLOOD: 79 MM/HR (ref 0–20)
GLOBULIN SER CALC-MCNC: 4.7 G/DL (ref 2–4)
GLUCOSE SERPL-MCNC: 89 MG/DL (ref 74–99)
GLUCOSE SERPL-MCNC: 95 MG/DL (ref 74–99)
HCT VFR BLD AUTO: 36.1 % (ref 36–48)
HGB BLD-MCNC: 12.3 G/DL (ref 13–16)
IMM GRANULOCYTES # BLD AUTO: 0.1 K/UL (ref 0–0.04)
IMM GRANULOCYTES NFR BLD AUTO: 1 % (ref 0–0.5)
LACTATE BLD-SCNC: 1.26 MMOL/L (ref 0.4–2)
LYMPHOCYTES # BLD: 2.2 K/UL (ref 0.9–3.6)
LYMPHOCYTES NFR BLD: 17 % (ref 21–52)
MCH RBC QN AUTO: 32.2 PG (ref 24–34)
MCHC RBC AUTO-ENTMCNC: 34.1 G/DL (ref 31–37)
MCV RBC AUTO: 94.5 FL (ref 78–100)
MONOCYTES # BLD: 1.2 K/UL (ref 0.05–1.2)
MONOCYTES NFR BLD: 9 % (ref 3–10)
NEUTS SEG # BLD: 8.8 K/UL (ref 1.8–8)
NEUTS SEG NFR BLD: 69 % (ref 40–73)
NRBC # BLD: 0 K/UL (ref 0–0.01)
NRBC BLD-RTO: 0 PER 100 WBC
PLATELET # BLD AUTO: 293 K/UL (ref 135–420)
PMV BLD AUTO: 8.9 FL (ref 9.2–11.8)
POTASSIUM SERPL-SCNC: 4.7 MMOL/L (ref 3.5–5.5)
POTASSIUM SERPL-SCNC: 4.7 MMOL/L (ref 3.5–5.5)
PROT SERPL-MCNC: 8.3 G/DL (ref 6.4–8.2)
RBC # BLD AUTO: 3.82 M/UL (ref 4.35–5.65)
SODIUM SERPL-SCNC: 131 MMOL/L (ref 136–145)
SODIUM SERPL-SCNC: 136 MMOL/L (ref 136–145)
SPECIMEN EXP DATE BLD: NORMAL
WBC # BLD AUTO: 12.7 K/UL (ref 4.6–13.2)

## 2022-12-21 PROCEDURE — 74011000258 HC RX REV CODE- 258: Performed by: PHYSICIAN ASSISTANT

## 2022-12-21 PROCEDURE — 99285 EMERGENCY DEPT VISIT HI MDM: CPT

## 2022-12-21 PROCEDURE — 65270000029 HC RM PRIVATE

## 2022-12-21 PROCEDURE — 87040 BLOOD CULTURE FOR BACTERIA: CPT

## 2022-12-21 PROCEDURE — 87186 SC STD MICRODIL/AGAR DIL: CPT

## 2022-12-21 PROCEDURE — 71045 X-RAY EXAM CHEST 1 VIEW: CPT

## 2022-12-21 PROCEDURE — 86140 C-REACTIVE PROTEIN: CPT

## 2022-12-21 PROCEDURE — 80053 COMPREHEN METABOLIC PANEL: CPT

## 2022-12-21 PROCEDURE — 74011250636 HC RX REV CODE- 250/636: Performed by: PHYSICIAN ASSISTANT

## 2022-12-21 PROCEDURE — 85652 RBC SED RATE AUTOMATED: CPT

## 2022-12-21 PROCEDURE — 73718 MRI LOWER EXTREMITY W/O DYE: CPT

## 2022-12-21 PROCEDURE — 99284 EMERGENCY DEPT VISIT MOD MDM: CPT

## 2022-12-21 PROCEDURE — 87150 DNA/RNA AMPLIFIED PROBE: CPT

## 2022-12-21 PROCEDURE — 74011000250 HC RX REV CODE- 250: Performed by: INTERNAL MEDICINE

## 2022-12-21 PROCEDURE — 73630 X-RAY EXAM OF FOOT: CPT

## 2022-12-21 PROCEDURE — 99222 1ST HOSP IP/OBS MODERATE 55: CPT | Performed by: INTERNAL MEDICINE

## 2022-12-21 PROCEDURE — 83605 ASSAY OF LACTIC ACID: CPT

## 2022-12-21 PROCEDURE — 85025 COMPLETE CBC W/AUTO DIFF WBC: CPT

## 2022-12-21 PROCEDURE — 93005 ELECTROCARDIOGRAM TRACING: CPT

## 2022-12-21 PROCEDURE — 86900 BLOOD TYPING SEROLOGIC ABO: CPT

## 2022-12-21 PROCEDURE — 87077 CULTURE AEROBIC IDENTIFY: CPT

## 2022-12-21 RX ORDER — SODIUM CHLORIDE 9 MG/ML
100 INJECTION, SOLUTION INTRAVENOUS CONTINUOUS
Status: DISCONTINUED | OUTPATIENT
Start: 2022-12-21 | End: 2022-12-27 | Stop reason: HOSPADM

## 2022-12-21 RX ORDER — COLCHICINE 0.6 MG/1
0.6 CAPSULE ORAL AS NEEDED
Status: DISCONTINUED | OUTPATIENT
Start: 2022-12-21 | End: 2022-12-27 | Stop reason: HOSPADM

## 2022-12-21 RX ORDER — AMLODIPINE BESYLATE 5 MG/1
5 TABLET ORAL DAILY
Status: DISCONTINUED | OUTPATIENT
Start: 2022-12-22 | End: 2022-12-27 | Stop reason: HOSPADM

## 2022-12-21 RX ORDER — ALLOPURINOL 100 MG/1
100 TABLET ORAL DAILY
Status: DISCONTINUED | OUTPATIENT
Start: 2022-12-22 | End: 2022-12-27 | Stop reason: HOSPADM

## 2022-12-21 RX ORDER — ENOXAPARIN SODIUM 100 MG/ML
40 INJECTION SUBCUTANEOUS DAILY
Status: DISCONTINUED | OUTPATIENT
Start: 2022-12-22 | End: 2022-12-27 | Stop reason: HOSPADM

## 2022-12-21 RX ORDER — VANCOMYCIN 2 GRAM/500 ML IN 0.9 % SODIUM CHLORIDE INTRAVENOUS
2000 ONCE
Status: COMPLETED | OUTPATIENT
Start: 2022-12-21 | End: 2022-12-21

## 2022-12-21 RX ORDER — POLYETHYLENE GLYCOL 3350 17 G/17G
17 POWDER, FOR SOLUTION ORAL DAILY PRN
Status: DISCONTINUED | OUTPATIENT
Start: 2022-12-21 | End: 2022-12-27 | Stop reason: HOSPADM

## 2022-12-21 RX ORDER — SODIUM CHLORIDE 0.9 % (FLUSH) 0.9 %
5-40 SYRINGE (ML) INJECTION AS NEEDED
Status: DISCONTINUED | OUTPATIENT
Start: 2022-12-21 | End: 2022-12-27 | Stop reason: HOSPADM

## 2022-12-21 RX ORDER — PROMETHAZINE HYDROCHLORIDE 12.5 MG/1
12.5 TABLET ORAL
Status: DISCONTINUED | OUTPATIENT
Start: 2022-12-21 | End: 2022-12-27 | Stop reason: HOSPADM

## 2022-12-21 RX ORDER — PANTOPRAZOLE SODIUM 40 MG/1
40 TABLET, DELAYED RELEASE ORAL
Status: DISCONTINUED | OUTPATIENT
Start: 2022-12-22 | End: 2022-12-27 | Stop reason: HOSPADM

## 2022-12-21 RX ORDER — SODIUM CHLORIDE 0.9 % (FLUSH) 0.9 %
5-40 SYRINGE (ML) INJECTION EVERY 8 HOURS
Status: DISCONTINUED | OUTPATIENT
Start: 2022-12-21 | End: 2022-12-27 | Stop reason: HOSPADM

## 2022-12-21 RX ORDER — ACETAMINOPHEN 650 MG/1
650 SUPPOSITORY RECTAL
Status: DISCONTINUED | OUTPATIENT
Start: 2022-12-21 | End: 2022-12-27 | Stop reason: HOSPADM

## 2022-12-21 RX ORDER — ACETAMINOPHEN 325 MG/1
650 TABLET ORAL
Status: DISCONTINUED | OUTPATIENT
Start: 2022-12-21 | End: 2022-12-27 | Stop reason: HOSPADM

## 2022-12-21 RX ORDER — ONDANSETRON 2 MG/ML
4 INJECTION INTRAMUSCULAR; INTRAVENOUS
Status: DISCONTINUED | OUTPATIENT
Start: 2022-12-21 | End: 2022-12-27 | Stop reason: HOSPADM

## 2022-12-21 RX ADMIN — SODIUM CHLORIDE 100 ML/HR: 9 INJECTION, SOLUTION INTRAVENOUS at 20:18

## 2022-12-21 RX ADMIN — SODIUM CHLORIDE, PRESERVATIVE FREE 10 ML: 5 INJECTION INTRAVENOUS at 22:30

## 2022-12-21 RX ADMIN — PIPERACILLIN AND TAZOBACTAM 4.5 G: 4; .5 INJECTION, POWDER, FOR SOLUTION INTRAVENOUS at 19:58

## 2022-12-21 RX ADMIN — VANCOMYCIN HYDROCHLORIDE 2000 MG: 10 INJECTION, POWDER, LYOPHILIZED, FOR SOLUTION INTRAVENOUS at 20:21

## 2022-12-21 NOTE — ED TRIAGE NOTES
Pt w/extensive foot Hx from childhood, approx 50 years. Hx \"hammertoe\", seen throughout his life by doctors for the bilat foot issues. Pt soaks feet 2-3 times/day. Ongoing issue of feet cracking and bleeding, generalized soarness. Pt states he still can't feel his feet, but it's not new. Pt denies bloodclots, DVT, Cellulitis, CHF, neuropathy, diabetes.

## 2022-12-21 NOTE — CONSULTS
Consulted by dr. Missael Negrete. Presentation c/w right third toe distal phalanx acute on chronic osteomyelitis  MRI results pending  Plans for surgery noted  Recommendations  -Recommend Zosyn, vancomycin  -Obtain wound cultures  -Follow-up MRI right foot  -Follow-up podiatry recommendations regarding right foot surgery  -Further recommendations based on the above test results, clinical course  Full consult to follow.   Thanks

## 2022-12-21 NOTE — Clinical Note
Status[de-identified] INPATIENT [101]   Type of Bed: Medical [8]   Cardiac Monitoring Required?: No   Inpatient Hospitalization Certified Necessary for the Following Reasons: 3.  Patient receiving treatment that can only be provided in an inpatient setting (further clarification in H&P documentation)   Admitting Diagnosis: Osteomyelitis [facility]) [[id number]]   Admitting Physician: Giselle Bloom [[id number]]   Attending Physician: Gabriela Walton   Estimated Length of Stay: 3-4 Midnights   Discharge Plan[de-identified] Home with Office Follow-up

## 2022-12-21 NOTE — ED PROVIDER NOTES
EMERGENCY DEPARTMENT HISTORY AND PHYSICAL EXAM    5:13 PM      Date: 12/21/2022  Patient Name: Magaly Arango    History of Presenting Illness     Chief Complaint   Patient presents with    Skin Infection     Patietn diagnosed with cellulitis of right middle toe, diagnosed today with osteomyelitis, foot and ankle surgeon, DR Meg clayton patient admitted         History Provided By: Patient    Additional History (Context): Magaly Arango is a 71 y.o. male with  hx of arthritis, gout, and other noted PMH  who presents with c/o R 3rd toe infection x 5 days. Pt was evaluated earlier in the emergency department, had follow-up with his podiatrist who then sent him back to the emergency department for admission. Wound was irrigated and cleansed by Dr. Meg Her in office, he recommends to not remove dressing, broad-spectrum antibiotics, MRI, admission with n.p.o. after midnight. Pt denies any exacerbating or alleviating factors. PCP: Jan Johnson DO    Current Facility-Administered Medications   Medication Dose Route Frequency Provider Last Rate Last Admin    vancomycin (VANCOCIN) 2000 mg in  ml infusion  2,000 mg IntraVENous ONCE Simon Ornelas PA        piperacillin-tazobactam (ZOSYN) 4.5 g in 0.9% sodium chloride (MBP/ADV) 100 mL MBP  4.5 g IntraVENous Simon Topete PA        Followed by    piperacillin-tazobactam (ZOSYN) 4.5 g in 0.9% sodium chloride (MBP/ADV) 100 mL MBP  4.5 g IntraVENous Q6H Lu[place]ne Gowers, PA        0.9% sodium chloride infusion  100 mL/hr IntraVENous CONTINUOUS Lu[place]ne Gowers, PA         Current Outpatient Medications   Medication Sig Dispense Refill    pantoprazole (PROTONIX) 40 mg tablet Take 1 Tablet by mouth Daily (before breakfast). 15 Tablet 0    multivitamin, tx-iron-ca-min (THERA-M w/ IRON) 9 mg iron-400 mcg tab tablet Take 1 Tablet by mouth daily. 30 Tablet 0    amLODIPine (NORVASC) 5 mg tablet Take 1 Tablet by mouth daily.  30 Tablet 0 colchicine 0.6 mg tablet Take 0.6 mg by mouth as needed (gout flare ups). varicella-zoster recombinant, PF, (Shingrix, PF,) 50 mcg/0.5 mL susr injection Shingrix (PF) 50 mcg/0.5 mL intramuscular suspension, kit      lidocaine HCL-benzyl alcohoL (Salonpas Lidocaine Plus) 4-10 % crea 1 Actuation(s) by Apply Externally route two (2) times daily as needed for Pain. 1 Each 0       Past History     Past Medical History:  Past Medical History:   Diagnosis Date    Arthritis     Hemochromatosis     Ill-defined condition     GOUT       Past Surgical History:  Past Surgical History:   Procedure Laterality Date    HX ORTHOPAEDIC      LEFT HAND       Family History:  No family history on file. Social History:  Social History     Tobacco Use    Smoking status: Never    Smokeless tobacco: Never   Substance Use Topics    Alcohol use: Yes     Alcohol/week: 6.0 standard drinks     Types: 6 Cans of beer per week     Comment: weekly on weekends       Allergies:  No Known Allergies      Review of Systems       Review of Systems   Constitutional:  Negative for chills and fever. Respiratory:  Negative for shortness of breath. Cardiovascular:  Negative for chest pain. Gastrointestinal:  Negative for abdominal pain, nausea and vomiting. Musculoskeletal:  Positive for arthralgias and myalgias. Skin:  Positive for wound. Negative for rash. Neurological:  Negative for weakness. All other systems reviewed and are negative. Physical Exam   Visit Vitals  BP (!) 138/102 (BP 1 Location: Left arm, BP Patient Position: At rest)   Pulse 78   Temp 98.6 °F (37 °C)   Resp 18   Ht 5' 11\" (1.803 m)   Wt 90.7 kg (200 lb)   SpO2 99%   BMI 27.89 kg/m²         Physical Exam  Vitals and nursing note reviewed. Constitutional:       General: He is not in acute distress. Appearance: Normal appearance. He is well-developed. He is not ill-appearing, toxic-appearing or diaphoretic. HENT:      Head: Normocephalic and atraumatic. Cardiovascular:      Rate and Rhythm: Normal rate and regular rhythm. Heart sounds: Normal heart sounds. No murmur heard. No friction rub. No gallop. Pulmonary:      Effort: Pulmonary effort is normal. No respiratory distress. Breath sounds: Normal breath sounds. No wheezing or rales. Musculoskeletal:         General: Normal range of motion. Cervical back: Normal range of motion and neck supple. Comments: Foot with dressing in place    Skin:     General: Skin is warm. Findings: No rash. Neurological:      Mental Status: He is alert. Diagnostic Study Results     Labs -  Recent Results (from the past 12 hour(s))   CBC WITH AUTOMATED DIFF    Collection Time: 12/21/22  8:45 AM   Result Value Ref Range    WBC 12.7 4.6 - 13.2 K/uL    RBC 3.82 (L) 4.35 - 5.65 M/uL    HGB 12.3 (L) 13.0 - 16.0 g/dL    HCT 36.1 36.0 - 48.0 %    MCV 94.5 78.0 - 100.0 FL    MCH 32.2 24.0 - 34.0 PG    MCHC 34.1 31.0 - 37.0 g/dL    RDW 13.0 11.6 - 14.5 %    PLATELET 226 940 - 151 K/uL    MPV 8.9 (L) 9.2 - 11.8 FL    NRBC 0.0 0  WBC    ABSOLUTE NRBC 0.00 0.00 - 0.01 K/uL    NEUTROPHILS 69 40 - 73 %    LYMPHOCYTES 17 (L) 21 - 52 %    MONOCYTES 9 3 - 10 %    EOSINOPHILS 4 0 - 5 %    BASOPHILS 1 0 - 2 %    IMMATURE GRANULOCYTES 1 (H) 0.0 - 0.5 %    ABS. NEUTROPHILS 8.8 (H) 1.8 - 8.0 K/UL    ABS. LYMPHOCYTES 2.2 0.9 - 3.6 K/UL    ABS. MONOCYTES 1.2 0.05 - 1.2 K/UL    ABS. EOSINOPHILS 0.5 (H) 0.0 - 0.4 K/UL    ABS. BASOPHILS 0.1 0.0 - 0.1 K/UL    ABS. IMM.  GRANS. 0.1 (H) 0.00 - 0.04 K/UL    DF AUTOMATED     METABOLIC PANEL, COMPREHENSIVE    Collection Time: 12/21/22  8:45 AM   Result Value Ref Range    Sodium 136 136 - 145 mmol/L    Potassium 4.7 3.5 - 5.5 mmol/L    Chloride 102 100 - 111 mmol/L    CO2 25 21 - 32 mmol/L    Anion gap 9 3.0 - 18 mmol/L    Glucose 89 74 - 99 mg/dL    BUN 23 (H) 7.0 - 18 MG/DL    Creatinine 1.80 (H) 0.6 - 1.3 MG/DL    BUN/Creatinine ratio 13 12 - 20      eGFR 40 (L) >60 ml/min/1.73m2    Calcium 9.5 8.5 - 10.1 MG/DL    Bilirubin, total 0.5 0.2 - 1.0 MG/DL    ALT (SGPT) 21 16 - 61 U/L    AST (SGOT) 22 10 - 38 U/L    Alk. phosphatase 221 (H) 45 - 117 U/L    Protein, total 8.3 (H) 6.4 - 8.2 g/dL    Albumin 3.6 3.4 - 5.0 g/dL    Globulin 4.7 (H) 2.0 - 4.0 g/dL    A-G Ratio 0.8 0.8 - 1.7     SED RATE (ESR)    Collection Time: 12/21/22  8:45 AM   Result Value Ref Range    Sed rate, automated 79 (H) 0 - 20 mm/hr   C REACTIVE PROTEIN, QT    Collection Time: 12/21/22  8:45 AM   Result Value Ref Range    C-Reactive protein 12.1 (H) 0 - 0.3 mg/dL       Radiologic Studies -   XR CHEST PORT   Final Result      No acute cardiopulmonary abnormalities. MRI FOOT RT WO CONT    (Results Pending)         Medical Decision Making   I am the first provider for this patient. I reviewed the vital signs, available nursing notes, past medical history, past surgical history, family history and social history. Vital Signs-Reviewed the patient's vital signs. Records Reviewed: Nursing Notes and Old Medical Records (Time of Review: 5:13 PM)    ED Course: Progress Notes, Reevaluation, and Consults:  5:26 PM: Discussed with Dr. Adrian Roberts, hospitalist, who recommends IVF, accepts admission. Updated patient with plan, in agreement     MDM: 81-year-old male who presents to the ED due to right third toe infection x 5 days. Patient was evaluated by podiatrist in office today and sent to the ED for IV antibiotics, MRI, and admission for surgery tomorrow. Patient is afebrile, nontoxic-appearing, looks well. Labs demonstrate mild leukocytosis, elevated ESR, CRP. Blood cultures sent and pending, broad-spectrum antibiotics administered. Pending MRI, admission    Diagnosis     Clinical Impression:   1.  Osteomyelitis of third toe of right foot (HCC)    2. Elevated blood pressure reading        Disposition: admission     Follow-up Information    None          Patient's Medications   Start Taking    No medications on file   Continue Taking    AMLODIPINE (NORVASC) 5 MG TABLET    Take 1 Tablet by mouth daily. COLCHICINE 0.6 MG TABLET    Take 0.6 mg by mouth as needed (gout flare ups). LIDOCAINE HCL-BENZYL ALCOHOL (SALONPAS LIDOCAINE PLUS) 4-10 % CREA    1 Actuation(s) by Apply Externally route two (2) times daily as needed for Pain. MULTIVITAMIN, TX-IRON-CA-MIN (THERA-M W/ IRON) 9 MG IRON-400 MCG TAB TABLET    Take 1 Tablet by mouth daily. PANTOPRAZOLE (PROTONIX) 40 MG TABLET    Take 1 Tablet by mouth Daily (before breakfast). VARICELLA-ZOSTER RECOMBINANT, PF, (SHINGRIX, PF,) 50 MCG/0.5 ML SUSR INJECTION    Shingrix (PF) 50 mcg/0.5 mL intramuscular suspension, kit   These Medications have changed    No medications on file   Stop Taking    No medications on file       Dictation disclaimer:  Please note that this dictation was completed with [employer], the computer voice recognition software. Quite often unanticipated grammatical, syntax, homophones, and other interpretive errors are inadvertently transcribed by the computer software. Please disregard these errors. Please excuse any errors that have escaped final proofreading.

## 2022-12-21 NOTE — ED PROVIDER NOTES
81-year-old male with Hx of arthritis gout and hemochromatosis presents to the ED with right middle foot pain for the past 5 days. While taking a shower he noted blood oozing from his right middle toe. Since then he has had serosangious oozing, redness, and swelling. He notes extensive foot history with multiple episodes of prior infections to both of his feet. No prior surgeries. 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 He has no pain in his toes but notes pain in his right foot that radiates up to his knee when he walks. Past Medical History:   Diagnosis Date    Arthritis     Hemochromatosis     Ill-defined condition     GOUT       Past Surgical History:   Procedure Laterality Date    HX ORTHOPAEDIC      LEFT HAND         No family history on file. Social History     Socioeconomic History    Marital status: SINGLE     Spouse name: Not on file    Number of children: Not on file    Years of education: Not on file    Highest education level: Not on file   Occupational History    Not on file   Tobacco Use    Smoking status: Never    Smokeless tobacco: Never   Substance and Sexual Activity    Alcohol use: Yes     Alcohol/week: 6.0 standard drinks     Types: 6 Cans of beer per week     Comment: weekly on weekends    Drug use: Not on file    Sexual activity: Not on file   Other Topics Concern    Not on file   Social History Narrative    Not on file     Social Determinants of Health     Financial Resource Strain: Not on file   Food Insecurity: Not on file   Transportation Needs: Not on file   Physical Activity: Not on file   Stress: Not on file   Social Connections: Not on file   Intimate Partner Violence: Not on file   Housing Stability: Not on file         ALLERGIES: Patient has no known allergies. Review of Systems   All other systems reviewed and are negative.     Vitals:    12/21/22 0815   BP: (!) 143/81   Pulse: 98   Resp: 18   Temp: 97.6 °F (36.4 °C)   SpO2: 100%   Weight: 90.7 kg (200 lb)   Height: 5' 11\" (1.803 m)            Physical Exam  Vitals and nursing note reviewed. Constitutional:       Appearance: Normal appearance. HENT:      Head: Normocephalic and atraumatic. Right Ear: External ear normal.      Left Ear: External ear normal.      Nose: Nose normal.      Mouth/Throat:      Mouth: Mucous membranes are moist.   Eyes:      Conjunctiva/sclera: Conjunctivae normal.      Pupils: Pupils are equal, round, and reactive to light. Cardiovascular:      Rate and Rhythm: Normal rate and regular rhythm. Pulses: Normal pulses. Heart sounds: Normal heart sounds. Pulmonary:      Effort: Pulmonary effort is normal. No respiratory distress. Breath sounds: Normal breath sounds. No wheezing. Abdominal:      General: There is no distension. Palpations: Abdomen is soft. Tenderness: There is no abdominal tenderness. Musculoskeletal:         General: Normal range of motion. Cervical back: Neck supple. Comments: Edema to the right middle toe. Skin:     Comments: 2 cm ulcer to the base of the right middle toe with surrounding erythema. Small 5 mm ulcer in between the webspace of the 2nd and middle toe with serosangious drainage. No area of fluctuance or induration. Otherwise, remaining toes have good capillary refill. Neurological:      Mental Status: He is alert and oriented to person, place, and time. Comments: 2+ DP pulses. Sensation intact. 5/5 dorsiflexion and plantarflexion of the right foot. Moving all extremities freely. Follows commands. Antalgic gait secondary to pain. Psychiatric:         Mood and Affect: Mood normal.         Behavior: Behavior normal.         Thought Content:  Thought content normal.         Judgment: Judgment normal.        MDM  Number of Diagnoses or Management Options  Diagnosis management comments: 80-year-old male with Hx of arthritis gout, hemochromatosis, chronic neuropathy in his toes presents to the ED for right foot pain with middle toe swelling, erythema, and drainage for the past 5 days. He has had similar episodes previously, but no prior surgeries or debridements of his feet or toes. Vital signs unremarkable. Physical exam notable for 2 cm ulcer to the dorsal aspect and 5 mm ulcer in between the 2nd and middle digit of the right middle toe with surrounding erythema, edema, with serosangious drainage. Antalgic gait secondary to pain. Sensation intact to the right foot, decreased sensation to the right toes (which patient reports is chronic). Good capillary refill. 2+ DP pulses.      ESR elevated to 79 and CRP to 12.1  CBC shows no elevated WBC  CMP shows Cr 1.8, BUN 23 otherwise unremarkable    Recent Results (from the past 12 hour(s))  -CBC WITH AUTOMATED DIFF:   Collection Time: 12/21/22  8:45 AM       Result                      Value             Ref Range           WBC                         12.7              4.6 - 13.2 K*       RBC                         3.82 (L)          4.35 - 5.65 *       HGB                         12.3 (L)          13.0 - 16.0 *       HCT                         36.1              36.0 - 48.0 %       MCV                         94.5              78.0 - 100.0*       MCH                         32.2              24.0 - 34.0 *       MCHC                        34.1              31.0 - 37.0 *       RDW                         13.0              11.6 - 14.5 %       PLATELET                    293               135 - 420 K/*       MPV                         8.9 (L)           9.2 - 11.8 FL       NRBC                        0.0               0  WBC       ABSOLUTE NRBC               0.00              0.00 - 0.01 *       NEUTROPHILS                 69                40 - 73 %           LYMPHOCYTES                 17 (L)            21 - 52 %           MONOCYTES                   9                 3 - 10 %            EOSINOPHILS                 4                 0 - 5 %             BASOPHILS 1                 0 - 2 %             IMMATURE GRANULOCYTES       1 (H)             0.0 - 0.5 %         ABS. NEUTROPHILS            8.8 (H)           1.8 - 8.0 K/*       ABS. LYMPHOCYTES            2.2               0.9 - 3.6 K/*       ABS. MONOCYTES              1.2               0.05 - 1.2 K*       ABS. EOSINOPHILS            0.5 (H)           0.0 - 0.4 K/*       ABS. BASOPHILS              0.1               0.0 - 0.1 K/*       ABS. IMM. GRANS.            0.1 (H)           0.00 - 0.04 *       DF                          AUTOMATED                        -METABOLIC PANEL, COMPREHENSIVE:   Collection Time: 12/21/22  8:45 AM       Result                      Value             Ref Range           Sodium                      136               136 - 145 mm*       Potassium                   4.7               3.5 - 5.5 mm*       Chloride                    102               100 - 111 mm*       CO2                         25                21 - 32 mmol*       Anion gap                   9                 3.0 - 18 mmo*       Glucose                     89                74 - 99 mg/dL       BUN                         23 (H)            7.0 - 18 MG/*       Creatinine                  1.80 (H)          0.6 - 1.3 MG*       BUN/Creatinine ratio        13                12 - 20             eGFR                        40 (L)            >60 ml/min/1*       Calcium                     9.5               8.5 - 10.1 M*       Bilirubin, total            0.5               0.2 - 1.0 MG*       ALT (SGPT)                  21                16 - 61 U/L         AST (SGOT)                  22                10 - 38 U/L         Alk.  phosphatase            221 (H)           45 - 117 U/L        Protein, total              8.3 (H)           6.4 - 8.2 g/*       Albumin                     3.6               3.4 - 5.0 g/*       Globulin                    4.7 (H)           2.0 - 4.0 g/*       A-G Ratio                   0.8               0.8 - 1. 7      -SED RATE (ESR):   Collection Time: 12/21/22  8:45 AM       Result                      Value             Ref Range           Sed rate, automated         79 (H)            0 - 20 mm/hr   -C REACTIVE PROTEIN, QT:   Collection Time: 12/21/22  8:45 AM       Result                      Value             Ref Range           C-Reactive protein          12.1 (H)          0 - 0.3 mg/dL    XR FOOT RT MIN 3 V   Final Result        Osteolysis of the right third distal phalanx with moderate soft tissue swelling. Findings highly suspicious for cellulitis and osteomyelitis. No evidence of soft    tissue gas. [place] [address] Dr. Mirella Wynne agrees to see the patient in clinic now. Discussed the most likely diagnosis with the patient. He remains hemodynamically stable. He agrees with plan of care and with discharge home. Instructed to go to the clinic right after leaving the ER. He agrees. Return precautions given. ESR elevated. Cr and BUN elevated compared to previous.    Consult podiatry 1028 AM             Procedures

## 2022-12-21 NOTE — H&P
History and Physical    Patient: Jassi Vanegas MRN: [id number]  SSN: [id number]    YOB: 1950    Age: 71 y.o. Sex: male    Aida Isaacs,     C/C : right third toe distal phalanx acute on chronic osteomyelitis    Subjective:      Jassi Vanegas is a 71 y.o. male with past medical history of arthritis, gout, hypertension admitted with right third toe distal phalanx osteomyelitis suspected. Referred by podiatry Dr. Alberto Hayes.  Patient is started on vancomycin and Zosyn. Patient is a good historian however he does not recall his medications very well    According to patient he has been having problems with his foot but last 4 to 5 days he has been experiencing little disturbance in his right toe area, yesterday he noticed that in the bathroom he saw blood and he was surprised why he has this. Patient went to his podiatrist from where he was sent to the ED for suspected osteomyelitis and possible surgery tomorrow. Empirically patient is started on Zosyn and vancomycin cultures are drawn IV fluids has been started as minor renal insufficiency is noted. Past Medical History:   Diagnosis Date    Arthritis     Hemochromatosis     Ill-defined condition     GOUT     Past Surgical History:   Procedure Laterality Date    HX ORTHOPAEDIC      LEFT HAND      No family history on file. Social History     Tobacco Use    Smoking status: Never    Smokeless tobacco: Never   Substance Use Topics    Alcohol use: Yes     Alcohol/week: 6.0 standard drinks     Types: 6 Cans of beer per week     Comment: weekly on weekends      Prior to Admission medications    Medication Sig Start Date End Date Taking? Authorizing Provider   pantoprazole (PROTONIX) 40 mg tablet Take 1 Tablet by mouth Daily (before breakfast). 5/10/22   Toma Orozco MD   multivitamin, tx-iron-ca-min (THERA-M w/ IRON) 9 mg iron-400 mcg tab tablet Take 1 Tablet by mouth daily.  5/10/22   Toma Orozco MD   amLODIPine (NORVASC) 5 mg tablet Take 1 Tablet by mouth daily. 5/10/22   Arsen Parrish MD   colchicine 0.6 mg tablet Take 0.6 mg by mouth as needed (gout flare ups). 11/15/21   Provider, Historical   varicella-zoster recombinant, PF, (Shingrix, PF,) 50 mcg/0.5 mL susr injection Shingrix (PF) 50 mcg/0.5 mL intramuscular suspension, kit    Provider, Historical   lidocaine HCL-benzyl alcohoL (Salonpas Lidocaine Plus) 4-10 % crea 1 Actuation(s) by Apply Externally route two (2) times daily as needed for Pain. 10/13/21   KRISSY Osorio        No Known Allergies    Review of Systems:  positive responses in bold type   Constitutional: Negative for fever, chills, diaphoresis and unexpected weight change. HENT: Negative for ear pain, congestion, sore throat, rhinorrhea, drooling, trouble swallowing, neck pain and tinnitus. Eyes: Negative for photophobia, pain, redness and visual disturbance. Respiratory: negative for shortness of breath, cough, choking, chest tightness, wheezing or stridor. Cardiovascular: Negative for chest pain, palpitations and leg swelling. Gastrointestinal: Negative for nausea, vomiting, abdominal pain, diarrhea, constipation, blood in stool, abdominal distention and anal bleeding. Genitourinary: Negative for dysuria, urgency, frequency, hematuria, flank pain and difficulty urinating. Musculoskeletal: Negative for back pain and arthralgias. Skin: Negative for color change, rash and wound. Neurological: Negative for dizziness, seizures, syncope, speech difficulty, light-headedness or headaches. Hematological: Does not bruise/bleed easily. Psychiatric/Behavioral: Negative for suicidal ideas, hallucinations, behavioral problems, self-injury or agitation          Objective: Body mass index is 27.89 kg/m².   Vitals:    12/21/22 1611   BP: (!) 138/102   Pulse: 78   Resp: 18   Temp: 98.6 °F (37 °C)   SpO2: 99%   Weight: 90.7 kg (200 lb)   Height: 5' 11\" (1.803 m)        Physical Exam:  General appearance - alert, well appearing, and in no distress  Mental status - alert, oriented to person, place, and time  Eyes - pupils equal and reactive, extraocular eye movements intact  Ears - bilateral TM's and external ear canals normal  Nose - normal and patent, no erythema, discharge or polyps  Chest - clear to auscultation, no wheezes, rales or rhonchi, symmetric air entry  Heart - normal rate, regular rhythm, normal S1, S2, no murmurs, rubs, clicks or gallops  Abdomen - soft, nontender, nondistended, no masses or organomegaly  Neurological - alert, oriented, normal speech, no focal findings or movement disorder noted  Musculoskeletal - no joint tenderness, deformity or swelling  Extremities -right third toe appears discolored? Gangrenous         Hospital Problems  Date Reviewed: 11/24/2021   None      CBC:  Lab Results   Component Value Date/Time    WBC 12.7 12/21/2022 08:45 AM    HGB 12.3 (L) 12/21/2022 08:45 AM    HCT 36.1 12/21/2022 08:45 AM    PLATELET 503 81/72/7254 08:45 AM    MCV 94.5 12/21/2022 08:45 AM        CMP:  Lab Results   Component Value Date/Time    Sodium 136 12/21/2022 08:45 AM    Potassium 4.7 12/21/2022 08:45 AM    Chloride 102 12/21/2022 08:45 AM    CO2 25 12/21/2022 08:45 AM    Anion gap 9 12/21/2022 08:45 AM    Glucose 89 12/21/2022 08:45 AM    BUN 23 (H) 12/21/2022 08:45 AM    Creatinine 1.80 (H) 12/21/2022 08:45 AM    BUN/Creatinine ratio 13 12/21/2022 08:45 AM    GFR est AA >60 07/18/2022 01:36 AM    GFR est non-AA >60 07/18/2022 01:36 AM    Calcium 9.5 12/21/2022 08:45 AM    Alk.  phosphatase 221 (H) 12/21/2022 08:45 AM    Protein, total 8.3 (H) 12/21/2022 08:45 AM    Albumin 3.6 12/21/2022 08:45 AM    Globulin 4.7 (H) 12/21/2022 08:45 AM    A-G Ratio 0.8 12/21/2022 08:45 AM    ALT (SGPT) 21 12/21/2022 08:45 AM        PT/INR  Lab Results   Component Value Date/Time    INR 1.0 09/13/2019 02:02 PM    Prothrombin time 13.1 09/13/2019 02:02 PM            EKG: No results found for this or any previous visit. Assessment and  Plan:     1 suspected acute osteomyelitis right third toe distal phalanx-MRI right foot pending  2 hypertension  3 history of gout  4 renal insufficiency    Plan    -It appears patient is very stable otherwise is all medical issues other than foot infection's are stable    -Patient will be evaluated by podiatry in the morning for possible surgery meanwhile we will keep patient n.p.o.    -Vanco and Zosyn-ID consulted by podiatry Dr. Vizcarra Public will see patient in the morning    -Continue other home medications    -On his home list it looks like patient is on lisinopril but here I will give him Norvasc due to renal insufficiency, monitor renal function continue IV hydration.     Signed By: Addie Rodriguez MD     December 21, 2022

## 2022-12-21 NOTE — PROGRESS NOTES
Pharmacy Note      Zosyn 4.5 gm q6h was ordered for treatment of Diabetic Foot Infection. Per [address] [place] [address] Mauro, this order will be changed to 4.5 gm x 1, followed by 3.375 gm q8h to be infused in 240 mins. Estimated Creatinine Clearance: Estimated Creatinine Clearance: 43.4 mL/min (A) (based on SCr of 1.8 mg/dL (H)). Dialysis Status, TARI, CKD: TARI    BMI:  Body mass index is 27.89 kg/m². Rationale for Adjustment:  [facility] B-Lactam extended infusion policy    Pharmacy will continue to monitor and adjust dose as necessary. Please call with any questions.     Thank you,  Abrahan Johnson, PHARMD

## 2022-12-22 ENCOUNTER — ANESTHESIA EVENT (OUTPATIENT)
Dept: SURGERY | Age: 72
DRG: 854 | End: 2022-12-22
Payer: MEDICARE

## 2022-12-22 LAB
ANION GAP SERPL CALC-SCNC: 7 MMOL/L (ref 3–18)
ATRIAL RATE: 79 BPM
BASOPHILS # BLD: 0 K/UL (ref 0–0.1)
BASOPHILS NFR BLD: 0 % (ref 0–2)
BUN SERPL-MCNC: 22 MG/DL (ref 7–18)
BUN/CREAT SERPL: 14 (ref 12–20)
CALCIUM SERPL-MCNC: 9 MG/DL (ref 8.5–10.1)
CALCULATED P AXIS, ECG09: 5 DEGREES
CALCULATED R AXIS, ECG10: -26 DEGREES
CALCULATED T AXIS, ECG11: 23 DEGREES
CHLORIDE SERPL-SCNC: 106 MMOL/L (ref 100–111)
CO2 SERPL-SCNC: 24 MMOL/L (ref 21–32)
CREAT SERPL-MCNC: 1.62 MG/DL (ref 0.6–1.3)
DIAGNOSIS, 93000: NORMAL
DIFFERENTIAL METHOD BLD: ABNORMAL
EOSINOPHIL # BLD: 0.4 K/UL (ref 0–0.4)
EOSINOPHIL NFR BLD: 4 % (ref 0–5)
ERYTHROCYTE [DISTWIDTH] IN BLOOD BY AUTOMATED COUNT: 13 % (ref 11.6–14.5)
GLUCOSE SERPL-MCNC: 85 MG/DL (ref 74–99)
HCT VFR BLD AUTO: 32.5 % (ref 36–48)
HGB BLD-MCNC: 11.1 G/DL (ref 13–16)
IMM GRANULOCYTES # BLD AUTO: 0.1 K/UL (ref 0–0.04)
IMM GRANULOCYTES NFR BLD AUTO: 1 % (ref 0–0.5)
LYMPHOCYTES # BLD: 1.6 K/UL (ref 0.9–3.6)
LYMPHOCYTES NFR BLD: 16 % (ref 21–52)
MCH RBC QN AUTO: 32.2 PG (ref 24–34)
MCHC RBC AUTO-ENTMCNC: 34.2 G/DL (ref 31–37)
MCV RBC AUTO: 94.2 FL (ref 78–100)
MONOCYTES # BLD: 1.1 K/UL (ref 0.05–1.2)
MONOCYTES NFR BLD: 11 % (ref 3–10)
NEUTS SEG # BLD: 7.1 K/UL (ref 1.8–8)
NEUTS SEG NFR BLD: 69 % (ref 40–73)
NRBC # BLD: 0 K/UL (ref 0–0.01)
NRBC BLD-RTO: 0 PER 100 WBC
P-R INTERVAL, ECG05: 176 MS
PLATELET # BLD AUTO: 283 K/UL (ref 135–420)
PMV BLD AUTO: 8.9 FL (ref 9.2–11.8)
POTASSIUM SERPL-SCNC: 4.2 MMOL/L (ref 3.5–5.5)
Q-T INTERVAL, ECG07: 376 MS
QRS DURATION, ECG06: 86 MS
QTC CALCULATION (BEZET), ECG08: 431 MS
RBC # BLD AUTO: 3.45 M/UL (ref 4.35–5.65)
SODIUM SERPL-SCNC: 137 MMOL/L (ref 136–145)
VANCOMYCIN SERPL-MCNC: 18.9 UG/ML (ref 5–40)
VENTRICULAR RATE, ECG03: 79 BPM
WBC # BLD AUTO: 10.3 K/UL (ref 4.6–13.2)

## 2022-12-22 PROCEDURE — 87205 SMEAR GRAM STAIN: CPT

## 2022-12-22 PROCEDURE — 87077 CULTURE AEROBIC IDENTIFY: CPT

## 2022-12-22 PROCEDURE — 74011250637 HC RX REV CODE- 250/637: Performed by: HOSPITALIST

## 2022-12-22 PROCEDURE — 65270000029 HC RM PRIVATE

## 2022-12-22 PROCEDURE — 80202 ASSAY OF VANCOMYCIN: CPT

## 2022-12-22 PROCEDURE — 74011250637 HC RX REV CODE- 250/637: Performed by: INTERNAL MEDICINE

## 2022-12-22 PROCEDURE — 74011000258 HC RX REV CODE- 258: Performed by: INTERNAL MEDICINE

## 2022-12-22 PROCEDURE — 99232 SBSQ HOSP IP/OBS MODERATE 35: CPT | Performed by: FAMILY MEDICINE

## 2022-12-22 PROCEDURE — 80048 BASIC METABOLIC PNL TOTAL CA: CPT

## 2022-12-22 PROCEDURE — 2709999900 HC NON-CHARGEABLE SUPPLY

## 2022-12-22 PROCEDURE — 87186 SC STD MICRODIL/AGAR DIL: CPT

## 2022-12-22 PROCEDURE — 74011250636 HC RX REV CODE- 250/636: Performed by: INTERNAL MEDICINE

## 2022-12-22 PROCEDURE — 85025 COMPLETE CBC W/AUTO DIFF WBC: CPT

## 2022-12-22 PROCEDURE — 74011000250 HC RX REV CODE- 250: Performed by: INTERNAL MEDICINE

## 2022-12-22 RX ORDER — ZOLPIDEM TARTRATE 5 MG/1
5 TABLET ORAL
Status: COMPLETED | OUTPATIENT
Start: 2022-12-22 | End: 2022-12-22

## 2022-12-22 RX ORDER — VANCOMYCIN/0.9 % SOD CHLORIDE 1.5G/250ML
1500 PLASTIC BAG, INJECTION (ML) INTRAVENOUS ONCE
Status: COMPLETED | OUTPATIENT
Start: 2022-12-22 | End: 2022-12-22

## 2022-12-22 RX ADMIN — PIPERACILLIN AND TAZOBACTAM 3.38 G: 3; .375 INJECTION, POWDER, FOR SOLUTION INTRAVENOUS at 02:24

## 2022-12-22 RX ADMIN — ZOLPIDEM TARTRATE 5 MG: 5 TABLET ORAL at 22:19

## 2022-12-22 RX ADMIN — ALLOPURINOL 100 MG: 100 TABLET ORAL at 08:43

## 2022-12-22 RX ADMIN — SODIUM CHLORIDE, PRESERVATIVE FREE 10 ML: 5 INJECTION INTRAVENOUS at 05:03

## 2022-12-22 RX ADMIN — SODIUM CHLORIDE, PRESERVATIVE FREE 10 ML: 5 INJECTION INTRAVENOUS at 22:33

## 2022-12-22 RX ADMIN — VANCOMYCIN HYDROCHLORIDE 1500 MG: 10 INJECTION, POWDER, LYOPHILIZED, FOR SOLUTION INTRAVENOUS at 11:55

## 2022-12-22 RX ADMIN — PIPERACILLIN AND TAZOBACTAM 3.38 G: 3; .375 INJECTION, POWDER, FOR SOLUTION INTRAVENOUS at 11:56

## 2022-12-22 RX ADMIN — SODIUM CHLORIDE 100 ML/HR: 9 INJECTION, SOLUTION INTRAVENOUS at 22:22

## 2022-12-22 RX ADMIN — ENOXAPARIN SODIUM 40 MG: 100 INJECTION SUBCUTANEOUS at 08:43

## 2022-12-22 RX ADMIN — PANTOPRAZOLE SODIUM 40 MG: 40 TABLET, DELAYED RELEASE ORAL at 08:43

## 2022-12-22 RX ADMIN — AMLODIPINE BESYLATE 5 MG: 5 TABLET ORAL at 08:43

## 2022-12-22 RX ADMIN — PIPERACILLIN AND TAZOBACTAM 3.38 G: 3; .375 INJECTION, POWDER, FOR SOLUTION INTRAVENOUS at 18:19

## 2022-12-22 NOTE — CONSULTS
Consult    Patient: Fito Torres MRN: [id number]  SSN: [id number]    YOB: 1950  Age: 71 y.o. Sex: male      Subjective:      Fito Torres is a 71 y.o. male who is being seen for asked to evaluate and treat infected right foot. Seen in Clinic yesterday and emergency room this AM.   . Past Medical History:   Diagnosis Date    Arthritis     Hemochromatosis     Ill-defined condition     GOUT     Past Surgical History:   Procedure Laterality Date    HX ORTHOPAEDIC      LEFT HAND      History reviewed. No pertinent family history. Social History     Tobacco Use    Smoking status: Never    Smokeless tobacco: Never   Substance Use Topics    Alcohol use:  Yes     Alcohol/week: 6.0 standard drinks     Types: 6 Cans of beer per week     Comment: weekly on weekends      Current Facility-Administered Medications   Medication Dose Route Frequency Provider Last Rate Last Admin    0.9% sodium chloride infusion  100 mL/hr IntraVENous CONTINUOUS Tanya Lui  mL/hr at 12/21/22 2018 100 mL/hr at 12/21/22 2018    amLODIPine (NORVASC) tablet 5 mg  5 mg Oral DAILY Tanya Lui MD   5 mg at 12/22/22 0843    colchicine (MITIGARE) capsule 0.6 mg  0.6 mg Oral PRN Tanya Lui MD        pantoprazole (PROTONIX) tablet 40 mg  40 mg Oral ACB Tanya Lui MD   40 mg at 12/22/22 0843    allopurinoL (ZYLOPRIM) tablet 100 mg  100 mg Oral DAILY Tanya Lui MD   100 mg at 12/22/22 0843    sodium chloride (NS) flush 5-40 mL  5-40 mL IntraVENous Q8H Tanya Lui MD   10 mL at 12/22/22 0503    sodium chloride (NS) flush 5-40 mL  5-40 mL IntraVENous PRN Tanya Lui MD        acetaminophen (TYLENOL) tablet 650 mg  650 mg Oral Q6H PRN Tanya Lui MD        Or    acetaminophen (TYLENOL) suppository 650 mg  650 mg Rectal Q6H PRN Tanya Lui MD        polyethylene glycol (MIRALAX) packet 17 g  17 g Oral DAILY PRN Tanya Lui MD        promethazine (PHENERGAN) tablet 12.5 mg  12.5 mg Oral Q6H PRN Bharat Machuca MD        Or    ondansetron [facility]) injection 4 mg  4 mg IntraVENous Q6H PRN Bharat Machuca MD        enoxaparin (LOVENOX) injection 40 mg  40 mg SubCUTAneous DAILY Bharat Machuca MD   40 mg at 12/22/22 1975    VANCOMYCIN INFORMATION NOTE   Other Rx Dosing/Monitoring Lisa James MD        piperacillin-tazobactam (ZOSYN) 3.375 g in 0.9% sodium chloride (MBP/ADV) 100 mL MBP  3.375 g IntraVENous Q8H Bharat Machuca MD 25 mL/hr at 12/22/22 1156 3.375 g at 12/22/22 1156     Current Outpatient Medications   Medication Sig Dispense Refill    pantoprazole (PROTONIX) 40 mg tablet Take 1 Tablet by mouth Daily (before breakfast). 15 Tablet 0    multivitamin, tx-iron-ca-min (THERA-M w/ IRON) 9 mg iron-400 mcg tab tablet Take 1 Tablet by mouth daily. 30 Tablet 0    amLODIPine (NORVASC) 5 mg tablet Take 1 Tablet by mouth daily. 30 Tablet 0    colchicine 0.6 mg tablet Take 0.6 mg by mouth as needed (gout flare ups). varicella-zoster recombinant, PF, (Shingrix, PF,) 50 mcg/0.5 mL susr injection Shingrix (PF) 50 mcg/0.5 mL intramuscular suspension, kit      lidocaine HCL-benzyl alcohoL (Salonpas Lidocaine Plus) 4-10 % crea 1 Actuation(s) by Apply Externally route two (2) times daily as needed for Pain. 1 Each 0        No Known Allergies    Review of Systems:  A comprehensive review of systems was negative except for that written in the History of Present Illness. Objective:     Vitals:    12/22/22 0750 12/22/22 0818 12/22/22 1158 12/22/22 1417   BP: 121/71 (!) 116/54 114/76 (!) 165/84   Pulse: 94 91  80   Resp: 18 16  17   Temp: 97.1 °F (36.2 °C)      SpO2: 99%   100%   Weight:       Height:            Physical Exam:  Seen in emergency room awake and alert. No white count or fever. Inspected right foot. Cellulitis on forefoot has cleared since yesterday. Grossly swollen right third toe, multiple sinus tracts. Pus.          X-ray shows lytic process distal phalange third toe right,MRI shows osteitis distal toe and fluid in flexor tendons. Assessment:     Hospital Problems  Date Reviewed: 12/22/2022            Codes Class Noted POA    Osteomyelitis [facility]) ICD-10-CM: M86.9  ICD-9-CM: 730.20  12/21/2022 Unknown        Infected abrasion of third toe ICD-10-CM: M69.489L, L08.9  ICD-9-CM: 917.1  12/21/2022 Unknown           Plan:     Osteitis , infection, plan amputation right third toe.      Signed By: Ericka Desouza DPM     December 22, 2022

## 2022-12-22 NOTE — PROGRESS NOTES
[facility] Pharmacokinetic Monitoring Service - Vancomycin    Consulting Provider: Belem Holliday MD   Indication: Diabetic Foot Infection  Target Concentration: Dosing based on anticipated concentration <15 mg/L due to renal impairment/insufficiency  Day of Therapy: 2  Additional Antimicrobials: Zosyn    Pertinent Laboratory Values: Wt Readings from Last 1 Encounters:   12/21/22 90.7 kg (200 lb)     Temp Readings from Last 1 Encounters:   12/21/22 98.6 °F (37 °C)     Recent Labs     12/22/22 0240 12/1950 12/21/22  0845   CREA 1.62* 1.63* 1.80*   BUN 22* 21* 23*     Estimated Creatinine Clearance: 48.2 mL/min (A) (based on SCr of 1.62 mg/dL (H)). Recent Labs     12/22/22 0240 12/21/22  0845   WBC 10.3 12.7       Pertinent Cultures:  Culture Date Source Results   12/21 Blood Pending        MRSA Nasal Swab: N/A. Non-respiratory infection. .      Assessment:  Date/Time Current Dose Concentration Timing of Concentration (h) AUC   12/21 2021 2000 mg x 1 - - -   12/22 0240 - 18.9 6 -          Note: Serum concentrations collected for AUC dosing may appear elevated if collected in close proximity to the dose administered, this is not necessarily an indication of toxicity    Plan:  Give Vancomycin 1500 mg x 1 at 1000 12/22  Repeat vancomycin concentration ordered for 12/23 @ 0400   Pharmacy will continue to monitor patient and adjust therapy as indicated

## 2022-12-22 NOTE — PROGRESS NOTES
[address] [facility] Pharmacokinetic Monitoring Service - Vancomycin     Alycia Jasso is a 71 y.o. male starting on vancomycin therapy for Diabetic Foot Infection. Pharmacy consulted by KRISSY Alfaro for monitoring and adjustment. Target Concentration: Dosing based on anticipated concentration <15 mg/L due to renal impairment/insufficiency    Additional Antimicrobials: Zosyn    Pertinent Laboratory Values: Wt Readings from Last 1 Encounters:   12/21/22 90.7 kg (200 lb)     Temp Readings from Last 1 Encounters:   12/21/22 98.6 °F (37 °C)     No components found for: PROCAL  Estimated Creatinine Clearance: 43.4 mL/min (A) (based on SCr of 1.8 mg/dL (H)). Recent Labs     12/21/22  0845   WBC 12.7       Pertinent Cultures:  Culture Date Source Results   12/21 Blood x 2 In Progress   MRSA Nasal Swab: N/A. Non-respiratory infection. .    Plan:  Concentration-guided dosing due to renal impairment/insufficiency  Start vancomycin 2000 mg x 1, then dose by levels based on renal function  Renal labs as indicated   Vancomycin concentration ordered for 12/22 @ 0400   Pharmacy will continue to monitor patient and adjust therapy as indicated    Thank you for the consult,  CARLOS Marte  12/21/2022 7:48 PM

## 2022-12-22 NOTE — CONSULTS
Infectious Disease Consultation Note        Reason: Right foot infection    Current abx Prior abx   Zosyn, vancomycin since 12/21/2022      Lines:       Assessment :    71 y.o. male with past medical history of arthritis, gout, hypertension sent to ED from podiatrist office on 12/29/2022 for concerns of right third toe infection. Clinical presentation consistent with right third toe distal phalanx acute osteomyelitis, tenosynovitis flexor tendon right third toe, cellulitis right third toe/right foot in a patient with diabetic neuropathy    MRI right foot 12/21 confirms clinical suspicion of right third toe distal phalanx osteomyelitis. Podiatry follow-up appreciated. Plans for surgical intervention noted    Recommendations:    Recommend piperacillin/tazobactam, vancomycin  Send wound cultures right third toe ulcer-I obtained specimen and gave it to RN  Follow podiatry recommendations regarding surgical intervention right foot    Thank you for consultation request. Above plan was discussed in details with patient, RN and dr Liz Phelps. Please call me if any further questions or concerns. Will continue to participate in the care of this patient. HPI:    71 y.o. male with past medical history of arthritis, gout, hypertension sent to ED from podiatrist office on 12/29/2022 for concerns of right third toe infection. Patient states that he has noted some swelling/discoloration of the right third toe for couple months. He followed with podiatrist as outpatient. About 3 days ago he noted some blood when he was taking shower. He has neuropathy and does not have any pain in his feet. He realized that the blood was oozing from his right third toe. He also started noticing pus. Hence came to the emergency room yesterday morning for further evaluation. He was sent to podiatrist office. When evaluated by podiatrist, there was concern that he has significant infection and will need surgery.   He was sent back to the emergency room and I was consulted for further recommendations. Patient was started on vancomycin and Zosyn. MRI right foot obtained yesterday revealed findings concerning for acute osteomyelitis right third distal phalanx, tenosynovitis flexor digitorum longus and brevis tendon of third toe, cellulitis third toe. Patient is scheduled for surgical intervention right foot. Patient denies any fever or chills throughout this time. He recollects noticing increasing redness of the right foot in the past couple days. He also noted increased swelling of his right foot and right leg. Denies prior history of MRSA colonization or infection              Past Medical History:   Diagnosis Date    Arthritis     Hemochromatosis     Ill-defined condition     GOUT       Past Surgical History:   Procedure Laterality Date    HX ORTHOPAEDIC      LEFT HAND       Patient's Medications   Start Taking    No medications on file   Continue Taking    AMLODIPINE (NORVASC) 5 MG TABLET    Take 1 Tablet by mouth daily. COLCHICINE 0.6 MG TABLET    Take 0.6 mg by mouth as needed (gout flare ups). LIDOCAINE HCL-BENZYL ALCOHOL (SALONPAS LIDOCAINE PLUS) 4-10 % CREA    1 Actuation(s) by Apply Externally route two (2) times daily as needed for Pain. MULTIVITAMIN, TX-IRON-CA-MIN (THERA-M W/ IRON) 9 MG IRON-400 MCG TAB TABLET    Take 1 Tablet by mouth daily. PANTOPRAZOLE (PROTONIX) 40 MG TABLET    Take 1 Tablet by mouth Daily (before breakfast).     VARICELLA-ZOSTER RECOMBINANT, PF, (SHINGRIX, PF,) 50 MCG/0.5 ML SUSR INJECTION    Shingrix (PF) 50 mcg/0.5 mL intramuscular suspension, kit   These Medications have changed    No medications on file   Stop Taking    No medications on file       Current Facility-Administered Medications   Medication Dose Route Frequency    vancomycin (VANCOCIN) 1500 mg in  ml infusion  1,500 mg IntraVENous ONCE    0.9% sodium chloride infusion  100 mL/hr IntraVENous CONTINUOUS    amLODIPine (NORVASC) tablet 5 mg  5 mg Oral DAILY    colchicine (MITIGARE) capsule 0.6 mg  0.6 mg Oral PRN    pantoprazole (PROTONIX) tablet 40 mg  40 mg Oral ACB    allopurinoL (ZYLOPRIM) tablet 100 mg  100 mg Oral DAILY    sodium chloride (NS) flush 5-40 mL  5-40 mL IntraVENous Q8H    sodium chloride (NS) flush 5-40 mL  5-40 mL IntraVENous PRN    acetaminophen (TYLENOL) tablet 650 mg  650 mg Oral Q6H PRN    Or    acetaminophen (TYLENOL) suppository 650 mg  650 mg Rectal Q6H PRN    polyethylene glycol (MIRALAX) packet 17 g  17 g Oral DAILY PRN    promethazine (PHENERGAN) tablet 12.5 mg  12.5 mg Oral Q6H PRN    Or    ondansetron (ZOFRAN) injection 4 mg  4 mg IntraVENous Q6H PRN    enoxaparin (LOVENOX) injection 40 mg  40 mg SubCUTAneous DAILY    VANCOMYCIN INFORMATION NOTE   Other Rx Dosing/Monitoring    piperacillin-tazobactam (ZOSYN) 3.375 g in 0.9% sodium chloride (MBP/ADV) 100 mL MBP  3.375 g IntraVENous Q8H     Current Outpatient Medications   Medication Sig    pantoprazole (PROTONIX) 40 mg tablet Take 1 Tablet by mouth Daily (before breakfast). multivitamin, tx-iron-ca-min (THERA-M w/ IRON) 9 mg iron-400 mcg tab tablet Take 1 Tablet by mouth daily. amLODIPine (NORVASC) 5 mg tablet Take 1 Tablet by mouth daily. colchicine 0.6 mg tablet Take 0.6 mg by mouth as needed (gout flare ups). varicella-zoster recombinant, PF, (Shingrix, PF,) 50 mcg/0.5 mL susr injection Shingrix (PF) 50 mcg/0.5 mL intramuscular suspension, kit    lidocaine HCL-benzyl alcohoL (Salonpas Lidocaine Plus) 4-10 % crea 1 Actuation(s) by Apply Externally route two (2) times daily as needed for Pain. Allergies: Patient has no known allergies. History reviewed. No pertinent family history.   Social History     Socioeconomic History    Marital status: SINGLE     Spouse name: Not on file    Number of children: Not on file    Years of education: Not on file    Highest education level: Not on file   Occupational History    Not on file Tobacco Use    Smoking status: Never    Smokeless tobacco: Never   Substance and Sexual Activity    Alcohol use: Yes     Alcohol/week: 6.0 standard drinks     Types: 6 Cans of beer per week     Comment: weekly on weekends    Drug use: Not on file    Sexual activity: Not on file   Other Topics Concern    Not on file   Social History Narrative    Not on file     Social Determinants of Health     Financial Resource Strain: Not on file   Food Insecurity: Not on file   Transportation Needs: Not on file   Physical Activity: Not on file   Stress: Not on file   Social Connections: Not on file   Intimate Partner Violence: Not on file   Housing Stability: Not on file     Social History     Tobacco Use   Smoking Status Never   Smokeless Tobacco Never        Temp (24hrs), Av.9 °F (36.6 °C), Min:97.1 °F (36.2 °C), Max:98.6 °F (37 °C)    Visit Vitals  BP (!) 116/54   Pulse 91   Temp 97.1 °F (36.2 °C)   Resp 16   Ht 5' 11\" (1.803 m)   Wt 90.7 kg (200 lb)   SpO2 99%   BMI 27.89 kg/m²       ROS: 12 point ROS obtained in details. Pertinent positives as mentioned in HPI,   otherwise negative    Physical Exam:    General appearance - alert, well appearing, and in no distress  Mental status - alert, oriented to person, place, and time  Eyes - pupils equal and reactive, extraocular eye movements intact  Ears - bilateral TM's and external ear canals normal  Nose - normal and patent, no erythema, discharge or polyps  Chest - clear to auscultation, no wheezes, rales or rhonchi, symmetric air entry  Heart - normal rate, regular rhythm, normal S1, S2, no , rubs, clicks or gallops  Abdomen - soft, nontender, nondistended, no masses or organomegaly  Neurological - alert, oriented, normal speech, no gross motor deficits. Exam consistent with peripheral neuropathy.   Musculoskeletal - no joint tenderness, deformity or swelling  Extremities -erythema/swelling right third toe with ulcer at the tip of the right third toe-unable to express purulent drainage, scant bloody drainage noted. Labs: Results:   Chemistry Recent Labs     12/22/22 0240 12/1950 12/21/22  0845   GLU 85 95 89    131* 136   K 4.2 4.7 4.7    101 102   CO2 24 24 25   BUN 22* 21* 23*   CREA 1.62* 1.63* 1.80*   CA 9.0 10.1 9.5   AGAP 7 6 9   BUCR 14 13 13   AP  --   --  221*   TP  --   --  8.3*   ALB  --   --  3.6   GLOB  --   --  4.7*   AGRAT  --   --  0.8      CBC w/Diff Recent Labs     12/22/22 0240 12/21/22  0845   WBC 10.3 12.7   RBC 3.45* 3.82*   HGB 11.1* 12.3*   HCT 32.5* 36.1    293   GRANS 69 69   LYMPH 16* 17*   EOS 4 4      Microbiology Recent Labs     12/1950 12/21/22  1931   CULT NO GROWTH AFTER 11 HOURS NO GROWTH AFTER 11 HOURS          RADIOLOGY:    All available imaging studies/reports in [facility] for this admission were reviewed      Disclaimer: Sections of this note are dictated utilizing voice recognition software, which may have resulted in some phonetic based errors in grammar and contents. Even though attempts were made to correct all the mistakes, some may have been missed, and remained in the body of the document. If questions arise, please contact our department.     Dr. Yann Garcia, Infectious Disease Specialist  [phone]  December 22, 2022  11:10 AM

## 2022-12-22 NOTE — PROGRESS NOTES
[facility] Hospitalists  Progress Note    Patient: Madhu Youssef Age: 71 y.o. : 1950 MR#: [id number] SSN: [id number]  Date: 2022     Subjective/24-hour events:     Remains roomed in ED. Resting comfortably, no pain or other complaints reported. Afebrile, no nausea/vomiting reported. Assessment:   Right foot infection, suspected osteomyelitis of distal 3rd phalanx  Hypertension  Gout  CKD 3    Plan:   Antibiotic therapy per ID. Surgical intervention per podiatry. Appears likely that procedure will be scheduled for tomorrow. Diet initiated, OK for patient to eat today but will make n.p.o. after midnight. Monitor labs. PT/OT evaluations when appropriate. Continue supportive care otherwise. Follow. Case discussed with:  [x]Patient  []Family  [x]Nursing  [x]Case Management  DVT Prophylaxis:  [x]Lovenox  []Hep SQ  []SCDs  []Coumadin   []On Heparin gtt    Objective:   VS: Visit Vitals  BP (!) 116/54   Pulse 91   Temp 97.1 °F (36.2 °C)   Resp 16   Ht 5' 11\" (1.803 m)   Wt 90.7 kg (200 lb)   SpO2 99%   BMI 27.89 kg/m²      Tmax/24hrs: Temp (24hrs), Av.9 °F (36.6 °C), Min:97.1 °F (36.2 °C), Max:98.6 °F (37 °C)    Intake/Output Summary (Last 24 hours) at 2022 1144  Last data filed at 2022 2221  Gross per 24 hour   Intake 600 ml   Output --   Net 600 ml       General:  In NAD. Nontoxic-appearing. Cardiovascular:  RRR. Pulmonary:  Lungs clear bilaterally, no wheezes. No accessory muscle use. GI:  Abdomen soft, NTTP. Extremities:  Warm, no edema or ischemia. Neuro:  Awake and alert.   Moves extremities spontaneously, motor grossly nonfocal.    Labs:    Recent Results (from the past 24 hour(s))   CULTURE, BLOOD    Collection Time: 22  7:31 PM    Specimen: Blood   Result Value Ref Range    Special Requests: NO SPECIAL REQUESTS      Culture result: NO GROWTH AFTER 11 HOURS     CULTURE, BLOOD    Collection Time: 22  7:51 PM    Specimen: Blood   Result Value Ref Range    Special Requests: NO SPECIAL REQUESTS      Culture result: NO GROWTH AFTER 11 HOURS     METABOLIC PANEL, BASIC    Collection Time: 12/21/22  7:51 PM   Result Value Ref Range    Sodium 131 (L) 136 - 145 mmol/L    Potassium 4.7 3.5 - 5.5 mmol/L    Chloride 101 100 - 111 mmol/L    CO2 24 21 - 32 mmol/L    Anion gap 6 3.0 - 18 mmol/L    Glucose 95 74 - 99 mg/dL    BUN 21 (H) 7.0 - 18 MG/DL    Creatinine 1.63 (H) 0.6 - 1.3 MG/DL    BUN/Creatinine ratio 13 12 - 20      eGFR 45 (L) >60 ml/min/1.73m2    Calcium 10.1 8.5 - 10.1 MG/DL   POC LACTIC ACID    Collection Time: 12/21/22  7:53 PM   Result Value Ref Range    Lactic Acid (POC) 1.26 0.40 - 2.00 mmol/L   EKG, 12 LEAD, INITIAL    Collection Time: 12/21/22  7:55 PM   Result Value Ref Range    Ventricular Rate 79 BPM    Atrial Rate 79 BPM    P-R Interval 176 ms    QRS Duration 86 ms    Q-T Interval 376 ms    QTC Calculation (Bezet) 431 ms    Calculated P Axis 5 degrees    Calculated R Axis -26 degrees    Calculated T Axis 23 degrees    Diagnosis       Normal sinus rhythm  Normal ECG  When compared with ECG of 17-JUL-2022 19:07,  No significant change was found     TYPE & SCREEN    Collection Time: 12/21/22 10:15 PM   Result Value Ref Range    Crossmatch Expiration 12/24/2022,2359     ABO/Rh(D) B NEGATIVE     Antibody screen NEG    METABOLIC PANEL, BASIC    Collection Time: 12/22/22  2:40 AM   Result Value Ref Range    Sodium 137 136 - 145 mmol/L    Potassium 4.2 3.5 - 5.5 mmol/L    Chloride 106 100 - 111 mmol/L    CO2 24 21 - 32 mmol/L    Anion gap 7 3.0 - 18 mmol/L    Glucose 85 74 - 99 mg/dL    BUN 22 (H) 7.0 - 18 MG/DL    Creatinine 1.62 (H) 0.6 - 1.3 MG/DL    BUN/Creatinine ratio 14 12 - 20      eGFR 45 (L) >60 ml/min/1.73m2    Calcium 9.0 8.5 - 10.1 MG/DL   CBC WITH AUTOMATED DIFF    Collection Time: 12/22/22  2:40 AM   Result Value Ref Range    WBC 10.3 4.6 - 13.2 K/uL    RBC 3.45 (L) 4.35 - 5.65 M/uL    HGB 11.1 (L) 13.0 - 16.0 g/dL HCT 32.5 (L) 36.0 - 48.0 %    MCV 94.2 78.0 - 100.0 FL    MCH 32.2 24.0 - 34.0 PG    MCHC 34.2 31.0 - 37.0 g/dL    RDW 13.0 11.6 - 14.5 %    PLATELET 084 609 - 961 K/uL    MPV 8.9 (L) 9.2 - 11.8 FL    NRBC 0.0 0  WBC    ABSOLUTE NRBC 0.00 0.00 - 0.01 K/uL    NEUTROPHILS 69 40 - 73 %    LYMPHOCYTES 16 (L) 21 - 52 %    MONOCYTES 11 (H) 3 - 10 %    EOSINOPHILS 4 0 - 5 %    BASOPHILS 0 0 - 2 %    IMMATURE GRANULOCYTES 1 (H) 0.0 - 0.5 %    ABS. NEUTROPHILS 7.1 1.8 - 8.0 K/UL    ABS. LYMPHOCYTES 1.6 0.9 - 3.6 K/UL    ABS. MONOCYTES 1.1 0.05 - 1.2 K/UL    ABS. EOSINOPHILS 0.4 0.0 - 0.4 K/UL    ABS. BASOPHILS 0.0 0.0 - 0.1 K/UL    ABS. IMM.  GRANS. 0.1 (H) 0.00 - 0.04 K/UL    DF AUTOMATED     VANCOMYCIN, RANDOM    Collection Time: 12/22/22  2:40 AM   Result Value Ref Range    Vancomycin, random 18.9 5.0 - 40.0 UG/ML       Signed By: Ana Julio MD     December 22, 2022

## 2022-12-23 ENCOUNTER — ANESTHESIA (OUTPATIENT)
Dept: SURGERY | Age: 72
DRG: 854 | End: 2022-12-23
Payer: MEDICARE

## 2022-12-23 LAB
ACC. NO. FROM MICRO ORDER, ACCP: ABNORMAL
ACINETOBACTER CALCOACETICUS-BAUMANII COMPLEX, ACBCX: NOT DETECTED
ANION GAP SERPL CALC-SCNC: 6 MMOL/L (ref 3–18)
BACTEROIDES FRAGILIS, BFRA: NOT DETECTED
BASOPHILS # BLD: 0.1 K/UL (ref 0–0.1)
BASOPHILS NFR BLD: 1 % (ref 0–2)
BIOFIRE COMMENT, BCIDPF: ABNORMAL
BUN SERPL-MCNC: 22 MG/DL (ref 7–18)
BUN/CREAT SERPL: 12 (ref 12–20)
C GLABRATA DNA VAG QL NAA+PROBE: NOT DETECTED
CALCIUM SERPL-MCNC: 9.4 MG/DL (ref 8.5–10.1)
CANDIDA ALBICANS: NOT DETECTED
CANDIDA AURIS, CAAU: NOT DETECTED
CANDIDA KRUSEI, CKRP: NOT DETECTED
CANDIDA PARAPSILOSIS, CPAUP: NOT DETECTED
CANDIDA TROPICALIS, CTROP: NOT DETECTED
CHLORIDE SERPL-SCNC: 106 MMOL/L (ref 100–111)
CO2 SERPL-SCNC: 24 MMOL/L (ref 21–32)
CREAT SERPL-MCNC: 1.83 MG/DL (ref 0.6–1.3)
CRYPTO NEOFORMANS/GATTII, CRYNEG: NOT DETECTED
DIFFERENTIAL METHOD BLD: ABNORMAL
ENTEROBACTER CLOACAE COMPLEX, ECCP: NOT DETECTED
ENTEROBACTERALES SP. , ENBLS: NOT DETECTED
ENTEROCOCCUS FAECALIS, ENFA: NOT DETECTED
ENTEROCOCCUS FAECIUM, ENFAM: NOT DETECTED
EOSINOPHIL # BLD: 0.6 K/UL (ref 0–0.4)
EOSINOPHIL NFR BLD: 6 % (ref 0–5)
ERYTHROCYTE [DISTWIDTH] IN BLOOD BY AUTOMATED COUNT: 12.9 % (ref 11.6–14.5)
ESCHERICHIA COLI: NOT DETECTED
GLUCOSE BLD STRIP.AUTO-MCNC: 91 MG/DL (ref 70–110)
GLUCOSE SERPL-MCNC: 75 MG/DL (ref 74–99)
HAEMOPHILUS INFLUENZAE, HMI: NOT DETECTED
HCT VFR BLD AUTO: 31.8 % (ref 36–48)
HGB BLD-MCNC: 10.8 G/DL (ref 13–16)
IMM GRANULOCYTES # BLD AUTO: 0.1 K/UL (ref 0–0.04)
IMM GRANULOCYTES NFR BLD AUTO: 1 % (ref 0–0.5)
KLEBSIELLA AEROGENES, KLAE: NOT DETECTED
KLEBSIELLA OXYTOCA: NOT DETECTED
KLEBSIELLA PNEUMONIAE GROUP, KPPG: NOT DETECTED
LISTERIA MONOCYTOGENES, LMONP: NOT DETECTED
LYMPHOCYTES # BLD: 1.9 K/UL (ref 0.9–3.6)
LYMPHOCYTES NFR BLD: 21 % (ref 21–52)
MCH RBC QN AUTO: 32.7 PG (ref 24–34)
MCHC RBC AUTO-ENTMCNC: 34 G/DL (ref 31–37)
MCV RBC AUTO: 96.4 FL (ref 78–100)
MECA/C AND MREJ (METHICILLIN RESISTANT GENE), MECAC: NOT DETECTED
MONOCYTES # BLD: 0.9 K/UL (ref 0.05–1.2)
MONOCYTES NFR BLD: 10 % (ref 3–10)
NEISSERIA MENINGITIDIS, NMNI: NOT DETECTED
NEUTS SEG # BLD: 5.7 K/UL (ref 1.8–8)
NEUTS SEG NFR BLD: 62 % (ref 40–73)
NRBC # BLD: 0 K/UL (ref 0–0.01)
NRBC BLD-RTO: 0 PER 100 WBC
PLATELET # BLD AUTO: 299 K/UL (ref 135–420)
PMV BLD AUTO: 9.1 FL (ref 9.2–11.8)
POTASSIUM SERPL-SCNC: 4.6 MMOL/L (ref 3.5–5.5)
PROTEUS, PRP: NOT DETECTED
PSEUDOMONAS AERUGINOSA: NOT DETECTED
RBC # BLD AUTO: 3.3 M/UL (ref 4.35–5.65)
RESISTANT GENE SPACE, REGENE: ABNORMAL
SALMONELLA, SALMO: NOT DETECTED
SERRATIA MARCESCENS: NOT DETECTED
SODIUM SERPL-SCNC: 136 MMOL/L (ref 136–145)
STAPH EPIDERMIDIS, STEP: NOT DETECTED
STAPH LUGDUNENSIS, STALUG: NOT DETECTED
STAPHYLOCOCCUS AUREUS: DETECTED
STAPHYLOCOCCUS, STAPP: DETECTED
STENO MALTOPHILIA, STMA: NOT DETECTED
STREPTOCOCCUS , STPSP: NOT DETECTED
STREPTOCOCCUS AGALACTIAE (GROUP B): NOT DETECTED
STREPTOCOCCUS PNEUMONIAE , SPNP: NOT DETECTED
STREPTOCOCCUS PYOGENES (GROUP A), SPYOP: NOT DETECTED
VANCOMYCIN SERPL-MCNC: 19.7 UG/ML (ref 5–40)
WBC # BLD AUTO: 9.2 K/UL (ref 4.6–13.2)

## 2022-12-23 PROCEDURE — 87076 CULTURE ANAEROBE IDENT EACH: CPT

## 2022-12-23 PROCEDURE — 88311 DECALCIFY TISSUE: CPT

## 2022-12-23 PROCEDURE — 76060000032 HC ANESTHESIA 0.5 TO 1 HR: Performed by: PODIATRIST

## 2022-12-23 PROCEDURE — 77030018842 HC SOL IRR SOD CL 9% BAXT -A

## 2022-12-23 PROCEDURE — 74011250636 HC RX REV CODE- 250/636: Performed by: PODIATRIST

## 2022-12-23 PROCEDURE — 87185 SC STD ENZYME DETCJ PER NZM: CPT

## 2022-12-23 PROCEDURE — 87205 SMEAR GRAM STAIN: CPT

## 2022-12-23 PROCEDURE — 74011000250 HC RX REV CODE- 250: Performed by: PODIATRIST

## 2022-12-23 PROCEDURE — 77030031139 HC SUT VCRL2 J&J -A: Performed by: PODIATRIST

## 2022-12-23 PROCEDURE — 80048 BASIC METABOLIC PNL TOTAL CA: CPT

## 2022-12-23 PROCEDURE — 74011000258 HC RX REV CODE- 258: Performed by: PODIATRIST

## 2022-12-23 PROCEDURE — 74011250636 HC RX REV CODE- 250/636: Performed by: INTERNAL MEDICINE

## 2022-12-23 PROCEDURE — 88305 TISSUE EXAM BY PATHOLOGIST: CPT

## 2022-12-23 PROCEDURE — 76010000138 HC OR TIME 0.5 TO 1 HR: Performed by: PODIATRIST

## 2022-12-23 PROCEDURE — 74011000250 HC RX REV CODE- 250: Performed by: NURSE ANESTHETIST, CERTIFIED REGISTERED

## 2022-12-23 PROCEDURE — 74011000250 HC RX REV CODE- 250: Performed by: INTERNAL MEDICINE

## 2022-12-23 PROCEDURE — 74011000258 HC RX REV CODE- 258: Performed by: NURSE ANESTHETIST, CERTIFIED REGISTERED

## 2022-12-23 PROCEDURE — 2709999900 HC NON-CHARGEABLE SUPPLY: Performed by: PODIATRIST

## 2022-12-23 PROCEDURE — 0Y6T0Z0 DETACHMENT AT RIGHT 3RD TOE, COMPLETE, OPEN APPROACH: ICD-10-PCS | Performed by: PODIATRIST

## 2022-12-23 PROCEDURE — 65270000029 HC RM PRIVATE

## 2022-12-23 PROCEDURE — 77030013079 HC BLNKT BAIR HGGR 3M -A: Performed by: ANESTHESIOLOGY

## 2022-12-23 PROCEDURE — 36415 COLL VENOUS BLD VENIPUNCTURE: CPT

## 2022-12-23 PROCEDURE — 87176 TISSUE HOMOGENIZATION CULTR: CPT

## 2022-12-23 PROCEDURE — 76210000000 HC OR PH I REC 2 TO 2.5 HR: Performed by: PODIATRIST

## 2022-12-23 PROCEDURE — 74011250637 HC RX REV CODE- 250/637: Performed by: NURSE ANESTHETIST, CERTIFIED REGISTERED

## 2022-12-23 PROCEDURE — 74011000258 HC RX REV CODE- 258: Performed by: INTERNAL MEDICINE

## 2022-12-23 PROCEDURE — 80202 ASSAY OF VANCOMYCIN: CPT

## 2022-12-23 PROCEDURE — 99232 SBSQ HOSP IP/OBS MODERATE 35: CPT | Performed by: FAMILY MEDICINE

## 2022-12-23 PROCEDURE — 87147 CULTURE TYPE IMMUNOLOGIC: CPT

## 2022-12-23 PROCEDURE — 85025 COMPLETE CBC W/AUTO DIFF WBC: CPT

## 2022-12-23 PROCEDURE — 74011250637 HC RX REV CODE- 250/637: Performed by: INTERNAL MEDICINE

## 2022-12-23 PROCEDURE — 74011250636 HC RX REV CODE- 250/636: Performed by: NURSE ANESTHETIST, CERTIFIED REGISTERED

## 2022-12-23 PROCEDURE — 77030018836 HC SOL IRR NACL ICUM -A: Performed by: PODIATRIST

## 2022-12-23 PROCEDURE — 82962 GLUCOSE BLOOD TEST: CPT

## 2022-12-23 PROCEDURE — 87075 CULTR BACTERIA EXCEPT BLOOD: CPT

## 2022-12-23 PROCEDURE — 0QBN0ZX EXCISION OF RIGHT METATARSAL, OPEN APPROACH, DIAGNOSTIC: ICD-10-PCS | Performed by: PODIATRIST

## 2022-12-23 RX ORDER — FENTANYL CITRATE 50 UG/ML
INJECTION, SOLUTION INTRAMUSCULAR; INTRAVENOUS AS NEEDED
Status: DISCONTINUED | OUTPATIENT
Start: 2022-12-23 | End: 2022-12-23 | Stop reason: HOSPADM

## 2022-12-23 RX ORDER — ONDANSETRON 2 MG/ML
4 INJECTION INTRAMUSCULAR; INTRAVENOUS ONCE
Status: DISCONTINUED | OUTPATIENT
Start: 2022-12-23 | End: 2022-12-23 | Stop reason: HOSPADM

## 2022-12-23 RX ORDER — FENTANYL CITRATE 50 UG/ML
25 INJECTION, SOLUTION INTRAMUSCULAR; INTRAVENOUS AS NEEDED
Status: DISCONTINUED | OUTPATIENT
Start: 2022-12-23 | End: 2022-12-23 | Stop reason: HOSPADM

## 2022-12-23 RX ORDER — IBUPROFEN 200 MG
4 TABLET ORAL AS NEEDED
Status: DISCONTINUED | OUTPATIENT
Start: 2022-12-23 | End: 2022-12-23 | Stop reason: HOSPADM

## 2022-12-23 RX ORDER — PHENYLEPHRINE HCL IN 0.9% NACL 1 MG/10 ML
SYRINGE (ML) INTRAVENOUS AS NEEDED
Status: DISCONTINUED | OUTPATIENT
Start: 2022-12-23 | End: 2022-12-23 | Stop reason: HOSPADM

## 2022-12-23 RX ORDER — LIDOCAINE HYDROCHLORIDE 20 MG/ML
INJECTION, SOLUTION INFILTRATION; PERINEURAL AS NEEDED
Status: DISCONTINUED | OUTPATIENT
Start: 2022-12-23 | End: 2022-12-23 | Stop reason: HOSPADM

## 2022-12-23 RX ORDER — FAMOTIDINE 20 MG/1
20 TABLET, FILM COATED ORAL ONCE
Status: COMPLETED | OUTPATIENT
Start: 2022-12-23 | End: 2022-12-23

## 2022-12-23 RX ORDER — SODIUM CHLORIDE 0.9 % (FLUSH) 0.9 %
5-40 SYRINGE (ML) INJECTION EVERY 8 HOURS
Status: DISCONTINUED | OUTPATIENT
Start: 2022-12-23 | End: 2022-12-23 | Stop reason: HOSPADM

## 2022-12-23 RX ORDER — HYDROMORPHONE HYDROCHLORIDE 1 MG/ML
0.5 INJECTION, SOLUTION INTRAMUSCULAR; INTRAVENOUS; SUBCUTANEOUS
Status: DISCONTINUED | OUTPATIENT
Start: 2022-12-23 | End: 2022-12-23 | Stop reason: HOSPADM

## 2022-12-23 RX ORDER — LIDOCAINE HYDROCHLORIDE 20 MG/ML
INJECTION, SOLUTION EPIDURAL; INFILTRATION; INTRACAUDAL; PERINEURAL AS NEEDED
Status: DISCONTINUED | OUTPATIENT
Start: 2022-12-23 | End: 2022-12-23 | Stop reason: HOSPADM

## 2022-12-23 RX ORDER — DEXTROSE MONOHYDRATE 100 MG/ML
0-250 INJECTION, SOLUTION INTRAVENOUS AS NEEDED
Status: DISCONTINUED | OUTPATIENT
Start: 2022-12-23 | End: 2022-12-23 | Stop reason: HOSPADM

## 2022-12-23 RX ORDER — SODIUM CHLORIDE 0.9 % (FLUSH) 0.9 %
5-40 SYRINGE (ML) INJECTION AS NEEDED
Status: DISCONTINUED | OUTPATIENT
Start: 2022-12-23 | End: 2022-12-23 | Stop reason: HOSPADM

## 2022-12-23 RX ORDER — SODIUM CHLORIDE, SODIUM LACTATE, POTASSIUM CHLORIDE, CALCIUM CHLORIDE 600; 310; 30; 20 MG/100ML; MG/100ML; MG/100ML; MG/100ML
25 INJECTION, SOLUTION INTRAVENOUS CONTINUOUS
Status: DISCONTINUED | OUTPATIENT
Start: 2022-12-23 | End: 2022-12-23 | Stop reason: HOSPADM

## 2022-12-23 RX ORDER — MIDAZOLAM HYDROCHLORIDE 1 MG/ML
INJECTION, SOLUTION INTRAMUSCULAR; INTRAVENOUS AS NEEDED
Status: DISCONTINUED | OUTPATIENT
Start: 2022-12-23 | End: 2022-12-23 | Stop reason: HOSPADM

## 2022-12-23 RX ORDER — PROPOFOL 10 MG/ML
VIAL (ML) INTRAVENOUS
Status: DISCONTINUED | OUTPATIENT
Start: 2022-12-23 | End: 2022-12-23 | Stop reason: HOSPADM

## 2022-12-23 RX ORDER — PROPOFOL 10 MG/ML
INJECTION, EMULSION INTRAVENOUS AS NEEDED
Status: DISCONTINUED | OUTPATIENT
Start: 2022-12-23 | End: 2022-12-23 | Stop reason: HOSPADM

## 2022-12-23 RX ORDER — SODIUM CHLORIDE 9 MG/ML
25 INJECTION, SOLUTION INTRAVENOUS CONTINUOUS
Status: DISCONTINUED | OUTPATIENT
Start: 2022-12-23 | End: 2022-12-23 | Stop reason: HOSPADM

## 2022-12-23 RX ORDER — INSULIN LISPRO 100 [IU]/ML
INJECTION, SOLUTION INTRAVENOUS; SUBCUTANEOUS ONCE
Status: DISCONTINUED | OUTPATIENT
Start: 2022-12-23 | End: 2022-12-23 | Stop reason: HOSPADM

## 2022-12-23 RX ORDER — LIDOCAINE HYDROCHLORIDE 10 MG/ML
0.1 INJECTION, SOLUTION EPIDURAL; INFILTRATION; INTRACAUDAL; PERINEURAL AS NEEDED
Status: DISCONTINUED | OUTPATIENT
Start: 2022-12-23 | End: 2022-12-23 | Stop reason: HOSPADM

## 2022-12-23 RX ADMIN — FAMOTIDINE 20 MG: 20 TABLET, FILM COATED ORAL at 11:18

## 2022-12-23 RX ADMIN — PANTOPRAZOLE SODIUM 40 MG: 40 TABLET, DELAYED RELEASE ORAL at 08:57

## 2022-12-23 RX ADMIN — SODIUM CHLORIDE, PRESERVATIVE FREE 10 ML: 5 INJECTION INTRAVENOUS at 06:06

## 2022-12-23 RX ADMIN — PIPERACILLIN AND TAZOBACTAM 3.38 G: 3; .375 INJECTION, POWDER, FOR SOLUTION INTRAVENOUS at 01:08

## 2022-12-23 RX ADMIN — DEXMEDETOMIDINE HYDROCHLORIDE 5 MCG: 100 INJECTION, SOLUTION, CONCENTRATE INTRAVENOUS at 12:23

## 2022-12-23 RX ADMIN — PIPERACILLIN AND TAZOBACTAM 3.38 G: 3; .375 INJECTION, POWDER, FOR SOLUTION INTRAVENOUS at 10:24

## 2022-12-23 RX ADMIN — Medication 100 MCG: at 12:51

## 2022-12-23 RX ADMIN — ALLOPURINOL 100 MG: 100 TABLET ORAL at 08:57

## 2022-12-23 RX ADMIN — SODIUM CHLORIDE 100 ML/HR: 9 INJECTION, SOLUTION INTRAVENOUS at 22:43

## 2022-12-23 RX ADMIN — SODIUM CHLORIDE 100 ML/HR: 9 INJECTION, SOLUTION INTRAVENOUS at 08:56

## 2022-12-23 RX ADMIN — Medication 100 MCG: at 12:42

## 2022-12-23 RX ADMIN — PIPERACILLIN AND TAZOBACTAM 3.38 G: 3; .375 INJECTION, POWDER, FOR SOLUTION INTRAVENOUS at 17:29

## 2022-12-23 RX ADMIN — SODIUM CHLORIDE, PRESERVATIVE FREE 10 ML: 5 INJECTION INTRAVENOUS at 22:41

## 2022-12-23 RX ADMIN — AMLODIPINE BESYLATE 5 MG: 5 TABLET ORAL at 08:57

## 2022-12-23 RX ADMIN — FENTANYL CITRATE 25 MCG: 50 INJECTION, SOLUTION INTRAMUSCULAR; INTRAVENOUS at 12:24

## 2022-12-23 RX ADMIN — MIDAZOLAM HYDROCHLORIDE 2 MG: 2 INJECTION, SOLUTION INTRAMUSCULAR; INTRAVENOUS at 12:14

## 2022-12-23 RX ADMIN — LIDOCAINE HYDROCHLORIDE 50 MG: 20 INJECTION, SOLUTION EPIDURAL; INFILTRATION; INTRACAUDAL; PERINEURAL at 12:20

## 2022-12-23 RX ADMIN — PROPOFOL 50 MG: 10 INJECTION, EMULSION INTRAVENOUS at 12:20

## 2022-12-23 RX ADMIN — PROPOFOL 120 MCG/KG/MIN: 10 INJECTION, EMULSION INTRAVENOUS at 12:20

## 2022-12-23 RX ADMIN — FENTANYL CITRATE 25 MCG: 50 INJECTION, SOLUTION INTRAMUSCULAR; INTRAVENOUS at 12:28

## 2022-12-23 NOTE — ANESTHESIA POSTPROCEDURE EVALUATION
Procedure(s):  AMPUTATION RIGHT THIRD TOE. MAC    Anesthesia Post Evaluation      Multimodal analgesia: multimodal analgesia used between 6 hours prior to anesthesia start to PACU discharge  Patient location during evaluation: PACU  Patient participation: complete - patient participated  Level of consciousness: awake and alert  Pain management: adequate  Airway patency: patent  Anesthetic complications: no  Cardiovascular status: acceptable  Respiratory status: acceptable  Hydration status: acceptable  Post anesthesia nausea and vomiting:  controlled  Final Post Anesthesia Temperature Assessment:  Normothermia (36.0-37.5 degrees C)      INITIAL Post-op Vital signs:   Vitals Value Taken Time   /61 12/23/22 1503   Temp     Pulse 72 12/23/22 1509   Resp 14 12/23/22 1509   SpO2 97 % 12/23/22 1509   Vitals shown include unvalidated device data.

## 2022-12-23 NOTE — PROGRESS NOTES
Problem: Falls - Risk of  Goal: *Absence of Falls  Description: Document Ham Mcfarlane Fall Risk and appropriate interventions in the flowsheet.   Outcome: Progressing Towards Goal  Note: Fall Risk Interventions:                                Problem: Patient Education: Go to Patient Education Activity  Goal: Patient/Family Education  Outcome: Progressing Towards Goal     Problem: Pain  Goal: *Control of Pain  Outcome: Progressing Towards Goal  Goal: *PALLIATIVE CARE:  Alleviation of Pain  Outcome: Progressing Towards Goal     Problem: Patient Education: Go to Patient Education Activity  Goal: Patient/Family Education  Outcome: Progressing Towards Goal

## 2022-12-23 NOTE — PROGRESS NOTES
Physician Progress Note      Cal Lima  [facility] #:                  [id number]  :                       1950  ADMIT DATE:       2022 4:13 PM  DISCH DATE:  RESPONDING  PROVIDER #:        Chante Rucker MD          QUERY TEXT:    Pt admitted with third toe right foot cellulitis. Pt noted to have DM. If possible, please document in progress notes and discharge summary the relationship, if any, between cellulitis and DM. The medical record reflects the following:  Risk Factors: osteomyelitis, diabetic neuropathy    Clinical Indicators:  2022, PN, Dr. Ruel Marvin:  \"Right foot infection, suspected osteomyelitis of distal 3rd phalanx. \"  2022, consult, Dr. Corita Gitelman:  \"cellulitis right third toe/right foot in a patient with diabetic neuropathy. \"  2022, MRI report:  IMPRESSION:  1. Acute osteomyelitis of the third distal phalanx. The majority of the bone appears either resorbed. Small amount of fluid at the level of the DIP joint which may reflect a small abscess or septic arthritis. Moderate cellulitis involving the third toe. Treatment: vancomycin, Zosyn    Thank you,  ESA Carr RN, CDS  [email or web address]  Options provided:  -- third toe right foot cellulitis associated with Diabetes  -- third toe right foot cellulitis unrelated to Diabetes  -- Other - I will add my own diagnosis  -- Disagree - Not applicable / Not valid  -- Disagree - Clinically unable to determine / Unknown  -- Refer to Clinical Documentation Reviewer    PROVIDER RESPONSE TEXT:    third toe right foot cellulitis unrelated to Diabetes. Query created by:  Saint Kraft on 2022 1:35 PM      Electronically signed by:  Chante Rucker MD 2022 9:21 AM

## 2022-12-23 NOTE — H&P
Update History & Physical    The Patient's History and Physical of December 23,   surgery was reviewed with the patient and I examined the patient. There was no change. The surgical site was confirmed by the patient and me. Plan:  The risk, benefits, expected outcome, and alternative to the recommended procedure have been discussed with the patient. Patient understands and wants to proceed with the procedure.     Electronically signed by Jez Jacobs DPM on 12/23/2022 at 12:21 PM

## 2022-12-23 NOTE — PROGRESS NOTES
Comprehensive Nutrition Assessment    Type and Reason for Visit: Initial, Positive nutrition screen, NPO/clear liquid    Nutrition Recommendations/Plan:   Resume diet when medically appropriate-will add oral supplement once diet advance. Pt receptive to Ensure Enlive and Marvel. Monitor PO intake, compliance of oral supplement, weight, labs, and plan of care during admission. Malnutrition Assessment:  Malnutrition Status: At risk for malnutrition (specify) (r/t varied PO intake PTA, wound and advance age) (12/23/22 1331)      Nutrition History and Allergies:   Past medical history: arthritis, gout, hypertension. NKFA. Weight history per chart review:  CBW: 12/21/22 : 90.7 kg (200 lb), > 180 days: 07/17/22 : 87.1 kg (192 lb), 1 year: 12/21/21 : 90.7 kg (200 lb). Weight stable x 1 year. Nutrition Assessment:    Visited pt in room, sitting up in chair, alert and able to communicate. Pt admitted with right third toe distal phalanx osteomyelitis suspected-amputation scheduled for today. At the time of visit, pt was NPO status pending procedure. Prior to NPO status, pt reported tolerating current diet with % PO intake and requested double portions. PTA pt reported consuming small bites of meal throughout the day - no additional oral supplements. Discussed the importance and role of an oral supplement after discharge and pt receptive to include one during admission. Pt denies abdominal pain nor d/c/n/v. Currently, diet order (signed and held) for REGULAR diet and ONS. Current lab shows elevated BUN (22) and Creatinine (1.83). Several wounds noted. Nutrition Related Findings:    Last BM 12/22. Output: 0mL (urine). Pertinent Medications: pepcid, sodium chloride infusion, amlodipine, pantoprazole, allopurinol, miralax, promethazine, zofran, lovenox.  Wound Type: Multiple, Skin tears, Surgical incision    Current Nutrition Intake & Therapies:  Average Meal Intake: NPO  Average Supplement Intake: NPO  No diet orders on file    Anthropometric Measures:  Height: 5' 11\" (180.3 cm)  Ideal Body Weight (IBW): 172 lbs (78 kg)  Admission Body Weight: 199 lb 15.3 oz  Current Body Wt:  90.7 kg (199 lb 15.3 oz), 116.3 % IBW. Stated  Current BMI (kg/m2): 27.9  Usual Body Weight: 90.7 kg (200 lb)  % Weight Change (Calculated): 0  Weight Adjustment: Amputation (third toe amputation)  BMI Category: Overweight (BMI 25.0-29. 9)    Estimated Daily Nutrient Needs:  Energy Requirements Based On: Formula (MSJ x 1.2-1.4)  Weight Used for Energy Requirements: Current  Energy (kcal/day): 4474-5184  Weight Used for Protein Requirements: Current (0.8-1.0)  Protein (g/day): 73-91  Method Used for Fluid Requirements: 1 ml/kcal  Fluid (ml/day): 2367-9583    Nutrition Diagnosis:   Increased nutrient needs related to acute injury/trauma, increased demand for energy/nutrients as evidenced by wounds, poor intake prior to admission    Nutrition Interventions:   Food and/or Nutrient Delivery: Start oral diet, Start oral nutrition supplement  Nutrition Education/Counseling: Education not indicated, No recommendations at this time  Coordination of Nutrition Care: Continue to monitor while inpatient  Plan of Care discussed with: patient    Goals:     Goals: Meet at least 75% of estimated needs, by next RD assessment       Nutrition Monitoring and Evaluation:   Behavioral-Environmental Outcomes: None identified  Food/Nutrient Intake Outcomes: Diet advancement/tolerance, Food and nutrient intake, Supplement intake  Physical Signs/Symptoms Outcomes: Biochemical data, Meal time behavior, Nutrition focused physical findings, Weight, GI status    Discharge Planning:     Too soon to determine    Fern Ku, FRANCISCO, LD   Contact: [phone]

## 2022-12-23 NOTE — PROGRESS NOTES
New lab results reported by [facility], positive blood culture groups. Hospitalist landon made aware. Pt is already on antibiotics. Continuous monitoring.

## 2022-12-23 NOTE — ANESTHESIA PREPROCEDURE EVALUATION
Anesthetic History   No history of anesthetic complications            Review of Systems / Medical History  Patient summary reviewed, nursing notes reviewed and pertinent labs reviewed    Pulmonary  Within defined limits                 Neuro/Psych   Within defined limits           Cardiovascular    Hypertension                Comments: 05/2022 ECHO  estimated EF of 55 - 60%. Moderate pulmonary HTN.      GI/Hepatic/Renal     GERD: well controlled    Renal disease: CRI       Endo/Other        Arthritis     Other Findings            Physical Exam    Airway  Mallampati: II  TM Distance: 4 - 6 cm  Neck ROM: normal range of motion   Mouth opening: Normal     Cardiovascular    Rhythm: regular           Dental    Dentition: Poor dentition     Pulmonary  Breath sounds clear to auscultation               Abdominal  GI exam deferred       Other Findings            Anesthetic Plan    ASA: 3  Anesthesia type: MAC            Anesthetic plan and risks discussed with: Patient

## 2022-12-23 NOTE — PROGRESS NOTES
Bedside and Verbal shift change report given to Sandy Shipley RN (oncoming nurse) by Scout Sauer RN (offgoing nurse). Report included the following information SBAR, ED Summary, Procedure Summary, Intake/Output, MAR, and Recent Results.

## 2022-12-23 NOTE — PROGRESS NOTES
Problem: Falls - Risk of  Goal: *Absence of Falls  Description: Document Gallegos Blades Fall Risk and appropriate interventions in the flowsheet.   Outcome: Progressing Towards Goal  Note: Fall Risk Interventions:                                Problem: Patient Education: Go to Patient Education Activity  Goal: Patient/Family Education  Outcome: Progressing Towards Goal     Problem: Pain  Goal: *Control of Pain  Outcome: Progressing Towards Goal  Goal: *PALLIATIVE CARE:  Alleviation of Pain  Outcome: Progressing Towards Goal     Problem: Patient Education: Go to Patient Education Activity  Goal: Patient/Family Education  Outcome: Progressing Towards Goal

## 2022-12-23 NOTE — ROUTINE PROCESS
TRANSFER - IN REPORT:    Verbal report received from Sanya Jackson RN  on Maria Elena Roots  being received from [facility] for ordered procedure      Report consisted of patients Situation, Background, Assessment and   Recommendations(SBAR). Information from the following report(s) SBAR was reviewed with the receiving nurse. Opportunity for questions and clarification was provided. Assessment completed upon patients arrival to unit and care assumed.

## 2022-12-23 NOTE — ACP (ADVANCE CARE PLANNING)
Advance Care Planning   Advance Care Planning Inpatient Note  [address] [place] [address]   Spiritual Care Department    Today's Date: 12/23/2022  Unit: [address]    Received request from . Upon review of chart and communication with care team, patient's decision making abilities are not in question. Patient was/were present in the room during visit. Goals of ACP Conversation:  Discuss Advance Care planning documents    Health Care Decision Makers:    No healthcare decision makers have been documented. Click here to complete [address] including selection of the Healthcare Decision Maker Relationship (ie \"Primary\")  Summary:  No Decision Maker named by patient at this time    Advance Care Planning Documents (Patient Wishes) on file:  None     Assessment:    Patient seen in the emergency room bed Ft2 where he will be admitted to the hospital from. There is no advance directive present.     Interventions:  Deferred conversation as patient not interested in completing an advance directive at this time    Care Preferences Communicated:  No    Outcomes/Plan:      [facility] on 12/23/2022 at 6:38 AM

## 2022-12-23 NOTE — PROGRESS NOTES
[address] [facility] Pharmacokinetic Monitoring Service - Vancomycin    Consulting Provider: Brandon Crockett MD   Indication: Diabetic Foot Infection  Target Concentration: Dosing based on anticipated concentration <15 mg/L due to renal impairment/insufficiency  Day of Therapy: 3  Additional Antimicrobials: Zosyn    Pertinent Laboratory Values: Wt Readings from Last 1 Encounters:   12/21/22 90.7 kg (200 lb)     Temp Readings from Last 1 Encounters:   12/22/22 98.2 °F (36.8 °C)     Recent Labs     12/23/22  0027 12/22/22  0240 12/21/22  1950/22  0845   CREA 1.83* 1.62* 1.63* 1.80*   BUN 22* 22* 21* 23*     Estimated Creatinine Clearance: 42.7 mL/min (A) (based on SCr of 1.83 mg/dL (H)). Recent Labs     12/23/22 0027 12/22/22  0240   WBC 9.2 10.3         Pertinent Cultures:  Culture Date Source Results   12/21 Blood ANAEROBIC BOTTLE GRAM POSITIVE COCCI IN GROUPS   12/22 Foot; Wound Pending   MRSA Nasal Swab: N/A. Non-respiratory infection. .      Assessment:  Date/Time Current Dose Concentration Timing of Concentration (h) AUC   12/21 2021 2000 mg x 1 - - -   12/22 0240 - 18.9 6 -   12/22 1155 1500 mg x 1 - - -   12/23 0027 - 19.7 12 -   Note: Serum concentrations collected for AUC dosing may appear elevated if collected in close proximity to the dose administered, this is not necessarily an indication of toxicity    Plan:  No dose needed today  Repeat vancomycin concentration ordered for 12/24 @ 0400   Pharmacy will continue to monitor patient and adjust therapy as indicated

## 2022-12-23 NOTE — BRIEF OP NOTE
Brief Postoperative Note    Patient: Jessica Joseph  YOB: 1950  MRN: [id number]    Date of Procedure: 12/23/2022     Pre-Op Diagnosis: DX    Post-Op Diagnosis: Same as preoperative diagnosis. Procedure(s):  AMPUTATION RIGHT THIRD TOE    Surgeon(s):  Mario Patel DPM    Surgical Assistant: Surg Asst-1: Keon Ferrer    Anesthesia: MAC     Estimated Blood Loss (mL): Minimal    Complications: None    Specimens:   ID Type Source Tests Collected by Time Destination   1 : RIGHT 3rd toe [facility]ative Foot[facility] 1240 Pathology   2 : RIGHT 3rd toe (Clean margin) Preservative Foot, Right  Mario Patel DPM 12/23/2022 1248 Pathology   1 : RIGHT 3rd toe (Clean margin) Wound Toe CULTURE, ANAEROBIC AND AEROBIC Mario Patel DPM 12/23/2022 1243 Microbiology   2 : RIGHT 3rd toe Wound Toe CULTURE, ANAEROBIC AND AEROBIC Mario Patel DPM 12/23/2022 1249 Microbiology        Implants: * No implants in log *    Drains: * No LDAs found *    Findings: osteitis right third toe.     Electronically Signed by David Porter DPM on 12/23/2022 at 1:03 PM

## 2022-12-23 NOTE — PERIOP NOTES
Assumed care of pt from OR via bed. Attached to monitor. VSS. OR, MAR and anesthesia report appreciated. Will cont to monitor. 1500  TRANSFER - OUT REPORT:    Verbal report given to Sanya Jackson RN (name) on Maria Elena Hunt  being transferred to [facility] (unit) for routine post - op       Report consisted of patients Situation, Background, Assessment and   Recommendations(SBAR). Information from the following report(s) SBAR, MAR, Recent Results, and Cardiac Rhythm sinus rhythm  was reviewed with the receiving nurse. Lines:   Peripheral IV 12/22/22 Left Antecubital (Active)   Site Assessment Clean, dry, & intact 12/23/22 1303   Phlebitis Assessment 0 12/23/22 1303   Infiltration Assessment 0 12/23/22 1303   Dressing Status Clean, dry, & intact 12/23/22 1303   Dressing Type Transparent;Tape 12/23/22 1303   Hub Color/Line Status Pink; Infusing 12/23/22 1303   Action Taken Open ports on tubing capped 12/22/22 1952   Alcohol Cap Used Yes 12/22/22 1952        Opportunity for questions and clarification was provided.       Patient transported with:   [employer]

## 2022-12-23 NOTE — PROGRESS NOTES
completed the initial Spiritual Assessment of the patient, and offered Pastoral Care,  support and prayer There is no advance directive present. Patient does not have any Hinduism/cultural needs that will affect patients preferences in health care. Chaplains will continue to follow and will provide pastoral care on an as needed/requested basis.     [facility] Care Department  [phone]

## 2022-12-23 NOTE — PROGRESS NOTES
Infectious Disease progress Note        Reason: Right foot infection    Current abx Prior abx   Zosyn, vancomycin since 12/21/2022      Lines:       Assessment :    71 y.o. male with past medical history of arthritis, gout, hypertension sent to ED from podiatrist office on 12/29/2022 for concerns of right third toe infection. Clinical presentation consistent with right third toe distal phalanx acute osteomyelitis, tenosynovitis flexor tendon right third toe, cellulitis right third toe/right foot in a patient with diabetic neuropathy    MRI right foot 12/21 confirms clinical suspicion of right third toe distal phalanx osteomyelitis. Podiatry follow-up appreciated. Status post right third toe amputation on 12/23/2022. Intraoperative findings discussed with podiatrist.  Grossly clean bone margins achieved at surgery    Recommendations:    Recommend piperacillin/tazobactam, vancomycin  Follow-up wound cultures right great toe and modify antibiotics accordingly   Follow-up bone biopsy of clean bone margins. Follow podiatry recommendations regarding right foot wound care  Will need to make definitive decision about duration/type of antibiotics based on the above test results    Above plan was discussed in details with patient, RN and dr Abdoulaye Harikns. Please call me if any further questions or concerns. Will continue to participate in the care of this patient. HPI:    No new complaints           Past Medical History:   Diagnosis Date    Arthritis     Hemochromatosis     Ill-defined condition     GOUT       Past Surgical History:   Procedure Laterality Date    HX ORTHOPAEDIC      LEFT HAND       home Medication List         Details   amLODIPine (NORVASC) 5 mg tablet Take 1 Tablet by mouth daily. Qty: 30 Tablet, Refills: 0      colchicine 0.6 mg tablet Take 0.6 mg by mouth as needed (gout flare ups). pantoprazole (PROTONIX) 40 mg tablet Take 1 Tablet by mouth Daily (before breakfast).   Qty: 15 Tablet, Refills: 0      multivitamin, tx-iron-ca-min (THERA-M w/ IRON) 9 mg iron-400 mcg tab tablet Take 1 Tablet by mouth daily. Qty: 30 Tablet, Refills: 0      varicella-zoster recombinant, PF, (Shingrix, PF,) 50 mcg/0.5 mL susr injection Shingrix (PF) 50 mcg/0.5 mL intramuscular suspension, kit      lidocaine HCL-benzyl alcohoL (Salonpas Lidocaine Plus) 4-10 % crea 1 Actuation(s) by Apply Externally route two (2) times daily as needed for Pain. Qty: 1 Each, Refills: 0             Current Facility-Administered Medications   Medication Dose Route Frequency    0.9% sodium chloride infusion  100 mL/hr IntraVENous CONTINUOUS    amLODIPine (NORVASC) tablet 5 mg  5 mg Oral DAILY    colchicine (MITIGARE) capsule 0.6 mg  0.6 mg Oral PRN    pantoprazole (PROTONIX) tablet 40 mg  40 mg Oral ACB    allopurinoL (ZYLOPRIM) tablet 100 mg  100 mg Oral DAILY    sodium chloride (NS) flush 5-40 mL  5-40 mL IntraVENous Q8H    sodium chloride (NS) flush 5-40 mL  5-40 mL IntraVENous PRN    acetaminophen (TYLENOL) tablet 650 mg  650 mg Oral Q6H PRN    Or    acetaminophen (TYLENOL) suppository 650 mg  650 mg Rectal Q6H PRN    polyethylene glycol (MIRALAX) packet 17 g  17 g Oral DAILY PRN    promethazine (PHENERGAN) tablet 12.5 mg  12.5 mg Oral Q6H PRN    Or    ondansetron (ZOFRAN) injection 4 mg  4 mg IntraVENous Q6H PRN    enoxaparin (LOVENOX) injection 40 mg  40 mg SubCUTAneous DAILY    VANCOMYCIN INFORMATION NOTE   Other Rx Dosing/Monitoring    piperacillin-tazobactam (ZOSYN) 3.375 g in 0.9% sodium chloride (MBP/ADV) 100 mL MBP  3.375 g IntraVENous Q8H       Allergies: Patient has no known allergies. History reviewed. No pertinent family history.   Social History     Socioeconomic History    Marital status: SINGLE     Spouse name: Not on file    Number of children: Not on file    Years of education: Not on file    Highest education level: Not on file   Occupational History    Not on file   Tobacco Use    Smoking status: Never    Smokeless tobacco: Never   Substance and Sexual Activity    Alcohol use: Yes     Alcohol/week: 6.0 standard drinks     Types: 6 Cans of beer per week     Comment: weekly on weekends    Drug use: Not on file    Sexual activity: Not on file   Other Topics Concern    Not on file   Social History Narrative    Not on file     Social Determinants of Health     Financial Resource Strain: Not on file   Food Insecurity: Not on file   Transportation Needs: Not on file   Physical Activity: Not on file   Stress: Not on file   Social Connections: Not on file   Intimate Partner Violence: Not on file   Housing Stability: Not on file     Social History     Tobacco Use   Smoking Status Never   Smokeless Tobacco Never        Temp (24hrs), Av.7 °F (36.5 °C), Min:97.1 °F (36.2 °C), Max:98.2 °F (36.8 °C)    Visit Vitals  BP (!) 145/69 (BP 1 Location: Right upper arm, BP Patient Position: Lying)   Pulse 77   Temp 97.9 °F (36.6 °C)   Resp 16   Ht 5' 11\" (1.803 m)   Wt 90.7 kg (200 lb)   SpO2 97%   BMI 27.89 kg/m²       ROS: 12 point ROS obtained in details. Pertinent positives as mentioned in HPI,   otherwise negative    Physical Exam:    General appearance - alert, well appearing, and in no distress  Mental status - alert, oriented to person, place, and time  Eyes - pupils equal and reactive, extraocular eye movements intact  Ears - bilateral TM's and external ear canals normal  Nose - normal and patent, no erythema, discharge or polyps  Chest - clear to auscultation, no wheezes, rales or rhonchi, symmetric air entry  Heart - normal rate, regular rhythm, normal S1, S2, no , rubs, clicks or gallops  Abdomen - soft, nontender, nondistended, no masses or organomegaly  Neurological - alert, oriented, normal speech, no gross motor deficits. Exam consistent with peripheral neuropathy.   Musculoskeletal - no joint tenderness, deformity or swelling  Extremities -erythema/swelling right third toe with ulcer at the tip of the right third toe-unable to express purulent drainage, scant bloody drainage noted. Labs: Results:   Chemistry Recent Labs     12/23/22  0027 12/22/22  0240 12/1950 12/21/22  0845   GLU 75 85 95 89    137 131* 136   K 4.6 4.2 4.7 4.7    106 101 102   CO2 24 24 24 25   BUN 22* 22* 21* 23*   CREA 1.83* 1.62* 1.63* 1.80*   CA 9.4 9.0 10.1 9.5   AGAP 6 7 6 9   BUCR 12 14 13 13   AP  --   --   --  221*   TP  --   --   --  8.3*   ALB  --   --   --  3.6   GLOB  --   --   --  4.7*   AGRAT  --   --   --  0.8        CBC w/Diff Recent Labs     12/23/22  0027 12/22/22 0240 12/21/22  0845   WBC 9.2 10.3 12.7   RBC 3.30* 3.45* 3.82*   HGB 10.8* 11.1* 12.3*   HCT 31.8* 32.5* 36.1    283 293   GRANS 62 69 69   LYMPH 21 16* 17*   EOS 6* 4 4        Microbiology Recent Labs     12/22/22  1038 12/1950 12/21/22 1931   CULT PENDING GRAM POSITIVE COCCI IN CLUSTERS GROWING IN 1 OF 2 BOTTLES DRAWN No Site Indicated* NO GROWTH 2 DAYS            RADIOLOGY:    All available imaging studies/reports in [facility] for this admission were reviewed      Disclaimer: Sections of this note are dictated utilizing voice recognition software, which may have resulted in some phonetic based errors in grammar and contents. Even though attempts were made to correct all the mistakes, some may have been missed, and remained in the body of the document. If questions arise, please contact our department.     Dr. Yvonne Neely, Infectious Disease Specialist  [phone]  December 23, 2022  11:10 AM

## 2022-12-23 NOTE — PROGRESS NOTES
[facility]    Patient: Soco Carver Age: 71 y.o. : 1950 MR#: [id number] SSN: [id number]  Date: 2022     Subjective/24-hour events:     Stable overnight, nothing new or acute. Remains afebrile. Assessment:   Right foot infection, suspected osteomyelitis of distal 3rd phalanx s/p 3rd toe amputation   Hypertension  Gout  CKD 3    Plan:   Postoperative wound care per podiatry. Antibiotics per ID. Will need to await intraoperative culture/path results for determination of need for antibiotics going forward. PT/OT. Anticipate need for home health care services at a minimum at discharge. Other care primarily supportive. Follow. Case discussed with:  [x]Patient  []Family  [x]Nursing  [x]Case Management  DVT Prophylaxis:  [x]Lovenox  []Hep SQ  []SCDs  []Coumadin   []On Heparin gtt    Objective:   VS: Visit Vitals  /73 (BP 1 Location: Right upper arm, BP Patient Position: Sitting)   Pulse 74   Temp 98.1 °F (36.7 °C)   Resp 16   Ht 5' 11\" (1.803 m)   Wt 90.7 kg (200 lb)   SpO2 98%   BMI 27.89 kg/m²      Tmax/24hrs: Temp (24hrs), Av.1 °F (36.7 °C), Min:97.9 °F (36.6 °C), Max:98.2 °F (36.8 °C)    Intake/Output Summary (Last 24 hours) at 2022 0859  Last data filed at 2022 2222  Gross per 24 hour   Intake 240 ml   Output --   Net 240 ml       General:  In NAD. Nontoxic-appearing. Cardiovascular:  RRR. Pulmonary:  Lungs clear bilaterally, no wheezes. No accessory muscle use. GI:  Abdomen soft, NTTP. Extremities:  Warm, no edema or ischemia. Neuro:  Awake and alert. Moves extremities spontaneously, motor grossly nonfocal.    Labs:    Recent Results (from the past 24 hour(s))   CULTURE, WOUND W GRAM STAIN    Collection Time: 22 10:38 AM    Specimen: Foot;  Wound   Result Value Ref Range    Special Requests: NO SPECIAL REQUESTS      GRAM STAIN FEW WBCS SEEN      GRAM STAIN FEW APPARENT GRAM POSITIVE COCCI IN PAIRS      Culture result: PENDING    METABOLIC PANEL, BASIC    Collection Time: 12/23/22 12:27 AM   Result Value Ref Range    Sodium 136 136 - 145 mmol/L    Potassium 4.6 3.5 - 5.5 mmol/L    Chloride 106 100 - 111 mmol/L    CO2 24 21 - 32 mmol/L    Anion gap 6 3.0 - 18 mmol/L    Glucose 75 74 - 99 mg/dL    BUN 22 (H) 7.0 - 18 MG/DL    Creatinine 1.83 (H) 0.6 - 1.3 MG/DL    BUN/Creatinine ratio 12 12 - 20      eGFR 39 (L) >60 ml/min/1.73m2    Calcium 9.4 8.5 - 10.1 MG/DL   CBC WITH AUTOMATED DIFF    Collection Time: 12/23/22 12:27 AM   Result Value Ref Range    WBC 9.2 4.6 - 13.2 K/uL    RBC 3.30 (L) 4.35 - 5.65 M/uL    HGB 10.8 (L) 13.0 - 16.0 g/dL    HCT 31.8 (L) 36.0 - 48.0 %    MCV 96.4 78.0 - 100.0 FL    MCH 32.7 24.0 - 34.0 PG    MCHC 34.0 31.0 - 37.0 g/dL    RDW 12.9 11.6 - 14.5 %    PLATELET 855 778 - 019 K/uL    MPV 9.1 (L) 9.2 - 11.8 FL    NRBC 0.0 0  WBC    ABSOLUTE NRBC 0.00 0.00 - 0.01 K/uL    NEUTROPHILS 62 40 - 73 %    LYMPHOCYTES 21 21 - 52 %    MONOCYTES 10 3 - 10 %    EOSINOPHILS 6 (H) 0 - 5 %    BASOPHILS 1 0 - 2 %    IMMATURE GRANULOCYTES 1 (H) 0.0 - 0.5 %    ABS. NEUTROPHILS 5.7 1.8 - 8.0 K/UL    ABS. LYMPHOCYTES 1.9 0.9 - 3.6 K/UL    ABS. MONOCYTES 0.9 0.05 - 1.2 K/UL    ABS. EOSINOPHILS 0.6 (H) 0.0 - 0.4 K/UL    ABS. BASOPHILS 0.1 0.0 - 0.1 K/UL    ABS. IMM.  GRANS. 0.1 (H) 0.00 - 0.04 K/UL    DF AUTOMATED     VANCOMYCIN, RANDOM    Collection Time: 12/23/22 12:27 AM   Result Value Ref Range    Vancomycin, random 19.7 5.0 - 40.0 UG/ML       Signed By: Ej Dodson MD     December 23, 2022

## 2022-12-24 LAB
ANION GAP SERPL CALC-SCNC: 7 MMOL/L (ref 3–18)
BASOPHILS # BLD: 0.1 K/UL (ref 0–0.1)
BASOPHILS NFR BLD: 1 % (ref 0–2)
BUN SERPL-MCNC: 14 MG/DL (ref 7–18)
BUN/CREAT SERPL: 10 (ref 12–20)
CALCIUM SERPL-MCNC: 9.5 MG/DL (ref 8.5–10.1)
CHLORIDE SERPL-SCNC: 106 MMOL/L (ref 100–111)
CO2 SERPL-SCNC: 24 MMOL/L (ref 21–32)
CREAT SERPL-MCNC: 1.44 MG/DL (ref 0.6–1.3)
DIFFERENTIAL METHOD BLD: ABNORMAL
EOSINOPHIL # BLD: 0.6 K/UL (ref 0–0.4)
EOSINOPHIL NFR BLD: 6 % (ref 0–5)
ERYTHROCYTE [DISTWIDTH] IN BLOOD BY AUTOMATED COUNT: 12.9 % (ref 11.6–14.5)
GLUCOSE SERPL-MCNC: 88 MG/DL (ref 74–99)
HCT VFR BLD AUTO: 34.4 % (ref 36–48)
HGB BLD-MCNC: 11.3 G/DL (ref 13–16)
IMM GRANULOCYTES # BLD AUTO: 0.1 K/UL (ref 0–0.04)
IMM GRANULOCYTES NFR BLD AUTO: 1 % (ref 0–0.5)
LYMPHOCYTES # BLD: 1.7 K/UL (ref 0.9–3.6)
LYMPHOCYTES NFR BLD: 18 % (ref 21–52)
MCH RBC QN AUTO: 32.1 PG (ref 24–34)
MCHC RBC AUTO-ENTMCNC: 32.8 G/DL (ref 31–37)
MCV RBC AUTO: 97.7 FL (ref 78–100)
MONOCYTES # BLD: 0.8 K/UL (ref 0.05–1.2)
MONOCYTES NFR BLD: 9 % (ref 3–10)
NEUTS SEG # BLD: 6.2 K/UL (ref 1.8–8)
NEUTS SEG NFR BLD: 66 % (ref 40–73)
NRBC # BLD: 0 K/UL (ref 0–0.01)
NRBC BLD-RTO: 0 PER 100 WBC
PLATELET # BLD AUTO: 314 K/UL (ref 135–420)
PMV BLD AUTO: 8.9 FL (ref 9.2–11.8)
POTASSIUM SERPL-SCNC: 3.9 MMOL/L (ref 3.5–5.5)
RBC # BLD AUTO: 3.52 M/UL (ref 4.35–5.65)
SODIUM SERPL-SCNC: 137 MMOL/L (ref 136–145)
VANCOMYCIN SERPL-MCNC: 9.5 UG/ML (ref 5–40)
WBC # BLD AUTO: 9.3 K/UL (ref 4.6–13.2)

## 2022-12-24 PROCEDURE — 74011000250 HC RX REV CODE- 250: Performed by: PODIATRIST

## 2022-12-24 PROCEDURE — 77030018842 HC SOL IRR SOD CL 9% BAXT -A

## 2022-12-24 PROCEDURE — 74011000258 HC RX REV CODE- 258: Performed by: PODIATRIST

## 2022-12-24 PROCEDURE — 65270000029 HC RM PRIVATE

## 2022-12-24 PROCEDURE — 85025 COMPLETE CBC W/AUTO DIFF WBC: CPT

## 2022-12-24 PROCEDURE — 99232 SBSQ HOSP IP/OBS MODERATE 35: CPT | Performed by: FAMILY MEDICINE

## 2022-12-24 PROCEDURE — 80202 ASSAY OF VANCOMYCIN: CPT

## 2022-12-24 PROCEDURE — 74011250636 HC RX REV CODE- 250/636: Performed by: PODIATRIST

## 2022-12-24 PROCEDURE — 74011250637 HC RX REV CODE- 250/637: Performed by: PODIATRIST

## 2022-12-24 PROCEDURE — 2709999900 HC NON-CHARGEABLE SUPPLY

## 2022-12-24 PROCEDURE — 36415 COLL VENOUS BLD VENIPUNCTURE: CPT

## 2022-12-24 PROCEDURE — 74011250636 HC RX REV CODE- 250/636: Performed by: INTERNAL MEDICINE

## 2022-12-24 PROCEDURE — 80048 BASIC METABOLIC PNL TOTAL CA: CPT

## 2022-12-24 RX ORDER — VANCOMYCIN/0.9 % SOD CHLORIDE 1.5G/250ML
1500 PLASTIC BAG, INJECTION (ML) INTRAVENOUS ONCE
Status: COMPLETED | OUTPATIENT
Start: 2022-12-24 | End: 2022-12-24

## 2022-12-24 RX ADMIN — SODIUM CHLORIDE, PRESERVATIVE FREE 10 ML: 5 INJECTION INTRAVENOUS at 21:47

## 2022-12-24 RX ADMIN — AMLODIPINE BESYLATE 5 MG: 5 TABLET ORAL at 10:06

## 2022-12-24 RX ADMIN — SODIUM CHLORIDE 100 ML/HR: 9 INJECTION, SOLUTION INTRAVENOUS at 21:47

## 2022-12-24 RX ADMIN — PIPERACILLIN AND TAZOBACTAM 3.38 G: 3; .375 INJECTION, POWDER, FOR SOLUTION INTRAVENOUS at 01:05

## 2022-12-24 RX ADMIN — PIPERACILLIN AND TAZOBACTAM 3.38 G: 3; .375 INJECTION, POWDER, FOR SOLUTION INTRAVENOUS at 17:45

## 2022-12-24 RX ADMIN — PIPERACILLIN AND TAZOBACTAM 3.38 G: 3; .375 INJECTION, POWDER, FOR SOLUTION INTRAVENOUS at 10:06

## 2022-12-24 RX ADMIN — VANCOMYCIN HYDROCHLORIDE 1500 MG: 10 INJECTION, POWDER, LYOPHILIZED, FOR SOLUTION INTRAVENOUS at 06:27

## 2022-12-24 RX ADMIN — PANTOPRAZOLE SODIUM 40 MG: 40 TABLET, DELAYED RELEASE ORAL at 10:06

## 2022-12-24 RX ADMIN — ENOXAPARIN SODIUM 40 MG: 100 INJECTION SUBCUTANEOUS at 10:06

## 2022-12-24 RX ADMIN — SODIUM CHLORIDE, PRESERVATIVE FREE 10 ML: 5 INJECTION INTRAVENOUS at 05:03

## 2022-12-24 RX ADMIN — ALLOPURINOL 100 MG: 100 TABLET ORAL at 10:06

## 2022-12-24 NOTE — PROGRESS NOTES
Bedside and Verbal shift change report given to Annabella Mortimer, RN (oncoming nurse) by Selwyn Rodrigues RN (offgoing nurse). Report included the following information SBAR, Kardex, Procedure Summary, Intake/Output, MAR, and Recent Results.

## 2022-12-24 NOTE — PROGRESS NOTES
[facility] Pharmacokinetic Monitoring Service - Vancomycin    Consulting Provider: Rakesh Rivers MD   Indication: Diabetic Foot Infection  Target Concentration: Dosing based on anticipated concentration <15 mg/L due to renal impairment/insufficiency  Day of Therapy: 4  Additional Antimicrobials: Zosyn    Pertinent Laboratory Values: Wt Readings from Last 1 Encounters:   12/21/22 90.7 kg (200 lb)     Temp Readings from Last 1 Encounters:   12/24/22 97.5 °F (36.4 °C)     Recent Labs     12/24/22  0114 12/23/22  0027 12/22/22  0240 12/21/22  1950   CREA 1.44* 1.83* 1.62* 1.63*   BUN 14 22* 22* 21*     Estimated Creatinine Clearance: 54.2 mL/min (A) (based on SCr of 1.44 mg/dL (H)). Recent Labs     12/24/22  0114 12/23/22  0027   WBC 9.3 9.2         Pertinent Cultures:  Culture Date Source Results   12/21 Blood POSSIBLE STAPHYLOCOCCUS AUREUS GROWING IN 1 OF 2 BOTTLES DRAWN No Site Indicated    12/22 Foot; Wound HEAVY PROBABLE STAPHYLOCOCCUS AUREUS  MODERATE GRAM NEGATIVE RODS    12/23 Wound Pending   MRSA Nasal Swab: N/A. Non-respiratory infection. .      Assessment:  Date/Time Current Dose Concentration Timing of Concentration (h) AUC   12/21 2021 2000 mg x 1 - - -   12/22 0240 - 18.9 6 -   12/22 1155 1500 mg x 1 - - -   12/23 0027 - 19.7 12 -   12/24 0114 - 9.5 36    Note: Serum concentrations collected for AUC dosing may appear elevated if collected in close proximity to the dose administered, this is not necessarily an indication of toxicity    Plan:  Vancomycin 1500 mg IV x 1 now  Repeat vancomycin concentration ordered for 12/25 @ 0400   Pharmacy will continue to monitor patient and adjust therapy as indicated

## 2022-12-24 NOTE — PROGRESS NOTES
[facility] Hospitalists  Progress Note    Patient: Deborah Lyman Age: 71 y.o. : 1950 MR#: [id number] SSN: [id number]  Date: 2022     Subjective/24-hour events: Tolerated procedure yesterday without difficulty. Assessment:   Right foot infection, suspected osteomyelitis of distal 3rd phalanx s/p 3rd toe amputation   Hypertension  Gout  CKD 3    Plan:   Wound care per podiatry. Antibiotic therapy per ID. Await culture results. PT/OT evaluations. Mobilize as tolerated. Orders for both placed today. Home meds as previously prescribed. Supportive care otherwise. Follow. Case discussed with:  [x]Patient  []Family  [x]Nursing  [x]Case Management  DVT Prophylaxis:  [x]Lovenox  []Hep SQ  []SCDs  []Coumadin   []On Heparin gtt    Objective:   VS: Visit Vitals  /65 (BP 1 Location: Right upper arm, BP Patient Position: Lying right side)   Pulse 80   Temp 98.2 °F (36.8 °C)   Resp 16   Ht 5' 11\" (1.803 m)   Wt 90.7 kg (200 lb)   SpO2 97%   BMI 27.89 kg/m²      Tmax/24hrs: Temp (24hrs), Av °F (36.7 °C), Min:97.5 °F (36.4 °C), Max:98.5 °F (36.9 °C)    Intake/Output Summary (Last 24 hours) at 2022 0857  Last data filed at 2022 1303  Gross per 24 hour   Intake 200 ml   Output 25 ml   Net 175 ml       General:  In NAD. Nontoxic-appearing. Cardiovascular:  RRR. Pulmonary:  Lungs clear bilaterally, no wheezes. No accessory muscle use. GI:  Abdomen soft, NTTP. Extremities:  Warm, no edema or ischemia. Neuro:  Awake and alert.   Moves extremities spontaneously, motor grossly nonfocal.    Labs:    Recent Results (from the past 24 hour(s))   CULTURE, WOUND W GRAM STAIN    Collection Time: 22 12:43 PM    Specimen: Right    3RD TOE CLEAN MARGIN   Result Value Ref Range    Special Requests: NO SPECIAL REQUESTS      GRAM STAIN FEW WBCS SEEN      GRAM STAIN NO ORGANISMS SEEN      Culture result: PENDING    CULTURE, WOUND W GRAM STAIN Collection Time: 12/23/22 12:49 PM    Specimen: Right    3RD TOE   Result Value Ref Range    Special Requests: NO SPECIAL REQUESTS      GRAM STAIN RARE WBCS SEEN      GRAM STAIN NO ORGANISMS SEEN      Culture result: PENDING    GLUCOSE, POC    Collection Time: 12/23/22  1:19 PM   Result Value Ref Range    Glucose (POC) 91 70 - 190 mg/dL   METABOLIC PANEL, BASIC    Collection Time: 12/24/22  1:14 AM   Result Value Ref Range    Sodium 137 136 - 145 mmol/L    Potassium 3.9 3.5 - 5.5 mmol/L    Chloride 106 100 - 111 mmol/L    CO2 24 21 - 32 mmol/L    Anion gap 7 3.0 - 18 mmol/L    Glucose 88 74 - 99 mg/dL    BUN 14 7.0 - 18 MG/DL    Creatinine 1.44 (H) 0.6 - 1.3 MG/DL    BUN/Creatinine ratio 10 (L) 12 - 20      eGFR 52 (L) >60 ml/min/1.73m2    Calcium 9.5 8.5 - 10.1 MG/DL   CBC WITH AUTOMATED DIFF    Collection Time: 12/24/22  1:14 AM   Result Value Ref Range    WBC 9.3 4.6 - 13.2 K/uL    RBC 3.52 (L) 4.35 - 5.65 M/uL    HGB 11.3 (L) 13.0 - 16.0 g/dL    HCT 34.4 (L) 36.0 - 48.0 %    MCV 97.7 78.0 - 100.0 FL    MCH 32.1 24.0 - 34.0 PG    MCHC 32.8 31.0 - 37.0 g/dL    RDW 12.9 11.6 - 14.5 %    PLATELET 348 187 - 015 K/uL    MPV 8.9 (L) 9.2 - 11.8 FL    NRBC 0.0 0  WBC    ABSOLUTE NRBC 0.00 0.00 - 0.01 K/uL    NEUTROPHILS 66 40 - 73 %    LYMPHOCYTES 18 (L) 21 - 52 %    MONOCYTES 9 3 - 10 %    EOSINOPHILS 6 (H) 0 - 5 %    BASOPHILS 1 0 - 2 %    IMMATURE GRANULOCYTES 1 (H) 0.0 - 0.5 %    ABS. NEUTROPHILS 6.2 1.8 - 8.0 K/UL    ABS. LYMPHOCYTES 1.7 0.9 - 3.6 K/UL    ABS. MONOCYTES 0.8 0.05 - 1.2 K/UL    ABS. EOSINOPHILS 0.6 (H) 0.0 - 0.4 K/UL    ABS. BASOPHILS 0.1 0.0 - 0.1 K/UL    ABS. IMM.  GRANS. 0.1 (H) 0.00 - 0.04 K/UL    DF AUTOMATED     VANCOMYCIN, RANDOM    Collection Time: 12/24/22  1:14 AM   Result Value Ref Range    Vancomycin, random 9.5 5.0 - 40.0 UG/ML       Signed By: Pedro Pablo Sadler MD     December 24, 2022

## 2022-12-24 NOTE — OP NOTES
[address] [place] [address]   OPERATIVE REPORT    Name:  Peyton Gray  MR#:   [id number]  :  1950  ACCOUNT #:  [de-identified]  DATE OF SERVICE:  2022    PREOPERATIVE DIAGNOSIS:  Abscess with osteomyelitis, right third toe. POSTOPERATIVE DIAGNOSIS:  Abscess with osteomyelitis, right third toe. PROCEDURE PERFORMED:  Amputation of the right third toe. SURGEON:  Mahi Miller DPM.    ASSISTANT:  student. ANESTHESIA:  MAC.    COMPLICATIONS:  0.    SPECIMENS REMOVED:  tissue. IMPLANTS:  0.    ESTIMATED BLOOD LOSS:  0.    DESCRIPTION OF PROCEDURE:  On 2022, the patient was placed on the operating room table in the supine position. After adequate induction of MAC anesthesia, the right lower extremity was prepped and draped in the usual sterile fashion. Attention was directed to the right foot. Third toe was grossly enlarged with multiple sinus tracts and yellow discoloration at the base of the toe was noted. Redness on the foot had subsided. Two similar toe incisions were accomplished at the base of the toe. There was good perfusion noted. The toe was carefully sharply disarticulated preserving the medial and lateral neurovascular structures. No deeper penetration of pus was noted  the toe was sent for culture and pathology. Wound edges were debrided. Small blood vessels were bovied as necessary. Metatarsal biopsy was taken for pathology and culture. The wound was thoroughly irrigated with antibiotic solution and partially closed with Prolene suture and iodoform gauze packing. A Betadine compressive dressing was applied. The patient tolerated the procedure and anesthesia well with vital signs stable throughout. The patient was transported to the recovery room in stable condition.         Michael Brooke DPM [id number]  D:  2022 13:20  T:  2022 5:00  JOB #:  [id number]  CC:  Mahi Miller DPM

## 2022-12-25 LAB
ANION GAP SERPL CALC-SCNC: 8 MMOL/L (ref 3–18)
BACTERIA SPEC CULT: ABNORMAL
BASOPHILS # BLD: 0.1 K/UL (ref 0–0.1)
BASOPHILS NFR BLD: 1 % (ref 0–2)
BUN SERPL-MCNC: 10 MG/DL (ref 7–18)
BUN/CREAT SERPL: 6 (ref 12–20)
CALCIUM SERPL-MCNC: 9.2 MG/DL (ref 8.5–10.1)
CHLORIDE SERPL-SCNC: 107 MMOL/L (ref 100–111)
CO2 SERPL-SCNC: 24 MMOL/L (ref 21–32)
CREAT SERPL-MCNC: 1.59 MG/DL (ref 0.6–1.3)
DIFFERENTIAL METHOD BLD: ABNORMAL
EOSINOPHIL # BLD: 0.7 K/UL (ref 0–0.4)
EOSINOPHIL NFR BLD: 8 % (ref 0–5)
ERYTHROCYTE [DISTWIDTH] IN BLOOD BY AUTOMATED COUNT: 12.6 % (ref 11.6–14.5)
GLUCOSE SERPL-MCNC: 99 MG/DL (ref 74–99)
GRAM STN SPEC: ABNORMAL
HCT VFR BLD AUTO: 33.9 % (ref 36–48)
HGB BLD-MCNC: 11.5 G/DL (ref 13–16)
IMM GRANULOCYTES # BLD AUTO: 0.1 K/UL (ref 0–0.04)
IMM GRANULOCYTES NFR BLD AUTO: 1 % (ref 0–0.5)
LYMPHOCYTES # BLD: 1.5 K/UL (ref 0.9–3.6)
LYMPHOCYTES NFR BLD: 16 % (ref 21–52)
MCH RBC QN AUTO: 32.4 PG (ref 24–34)
MCHC RBC AUTO-ENTMCNC: 33.9 G/DL (ref 31–37)
MCV RBC AUTO: 95.5 FL (ref 78–100)
MONOCYTES # BLD: 1 K/UL (ref 0.05–1.2)
MONOCYTES NFR BLD: 10 % (ref 3–10)
NEUTS SEG # BLD: 5.9 K/UL (ref 1.8–8)
NEUTS SEG NFR BLD: 65 % (ref 40–73)
NRBC # BLD: 0 K/UL (ref 0–0.01)
NRBC BLD-RTO: 0 PER 100 WBC
PLATELET # BLD AUTO: 300 K/UL (ref 135–420)
PMV BLD AUTO: 8.6 FL (ref 9.2–11.8)
POTASSIUM SERPL-SCNC: 4.1 MMOL/L (ref 3.5–5.5)
RBC # BLD AUTO: 3.55 M/UL (ref 4.35–5.65)
SERVICE CMNT-IMP: ABNORMAL
SODIUM SERPL-SCNC: 139 MMOL/L (ref 136–145)
VANCOMYCIN SERPL-MCNC: 14.6 UG/ML (ref 5–40)
WBC # BLD AUTO: 9.2 K/UL (ref 4.6–13.2)

## 2022-12-25 PROCEDURE — 97166 OT EVAL MOD COMPLEX 45 MIN: CPT

## 2022-12-25 PROCEDURE — 65270000029 HC RM PRIVATE

## 2022-12-25 PROCEDURE — 2709999900 HC NON-CHARGEABLE SUPPLY

## 2022-12-25 PROCEDURE — 99233 SBSQ HOSP IP/OBS HIGH 50: CPT | Performed by: STUDENT IN AN ORGANIZED HEALTH CARE EDUCATION/TRAINING PROGRAM

## 2022-12-25 PROCEDURE — 36415 COLL VENOUS BLD VENIPUNCTURE: CPT

## 2022-12-25 PROCEDURE — 85025 COMPLETE CBC W/AUTO DIFF WBC: CPT

## 2022-12-25 PROCEDURE — 80048 BASIC METABOLIC PNL TOTAL CA: CPT

## 2022-12-25 PROCEDURE — 74011000258 HC RX REV CODE- 258: Performed by: PODIATRIST

## 2022-12-25 PROCEDURE — 74011250636 HC RX REV CODE- 250/636: Performed by: PODIATRIST

## 2022-12-25 PROCEDURE — 77030018842 HC SOL IRR SOD CL 9% BAXT -A

## 2022-12-25 PROCEDURE — 74011250636 HC RX REV CODE- 250/636: Performed by: INTERNAL MEDICINE

## 2022-12-25 PROCEDURE — 74011250637 HC RX REV CODE- 250/637: Performed by: PODIATRIST

## 2022-12-25 PROCEDURE — 74011250637 HC RX REV CODE- 250/637: Performed by: STUDENT IN AN ORGANIZED HEALTH CARE EDUCATION/TRAINING PROGRAM

## 2022-12-25 PROCEDURE — 74011000250 HC RX REV CODE- 250: Performed by: PODIATRIST

## 2022-12-25 PROCEDURE — 80202 ASSAY OF VANCOMYCIN: CPT

## 2022-12-25 PROCEDURE — 97535 SELF CARE MNGMENT TRAINING: CPT

## 2022-12-25 RX ORDER — VANCOMYCIN/0.9 % SOD CHLORIDE 1.5G/250ML
1500 PLASTIC BAG, INJECTION (ML) INTRAVENOUS ONCE
Status: COMPLETED | OUTPATIENT
Start: 2022-12-25 | End: 2022-12-25

## 2022-12-25 RX ORDER — ZOLPIDEM TARTRATE 5 MG/1
10 TABLET ORAL
Status: DISCONTINUED | OUTPATIENT
Start: 2022-12-25 | End: 2022-12-27 | Stop reason: HOSPADM

## 2022-12-25 RX ORDER — ZOLPIDEM TARTRATE 10 MG/1
10 TABLET ORAL
COMMUNITY
Start: 2022-12-01

## 2022-12-25 RX ADMIN — AMLODIPINE BESYLATE 5 MG: 5 TABLET ORAL at 08:58

## 2022-12-25 RX ADMIN — SODIUM CHLORIDE, PRESERVATIVE FREE 10 ML: 5 INJECTION INTRAVENOUS at 06:21

## 2022-12-25 RX ADMIN — PANTOPRAZOLE SODIUM 40 MG: 40 TABLET, DELAYED RELEASE ORAL at 08:58

## 2022-12-25 RX ADMIN — SODIUM CHLORIDE 100 ML/HR: 9 INJECTION, SOLUTION INTRAVENOUS at 10:12

## 2022-12-25 RX ADMIN — PIPERACILLIN AND TAZOBACTAM 3.38 G: 3; .375 INJECTION, POWDER, FOR SOLUTION INTRAVENOUS at 02:20

## 2022-12-25 RX ADMIN — PIPERACILLIN AND TAZOBACTAM 3.38 G: 3; .375 INJECTION, POWDER, FOR SOLUTION INTRAVENOUS at 10:12

## 2022-12-25 RX ADMIN — SODIUM CHLORIDE, PRESERVATIVE FREE 10 ML: 5 INJECTION INTRAVENOUS at 21:38

## 2022-12-25 RX ADMIN — PIPERACILLIN AND TAZOBACTAM 3.38 G: 3; .375 INJECTION, POWDER, FOR SOLUTION INTRAVENOUS at 18:56

## 2022-12-25 RX ADMIN — ENOXAPARIN SODIUM 40 MG: 100 INJECTION SUBCUTANEOUS at 08:58

## 2022-12-25 RX ADMIN — VANCOMYCIN HYDROCHLORIDE 1500 MG: 10 INJECTION, POWDER, LYOPHILIZED, FOR SOLUTION INTRAVENOUS at 06:21

## 2022-12-25 RX ADMIN — SODIUM CHLORIDE, PRESERVATIVE FREE 10 ML: 5 INJECTION INTRAVENOUS at 14:00

## 2022-12-25 RX ADMIN — ZOLPIDEM TARTRATE 10 MG: 5 TABLET ORAL at 21:40

## 2022-12-25 RX ADMIN — ALLOPURINOL 100 MG: 100 TABLET ORAL at 08:58

## 2022-12-25 NOTE — PROGRESS NOTES
Upon speaking with pt, pt inquired about if an Ambien order had been obtained. This RN asked if that was a medication he takes regularly at home. Pt stated that he takes 10mg Ambien QHS. This RN informed pt that I would reach out to the hospitalist and have it added to his profile. Hospitalist paged, and notified of patient's home medication, requested it to be added to [facility]. Hospitalist agreed and N.O. for Ambien 10mg QHS was entered.

## 2022-12-25 NOTE — PROGRESS NOTES
Problem: Self Care Deficits Care Plan (Adult)  Goal: *Acute Goals and Plan of Care (Insert Text)  Outcome: Resolved/Met   OCCUPATIONAL THERAPY EVALUATION/DISCHARGE    Patient: Jassi Vanegas (62 y.o. male)  Date: 12/25/2022  Primary Diagnosis: Osteomyelitis ([facility] [address] [M86.9]  Infected abrasion of third toe [S90.416A, L08.9]  Procedure(s) (LRB):  AMPUTATION RIGHT THIRD TOE (N/A) 2 Days Post-Op   Precautions:   PLOF: independent with ADLs and functional mobility    ASSESSMENT AND RECOMMENDATIONS:  Nursing/RN cleared for pt to participate in OT evaluation and tx session. Patient presents lying supine in bed, A & O x 4, no c/o pain. Bed mobility: Mod I supine <-> sit edge of bed. Toilet transfer: Mod I using RW, assist providing with mgmt of IV pole  and good balance, Mod I toilet tasks, Mod I ADLs tasks while standing with RW and seated on toilet. Patient sitting up in recliner chair at end of tx session, Patient verbalized understanding to utilize call bell to request assist as needed. Nursing notified of pt's level of assist with toilet transfer using RW. Patient presents close at baseline as PLOF with ADLs and functional mobility and lives with a supportive dtr to assist for safe d/c home. Patient verbalized understanding of skilled OT services not indicated at this time while in acute hospital.       Further Equipment Recommendations for Discharge: shower chair for energy conservation    AMPAC: At this time and based on an AM-PAC score of 24/24, no further OT is recommended upon discharge due to patient at baseline functional status. Recommend patient returns to prior setting with prior services. This AMPAC score should be considered in conjunction with interdisciplinary team recommendations to determine the most appropriate discharge setting. Patient's social support, diagnosis, medical stability, and prior level of function should also be taken into consideration.      SUBJECTIVE:   Patient stated I love spending time with my grandson.     OBJECTIVE DATA SUMMARY:     Past Medical History:   Diagnosis Date    Arthritis     Hemochromatosis     Ill-defined condition     GOUT     Past Surgical History:   Procedure Laterality Date    HX ORTHOPAEDIC      LEFT HAND     Barriers to Learning/Limitations: None  Compensate with: visual, verbal, tactile, kinesthetic cues/model    Home Situation:   Home Situation  Home Environment: Private residence  # Steps to Enter: 3  Rails to Enter: Yes  One/Two Story Residence: One story  Living Alone: No  Support Systems: Child(rae), Other Family Member(s)  Patient Expects to be Discharged to[de-identified] Home  Current DME Used/Available at Home: Walker, rolling  Tub or Shower Type: Tub/Shower combination  []     Right hand dominant   []     Left hand dominant    Cognitive/Behavioral Status:  Neurologic State: Alert  Orientation Level: Oriented X4  Cognition: Follows commands  Safety/Judgement: Fall prevention    Skin: s/p right foot third toe amputation  Edema: none noted    Vision/Perceptual:  appears intact, able to tell correct time on wall clock  Corrective Lenses: Glasses    Coordination: BUE  Coordination: Within functional limits  Fine Motor Skills-Upper: Left Intact; Right Intact    Gross Motor Skills-Upper: Left Intact; Right Intact    Balance:  Sitting: Intact  Standing: Impaired; With support  Standing - Static: Good  Standing - Dynamic : Good    Strength: BUE  Strength:  Within functional limits              Tone & Sensation: BUE  Tone: Normal                       Range of Motion: BUE  AROM: Within functional limits                         Functional Mobility and Transfers for ADLs:  Bed Mobility:     Supine to Sit: Modified independent  Sit to Supine: Modified independent     Transfers:  Sit to Stand: Modified independent  Stand to Sit: Modified independent      Toilet Transfer : Modified independent      Bathroom Mobility: Modified independent    ADL Assessment:  Feeding: Independent    Bathing: Modified independent    Upper Body Dressing: Modified independent    Lower Body Dressing: Modified independent    Toileting: Modified independent     ADL Intervention:       Grooming  Position Performed:  (w/ RW)  Washing Face: Modified independent  Washing Hands: Modified independent  Brushing Teeth: Modified independent  Brushing/Combing Hair: Modified independent    Upper Body Bathing  Bathing Assistance: Modified independent  Position Performed: Standing (w/ RW)    Lower Body Bathing  Perineal  : Modified independent  Position Performed: Standing (w/ RW)    Upper Body [address]: Modified independent    Lower Body Dressing Assistance  Underpants: Modified independent  Socks: Modified independent  Position Performed: Seated in chair    Toileting  Toileting Assistance: Modified independent  Bladder Hygiene: Modified independent  Clothing Management: Modified independent    Cognitive Retraining  Safety/Judgement: Fall prevention    Pain:  Pain level pre-treatment: 0/10   Pain level post-treatment: 0/10     Activity Tolerance:   good  Please refer to the flowsheet for vital signs taken during this treatment. After treatment:   [x]  Patient left in no apparent distress sitting up in chair  []  Patient left in no apparent distress in bed  [x]  Call bell left within reach  [x]  Nursing notified  []  Caregiver present  []  Bed alarm activated    COMMUNICATION/EDUCATION:   [x]      Role of Occupational Therapy in the acute care setting  [x]      Home safety education was provided and the patient/caregiver indicated understanding. [x]      Patient/family have participated as able and agree with findings and recommendations. []      Patient is unable to participate in plan of care at this time.     Thank you for this referral.  Lavinia   Time Calculation: 32 mins      Eval Complexity: History: MEDIUM Complexity : Expanded review of history including physical, cognitive and psychosocial  history ; Examination: MEDIUM Complexity : 3-5 performance deficits relating to physical, cognitive , or psychosocial skils that result in activity limitations and / or participation restrictions; Decision Making:MEDIUM Complexity : Patient may present with comorbidities that affect occupational performnce. Miniml to moderate modification of tasks or assistance (eg, physical or verbal ) with assesment(s) is necessary to enable patient to complete evaluation     [facility]-PAC® Daily Activity Inpatient Short Form (6-Clicks)*    How much HELP from another person does the patient currently need    (If the patient hasn't done an activity recently, how much help from another person do you think he/she would need if he/she tried?)   Total (Total A or Dep)   A Lot  (Mod to Max A)   A Little (Sup or Min A)   None (Mod I to I)   Putting on and taking off regular lower body clothing? [] 1 [] 2 [] 3 [x] 4   2. Bathing (including washing, rinsing,      drying)? [] 1 [] 2 [] 3 [x] 4   3. Toileting, which includes using toilet, bedpan or urinal?   [] 1 [] 2 [] 3 [x] 4   4. Putting on and taking off regular upper body clothing? [] 1 [] 2 [] 3 [x] 4   5. Taking care of personal grooming such as brushing teeth? [] 1 [] 2 [] 3 [x] 4   6. Eating meals?    [] 1 [] 2 [] 3 [x] 4

## 2022-12-25 NOTE — PROGRESS NOTES
Problem: Falls - Risk of  Goal: *Absence of Falls  Description: Document Luma Yoon Fall Risk and appropriate interventions in the flowsheet.   Outcome: Progressing Towards Goal  Note: Fall Risk Interventions:  Mobility Interventions: Bed/chair exit alarm, Patient to call before getting OOB         Medication Interventions: Bed/chair exit alarm, Teach patient to arise slowly    Elimination Interventions: Bed/chair exit alarm, Call light in reach, Toileting schedule/hourly rounds              Problem: Patient Education: Go to Patient Education Activity  Goal: Patient/Family Education  Outcome: Progressing Towards Goal     Problem: Pain  Goal: *Control of Pain  Outcome: Progressing Towards Goal  Goal: *PALLIATIVE CARE:  Alleviation of Pain  Outcome: Progressing Towards Goal     Problem: Patient Education: Go to Patient Education Activity  Goal: Patient/Family Education  Outcome: Progressing Towards Goal     Problem: Nutrition Deficit  Goal: *Optimize nutritional status  Outcome: Progressing Towards Goal     Problem: Patient Education: Go to Patient Education Activity  Goal: Patient/Family Education  Outcome: Progressing Towards Goal

## 2022-12-25 NOTE — PROGRESS NOTES
[facility]  [facility]    Patient: Brielle Jimenez Age: 71 y.o. : 1950 MR#: [id number] SSN: [id number]  Date: 2022     Subjective/24-hour events:     No events noted overnight. Afebrile overnight. BPs mildly elevated. Assessment:   Right foot infection, suspected osteomyelitis of distal 3rd phalanx s/p 3rd toe amputation   Hypertension  Gout  CKD 3    Plan:   Wound care per podiatry. Antibiotic therapy per ID. Await culture results. On Vanc, zosyn. PT/OT evaluations. Mobilize as tolerated. Home meds as previously prescribed. Supportive care otherwise. Follow. Case discussed with:  [x]Patient  []Family  [x]Nursing  [x]Case Management  DVT Prophylaxis:  [x]Lovenox  []Hep SQ  []SCDs  []Coumadin   []On Heparin gtt    Objective:   VS: Visit Vitals  BP (!) 147/69 (BP 1 Location: Right upper arm, BP Patient Position: At rest)   Pulse 77   Temp 98.1 °F (36.7 °C)   Resp 20   Ht 5' 11\" (1.803 m)   Wt 90.7 kg (200 lb)   SpO2 99%   BMI 27.89 kg/m²        Tmax/24hrs: Temp (24hrs), Av °F (36.7 °C), Min:97.5 °F (36.4 °C), Max:98.5 °F (36.9 °C)    Intake/Output Summary (Last 24 hours) at 2022 0919  Last data filed at 2022 0750  Gross per 24 hour   Intake 600 ml   Output 1800 ml   Net -1200 ml         General:  In NAD. Nontoxic-appearing. Cardiovascular:  RRR. Pulmonary:  Lungs clear bilaterally, no wheezes. No accessory muscle use. GI:  Abdomen soft, NTTP. Extremities:  Warm, no edema or ischemia. Neuro:  Awake and alert.   Moves extremities spontaneously, motor grossly nonfocal.    Labs:    Recent Results (from the past 24 hour(s))   VANCOMYCIN, RANDOM    Collection Time: 22  1:01 AM   Result Value Ref Range    Vancomycin, random 14.6 5.0 - 40.0 UG/ML       Signed By: Cali Persaud DO     2022

## 2022-12-25 NOTE — PROGRESS NOTES
Approx 0230, Zosyn 3.375mg in 100mL NS was hung for extended infusion. Approx 0330, N.O. for Vancomycin 1500mg in 500mL was entered to be given at 0400. Using [employer], it was noted that Vanc and Zosyn are compatible to run together. Message sent to pharmacy to re-time Vanc was sent at approx 0320. Vanc delivered at approx 0345, however, Vanc had not yet been re-timed. This RN reached out to pharmacy via phone, pharmacy stated due to it being a one time dose it cannot be re-timed, and it was ok to give late when Zosyn is complete.

## 2022-12-25 NOTE — PROGRESS NOTES
[address] [facility] Pharmacokinetic Monitoring Service - Vancomycin    Consulting Provider: Sean Wise MD   Indication: Diabetic Foot Infection  Target Concentration: Dosing based on anticipated concentration <15 mg/L due to renal impairment/insufficiency  Day of Therapy: 5  Additional Antimicrobials: Zosyn    Pertinent Laboratory Values: Wt Readings from Last 1 Encounters:   12/21/22 90.7 kg (200 lb)     Temp Readings from Last 1 Encounters:   12/25/22 98.3 °F (36.8 °C)     Recent Labs     12/24/22  0114 12/23/22  0027   CREA 1.44* 1.83*   BUN 14 22*     Estimated Creatinine Clearance: 54.2 mL/min (A) (based on SCr of 1.44 mg/dL (H)). Recent Labs     12/24/22  0114 12/23/22  0027   WBC 9.3 9.2         Pertinent Cultures:  Culture Date Source Results   12/21 Blood POSSIBLE STAPHYLOCOCCUS AUREUS GROWING IN 1 OF 2 BOTTLES DRAWN No Site Indicated    12/22 Foot; Wound HEAVY PROBABLE STAPHYLOCOCCUS AUREUS  MODERATE GRAM NEGATIVE RODS    12/23 Wound - 3rd toe FEW POSSIBLE STAPHYLOCOCCUS AUREUS    12/23 Wound - 3rd toe clean margin NO GROWTH THUS FAR   MRSA Nasal Swab: N/A. Non-respiratory infection. .      Assessment:  Date/Time Current Dose Concentration Timing of Concentration (h) AUC   12/21 2021 2000 mg x 1 - - -   12/22 0240 - 18.9 6 -   12/22 1155 1500 mg x 1 - - -   12/23 0027 - 19.7 12 -   12/24 0114 - 9.5 36 -   12/24 0657 1500 mg x 1 - - -   12/25 0101 - 14.6 18 -          Note: Serum concentrations collected for AUC dosing may appear elevated if collected in close proximity to the dose administered, this is not necessarily an indication of toxicity    Plan:  Vancomycin 1500 mg IV x 1 now  Repeat vancomycin concentration ordered for 12/26 @ 0400   Pharmacy will continue to monitor patient and adjust therapy as indicated

## 2022-12-25 NOTE — PROGRESS NOTES
Problem: Falls - Risk of  Goal: *Absence of Falls  Description: Document Clearence Skates Fall Risk and appropriate interventions in the flowsheet.   Outcome: Progressing Towards Goal  Note: Fall Risk Interventions:  Mobility Interventions: Bed/chair exit alarm, Patient to call before getting OOB         Medication Interventions: Bed/chair exit alarm, Teach patient to arise slowly    Elimination Interventions: Bed/chair exit alarm, Call light in reach, Toileting schedule/hourly rounds              Problem: Pain  Goal: *Control of Pain  Outcome: Progressing Towards Goal     Problem: Nutrition Deficit  Goal: *Optimize nutritional status  Outcome: Progressing Towards Goal

## 2022-12-25 NOTE — PROGRESS NOTES
Bedside shift change report given to [facility] (oncoming nurse) by Dee Kenney (offgoing nurse). Report included the following information SBAR, Kardex, Intake/Output, MAR, and Recent Results.    Wound Prevention Checklist    Patient: Madhu Youssef (89 y.o. male)  Date: 12/25/2022  Diagnosis: Osteomyelitis ([address] [M86.9]  Infected abrasion of third toe [S90.416A, L08.9] <principal problem not specified>    Precautions:         []  Heel prevention boots placed on patient    []  Patient turned q2h during shift    []  Lift team ordered    []  Patient on Sravanthi bed/Specialty bed    []  Each Wound is documented during shift (Stage, Color, drainage, odor, measurements, and dressings)    [x]  Dual skin check done with Rosalia Weston RN

## 2022-12-25 NOTE — PROGRESS NOTES
Seen at chairside awake and alert. No complaints. Dressing intact. Inspected wound. Clean. Cellulitis cleared. Good perfusion. Removed packing. Redressed.

## 2022-12-25 NOTE — PROGRESS NOTES
Upon rounding on pt for VS, it was noted that pt's urinal had spilled on the bed. Pt stated he did not wish to be cleaned up at this time, stating that he was not ready to get up yet and would like to be cleaned up when breakfast arrives.

## 2022-12-25 NOTE — PROGRESS NOTES
PT eval order received and chart reviewed. Spoke with patient who reports they are at functional baseline for mobility. Patient shows no functional impairments that indicated skilled physical therapy need and that they have no further need for assist or AD/DME. Will sign off per protocol and educated patient that if their functional mobility needs should change that they may have MD re-order PT at any time.  Thank you for this referral.     Noel Cruz, PT, DPT

## 2022-12-26 LAB
ANION GAP SERPL CALC-SCNC: 9 MMOL/L (ref 3–18)
BASOPHILS # BLD: 0.1 K/UL (ref 0–0.1)
BASOPHILS NFR BLD: 1 % (ref 0–2)
BUN SERPL-MCNC: 12 MG/DL (ref 7–18)
BUN/CREAT SERPL: 6 (ref 12–20)
CALCIUM SERPL-MCNC: 9.2 MG/DL (ref 8.5–10.1)
CHLORIDE SERPL-SCNC: 111 MMOL/L (ref 100–111)
CO2 SERPL-SCNC: 24 MMOL/L (ref 21–32)
CREAT SERPL-MCNC: 1.92 MG/DL (ref 0.6–1.3)
DIFFERENTIAL METHOD BLD: ABNORMAL
EOSINOPHIL # BLD: 0.7 K/UL (ref 0–0.4)
EOSINOPHIL NFR BLD: 8 % (ref 0–5)
ERYTHROCYTE [DISTWIDTH] IN BLOOD BY AUTOMATED COUNT: 12.7 % (ref 11.6–14.5)
GLUCOSE SERPL-MCNC: 79 MG/DL (ref 74–99)
HCT VFR BLD AUTO: 30.2 % (ref 36–48)
HGB BLD-MCNC: 10.4 G/DL (ref 13–16)
IMM GRANULOCYTES # BLD AUTO: 0.1 K/UL (ref 0–0.04)
IMM GRANULOCYTES NFR BLD AUTO: 1 % (ref 0–0.5)
LYMPHOCYTES # BLD: 2 K/UL (ref 0.9–3.6)
LYMPHOCYTES NFR BLD: 23 % (ref 21–52)
MCH RBC QN AUTO: 32.2 PG (ref 24–34)
MCHC RBC AUTO-ENTMCNC: 34.4 G/DL (ref 31–37)
MCV RBC AUTO: 93.5 FL (ref 78–100)
MONOCYTES # BLD: 0.8 K/UL (ref 0.05–1.2)
MONOCYTES NFR BLD: 9 % (ref 3–10)
NEUTS SEG # BLD: 5.3 K/UL (ref 1.8–8)
NEUTS SEG NFR BLD: 59 % (ref 40–73)
NRBC # BLD: 0 K/UL (ref 0–0.01)
NRBC BLD-RTO: 0 PER 100 WBC
PLATELET # BLD AUTO: 277 K/UL (ref 135–420)
PMV BLD AUTO: 9 FL (ref 9.2–11.8)
POTASSIUM SERPL-SCNC: 4.2 MMOL/L (ref 3.5–5.5)
RBC # BLD AUTO: 3.23 M/UL (ref 4.35–5.65)
SODIUM SERPL-SCNC: 144 MMOL/L (ref 136–145)
VANCOMYCIN SERPL-MCNC: 17.2 UG/ML (ref 5–40)
WBC # BLD AUTO: 8.9 K/UL (ref 4.6–13.2)

## 2022-12-26 PROCEDURE — 87040 BLOOD CULTURE FOR BACTERIA: CPT

## 2022-12-26 PROCEDURE — 36415 COLL VENOUS BLD VENIPUNCTURE: CPT

## 2022-12-26 PROCEDURE — 74011000250 HC RX REV CODE- 250: Performed by: PODIATRIST

## 2022-12-26 PROCEDURE — 99233 SBSQ HOSP IP/OBS HIGH 50: CPT | Performed by: STUDENT IN AN ORGANIZED HEALTH CARE EDUCATION/TRAINING PROGRAM

## 2022-12-26 PROCEDURE — 65270000029 HC RM PRIVATE

## 2022-12-26 PROCEDURE — 85025 COMPLETE CBC W/AUTO DIFF WBC: CPT

## 2022-12-26 PROCEDURE — 80048 BASIC METABOLIC PNL TOTAL CA: CPT

## 2022-12-26 PROCEDURE — 74011250637 HC RX REV CODE- 250/637: Performed by: PODIATRIST

## 2022-12-26 PROCEDURE — 80202 ASSAY OF VANCOMYCIN: CPT

## 2022-12-26 PROCEDURE — 74011000258 HC RX REV CODE- 258: Performed by: PODIATRIST

## 2022-12-26 PROCEDURE — 74011250637 HC RX REV CODE- 250/637: Performed by: STUDENT IN AN ORGANIZED HEALTH CARE EDUCATION/TRAINING PROGRAM

## 2022-12-26 PROCEDURE — 74011250636 HC RX REV CODE- 250/636: Performed by: PODIATRIST

## 2022-12-26 RX ORDER — VANCOMYCIN/0.9 % SOD CHLORIDE 1.5G/250ML
1500 PLASTIC BAG, INJECTION (ML) INTRAVENOUS ONCE
Status: DISCONTINUED | OUTPATIENT
Start: 2022-12-26 | End: 2022-12-26

## 2022-12-26 RX ORDER — CEPHALEXIN 500 MG/1
500 CAPSULE ORAL EVERY 6 HOURS
Qty: 44 CAPSULE | Refills: 0 | Status: SHIPPED | OUTPATIENT
Start: 2022-12-26 | End: 2022-12-27

## 2022-12-26 RX ADMIN — PIPERACILLIN AND TAZOBACTAM 3.38 G: 3; .375 INJECTION, POWDER, FOR SOLUTION INTRAVENOUS at 18:01

## 2022-12-26 RX ADMIN — PANTOPRAZOLE SODIUM 40 MG: 40 TABLET, DELAYED RELEASE ORAL at 08:47

## 2022-12-26 RX ADMIN — ENOXAPARIN SODIUM 40 MG: 100 INJECTION SUBCUTANEOUS at 08:48

## 2022-12-26 RX ADMIN — SODIUM CHLORIDE, PRESERVATIVE FREE 10 ML: 5 INJECTION INTRAVENOUS at 22:06

## 2022-12-26 RX ADMIN — AMLODIPINE BESYLATE 5 MG: 5 TABLET ORAL at 08:47

## 2022-12-26 RX ADMIN — SODIUM CHLORIDE, PRESERVATIVE FREE 10 ML: 5 INJECTION INTRAVENOUS at 05:23

## 2022-12-26 RX ADMIN — ALLOPURINOL 100 MG: 100 TABLET ORAL at 08:47

## 2022-12-26 RX ADMIN — PIPERACILLIN AND TAZOBACTAM 3.38 G: 3; .375 INJECTION, POWDER, FOR SOLUTION INTRAVENOUS at 01:11

## 2022-12-26 RX ADMIN — ZOLPIDEM TARTRATE 10 MG: 5 TABLET ORAL at 22:05

## 2022-12-26 RX ADMIN — SODIUM CHLORIDE, PRESERVATIVE FREE 10 ML: 5 INJECTION INTRAVENOUS at 14:00

## 2022-12-26 NOTE — PROGRESS NOTES
Reason for Admission:  Osteomyelitis ([facility] [address] [M86.9]  Infected abrasion of third toe [S90.416A, L08.9]                 RUR Score:              Plan for utilizing home health:   TBD                    Likelihood of Readmission:   LOW                         Transition of Care Plan:              Initial assessment completed with patient. Cognitive status of patient: oriented to time, place, person and situation. Face sheet information confirmed:  yes. This patient lives in a single family home with daughter. Patient is able to navigate steps as needed. Prior to hospitalization, patient was considered to be independent with ADLs/IADLS : no . I      Patient has a current ACP document on file: no      Healthcare Decision Maker:     Click here to complete [address] including selection of the Healthcare Decision Maker Relationship (ie \"Primary\")    The patient's daughter  will be available to transport patient home upon discharge. The patient already has none reported medical equipment available in the home. Patient is not currently active with home health. Patient has not stayed in a skilled nursing facility or rehab. Was  stay within last 60 days : no. This patient is on dialysis :no    Freedom of choice signed: no, Currently, the discharge plan is Home. The patient states that he can obtain his medications from the pharmacy, and take his medications as directed. 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

## 2022-12-26 NOTE — PROGRESS NOTES
Infectious Disease progress Note        Reason: Right foot infection    Current abx Prior abx   Zosyn, vancomycin since 12/21/2022      Lines:       Assessment :    71 y.o. male with past medical history of arthritis, gout, hypertension sent to ED from podiatrist office on 12/29/2022 for concerns of right third toe infection. Clinical presentation consistent with right third toe distal phalanx acute osteomyelitis, tenosynovitis flexor tendon right third toe, cellulitis right third toe/right foot in a patient with diabetic neuropathy    MRI right foot 12/21 confirms clinical suspicion of right third toe distal phalanx osteomyelitis. Podiatry follow-up appreciated. Status post right third toe amputation on 12/23/2022. Intraoperative findings discussed with podiatrist.  Grossly clean bone margins achieved at surgery    Cultures 12/23- MSSA, klebsiella, e.coli, bacteroides fragilis susceptibilities noted    Acute kidney injury-likely multifactorial    Recommendations:    Recommend piperacillin/tazobactam, d/c vancomycin  Follow-up bone biopsy of clean bone margins. Follow podiatry recommendations regarding right foot wound care  Switch to oral antibiotics to complete treatment for right foot infection since grossly clean bone margins achieved at surgery. I will follow-up the bone biopsy result in a.m. and modify treatment duration and if indicated based on the results of the bone biopsy. Mx of TARI per primary team    Addendum: 13:00 pm  Reviewed patient's labs. Blood cx 12/21- MSSA c/w MSSA bloodstream infection likely secondary to right foot infection. Will need to hold discharge. Repeat blood cx to document clearance of bacteremia. Will place picc line in am if blood cx negative at 24 hours. Recommend outpatient iv ertapenem till 1/8/23 since less risk of nephrotoxicity, once daily dosing easier to use as outpatient and it it will cover for MSSA, Klebsiella, E. coli and Bacteroides.   Discussed with patient, daughter at bedside. In agreement with the plan    Home health orders on chart (click on \"chart review, other orders, IP home health)     Above plan was discussed in details with patient, RN,daughter at bedside, dr. [place]. D/w . All questions answered to their full satisfaction. Spent additional 35 minutes in management and evaluation of this patient. >50% time spent in counselling and coordination of care. Please call me if any further questions or concerns. Will continue to participate in the care of this patient. HPI:    No new complaints. Feels good. Gamaliele Slight to go home           Past Medical History:   Diagnosis Date    Arthritis     Hemochromatosis     Ill-defined condition     GOUT       Past Surgical History:   Procedure Laterality Date    HX ORTHOPAEDIC      LEFT HAND       home Medication List         Details   amLODIPine (NORVASC) 5 mg tablet Take 1 Tablet by mouth daily. Qty: 30 Tablet, Refills: 0      colchicine 0.6 mg tablet Take 0.6 mg by mouth as needed (gout flare ups). pantoprazole (PROTONIX) 40 mg tablet Take 1 Tablet by mouth Daily (before breakfast). Qty: 15 Tablet, Refills: 0      multivitamin, tx-iron-ca-min (THERA-M w/ IRON) 9 mg iron-400 mcg tab tablet Take 1 Tablet by mouth daily. Qty: 30 Tablet, Refills: 0      varicella-zoster recombinant, PF, (Shingrix, PF,) 50 mcg/0.5 mL susr injection Shingrix (PF) 50 mcg/0.5 mL intramuscular suspension, kit      lidocaine HCL-benzyl alcohoL (Salonpas Lidocaine Plus) 4-10 % crea 1 Actuation(s) by Apply Externally route two (2) times daily as needed for Pain.   Qty: 1 Each, Refills: 0             Current Facility-Administered Medications   Medication Dose Route Frequency    vancomycin (VANCOCIN) 1500 mg in  ml infusion  1,500 mg IntraVENous ONCE    zolpidem (AMBIEN) tablet 10 mg  10 mg Oral QHS    0.9% sodium chloride infusion  100 mL/hr IntraVENous CONTINUOUS    amLODIPine (NORVASC) tablet 5 mg  5 mg Oral DAILY colchicine (MITIGARE) capsule 0.6 mg  0.6 mg Oral PRN    pantoprazole (PROTONIX) tablet 40 mg  40 mg Oral ACB    allopurinoL (ZYLOPRIM) tablet 100 mg  100 mg Oral DAILY    sodium chloride (NS) flush 5-40 mL  5-40 mL IntraVENous Q8H    sodium chloride (NS) flush 5-40 mL  5-40 mL IntraVENous PRN    acetaminophen (TYLENOL) tablet 650 mg  650 mg Oral Q6H PRN    Or    acetaminophen (TYLENOL) suppository 650 mg  650 mg Rectal Q6H PRN    polyethylene glycol (MIRALAX) packet 17 g  17 g Oral DAILY PRN    promethazine (PHENERGAN) tablet 12.5 mg  12.5 mg Oral Q6H PRN    Or    ondansetron (ZOFRAN) injection 4 mg  4 mg IntraVENous Q6H PRN    enoxaparin (LOVENOX) injection 40 mg  40 mg SubCUTAneous DAILY    VANCOMYCIN INFORMATION NOTE   Other Rx Dosing/Monitoring    piperacillin-tazobactam (ZOSYN) 3.375 g in 0.9% sodium chloride (MBP/ADV) 100 mL MBP  3.375 g IntraVENous Q8H       Allergies: Patient has no known allergies. History reviewed. No pertinent family history. Social History     Socioeconomic History    Marital status: SINGLE     Spouse name: Not on file    Number of children: Not on file    Years of education: Not on file    Highest education level: Not on file   Occupational History    Not on file   Tobacco Use    Smoking status: Never    Smokeless tobacco: Never   Substance and Sexual Activity    Alcohol use:  Yes     Alcohol/week: 6.0 standard drinks     Types: 6 Cans of beer per week     Comment: weekly on weekends    Drug use: Not on file    Sexual activity: Not on file   Other Topics Concern    Not on file   Social History Narrative    Not on file     Social Determinants of Health     Financial Resource Strain: Not on file   Food Insecurity: Not on file   Transportation Needs: Not on file   Physical Activity: Not on file   Stress: Not on file   Social Connections: Not on file   Intimate Partner Violence: Not on file   Housing Stability: Not on file     Social History     Tobacco Use   Smoking Status Never Smokeless Tobacco Never        Temp (24hrs), Av.8 °F (36.6 °C), Min:97.5 °F (36.4 °C), Max:98.4 °F (36.9 °C)    Visit Vitals  /74 (BP 1 Location: Right upper arm, BP Patient Position: At rest)   Pulse 82   Temp 97.5 °F (36.4 °C)   Resp 18   Ht 5' 11\" (1.803 m)   Wt 90.7 kg (200 lb)   SpO2 96%   BMI 27.89 kg/m²       ROS: 12 point ROS obtained in details. Pertinent positives as mentioned in HPI,   otherwise negative    Physical Exam:    General appearance - alert, well appearing, and in no distress  Mental status - alert, oriented to person, place, and time  Eyes - pupils equal and reactive, extraocular eye movements intact  Ears - bilateral TM's and external ear canals normal  Nose - normal and patent, no erythema, discharge or polyps  Chest - clear to auscultation, no wheezes, rales or rhonchi, symmetric air entry  Heart - normal rate, regular rhythm, normal S1, S2, no , rubs, clicks or gallops  Abdomen - soft, nontender, nondistended, no masses or organomegaly  Neurological - alert, oriented, normal speech, no gross motor deficits. Exam consistent with peripheral neuropathy. Musculoskeletal - no joint tenderness, deformity or swelling  Extremities -right foot surgical dressing not opened. No tenderness right leg.     Labs: Results:   Chemistry Recent Labs     226 22  1014 22  0114   GLU 79 99 88    139 137   K 4.2 4.1 3.9    107 106   CO2 24 24 24   BUN 12 10 14   CREA 1.92* 1.59* 1.44*   CA 9.2 9.2 9.5   AGAP 9 8 7   BUCR 6* 6* 10*        CBC w/Diff Recent Labs     226 22  1014 22  0114   WBC 8.9 9.2 9.3   RBC 3.23* 3.55* 3.52*   HGB 10.4* 11.5* 11.3*   HCT 30.2* 33.9* 34.4*    300 314   GRANS 59 65 66   LYMPH 23 16* 18*   EOS 8* 8* 6*        Microbiology Recent Labs     22  1249 22  1243   CULT SCANT BACTEROIDES FRAGILIS BETA LACTAMASE POSITIVE*  LIGHT STAPHYLOCOCCUS AUREUS TO REFER TO [id number] FOR SENSITIVITIES*  RARE STAPHYLOCOCCUS SPECIES, COAGULASE NEGATIVE* NO GROWTH THUS FAR  NO GROWTH THUS FAR            RADIOLOGY:    All available imaging studies/reports in [facility] for this admission were reviewed    High complexity decision making was performed during the evaluation of this patient at high risk for decompensation      Above mentioned total time spent on reviewing the case/medical record/data/notes/EMR/patient examination/documentation/coordinating care with nurse/consultants, exclusive of procedures with complex decision making performed and > 50% time spent in face to face evaluation. Disclaimer: Sections of this note are dictated utilizing voice recognition software, which may have resulted in some phonetic based errors in grammar and contents. Even though attempts were made to correct all the mistakes, some may have been missed, and remained in the body of the document. If questions arise, please contact our department.     Dr. Carlos Reynolds, Infectious Disease Specialist  [phone]  December 26, 2022  11:10 AM

## 2022-12-26 NOTE — PROGRESS NOTES
[facility]  Progress Note    Patient: Dejan Chandler Age: 71 y.o. : 1950 MR#: [id number] SSN: [id number]  Date: 2022     Subjective/24-hour events:     Blood cultures     Assessment:   Right foot infection, suspected osteomyelitis of distal 3rd phalanx s/p 3rd toe amputation   Hypertension  Gout  CKD 3  Blood culture positive for staph aureus     Plan:   Wound care per podiatry. Antibiotic therapy per ID. Await culture results. On zosyn. Redraw blood cultures   PT/OT evaluations. Mobilize as tolerated. Home meds as previously prescribed. Supportive care otherwise. Follow. Case discussed with:  [x]Patient  []Family  [x]Nursing  [x]Case Management  DVT Prophylaxis:  [x]Lovenox  []Hep SQ  []SCDs  []Coumadin   []On Heparin gtt    Objective:   VS: Visit Vitals  /85 (BP 1 Location: Right upper arm, BP Patient Position: At rest)   Pulse 88   Temp 97.5 °F (36.4 °C)   Resp 18   Ht 5' 11\" (1.803 m)   Wt 90.7 kg (200 lb)   SpO2 99%   BMI 27.89 kg/m²        Tmax/24hrs: Temp (24hrs), Av.5 °F (36.4 °C), Min:97.5 °F (36.4 °C), Max:97.5 °F (36.4 °C)    Intake/Output Summary (Last 24 hours) at 2022 1533  Last data filed at 2022 1721  Gross per 24 hour   Intake 120 ml   Output --   Net 120 ml         General:  In NAD. Nontoxic-appearing. Cardiovascular:  RRR. Pulmonary:  Lungs clear bilaterally, no wheezes. No accessory muscle use. GI:  Abdomen soft, NTTP. Extremities:  Warm, no edema or ischemia. Neuro:  Awake and alert.   Moves extremities spontaneously, motor grossly nonfocal.    Labs:    Recent Results (from the past 24 hour(s))   VANCOMYCIN, RANDOM    Collection Time: 22  1:16 AM   Result Value Ref Range    Vancomycin, random 17.2 5.0 - 40.0 UG/ML   CBC WITH AUTOMATED DIFF    Collection Time: 22  1:16 AM   Result Value Ref Range    WBC 8.9 4.6 - 13.2 K/uL    RBC 3.23 (L) 4.35 - 5.65 M/uL    HGB 10.4 (L) 13.0 - 16.0 g/dL    HCT 30.2 (L) 36.0 - 48.0 %    MCV 93.5 78.0 - 100.0 FL    MCH 32.2 24.0 - 34.0 PG    MCHC 34.4 31.0 - 37.0 g/dL    RDW 12.7 11.6 - 14.5 %    PLATELET 039 092 - 101 K/uL    MPV 9.0 (L) 9.2 - 11.8 FL    NRBC 0.0 0  WBC    ABSOLUTE NRBC 0.00 0.00 - 0.01 K/uL    NEUTROPHILS 59 40 - 73 %    LYMPHOCYTES 23 21 - 52 %    MONOCYTES 9 3 - 10 %    EOSINOPHILS 8 (H) 0 - 5 %    BASOPHILS 1 0 - 2 %    IMMATURE GRANULOCYTES 1 (H) 0.0 - 0.5 %    ABS. NEUTROPHILS 5.3 1.8 - 8.0 K/UL    ABS. LYMPHOCYTES 2.0 0.9 - 3.6 K/UL    ABS. MONOCYTES 0.8 0.05 - 1.2 K/UL    ABS. EOSINOPHILS 0.7 (H) 0.0 - 0.4 K/UL    ABS. BASOPHILS 0.1 0.0 - 0.1 K/UL    ABS. IMM.  GRANS. 0.1 (H) 0.00 - 0.04 K/UL    DF AUTOMATED     METABOLIC PANEL, BASIC    Collection Time: 12/26/22  1:16 AM   Result Value Ref Range    Sodium 144 136 - 145 mmol/L    Potassium 4.2 3.5 - 5.5 mmol/L    Chloride 111 100 - 111 mmol/L    CO2 24 21 - 32 mmol/L    Anion gap 9 3.0 - 18 mmol/L    Glucose 79 74 - 99 mg/dL    BUN 12 7.0 - 18 MG/DL    Creatinine 1.92 (H) 0.6 - 1.3 MG/DL    BUN/Creatinine ratio 6 (L) 12 - 20      eGFR 37 (L) >60 ml/min/1.73m2    Calcium 9.2 8.5 - 10.1 MG/DL       Signed By: Isac Williamson DO     December 26, 2022

## 2022-12-26 NOTE — PROGRESS NOTES
D/C order noted for today. Orders reviewed. No needs identified at this time. CM remains available if needed.       ALEXEI Porter, ROGERHP

## 2022-12-26 NOTE — PROGRESS NOTES
Problem: Falls - Risk of  Goal: *Absence of Falls  Description: Document Darlen [place] Fall Risk and appropriate interventions in the flowsheet.   Outcome: Progressing Towards Goal  Note: Fall Risk Interventions:  Mobility Interventions: Bed/chair exit alarm, Patient to call before getting OOB         Medication Interventions: Bed/chair exit alarm, Teach patient to arise slowly    Elimination Interventions: Bed/chair exit alarm, Call light in reach, Toileting schedule/hourly rounds              Problem: Pain  Goal: *Control of Pain  Outcome: Progressing Towards Goal  Goal: *PALLIATIVE CARE:  Alleviation of Pain  Outcome: Progressing Towards Goal     Problem: Nutrition Deficit  Goal: *Optimize nutritional status  Outcome: Progressing Towards Goal

## 2022-12-26 NOTE — PROGRESS NOTES
Nutrition Note      Po diet resumed on 12/23. Pt with good meal intake per chart documentation, >76% of meals. Tolerating diet. Plan to add nutrition supplements. Progressing towards nutrition goals      Nutrition Recommendations/Plan:   Add supplement:  Ensure Enlive once daily (350 kcal, 20 gm protein each),  Juvenn BID (95 kcal, 2.5 gm protein, 7 gm L-arginine, 7 gm L-glutamine each)  Continue all other nutrition interventions. Encourage/ monitor po intake of meals and supplements.          Electronically signed by Donte London RD on 12/26/2022 at 1:48 PM    Contact: [phone]

## 2022-12-26 NOTE — PROGRESS NOTES
Bedside shift change report given to Rosamaria Meza RN (oncoming nurse) by RICKEY Curiel (offgoing nurse). Report included the following information SBAR, Kardex, Intake/Output, MAR, and Recent Results.     Wound Prevention Checklist    Patient: Trish Perez (34 y.o. male)  Date: 12/25/2022  Diagnosis: Osteomyelitis ([address] [M86.9]  Infected abrasion of third toe [S90.416A, L08.9] <principal problem not specified>    Precautions:         []  Heel prevention boots placed on patient    []  Patient turned q2h during shift    []  Lift team ordered    []  Patient on Sravanthi bed/Specialty bed    []  Each Wound is documented during shift (Stage, Color, drainage, odor, measurements, and dressings)    [x]  Dual skin check done with RICKEY Mckenzie

## 2022-12-26 NOTE — PROGRESS NOTES
[facility] Pharmacokinetic Monitoring Service - Vancomycin    Consulting Provider: Odette Gomez MD   Indication: Diabetic Foot Infection  Target Concentration: Dosing based on anticipated concentration <15 mg/L due to renal impairment/insufficiency  Day of Therapy: 6  Additional Antimicrobials: Zosyn    Pertinent Laboratory Values: Wt Readings from Last 1 Encounters:   12/21/22 90.7 kg (200 lb)     Temp Readings from Last 1 Encounters:   12/26/22 97.5 °F (36.4 °C)     Recent Labs     12/26/22  0116 12/25/22  1014 12/24/22  0114   CREA 1.92* 1.59* 1.44*   BUN 12 10 14     Estimated Creatinine Clearance: 40.7 mL/min (A) (based on SCr of 1.92 mg/dL (H)). Recent Labs     12/26/22  0116 12/25/22  1014   WBC 8.9 9.2         Pertinent Cultures:  Culture Date Source Results   12/21 Blood POSSIBLE STAPHYLOCOCCUS AUREUS GROWING IN 1 OF 2 BOTTLES DRAWN No Site Indicated    12/22 Foot; Wound HEAVY PROBABLE STAPHYLOCOCCUS AUREUS  MODERATE GRAM NEGATIVE RODS    12/23 Wound - 3rd toe FEW POSSIBLE STAPHYLOCOCCUS AUREUS    12/23 Wound - 3rd toe clean margin NO GROWTH THUS FAR   MRSA Nasal Swab: N/A. Non-respiratory infection. .      Assessment:  Date/Time Current Dose Concentration Timing of Concentration (h) AUC   12/21 2021 2000 mg x 1 - - -   12/22 0240 - 18.9 6 -   12/22 1155 1500 mg x 1 - - -   12/23 0027 - 19.7 12 -   12/24 0114 - 9.5 36 -   12/24 0657 1500 mg x 1 - - -   12/25 0101 - 14.6 18 - 12/25 0621 1500 mg x 1 - - -   12/26 0116 - 17.2 19    Note: Serum concentrations collected for AUC dosing may appear elevated if collected in close proximity to the dose administered, this is not necessarily an indication of toxicity    Plan:  Vancomycin 1500 mg IV x 1 now  Repeat vancomycin concentration ordered for 12/27 @ 0400   Pharmacy will continue to monitor patient and adjust therapy as indicated

## 2022-12-26 NOTE — PROGRESS NOTES
Bedside and Verbal shift change report given to [facility], RN (oncoming nurse) by Jovan Veronica RN (offgoing nurse). Report included the following information SBAR, Kardex, Procedure Summary, Intake/Output, MAR, Recent Results, and Med Rec Status.

## 2022-12-26 NOTE — DISCHARGE INSTRUCTIONS
DISCHARGE SUMMARY from Nurse    PATIENT INSTRUCTIONS:    After general anesthesia or intravenous sedation, for 24 hours or while taking prescription Narcotics:  Limit your activities  Do not drive and operate hazardous machinery  Do not make important personal or business decisions  Do  not drink alcoholic beverages  If you have not urinated within 8 hours after discharge, please contact your surgeon on call. Report the following to your surgeon:  Excessive pain, swelling, redness or odor of or around the surgical area  Temperature over 100.5  Nausea and vomiting lasting longer than 4 hours or if unable to take medications  Any signs of decreased circulation or nerve impairment to extremity: change in color, persistent  numbness, tingling, coldness or increase pain  Any questions    What to do at Home:  Recommended activity: Activity as tolerated,     If you experience any of the following symptoms chest pain, shortness of breath, nausea, vomiting or fever, please follow up with your primary care provider. *  Please give a list of your current medications to your Primary Care Provider. *  Please update this list whenever your medications are discontinued, doses are      changed, or new medications (including over-the-counter products) are added. *  Please carry medication information at all times in case of emergency situations. These are general instructions for a healthy lifestyle:    No smoking/ No tobacco products/ Avoid exposure to second hand smoke  Surgeon General's Warning:  Quitting smoking now greatly reduces serious risk to your health.     Obesity, smoking, and sedentary lifestyle greatly increases your risk for illness    A healthy diet, regular physical exercise & weight monitoring are important for maintaining a healthy lifestyle    You may be retaining fluid if you have a history of heart failure or if you experience any of the following symptoms:  Weight gain of 3 pounds or more overnight or 5 pounds in a week, increased swelling in our hands or feet or shortness of breath while lying flat in bed. Please call your doctor as soon as you notice any of these symptoms; do not wait until your next office visit. Patient armband removed and shredded. The discharge information has been reviewed with the patient. The patient verbalized understanding. Discharge medications reviewed with the patient and appropriate educational materials and side effects teaching were provided. ___________________________________________________________________________________________________________________________________  DISCHARGE SUMMARY from Nurse    PATIENT INSTRUCTIONS:    After general anesthesia or intravenous sedation, for 24 hours or while taking prescription Narcotics:  Limit your activities  Do not drive and operate hazardous machinery  Do not make important personal or business decisions  Do  not drink alcoholic beverages  If you have not urinated within 8 hours after discharge, please contact your surgeon on call. Report the following to your surgeon:  Excessive pain, swelling, redness or odor of or around the surgical area  Temperature over 100.5  Nausea and vomiting lasting longer than 4 hours or if unable to take medications  Any signs of decreased circulation or nerve impairment to extremity: change in color, persistent  numbness, tingling, coldness or increase pain  Any questions    What to do at Home:  Recommended activity: Activity as tolerated, with assistance    If you experience any of the following symptoms chest pain, shortness of breath, fever greater than 100.5, nausea, vomiting, pain unrelieved by medication, please follow up with Abisai Harper Dr. *  Please give a list of your current medications to your Primary Care Provider.     *  Please update this list whenever your medications are discontinued, doses are      changed, or new medications (including over-the-counter products) are added. *  Please carry medication information at all times in case of emergency situations. These are general instructions for a healthy lifestyle:    No smoking/ No tobacco products/ Avoid exposure to second hand smoke  Surgeon General's Warning:  Quitting smoking now greatly reduces serious risk to your health. Obesity, smoking, and sedentary lifestyle greatly increases your risk for illness    A healthy diet, regular physical exercise & weight monitoring are important for maintaining a healthy lifestyle    You may be retaining fluid if you have a history of heart failure or if you experience any of the following symptoms:  Weight gain of 3 pounds or more overnight or 5 pounds in a week, increased swelling in our hands or feet or shortness of breath while lying flat in bed. Please call your doctor as soon as you notice any of these symptoms; do not wait until your next office visit. Patient armband removed and shredded   MyChart Activation    Thank you for requesting access to [facility]. Please follow the instructions below to securely access and download your online medical record. [facility] allows you to send messages to your doctor, view your test results, renew your prescriptions, schedule appointments, and more. How Do I Sign Up? In your internet browser, go to [email or web address]  Click on the First Time User? Click Here link in the Sign In box. You will be redirect to the New Member Sign Up page. Enter your [facility] Access Code exactly as it appears below. You will not need to use this code after youve completed the sign-up process. If you do not sign up before the expiration date, you must request a new code. [facility] Access Code: [email or web address]  Expires: 2023  8:43 AM (This is the date your [facility] access code will )    Enter the last four digits of your Social Security Number (xxxx) and Date of Birth (mm/dd/yyyy) as indicated and click Submit.  You will be taken to the next sign-up page. Create a [employer] ID. This will be your [employer] login ID and cannot be changed, so think of one that is secure and easy to remember. Create a [employer] password. You can change your password at any time. Enter your Password Reset Question and Answer. This can be used at a later time if you forget your password. Enter your e-mail address. You will receive e-mail notification when new information is available in [address] Click Sign Up. You can now view and download portions of your medical record. Click the NewsHunt link to download a portable copy of your medical information. Additional Information    If you have questions, please visit the Frequently Asked Questions section of the [employer] website at [email or web address] Remember, [employer] is NOT to be used for urgent needs. For medical emergencies, dial 911. The discharge information has been reviewed with the {PATIENT PARENT GUARDIAN:[id number]}. The {PATIENT PARENT GUARDIAN:49803} verbalized understanding. Discharge medications reviewed with the {Dishcarge meds reviewed CNTK:[id number]} and appropriate educational materials and side effects teaching were provided.   ___________________________________________________________________________________________________________________________________

## 2022-12-26 NOTE — PROGRESS NOTES
Problem: Falls - Risk of  Goal: *Absence of Falls  Description: Document Darlen [place] Fall Risk and appropriate interventions in the flowsheet.   Outcome: Progressing Towards Goal  Note: Fall Risk Interventions:  Mobility Interventions: Bed/chair exit alarm, Patient to call before getting OOB         Medication Interventions: Bed/chair exit alarm, Patient to call before getting OOB    Elimination Interventions: Bed/chair exit alarm, Call light in reach, Toileting schedule/hourly rounds              Problem: Patient Education: Go to Patient Education Activity  Goal: Patient/Family Education  Outcome: Progressing Towards Goal     Problem: Pain  Goal: *Control of Pain  Outcome: Progressing Towards Goal  Goal: *PALLIATIVE CARE:  Alleviation of Pain  Outcome: Progressing Towards Goal     Problem: Patient Education: Go to Patient Education Activity  Goal: Patient/Family Education  Outcome: Progressing Towards Goal     Problem: Nutrition Deficit  Goal: *Optimize nutritional status  Outcome: Progressing Towards Goal     Problem: Patient Education: Go to Patient Education Activity  Goal: Patient/Family Education  Outcome: Progressing Towards Goal

## 2022-12-27 ENCOUNTER — HOME HEALTH ADMISSION (OUTPATIENT)
Dept: HOME HEALTH SERVICES | Facility: HOME HEALTH | Age: 72
End: 2022-12-27
Payer: MEDICARE

## 2022-12-27 VITALS
DIASTOLIC BLOOD PRESSURE: 70 MMHG | HEIGHT: 71 IN | WEIGHT: 200 LBS | SYSTOLIC BLOOD PRESSURE: 151 MMHG | TEMPERATURE: 97.2 F | OXYGEN SATURATION: 99 % | RESPIRATION RATE: 20 BRPM | BODY MASS INDEX: 28 KG/M2 | HEART RATE: 87 BPM

## 2022-12-27 PROBLEM — M86.071 ACUTE HEMATOGENOUS OSTEOMYELITIS OF RIGHT FOOT (HCC): Status: ACTIVE | Noted: 2022-12-27

## 2022-12-27 PROBLEM — M86.9 OSTEOMYELITIS (HCC): Status: RESOLVED | Noted: 2022-12-21 | Resolved: 2022-12-27

## 2022-12-27 LAB
ANION GAP SERPL CALC-SCNC: 9 MMOL/L (ref 3–18)
BACTERIA SPEC CULT: NORMAL
BACTERIA SPEC CULT: NORMAL
BASOPHILS # BLD: 0 K/UL (ref 0–0.1)
BASOPHILS NFR BLD: 0 % (ref 0–2)
BUN SERPL-MCNC: 16 MG/DL (ref 7–18)
BUN/CREAT SERPL: 8 (ref 12–20)
CALCIUM SERPL-MCNC: 8.8 MG/DL (ref 8.5–10.1)
CHLORIDE SERPL-SCNC: 106 MMOL/L (ref 100–111)
CO2 SERPL-SCNC: 23 MMOL/L (ref 21–32)
CREAT SERPL-MCNC: 1.94 MG/DL (ref 0.6–1.3)
DIFFERENTIAL METHOD BLD: ABNORMAL
EOSINOPHIL # BLD: 0.7 K/UL (ref 0–0.4)
EOSINOPHIL NFR BLD: 7 % (ref 0–5)
ERYTHROCYTE [DISTWIDTH] IN BLOOD BY AUTOMATED COUNT: 12.7 % (ref 11.6–14.5)
GLUCOSE SERPL-MCNC: 72 MG/DL (ref 74–99)
GRAM STN SPEC: NORMAL
GRAM STN SPEC: NORMAL
HCT VFR BLD AUTO: 29 % (ref 36–48)
HGB BLD-MCNC: 10 G/DL (ref 13–16)
IMM GRANULOCYTES # BLD AUTO: 0.1 K/UL (ref 0–0.04)
IMM GRANULOCYTES NFR BLD AUTO: 1 % (ref 0–0.5)
LYMPHOCYTES # BLD: 2.1 K/UL (ref 0.9–3.6)
LYMPHOCYTES NFR BLD: 22 % (ref 21–52)
MCH RBC QN AUTO: 32.7 PG (ref 24–34)
MCHC RBC AUTO-ENTMCNC: 34.5 G/DL (ref 31–37)
MCV RBC AUTO: 94.8 FL (ref 78–100)
MONOCYTES # BLD: 0.8 K/UL (ref 0.05–1.2)
MONOCYTES NFR BLD: 9 % (ref 3–10)
NEUTS SEG # BLD: 5.7 K/UL (ref 1.8–8)
NEUTS SEG NFR BLD: 60 % (ref 40–73)
NRBC # BLD: 0 K/UL (ref 0–0.01)
NRBC BLD-RTO: 0 PER 100 WBC
PLATELET # BLD AUTO: 287 K/UL (ref 135–420)
PMV BLD AUTO: 8.9 FL (ref 9.2–11.8)
POTASSIUM SERPL-SCNC: 3.9 MMOL/L (ref 3.5–5.5)
RBC # BLD AUTO: 3.06 M/UL (ref 4.35–5.65)
SERVICE CMNT-IMP: NORMAL
SERVICE CMNT-IMP: NORMAL
SODIUM SERPL-SCNC: 138 MMOL/L (ref 136–145)
WBC # BLD AUTO: 9.5 K/UL (ref 4.6–13.2)

## 2022-12-27 PROCEDURE — 36415 COLL VENOUS BLD VENIPUNCTURE: CPT

## 2022-12-27 PROCEDURE — 74011250637 HC RX REV CODE- 250/637: Performed by: PODIATRIST

## 2022-12-27 PROCEDURE — 99239 HOSP IP/OBS DSCHRG MGMT >30: CPT | Performed by: STUDENT IN AN ORGANIZED HEALTH CARE EDUCATION/TRAINING PROGRAM

## 2022-12-27 PROCEDURE — 74011250636 HC RX REV CODE- 250/636: Performed by: PODIATRIST

## 2022-12-27 PROCEDURE — 74011000258 HC RX REV CODE- 258: Performed by: PODIATRIST

## 2022-12-27 PROCEDURE — 85025 COMPLETE CBC W/AUTO DIFF WBC: CPT

## 2022-12-27 PROCEDURE — 80048 BASIC METABOLIC PNL TOTAL CA: CPT

## 2022-12-27 RX ADMIN — PIPERACILLIN AND TAZOBACTAM 3.38 G: 3; .375 INJECTION, POWDER, FOR SOLUTION INTRAVENOUS at 02:49

## 2022-12-27 RX ADMIN — AMLODIPINE BESYLATE 5 MG: 5 TABLET ORAL at 08:52

## 2022-12-27 RX ADMIN — PIPERACILLIN AND TAZOBACTAM 3.38 G: 3; .375 INJECTION, POWDER, FOR SOLUTION INTRAVENOUS at 10:21

## 2022-12-27 RX ADMIN — ALLOPURINOL 100 MG: 100 TABLET ORAL at 08:52

## 2022-12-27 RX ADMIN — ENOXAPARIN SODIUM 40 MG: 100 INJECTION SUBCUTANEOUS at 08:52

## 2022-12-27 RX ADMIN — PANTOPRAZOLE SODIUM 40 MG: 40 TABLET, DELAYED RELEASE ORAL at 08:52

## 2022-12-27 NOTE — PROGRESS NOTES
Problem: Anxiety  Goal: *Alleviation of anxiety  Outcome: Resolved/Met  Goal: *Alleviation of anxiety (Palliative Care)  Outcome: Resolved/Met     Problem: Patient Education: Go to Patient Education Activity  Goal: Patient/Family Education  Outcome: Resolved/Met     Problem: Surgical Wound Care  Goal: *Non-infected Wound: Absence of infection signs and symptoms  Description: Infection control procedures (eg: clean dressings, clean gloves, hand washing, precautions to isolate wound from contamination, sterile instruments used for wound debridement) should be implemented. 12/27/2022 1640 by Adal Knight RN  Outcome: Resolved/Met  12/27/2022 1304 by Adal Knight RN  Outcome: Progressing Towards Goal  Goal: *Infected Wound: Prevention of further infection and promotion of healing  Description: Consider the use of systemic antibiotics in patients with cellulitis, osteomyelitis, bacteremia, or sepsis if there are no contraindications.   12/27/2022 1640 by Adal Knight RN  Outcome: Resolved/Met  12/27/2022 1304 by Adal Knight RN  Outcome: Progressing Towards Goal  Goal: *Improvement of existing wound and maintenance of skin integrity  12/27/2022 1640 by Adal Knight RN  Outcome: Resolved/Met  12/27/2022 1304 by Adal Knight RN  Outcome: Progressing Towards Goal

## 2022-12-27 NOTE — DISCHARGE SUMMARY
Discharge Summary    Patient: Brielle Jimenez MRN: [id number]  CSN: [id number]    YOB: 1950  Age: 71 y.o. Sex: male    DOA: 12/21/2022 LOS:  LOS: 6 days   Discharge Date:      Admission Diagnosis: Osteomyelitis ([address] [M86.9]  Infected abrasion of third toe [S90.416A, L08.9]    Discharge Diagnosis:    Hospital Problems  Date Reviewed: 12/22/2022            Codes Class Noted POA    Acute hematogenous osteomyelitis of right foot [facility]) ICD-10-CM: M86.071  ICD-9-CM: 730.07  12/27/2022 Unknown           Discharge Condition: Stable  Discharge Disposition:     PHYSICAL EXAM  Visit Vitals  BP (!) 151/70 (BP 1 Location: Right upper arm, BP Patient Position: Sitting)   Pulse 87   Temp 97.2 °F (36.2 °C)   Resp 20   Ht 5' 11\" (1.803 m)   Wt 90.7 kg (200 lb)   SpO2 99%   BMI 27.89 kg/m²       General: Alert, cooperative, no acute distress    HEENT: NC, Atraumatic. PERRLA, EOMI. Anicteric sclerae. Lungs:  CTA Bilaterally. No Wheezing/Rhonchi/Rales. Heart:  Regular  rhythm,  No murmur, No Rubs, No Gallops  Abdomen: Soft, Non distended, Non tender. +Bowel sounds, no HSM  Extremities: No c/c/e. Right foot bandaged, in boot   Psych:   Good insight. Not anxious or agitated. Neurologic:  CN 2-12 grossly intact, oriented X 3. No acute neurological                                 Deficits,     Hospital Course By Problem:   Acute osteomyelitis right third toe distal phalanx s/p amputation of right third toe on 12/23. MSSA bloodstream infection secondary to right foot infection  #1-2. Patient with PICC line inserted 12/27. He is to be continued on Ertapenem 1 gram every 24 hours. Home health orders placed. Patient received Vanc/zosyn inpatient 12/21-12/27.   2 hypertension - well controlled on current regimen. 3 history of gout - continue home colchicine. 4 renal insufficiency - TARI. Improving with IV fluids. Patient to FU with PCP repeat BMP.      Outpatient/Home health IV antibiotics orders:   Pharmacy to dose and monitor the following antibiotics based upon renal function:    Tentative Stop date: till 1/8/23  Ertapenem 1g IV q 24 hour      Anaphylaxis kit per protocol. PICC line routine care. 10 ml flush with normal saline pre and post infusion. 3ml heparin flush (10 units/ml) every 24 hours. Remove picc line once antibiotics completed      Consults:   ID - Dr. Mikey Vázquez Studies:   CULTURE, BLOOD  Order: [id number]  Collected 12/21/2022 19:51    Status: Final result    Specimen Information: Blood   0 Result Notes  Component Ref Range & Units 12/1950  Resulting Agency   Special Requests:   NO SPECIAL REQUESTS     GRAM STAIN   ANAEROBIC BOTTLE GRAM POSITIVE COCCI IN [address]    GRAM STAIN   SMEAR CALLED TO AND CORRECTLY REPEATED BY: [facility] 4N AT 2040 BY KDA 12/22/22  [address] [place] [address]    Culture result:   POSSIBLE STAPHYLOCOCCUS AUREUS GROWING IN 1 OF 2 BOTTLES DRAWN No Site Indicated Abnormal   ST. [address]        Susceptibility     Staphylococcus aureus     MARAH     Ciprofloxacin ($) Susceptible     Clindamycin ($) Susceptible     Daptomycin ($$$$$) Susceptible     Doxycycline ($$) Susceptible     Erythromycin ($$$$) Susceptible     Gentamicin ($) Susceptible     Levofloxacin ($) Susceptible     Linezolid ($$$$$) Susceptible     Moxifloxacin ($$$$) Susceptible     Oxacillin Susceptible     Tetracycline Susceptible     Trimeth/Sulfa Susceptible     Vancomycin ($) Susceptible                 Discharge Medications:     Current Discharge Medication List        START taking these medications    Details   cephALEXin (Keflex) 500 mg capsule Take 1 Capsule by mouth every six (6) hours for 11 days. Qty: 44 Capsule, Refills: 0           CONTINUE these medications which have NOT CHANGED    Details   amLODIPine (NORVASC) 5 mg tablet Take 1 Tablet by mouth daily.   Qty: 30 Tablet, Refills: 0      colchicine 0.6 mg tablet Take 0.6 mg by mouth as needed (gout flare ups). zolpidem (AMBIEN) 10 mg tablet Take 10 mg by mouth nightly. pantoprazole (PROTONIX) 40 mg tablet Take 1 Tablet by mouth Daily (before breakfast). Qty: 15 Tablet, Refills: 0      multivitamin, tx-iron-ca-min (THERA-M w/ IRON) 9 mg iron-400 mcg tab tablet Take 1 Tablet by mouth daily. Qty: 30 Tablet, Refills: 0      varicella-zoster recombinant, PF, (Shingrix, PF,) 50 mcg/0.5 mL susr injection Shingrix (PF) 50 mcg/0.5 mL intramuscular suspension, kit      lidocaine HCL-benzyl alcohoL (Salonpas Lidocaine Plus) 4-10 % crea 1 Actuation(s) by Apply Externally route two (2) times daily as needed for Pain. Qty: 1 Each, Refills: 0             Activity: activity as tolerated    Diet: Regular Diet    Wound Care: cleanse right foot wound, apply aqucel, DSD.  Three times per week     Follow-up: with PCP, Donovan Levine DO in 7-10days    Minutes spent on discharge: >30 minutes spent coordinating this discharge (review instructions/follow-up, prescriptions, preparing report for sign off)

## 2022-12-27 NOTE — PROGRESS NOTES
Problem: Surgical Wound Care  Goal: *Non-infected Wound: Absence of infection signs and symptoms  Description: Infection control procedures (eg: clean dressings, clean gloves, hand washing, precautions to isolate wound from contamination, sterile instruments used for wound debridement) should be implemented. Outcome: Progressing Towards Goal  Goal: *Infected Wound: Prevention of further infection and promotion of healing  Description: Consider the use of systemic antibiotics in patients with cellulitis, osteomyelitis, bacteremia, or sepsis if there are no contraindications.   Outcome: Progressing Towards Goal  Goal: *Improvement of existing wound and maintenance of skin integrity  Outcome: Progressing Towards Goal

## 2022-12-27 NOTE — PROGRESS NOTES
Discharge teaching completed at bedside with patient. Opportunity provided for clarifying questions All answered to patient satisfaction. IV removed ID removed and shredded. Patient discharged via wheelchair.

## 2022-12-27 NOTE — HOME CARE
Discharge order noted for today, patient has received PICC line and PICC report faxed to yoly Del Rio at [facility] ,states  patients Insurance covers 85% for IV abx ,patient has a cost of $56.05 daily for drug and supplies ( $408.08 x 7 days , or total $672.72 for 12 days up to 1/8/23 stop date for IV abx ); explained cost to patient and patient accepts cost and wishes to set up a payment plan from [facility] for IV abx, Airam informed  that patient wants to arrange a payment plan;  Received [facility] orders for wound care from Dr Nayla Martínez thru [facility] ; [facility] referral updated and processed to [facility] central Intake and scheduling dept for [facility] for Wednesday 12/28/22 to start IV abx; [facility] will follow. CAM SCOTT.

## 2022-12-27 NOTE — PROGRESS NOTES
Bedside shift change report given to Chong Davis (oncoming nurse) by [facility], RN (offgoing nurse). Report included the following information SBAR, Kardex, Intake/Output, MAR, and Recent Results.     Wound Prevention Checklist    Patient: Deborah Lyman (89 y.o. male)  Date: 12/26/2022  Diagnosis: Osteomyelitis ([address] [M86.9]  Infected abrasion of third toe [S90.416A, L08.9] <principal problem not specified>    Precautions:         []  Heel prevention boots placed on patient    []  Patient turned q2h during shift    []  Lift team ordered    []  Patient on Sravanthi bed/Specialty bed    []  Each Wound is documented during shift (Stage, Color, drainage, odor, measurements, and dressings)    [x]  Dual skin check done with Jessica Evans RN    [facility]

## 2022-12-27 NOTE — PROGRESS NOTES
Infectious Disease progress Note        Reason: Right foot infection    Current abx Prior abx   Zosyn, vancomycin since 12/21/2022      Lines:       Assessment :    71 y.o. male with past medical history of arthritis, gout, hypertension sent to ED from podiatrist office on 12/29/2022 for concerns of right third toe infection. Clinical presentation consistent with MSSA bloodstream infection -positive blood culture 12/22, negative blood culture 12/26 ;right third toe distal phalanx acute osteomyelitis, tenosynovitis flexor tendon right third toe, cellulitis right third toe/right foot in a patient with diabetic neuropathy    MRI right foot 12/21 confirms clinical suspicion of right third toe distal phalanx osteomyelitis. Podiatry follow-up appreciated. Status post right third toe amputation on 12/23/2022. Intraoperative findings discussed with podiatrist.  Grossly clean bone margins achieved at surgery    Most likely source of MSSA bloodstream infection is right foot infection. Cultures 12/23- MSSA, klebsiella, e.coli, bacteroides fragilis susceptibilities noted    Acute kidney injury-likely multifactorial    Recommendations:    Recommend piperacillin/tazobactam while inpatient  Follow-up bone biopsy of clean bone margins-discussed with histology. Results will be available tomorrow. Follow podiatry recommendations regarding right foot wound care  Okay to place PICC line from infectious disease standpoint   Mx of TARI per primary team    Recommend outpatient iv ertapenem till 1/8/23 since less risk of nephrotoxicity, once daily dosing easier to use as outpatient and it it will cover for MSSA, Klebsiella, E. coli and Bacteroides. Home health orders on chart (click on \"chart review, other orders, IP home health)     Above plan was discussed in details with patient, RN,, dr. Jagdish Aj. Please call me if any further questions or concerns. Will continue to participate in the care of this patient.   HPI:    No new complaints. Feels good. Dione Ellis to go home           Past Medical History:   Diagnosis Date    Arthritis     Hemochromatosis     Ill-defined condition     GOUT       Past Surgical History:   Procedure Laterality Date    HX ORTHOPAEDIC      LEFT HAND       home Medication List         Details   amLODIPine (NORVASC) 5 mg tablet Take 1 Tablet by mouth daily. Qty: 30 Tablet, Refills: 0      colchicine 0.6 mg tablet Take 0.6 mg by mouth as needed (gout flare ups). pantoprazole (PROTONIX) 40 mg tablet Take 1 Tablet by mouth Daily (before breakfast). Qty: 15 Tablet, Refills: 0      multivitamin, tx-iron-ca-min (THERA-M w/ IRON) 9 mg iron-400 mcg tab tablet Take 1 Tablet by mouth daily. Qty: 30 Tablet, Refills: 0      varicella-zoster recombinant, PF, (Shingrix, PF,) 50 mcg/0.5 mL susr injection Shingrix (PF) 50 mcg/0.5 mL intramuscular suspension, kit      lidocaine HCL-benzyl alcohoL (Salonpas Lidocaine Plus) 4-10 % crea 1 Actuation(s) by Apply Externally route two (2) times daily as needed for Pain.   Qty: 1 Each, Refills: 0             Current Facility-Administered Medications   Medication Dose Route Frequency    zolpidem (AMBIEN) tablet 10 mg  10 mg Oral QHS    0.9% sodium chloride infusion  100 mL/hr IntraVENous CONTINUOUS    amLODIPine (NORVASC) tablet 5 mg  5 mg Oral DAILY    colchicine (MITIGARE) capsule 0.6 mg  0.6 mg Oral PRN    pantoprazole (PROTONIX) tablet 40 mg  40 mg Oral ACB    allopurinoL (ZYLOPRIM) tablet 100 mg  100 mg Oral DAILY    sodium chloride (NS) flush 5-40 mL  5-40 mL IntraVENous Q8H    sodium chloride (NS) flush 5-40 mL  5-40 mL IntraVENous PRN    acetaminophen (TYLENOL) tablet 650 mg  650 mg Oral Q6H PRN    Or    acetaminophen (TYLENOL) suppository 650 mg  650 mg Rectal Q6H PRN    polyethylene glycol (MIRALAX) packet 17 g  17 g Oral DAILY PRN    promethazine (PHENERGAN) tablet 12.5 mg  12.5 mg Oral Q6H PRN    Or    ondansetron (ZOFRAN) injection 4 mg  4 mg IntraVENous Q6H PRN enoxaparin (LOVENOX) injection 40 mg  40 mg SubCUTAneous DAILY    piperacillin-tazobactam (ZOSYN) 3.375 g in 0.9% sodium chloride (MBP/ADV) 100 mL MBP  3.375 g IntraVENous Q8H       Allergies: Patient has no known allergies. History reviewed. No pertinent family history. Social History     Socioeconomic History    Marital status: SINGLE     Spouse name: Not on file    Number of children: Not on file    Years of education: Not on file    Highest education level: Not on file   Occupational History    Not on file   Tobacco Use    Smoking status: Never    Smokeless tobacco: Never   Substance and Sexual Activity    Alcohol use: Yes     Alcohol/week: 6.0 standard drinks     Types: 6 Cans of beer per week     Comment: weekly on weekends    Drug use: Not on file    Sexual activity: Not on file   Other Topics Concern    Not on file   Social History Narrative    Not on file     Social Determinants of Health     Financial Resource Strain: Not on file   Food Insecurity: Not on file   Transportation Needs: Not on file   Physical Activity: Not on file   Stress: Not on file   Social Connections: Not on file   Intimate Partner Violence: Not on file   Housing Stability: Not on file     Social History     Tobacco Use   Smoking Status Never   Smokeless Tobacco Never        Temp (24hrs), Av.8 °F (36.6 °C), Min:97.5 °F (36.4 °C), Max:98.2 °F (36.8 °C)    Visit Vitals  /65 (BP 1 Location: Right upper arm, BP Patient Position: At rest)   Pulse 77   Temp 97.7 °F (36.5 °C)   Resp 20   Ht 5' 11\" (1.803 m)   Wt 90.7 kg (200 lb)   SpO2 95%   BMI 27.89 kg/m²       ROS: 12 point ROS obtained in details.  Pertinent positives as mentioned in HPI,   otherwise negative    Physical Exam:    General appearance - alert, well appearing, and in no distress  Mental status - alert, oriented to person, place, and time  Eyes - pupils equal and reactive, extraocular eye movements intact  Ears - bilateral TM's and external ear canals normal  Nose - normal and patent, no erythema, discharge or polyps  Chest - clear to auscultation, no wheezes, rales or rhonchi, symmetric air entry  Heart - normal rate, regular rhythm, normal S1, S2, no , rubs, clicks or gallops  Abdomen - soft, nontender, nondistended, no masses or organomegaly  Neurological - alert, oriented, normal speech, no gross motor deficits. Exam consistent with peripheral neuropathy. Musculoskeletal - no joint tenderness, deformity or swelling  Extremities -right foot surgical dressing not opened. No tenderness right leg. Labs: Results:   Chemistry Recent Labs     12/27/22  0359 12/26/22  0116 12/25/22  1014   GLU 72* 79 99    144 139   K 3.9 4.2 4.1    111 107   CO2 23 24 24   BUN 16 12 10   CREA 1.94* 1.92* 1.59*   CA 8.8 9.2 9.2   AGAP 9 9 8   BUCR 8* 6* 6*        CBC w/Diff Recent Labs     12/27/22  0359 12/26/22  0116 12/25/22  1014   WBC 9.5 8.9 9.2   RBC 3.06* 3.23* 3.55*   HGB 10.0* 10.4* 11.5*   HCT 29.0* 30.2* 33.9*    277 300   GRANS 60 59 65   LYMPH 22 23 16*   EOS 7* 8* 8*        Microbiology Recent Labs     12/26/22  1435   CULT NO GROWTH AFTER 17 HOURS            RADIOLOGY:    All available imaging studies/reports in [facility] for this admission were reviewed          Disclaimer: Sections of this note are dictated utilizing voice recognition software, which may have resulted in some phonetic based errors in grammar and contents. Even though attempts were made to correct all the mistakes, some may have been missed, and remained in the body of the document. If questions arise, please contact our department.     Dr. Yann Garcia, Infectious Disease Specialist  [phone]  December 27, 2022  11:10 AM

## 2022-12-27 NOTE — PROGRESS NOTES
Discharge planning    Discharge order noted for today. Pt has been accepted to [facility] agency. Met with patient and agreeable to the transition plan today. Transport has been arranged with family. . Patient's home health  orders have been forwarded to [address] [place] [address] health  agency via [employer]. Updated bedside RN, Dorita Higginbotham,  to the transition plan.   Discharge information has been documented on the AVS.       SAPPHIRE FerreiraN, RN  Pager # [phone]  Care Manager

## 2022-12-27 NOTE — HOME CARE
Received  referral for SN for IV abx , attempted to call patient ,but no answer, spoke to patients daughter Keara Wagner) ,who states she lives with her father and also is willing to learn how to administer IV abx at home; Verified demographics,explained [facility] services and answered all questions; Notified Airam at [address] [facility] of new IV referral, IV abx orders, demographics,H&P, ID consult notes and Op note all faxed to [facility] to begin process  of insurance and cost for IV Abx, [facility] will follow. CAM SCOTT.

## 2022-12-27 NOTE — PROGRESS NOTES
Discharge planning    Met with patient sitting in recliner in room. Discussed plan for discharge with IVABX. [place] of choice obtained for any local home health agency. Placed in que for [facility] and spoke with Hanna. CM will continue to assist with transition of care needs.      SAPPHIRE HodgeN, RN  Pager # [phone]  Care Manager

## 2022-12-28 ENCOUNTER — HOME CARE VISIT (OUTPATIENT)
Dept: HOME HEALTH SERVICES | Facility: HOME HEALTH | Age: 72
End: 2022-12-28

## 2022-12-28 ENCOUNTER — HOME CARE VISIT (OUTPATIENT)
Dept: SCHEDULING | Facility: HOME HEALTH | Age: 72
End: 2022-12-28
Payer: MEDICARE

## 2022-12-28 VITALS
OXYGEN SATURATION: 98 % | HEART RATE: 80 BPM | DIASTOLIC BLOOD PRESSURE: 68 MMHG | SYSTOLIC BLOOD PRESSURE: 132 MMHG | TEMPERATURE: 97.7 F | RESPIRATION RATE: 20 BRPM

## 2022-12-28 PROCEDURE — G0299 HHS/HOSPICE OF RN EA 15 MIN: HCPCS

## 2022-12-28 PROCEDURE — 400018 HH-NO PAY CLAIM PROCEDURE

## 2022-12-28 NOTE — Clinical Note
TULIO DEDE ADMITTED TO [facility] FOR POST OP SURGICAL MONITORING, PICC LINE CARE AND IV ABX TEACHING, ANTICIPATED SN FREQUENCY [id number], [id number], 2PRN. THANK YOU FOR REFERRAL !     RESPECTFULLY,  RICKEY DONNELLY

## 2022-12-29 ENCOUNTER — HOME CARE VISIT (OUTPATIENT)
Dept: HOME HEALTH SERVICES | Facility: HOME HEALTH | Age: 72
End: 2022-12-29
Payer: MEDICARE

## 2022-12-30 NOTE — HOME HEALTH
Skilled services/Home bound verification: S/P osteomyelitis right third toe amputation     Skilled Reason for admission/summary of clinical condition:  osteomyelitis right third toe amputation . This patient is homebound for the following reasons Requires considerable and taxing effort to leave the home  and Requires the assistance of 1 or more persons to leave the home . Primary caregiver is pt daughter kelli available daily mornings from 8am to 2pm before work and is able to assist with administer IV medication, grocery shopping, household chores and transportation to MD appointment. Medications reconciled and all medications are available in the home this visit. The following education was provided regarding medications:  patient to continue to take medications as prescribed. patient aware to monitor for effectiveness and to notify staff of any adverse reactions to medications/any changes to medication regimen. Medications  are too soon to determine effectiveness at this time. High risk medication teaching regarding anticoagulants, hyperglycemic agents or opiod narcotics performed (specify) NA    MD notified of any discrepancies/look a like medications/medication interactions NA. Home health supplies by type and quantity ordered/delivered this visit include: wound care supplies    Patient education provided this visit to include: patient made aware to monitor for s/s of infection [increased swelling, increased redness around site, increased pain, foul smelling drainage, fever] aware who to report to/when. .      Patient level of understanding of education provided: pt verbalized understanding of education provided this visit      [facility] Education Provided: Containers should be made of hard plastic, be puncture-resistant and leakproof,   such as a laundry detergent or bleach bottle.   When the container is ¾ full, it should be sealed with tape and labeled   DO NOT RECYCLE prior to discarding in the regular trash. Patient response to procedure performed:  pt tolerated without complaints of pain      Home exercise program/Homework provided: discussed fall precautions in detail- having lighted hallways, removing throw rugs, monitoring medication that may alter mental status. perform skin inspections looking for any skin breakdown or redness. monitor for edema. frequent position changes. Watch for signs and symptoms of infection which include; fever, drainage, foul smell, lethargy. Pt/Caregiver instructed on plan of care and are agreeable to plan of care at this time. Physician Joselito Hobson MD notified of patient admission to home health and plan of care including anticipated frequency of 2WK1, 3WK4, 2PRN and treatments/interventions/modalities of SN ONLY. Discharge planning discussed with patient and caregiver. Discharge planning as follows: Patient will be discharged once antibiotic has completed, patient is medically stable and pt is able to independently manage picc line  care/healed or no longer requires skilled care. Pinky Angles Pt/Caregiver did verbalize understanding of discharge planning. Next MD appointment with PCP BLANCA . Patient/caregiver encouraged/instructed to keep appointment as lack of follow through with physician appointment could result in discontinuation of home care services for non-compliance.

## 2022-12-31 ENCOUNTER — HOME CARE VISIT (OUTPATIENT)
Dept: SCHEDULING | Facility: HOME HEALTH | Age: 72
End: 2022-12-31
Payer: MEDICARE

## 2023-01-01 LAB
BACTERIA SPEC CULT: NORMAL
SERVICE CMNT-IMP: NORMAL

## 2023-01-02 ENCOUNTER — HOME CARE VISIT (OUTPATIENT)
Dept: SCHEDULING | Facility: HOME HEALTH | Age: 73
End: 2023-01-02
Payer: MEDICARE

## 2023-01-02 ENCOUNTER — HOSPITAL ENCOUNTER (OUTPATIENT)
Dept: LAB | Age: 73
Discharge: HOME | End: 2023-01-02
Payer: COMMERCIAL

## 2023-01-02 VITALS
HEART RATE: 60 BPM | TEMPERATURE: 97.6 F | DIASTOLIC BLOOD PRESSURE: 72 MMHG | OXYGEN SATURATION: 99 % | RESPIRATION RATE: 18 BRPM | SYSTOLIC BLOOD PRESSURE: 152 MMHG

## 2023-01-02 PROCEDURE — 82550 ASSAY OF CK (CPK): CPT

## 2023-01-02 PROCEDURE — 80076 HEPATIC FUNCTION PANEL: CPT

## 2023-01-02 PROCEDURE — 82565 ASSAY OF CREATININE: CPT

## 2023-01-02 PROCEDURE — 84520 ASSAY OF UREA NITROGEN: CPT

## 2023-01-02 PROCEDURE — 85025 COMPLETE CBC W/AUTO DIFF WBC: CPT

## 2023-01-02 PROCEDURE — G0299 HHS/HOSPICE OF RN EA 15 MIN: HCPCS

## 2023-01-02 PROCEDURE — 86140 C-REACTIVE PROTEIN: CPT

## 2023-01-02 NOTE — HOME HEALTH
Skilled reason for visit: wound care and IV management    Caregiver involvement: daughter assists with ADLs, transportation, meal prep. Medications reviewed and all medications are available in the home this visit. The following education was provided regarding medications:  Ertapenem is used to prevent and treat a wide variety of bacterial infections. This medication is known as a carbapenem-type antibiotic. It works by stopping the growth of bacteria. .    MD notified of any discrepancies/look a-like medications/medication interactions: none  Medications are effective at this time. Home health supplies by type and quantity ordered/delivered this visit include: n/a    Patient education provided this visit: Instructed patient call your [facility] immediately if you have: pain, fever, a large amount of bright red bleeding and also if you have: warmth, redness, or swelling along the arm or PICC line insertion site. A tear or break in the PICC line catheter or tubing.     Sharps education provided: n/a    Patient level of understanding of education provided: Patient verbalized understanding    Skilled Care Performed this visit: wound care, labs, IV education    Patient response to procedure performed:  patient tolerated without any c/o pain or discomfort    Agency Progress toward goals: progressing    Patient's Progress towards personal goals: progressing    Home exercise program: take all medications as prescribed, monitor for s/s of infection    Continued need for the following skills: Nursing    Plan for next visit: wound care, IV education, medication management    Patient and/or caregiver notified and agrees to changes in the Plan of Care YES/NO/NA: YES      The following discharge planning was discussed with the pt/caregiver: Discharge when goals are met, IV antibiotics completed, wound healed, patient/caregiver able to manage disease process, mediations, and pain

## 2023-01-03 LAB
ALBUMIN SERPL-MCNC: 3.5 G/DL (ref 3.4–5)
ALBUMIN/GLOB SERPL: 0.9 {RATIO} (ref 0.8–1.7)
ALP SERPL-CCNC: 167 U/L (ref 45–117)
ALT SERPL-CCNC: 28 U/L (ref 16–61)
AST SERPL-CCNC: 39 U/L (ref 10–38)
BASOPHILS # BLD: 0 K/UL (ref 0–0.1)
BASOPHILS NFR BLD: 1 % (ref 0–2)
BILIRUB DIRECT SERPL-MCNC: 0.1 MG/DL (ref 0–0.2)
BILIRUB SERPL-MCNC: 0.2 MG/DL (ref 0.2–1)
BUN SERPL-MCNC: 15 MG/DL (ref 7–18)
CK SERPL-CCNC: 28 U/L (ref 39–308)
CREAT SERPL-MCNC: 1.71 MG/DL (ref 0.6–1.3)
CRP SERPL-MCNC: 0.3 MG/DL (ref 0–0.3)
DIFFERENTIAL METHOD BLD: ABNORMAL
EOSINOPHIL # BLD: 0.3 K/UL (ref 0–0.4)
EOSINOPHIL NFR BLD: 4 % (ref 0–5)
ERYTHROCYTE [DISTWIDTH] IN BLOOD BY AUTOMATED COUNT: 12.6 % (ref 11.6–14.5)
GLOBULIN SER CALC-MCNC: 4 G/DL (ref 2–4)
HCT VFR BLD AUTO: 31.7 % (ref 36–48)
HGB BLD-MCNC: 10.7 G/DL (ref 13–16)
IMM GRANULOCYTES # BLD AUTO: 0.4 K/UL (ref 0–0.04)
IMM GRANULOCYTES NFR BLD AUTO: 0 %
LYMPHOCYTES # BLD: 1.8 K/UL (ref 0.9–3.6)
LYMPHOCYTES NFR BLD: 23 % (ref 21–52)
MCH RBC QN AUTO: 32.1 PG (ref 24–34)
MCHC RBC AUTO-ENTMCNC: 33.8 G/DL (ref 31–37)
MCV RBC AUTO: 95.2 FL (ref 78–100)
MONOCYTES # BLD: 0.4 K/UL (ref 0.05–1.2)
MONOCYTES NFR BLD: 5 % (ref 3–10)
NEUTS SEG # BLD: 5.1 K/UL (ref 1.8–8)
NEUTS SEG NFR BLD: 67 % (ref 40–73)
NRBC # BLD: 0 K/UL (ref 0–0.01)
NRBC BLD-RTO: 0 PER 100 WBC
PLATELET # BLD AUTO: 243 K/UL (ref 135–420)
PMV BLD AUTO: 9.3 FL (ref 9.2–11.8)
PROT SERPL-MCNC: 7.5 G/DL (ref 6.4–8.2)
RBC # BLD AUTO: 3.33 M/UL (ref 4.35–5.65)
WBC # BLD AUTO: 7.7 K/UL (ref 4.6–13.2)

## 2023-01-04 ENCOUNTER — HOME CARE VISIT (OUTPATIENT)
Dept: SCHEDULING | Facility: HOME HEALTH | Age: 73
End: 2023-01-04
Payer: MEDICARE

## 2023-01-04 VITALS
HEART RATE: 74 BPM | OXYGEN SATURATION: 98 % | TEMPERATURE: 97.7 F | SYSTOLIC BLOOD PRESSURE: 122 MMHG | RESPIRATION RATE: 20 BRPM | DIASTOLIC BLOOD PRESSURE: 80 MMHG

## 2023-01-04 PROCEDURE — G0299 HHS/HOSPICE OF RN EA 15 MIN: HCPCS

## 2023-01-05 NOTE — HOME HEALTH
Skilled reason for visit: wound care and IV education and management. Caregiver involvement: daughter assists with ADLs, transportation and meal prep    Medications reviewed and all medications are available in the home this visit. The following education was provided regarding medications:  Amlodipine is used alone or in combination with other medications to treat high blood pressure and chest pain (angina). SN instructs the patient about the new medication Amlodipine may cause dizziness, lightheadedness, or fainting, especially when you get up suddenly from a lying or sitting position, if you feel dizzy, lie down so you do not faint. Then sit for a few moments before standing to prevent the dizziness from returning. If you faint, call your doctor right away. .    MD notified of any discrepancies/look a-like medications/medication interactions: none  Medications are effective at this time. Home health supplies by type and quantity ordered/delivered this visit include: none    Patient education provided this visit: Patient was instructed on wound healing. Healing time depends on a variety of factors, such as wound size and location, pressure on the wound from walking or standing, swelling, circulation, blood glucose levels, wound care, and what is being applied to the wound. Healing may occur within weeks or require several months.     Sharps education provided: n/a    Patient level of understanding of education provided: patient verbalized understanding    Skilled Care Performed this visit: surgical incision care, IV teaching and managsment    Patient response to procedure performed:Patient tolerated w/o any c/o pain    Agency Progress toward goals: progressing    Patient's Progress towards personal goals: progressing    Home exercise program: take all medications as prescribed, monitor for s/s of infection, complete all antibiotics as prescribed    Continued need for the following skills: Nursing    Plan for next visit: wound care, IV teaching, PICC line management    Patient and/or caregiver notified and agrees to changes in the Plan of Care YES/NO/NA: YES      The following discharge planning was discussed with the pt/caregiver: Discharge when goals are met, abt is complete, patient/caregiver able to manage disease process, mediations, and pain

## 2023-01-06 ENCOUNTER — HOME CARE VISIT (OUTPATIENT)
Dept: SCHEDULING | Facility: HOME HEALTH | Age: 73
End: 2023-01-06
Payer: MEDICARE

## 2023-01-06 VITALS
HEART RATE: 68 BPM | TEMPERATURE: 97.5 F | OXYGEN SATURATION: 100 % | DIASTOLIC BLOOD PRESSURE: 80 MMHG | RESPIRATION RATE: 18 BRPM | SYSTOLIC BLOOD PRESSURE: 128 MMHG

## 2023-01-06 PROCEDURE — G0299 HHS/HOSPICE OF RN EA 15 MIN: HCPCS

## 2023-01-06 NOTE — HOME HEALTH
Skilled reason for visit: wound care, IV therapy and teaching. Last IV abt dose scheduled for 1/8/23, orders received to pull PICC line after last dose    Caregiver involvement: daughter assists with ADLs. Medications reviewed and all medications are available in the home this visit. The following education was provided regarding medications: Patient instructed Protonix (pantoprazole) is a proton pump inhibitor that decreases the amount of acid produced in the stomach. Take once daily in the morning with or without food. Common Protonix side effects may include: headache, dizziness; stomach pain, gas, nausea, vomiting, diarrhea; joint pain; or fever, rash, or cold symptoms. Instructed to notify prescriber if unusual or bothersome side effects occur or medication is not effective. MD notified of any discrepancies/look a-like medications/medication interactions: none  Medications are effective at this time. Home health supplies by type and quantity ordered/delivered this visit include: ACE wrap and gauze    Patient education provided this visit: Instructed patient [facility] immediately if you have: pain, fever, a large amount of bright red bleeding and also if you have: warmth, redness, or swelling along the arm or PICC line insertion site. A tear or break in the PICC line catheter or tubing.     Sharps education provided: n/a    Patient level of understanding of education provided: patient verbalized understanding    Skilled Care Performed this visit: wound care, IV teaching    Patient response to procedure performed:  patient tolerated without any c/o pain or discomfort    Agency Progress toward goals: progressing    Patient's Progress towards personal goals: progressing    Home exercise program: completed all IV antibiotics, monitor IV site and surgical site for s/s of infection    Continued need for the following skills: Nursing    Plan for next visit: wound care, d/c PICC line    Patient and/or caregiver notified and agrees to changes in the Plan of Care YES/NO/NA: YES      The following discharge planning was discussed with the pt/caregiver: Discharge when goals are met, abt is completed, patient/caregiver able to manage disease process, mediations, and pain

## 2023-01-07 NOTE — PROGRESS NOTES
Physician Progress Note      PATIENT:               Romana Steve  CSN #:                  [id number]  :                       1950  ADMIT DATE:       2022 4:13 PM  Anil Abbott [place] DATE:        2022 5:31 PM  RESPONDING  PROVIDER #:        [id number] Ovidio Rouse DO          QUERY TEXT:    Pt admitted with osteomyelitis of right foot. Pt noted to have MSSA blood stream infection secondary to right foot infection. If possible, please document in the progress notes and discharge summary if you are evaluating and /or treating any of the following: The medical record reflects the following:  Risk Factors: diabetes, CKD, TARI, gout    Clinical Indicators:  2022, discharge summary, Dr. Skip Fisher:  \"1. Acute osteomyelitis right third toe distal phalanx s/p amputation of right third toe on . 2.  MSSA bloodstream infection secondary to right foot infection  #1-2. Patient with PICC line inserted . He is to be continued on Ertapenem 1 gram every 24 hours. Home health orders placed. Patient received Vanc/zosyn inpatient -. \"  2022, Op note, Dr. Lance Redman:  \"POSTOPERATIVE DIAGNOSIS:  Abscess with osteomyelitis, right third toe. PROCEDURE PERFORMED:  Amputation of the right third toe. \"  WBC:  12.7 on 2022  CRP:  12.1 on 2022    Treatment: IV antibiotic, amputation of right 3rd toe    Thank you,  ESA Carr RN, CDS  [email or web address]  Options provided:  -- MSSA Sepsis associated with right foot infection present on admission  -- Right foot infection without Sepsis  -- Other - I will add my own diagnosis  -- Disagree - Not applicable / Not valid  -- Disagree - Clinically unable to determine / Unknown  -- Refer to Clinical Documentation Reviewer    PROVIDER RESPONSE TEXT:    This patient has MSSA sepsis associated with right foot infection which was present on admission. Query created by:  Adam Covington on 2023 8:25 AM      Electronically signed by: [id number] Ovidio Rouse DO 1/6/2023 9:21 PM

## 2023-01-09 ENCOUNTER — HOME CARE VISIT (OUTPATIENT)
Dept: SCHEDULING | Facility: HOME HEALTH | Age: 73
End: 2023-01-09
Payer: MEDICARE

## 2023-01-09 VITALS — DIASTOLIC BLOOD PRESSURE: 78 MMHG | TEMPERATURE: 97.6 F | SYSTOLIC BLOOD PRESSURE: 132 MMHG

## 2023-01-09 PROCEDURE — G0299 HHS/HOSPICE OF RN EA 15 MIN: HCPCS

## 2023-01-09 NOTE — HOME HEALTH
Skilled reason for visit: PICC line removal, catheter intact,  wound care to right foot. Call placed to Podiatry MD to determine when patient f/u appointment is and orders regarding suture removal. Informed patient to call MD office on Friday but stated he didnt call.  Joselo Shelia returned call this evening and stated that patient does not currently have a f/u appointment and that he can come into office tomorrow morning or friday morning and they will be removed in the office.  and writer attempted to call patient to schedule, no answer, daughters number disconnnected. Sutures intact with no s/s of infection. Caregiver involvement: daughter assists with ADLs, meal prep and transportation. Medications reviewed and all medications are available in the home this visit. The following education was provided regarding medications:  none. MD notified of any discrepancies/look a-like medications/medication interactions: none  Medications are effective at this time. Home health supplies by type and quantity ordered/delivered this visit include: none    Patient education provided this visit: Instructed on the importance/need of keeping her medical appointments.  Monitor for s/s of infection    Sharps education provided: n/a    Patient level of understanding of education provided:     Skilled Care Performed this visit: wound care, PICC line removal     Patient response to procedure performed:  patient tolerated w/o any c/o pain    Agency Progress toward goals: progressing    Patient's Progress towards personal goals: progressing    Home exercise program: take all medications as prescribed, keep all medical appointments    Continued need for the following skills: Nursing    Plan for next visit: wound care    Patient and/or caregiver notified and agrees to changes in the Plan of Care YES/NO/NA: YES      The following discharge planning was discussed with the pt/caregiver: Discharge when goals are met, incision is healed,  patient/caregiver able to manage disease process, mediations, and pain

## 2023-01-11 ENCOUNTER — HOME CARE VISIT (OUTPATIENT)
Dept: SCHEDULING | Facility: HOME HEALTH | Age: 73
End: 2023-01-11
Payer: MEDICARE

## 2023-01-11 VITALS
RESPIRATION RATE: 17 BRPM | OXYGEN SATURATION: 96 % | DIASTOLIC BLOOD PRESSURE: 78 MMHG | HEART RATE: 70 BPM | TEMPERATURE: 98.2 F | SYSTOLIC BLOOD PRESSURE: 140 MMHG

## 2023-01-11 PROCEDURE — G0299 HHS/HOSPICE OF RN EA 15 MIN: HCPCS

## 2023-01-11 NOTE — HOME HEALTH
Skilled reason for visit: wound care, Spoke with Podiatrist office to f/u suture removal because patient did not have a f/u appointment scheduled. On arrival patient and daughter informed nurse that podiatry office called and will fit patient in for a f/u for suture removal today at 12pm.     Caregiver involvement: daughter assists with ADLs. Medications reviewed and all medications are available in the home this visit. The following education was provided regarding medications:Amlodipine is used alone or in combination with other medications to treat high blood pressure and chest pain (angina). SN instructs the patient about the new medication Amlodipine may cause dizziness, lightheadedness, or fainting, especially when you get up suddenly from a lying or sitting position, if you feel dizzy, lie down so you do not faint. Then sit for a few moments before standing to prevent the dizziness from returning. If you faint, call your doctor right away. MD notified of any discrepancies/look a-like medications/medication interactions: none  Medications are effective at this time. Home health supplies by type and quantity ordered/delivered this visit include: none    Patient education provided this visit: Patient was instructed on wound healing. Healing time depends on a variety of factors, such as wound size and location, pressure on the wound from walking or standing, swelling, circulation, blood glucose levels, wound care, and what is being applied to the wound. Healing may occur within weeks or require several months.     Vincent education provided: n/a    Patient level of understanding of education provided: patientv verbalized understanding    Skilled Care Performed this visit: wound care    Patient response to procedure performed:  patient tolerated well    Agency Progress toward goals: progressing    Patient's Progress towards personal goals: progressing    Home exercise program: take all medications as prescribed, monitor for s/s of infection    Continued need for the following skills: Nursing    Plan for next visit: wound care, medication and disease management    Patient and/or caregiver notified and agrees to changes in the Plan of Care YES/NO/NA: YES      The following discharge planning was discussed with the pt/caregiver: Discharge when goals are met, incision is healed, patient/caregiver able to manage disease process, mediations, and pain

## 2023-01-13 ENCOUNTER — HOME CARE VISIT (OUTPATIENT)
Dept: HOME HEALTH SERVICES | Facility: HOME HEALTH | Age: 73
End: 2023-01-13
Payer: MEDICARE

## 2023-01-14 ENCOUNTER — HOME CARE VISIT (OUTPATIENT)
Dept: HOME HEALTH SERVICES | Facility: HOME HEALTH | Age: 73
End: 2023-01-14
Payer: MEDICARE

## 2023-01-16 ENCOUNTER — HOME CARE VISIT (OUTPATIENT)
Dept: SCHEDULING | Facility: HOME HEALTH | Age: 73
End: 2023-01-16
Payer: MEDICARE

## 2023-01-16 VITALS
OXYGEN SATURATION: 100 % | TEMPERATURE: 98.2 F | HEART RATE: 91 BPM | SYSTOLIC BLOOD PRESSURE: 144 MMHG | RESPIRATION RATE: 16 BRPM | DIASTOLIC BLOOD PRESSURE: 68 MMHG

## 2023-01-16 PROCEDURE — G0299 HHS/HOSPICE OF RN EA 15 MIN: HCPCS

## 2023-01-16 NOTE — Clinical Note
Patient admitted to home health services for osteomyelitis, s/p right 3rd toe amputation. Throughout episode patient educated on wound care, infection prevention, pain management, med management, safety and fall precautions. Patient able to verbalize what medications he is on and shows nurse his med list during today's visit. Patient Patient's right 3rd toe wound has healed, no infection present and denies any pain. Patient being discharged due to no longer being homebound and all goals met. Discharge instructions provided. SN instructed patient to follow up with MD as ordered. If you have to miss your appointments reschedule them. SN instructed patient on importance of compliance with medication regimen, try to avoid missing doses, if you do miss a dose do not double up but instead take at your next scheduled time. SN instructed on importance of infection control and handwashing. SN instructed on when to call the Dr.: temp >100.4, any pain not relieved with medications, any slight shortness of breath, chest discomfort, falls, or questions and concerns. SN instructed to continue cardiac diet. SN instructed to continue exercises as prescribed by therapy but don't over do it.

## 2023-01-17 NOTE — HOME HEALTH
Patient admitted to home health services for osteomyelitis, s/p right 3rd toe amputation. Throughout episode patient educated on wound care, infection prevention, pain management, med management, safety and fall precautions. Patient able to verbalize what medications he is on and shows nurse his med list during today's visit. Patient Patient's right 3rd toe wound has healed, no infection present and denies any pain, pic taken and uploaded. Patient being discharged due to no longer being homebound and all goals met. Discharge instructions provided. SN instructed patient to follow up with MD as ordered. If you have to miss your appointments reschedule them. SN instructed patient on importance of compliance with medication regimen, try to avoid missing doses, if you do miss a dose do not double up but instead take at your next scheduled time. SN instructed on importance of infection control and handwashing. SN instructed on when to call the Dr.: temp >100.4, any pain not relieved with medications, any slight shortness of breath, chest discomfort, falls, or questions and concerns. SN instructed to continue cardiac diet. SN instructed to continue exercises as prescribed by therapy but don't over do it.

## 2023-06-20 ENCOUNTER — HOSPITAL ENCOUNTER (EMERGENCY)
Facility: HOSPITAL | Age: 73
Discharge: HOME | End: 2023-06-20
Attending: EMERGENCY MEDICINE
Payer: COMMERCIAL

## 2023-06-20 ENCOUNTER — APPOINTMENT (OUTPATIENT)
Facility: HOSPITAL | Age: 73
End: 2023-06-20
Payer: COMMERCIAL

## 2023-06-20 VITALS
HEART RATE: 90 BPM | WEIGHT: 210 LBS | RESPIRATION RATE: 16 BRPM | SYSTOLIC BLOOD PRESSURE: 139 MMHG | HEIGHT: 71 IN | OXYGEN SATURATION: 97 % | DIASTOLIC BLOOD PRESSURE: 79 MMHG | BODY MASS INDEX: 29.4 KG/M2 | TEMPERATURE: 98 F

## 2023-06-20 DIAGNOSIS — M54.41 ACUTE RIGHT-SIDED LOW BACK PAIN WITH RIGHT-SIDED SCIATICA: Primary | ICD-10-CM

## 2023-06-20 PROCEDURE — 72100 X-RAY EXAM L-S SPINE 2/3 VWS: CPT

## 2023-06-20 PROCEDURE — 99283 EMERGENCY DEPT VISIT LOW MDM: CPT

## 2023-06-20 RX ORDER — LIDOCAINE 50 MG/G
1 PATCH TOPICAL DAILY
Qty: 10 PATCH | Refills: 1 | Status: SHIPPED | OUTPATIENT
Start: 2023-06-20 | End: 2023-06-30

## 2023-06-20 RX ORDER — PREDNISONE 10 MG/1
TABLET ORAL
Qty: 21 EACH | Refills: 1 | Status: SHIPPED | OUTPATIENT
Start: 2023-06-20

## 2023-06-20 NOTE — ED TRIAGE NOTES
Pt ambulatory to triage c/o right hip and leg pain s/p GLF three weeks ago. Denies taking any medications for the pain.

## 2023-06-20 NOTE — ED NOTES
Pt was seen and discharged by provider. Discharge papers with instructions given.      Ashlyn Hodges RN  06/20/23 [id number]

## 2023-06-20 NOTE — ED PROVIDER NOTES
[facility] EMERGENCY DEPT  EMERGENCY DEPARTMENT ENCOUNTER      Pt Name: Jak Timmons  MRN: [id number]  [place] 1950  Date of evaluation: 6/20/2023  Provider: Joseline Riley, [address] [place] [address]       Chief Complaint   Patient presents with    Leg Pain         HISTORY OF PRESENT ILLNESS   (Location/Symptom, Timing/Onset, Context/Setting, Quality, Duration, Modifying Factors, Severity)  Note limiting factors. Jak Timmons is a 68 y.o. male who presents to the emergency department persistent low right-sided back pain radiating down his right leg after falling 3 weeks ago. Denies saddle anesthesia bowel incontinence urinary retention fever rash IVDU. HPI    Nursing Notes were reviewed. REVIEW OF SYSTEMS    (2-9 systems for level 4, 10 or more for level 5)     Review of Systems    Except as noted above the remainder of the review of systems was reviewed and negative. PAST MEDICAL HISTORY     Past Medical History:   Diagnosis Date    Arthritis     Hemochromatosis     Ill-defined condition     GOUT         SURGICAL HISTORY       Past Surgical History:   Procedure Laterality Date    ORTHOPEDIC SURGERY      LEFT HAND         CURRENT MEDICATIONS       Previous Medications    AMLODIPINE (NORVASC) 5 MG TABLET    Take 5 mg by mouth daily    COLCHICINE (COLCRYS) 0.6 MG TABLET    Take 0.6 mg by mouth as needed    LIDOCAINE HCL-BENZYL ALCOHOL 4-10 % CREA    Apply 1 Act topically 2 times daily as needed    PANTOPRAZOLE (PROTONIX) 40 MG TABLET    Take 40 mg by mouth every morning (before breakfast)    ZOLPIDEM (AMBIEN) 10 MG TABLET    Take 10 mg by mouth. ZOSTER RECOMBINANT ADJUVANTED VACCINE (SHINGRIX) 50 MCG/0.5ML SUSR INJECTION    Shingrix (PF) 50 mcg/0.5 mL intramuscular suspension, kit       ALLERGIES     Patient has no known allergies. FAMILY HISTORY     History reviewed. No pertinent family history.        SOCIAL HISTORY       Social History     Socioeconomic History    Marital status: Single

## 2024-02-07 ENCOUNTER — OFFICE VISIT (OUTPATIENT)
Age: 74
End: 2024-02-07
Payer: COMMERCIAL

## 2024-02-07 VITALS
OXYGEN SATURATION: 97 % | DIASTOLIC BLOOD PRESSURE: 65 MMHG | BODY MASS INDEX: 30.8 KG/M2 | TEMPERATURE: 97.8 F | WEIGHT: 220 LBS | SYSTOLIC BLOOD PRESSURE: 126 MMHG | HEART RATE: 91 BPM | HEIGHT: 71 IN

## 2024-02-07 DIAGNOSIS — R10.31 RIGHT GROIN PAIN: Primary | ICD-10-CM

## 2024-02-07 DIAGNOSIS — M47.816 LUMBAR SPONDYLOSIS: ICD-10-CM

## 2024-02-07 DIAGNOSIS — M16.0 PRIMARY OSTEOARTHRITIS OF BOTH HIPS: ICD-10-CM

## 2024-02-07 DIAGNOSIS — M54.16 RIGHT LUMBAR RADICULOPATHY: ICD-10-CM

## 2024-02-07 PROCEDURE — 73502 X-RAY EXAM HIP UNI 2-3 VIEWS: CPT | Performed by: PHYSICAL MEDICINE & REHABILITATION

## 2024-02-07 PROCEDURE — 1123F ACP DISCUSS/DSCN MKR DOCD: CPT | Performed by: PHYSICAL MEDICINE & REHABILITATION

## 2024-02-07 PROCEDURE — 99213 OFFICE O/P EST LOW 20 MIN: CPT | Performed by: PHYSICAL MEDICINE & REHABILITATION

## 2024-02-07 RX ORDER — LISINOPRIL 10 MG/1
10 TABLET ORAL DAILY
COMMUNITY

## 2024-02-07 RX ORDER — ALLOPURINOL 300 MG/1
300 TABLET ORAL DAILY
COMMUNITY

## 2024-02-07 ASSESSMENT — PATIENT HEALTH QUESTIONNAIRE - PHQ9
SUM OF ALL RESPONSES TO PHQ QUESTIONS 1-9: 0
SUM OF ALL RESPONSES TO PHQ QUESTIONS 1-9: 0
2. FEELING DOWN, DEPRESSED OR HOPELESS: 0
SUM OF ALL RESPONSES TO PHQ9 QUESTIONS 1 & 2: 0
SUM OF ALL RESPONSES TO PHQ QUESTIONS 1-9: 0
1. LITTLE INTEREST OR PLEASURE IN DOING THINGS: 0
SUM OF ALL RESPONSES TO PHQ QUESTIONS 1-9: 0

## 2024-02-07 NOTE — PROGRESS NOTES
[facility]    [facility] [facility], [address]  [place] [address]  Phone: [phone]  Fax: [phone]        Lauri Hoover  : 1950  PCP: Mima Ryan DO    PROGRESS NOTE      ASSESSMENT AND PLAN    Lauri was seen today for back pain and leg pain.    Diagnoses and all orders for this visit:    Right groin pain  -     AMB POC X-RAY RADEX HIP UNI WITH PELVIS 2-3 VIEWS    Right lumbar radiculopathy  -     MRI LUMBAR SPINE WO CONTRAST; Future    Lumbar spondylosis    Primary osteoarthritis of both hips        Lauri Hoover is a 74 y.o. male with 6 months of worsening sciatic pain post fall.  Symptoms not responding to home exercise, NSAIDs, and gabapentin  Needs new lumbar MRI for further evaluation of impingement.  Possible injections.  Mechanical groin pain consistent with degenerative findings in his hips bilaterally.    Follow-up and Dispositions    Return in about 4 weeks (around 3/6/2024) for fu MRI/CT/EDx.           HISTORY OF PRESENT ILLNESS    Lauri Hoover is a 74 y.o. male presents for follow up of low back pain. At his last OV (2021), the patient was given instructions for HEP, Dc Mobic, and started tiral of Baclofen 5-10 mg TID PRN.    Patient presents today with pain on the lateral side of his right calf that radiates into his medial thigh and buttocks. He presented to the ED (2023) with right hip and leg pain s/p ground-level fall 3 weeks prior. He explains his symptoms have not subsided. He also complains of numbness and tingling in his right foot coupled with weakness. As a result, he often finds himself dragging his right foot. He currently uses [place] and adheres to HEP with minimal relief.     The patient also has occasional pain in his low back that is exacerbated by sitting.     Of note, patient had a toe on right toe removed two years ago.         2024     1:39 PM   AMB PAIN ASSESSMENT   Location of Pain

## 2024-02-07 NOTE — PROGRESS NOTES
Lauri Hoover presents today for   Chief Complaint   Patient presents with    Back Pain    Leg Pain     Bilateral foot numbness       Is someone accompanying this pt? no    Is the patient using any DME equipment during OV? no        Coordination of Care:  1. Have you been to the ER, urgent care clinic since your last visit? no  Hospitalized since your last visit? no    2. Have you seen or consulted any other health care providers outside of the [facility] since your last visit? Yes, PCP Include any pap smears or colon screening. no

## 2024-02-19 ENCOUNTER — HOSPITAL ENCOUNTER (OUTPATIENT)
Facility: HOSPITAL | Age: 74
Discharge: HOME | End: 2024-02-22
Attending: PHYSICAL MEDICINE & REHABILITATION
Payer: COMMERCIAL

## 2024-02-19 DIAGNOSIS — M54.16 RIGHT LUMBAR RADICULOPATHY: ICD-10-CM

## 2024-02-19 PROCEDURE — 72148 MRI LUMBAR SPINE W/O DYE: CPT

## 2024-03-11 ENCOUNTER — OFFICE VISIT (OUTPATIENT)
Age: 74
End: 2024-03-11
Payer: COMMERCIAL

## 2024-03-11 VITALS
WEIGHT: 222 LBS | BODY MASS INDEX: 31.08 KG/M2 | TEMPERATURE: 97.6 F | HEIGHT: 71 IN | HEART RATE: 75 BPM | OXYGEN SATURATION: 97 % | DIASTOLIC BLOOD PRESSURE: 73 MMHG | SYSTOLIC BLOOD PRESSURE: 133 MMHG

## 2024-03-11 DIAGNOSIS — M54.16 RIGHT LUMBAR RADICULOPATHY: Primary | ICD-10-CM

## 2024-03-11 DIAGNOSIS — M48.061 STENOSIS OF LATERAL RECESS OF LUMBAR SPINE: ICD-10-CM

## 2024-03-11 DIAGNOSIS — D17.79 EPIDURAL LIPOMATOSIS: ICD-10-CM

## 2024-03-11 PROCEDURE — 1123F ACP DISCUSS/DSCN MKR DOCD: CPT | Performed by: PHYSICAL MEDICINE & REHABILITATION

## 2024-03-11 PROCEDURE — 96372 THER/PROPH/DIAG INJ SC/IM: CPT | Performed by: PHYSICAL MEDICINE & REHABILITATION

## 2024-03-11 PROCEDURE — 99214 OFFICE O/P EST MOD 30 MIN: CPT | Performed by: PHYSICAL MEDICINE & REHABILITATION

## 2024-03-11 RX ORDER — KETOROLAC TROMETHAMINE 15 MG/ML
15 INJECTION, SOLUTION INTRAMUSCULAR; INTRAVENOUS ONCE
Status: COMPLETED | OUTPATIENT
Start: 2024-03-11 | End: 2024-03-11

## 2024-03-11 RX ORDER — PREGABALIN 75 MG/1
CAPSULE ORAL
Qty: 60 CAPSULE | Refills: 2 | Status: SHIPPED | OUTPATIENT
Start: 2024-03-11 | End: 2024-05-11

## 2024-03-11 RX ORDER — LIDOCAINE 50 MG/G
1 PATCH TOPICAL DAILY
COMMUNITY

## 2024-03-11 RX ADMIN — KETOROLAC TROMETHAMINE 15 MG: 15 INJECTION, SOLUTION INTRAMUSCULAR; INTRAVENOUS at 13:57

## 2024-03-11 ASSESSMENT — PATIENT HEALTH QUESTIONNAIRE - PHQ9
1. LITTLE INTEREST OR PLEASURE IN DOING THINGS: 0
SUM OF ALL RESPONSES TO PHQ QUESTIONS 1-9: 0
SUM OF ALL RESPONSES TO PHQ9 QUESTIONS 1 & 2: 0
2. FEELING DOWN, DEPRESSED OR HOPELESS: 0
SUM OF ALL RESPONSES TO PHQ QUESTIONS 1-9: 0

## 2024-03-11 NOTE — PROGRESS NOTES
Consent was explained to the pt and signed. No questions or concerns voiced at this time. Pt given 15mg/1ml of toradol IM in right gluteus. No sign or symptoms of infection noted at injection site. There was no bleeding, swelling or leaking noted after injection. Pt handed injection information sheet to take home. Pt handled the injection well. While pulling the needle out of the right gluteus the pt did start bleeding. I applied gauze and pressure and let the pt know what was going on and he responded, \"Oh I am a free bleeder.\" That's super normal. I applied the band-aid and the pt was all good. Pt declined waiting the 10 minutes after the injection for observation and went to the  to check out.    
Lauri Hoover presents today for   Chief Complaint   Patient presents with    Leg Pain     Right leg pain     Hip Pain     Right hip pain        Is someone accompanying this pt? no    Is the patient using any DME equipment during OV? no    Coordination of Care:  1. Have you been to the ER, urgent care clinic since your last visit? no  Hospitalized since your last visit? no    2. Have you seen or consulted any other health care providers outside of the [facility] since your last visit? PCP Include any pap smears or colon screening. no        
Patient reports having a cane and walker at home to assist with ambulation.    Patient denies any recent GI ulcers, bleeds or renal dysfunction.     L MRI (02/2024) reviewed with patient.           3/11/2024    12:50 PM   AMB PAIN ASSESSMENT   Location of Pain Hip   Location Modifiers Right   Severity of Pain 9   Quality of Pain Sharp;Other (Comment)   Duration of Pain Persistent   Frequency of Pain Several times daily   Aggravating Factors Other (Comment)   Limiting Behavior Yes   Relieving Factors Other (Comment)   Result of Injury No   Work-Related Injury No   Are there other pain locations you wish to document? No         Onset: chronic (18 years), injury (fall)     Investigations:    L MRI (02/2024): Severe right foraminal stensois L5-S1. Extensive epidural lipomatosis moderate to severe L2 to S1, progressed from 2018. Multilevel FA.   BL Hip XR (02/2024): OA bilateral hips-mild. Right hip cam deformity-mild   L XR (06/2023): mild wedging T12 and L1, moderate degenerative changes L2-L3, L5-S1   L XR 10/2021: mild degenerative findings   L MRI 2018: old L1 compression fx, moderate stenosis   Spine surgery consult: none      Treatments:   Physical therapy: HEP (02/2024) no relief   Spinal injections: right L5 right S1 SNRB by Dr. Chavez 2018, reports no benefit   Spinal surgery- no   Beneficial medications: none   Failed medications: Aleve, Icy Hot, Gabapentin (GI), Mobic, Lidoderm patches      Work Status: retired   Pertinent PMHx:  Hemochromatosis, neuropathy, gout, osteomyelitis right foot, third digit-requiring amputation. 01/2023 GFR = 42.        PHYSICAL EXAMINATION    /73 (Site: Right Upper Arm, Position: Sitting, Cuff Size: Large Adult)   Pulse 75   Temp 97.6 °F (36.4 °C) (Skin)   Ht 1.803 m (5' 11\")   Wt 100.7 kg (222 lb)   SpO2 97%   BMI 30.96 kg/m²     Ambulatory without assistive device, FWB gait.    TTP: Right L5-S1, right sciatic notch  LE strength: Intact, except right DF 4/5  SLR:

## 2024-04-18 ENCOUNTER — OFFICE VISIT (OUTPATIENT)
Age: 74
End: 2024-04-18
Payer: COMMERCIAL

## 2024-04-18 VITALS
DIASTOLIC BLOOD PRESSURE: 64 MMHG | HEIGHT: 71 IN | BODY MASS INDEX: 31.98 KG/M2 | OXYGEN SATURATION: 96 % | SYSTOLIC BLOOD PRESSURE: 130 MMHG | TEMPERATURE: 98.3 F | HEART RATE: 78 BPM | RESPIRATION RATE: 18 BRPM | WEIGHT: 228.4 LBS

## 2024-04-18 DIAGNOSIS — R60.0 EDEMA OF RIGHT LOWER LEG: ICD-10-CM

## 2024-04-18 DIAGNOSIS — M54.16 RIGHT LUMBAR RADICULOPATHY: Primary | ICD-10-CM

## 2024-04-18 DIAGNOSIS — D17.79 EPIDURAL LIPOMATOSIS: ICD-10-CM

## 2024-04-18 DIAGNOSIS — M48.061 STENOSIS OF LATERAL RECESS OF LUMBAR SPINE: ICD-10-CM

## 2024-04-18 PROCEDURE — 99214 OFFICE O/P EST MOD 30 MIN: CPT | Performed by: PHYSICAL MEDICINE & REHABILITATION

## 2024-04-18 PROCEDURE — 1123F ACP DISCUSS/DSCN MKR DOCD: CPT | Performed by: PHYSICAL MEDICINE & REHABILITATION

## 2024-04-18 RX ORDER — PREGABALIN 75 MG/1
CAPSULE ORAL
Qty: 60 CAPSULE | Refills: 2 | Status: SHIPPED | OUTPATIENT
Start: 2024-04-18 | End: 2024-06-18

## 2024-04-18 NOTE — PROGRESS NOTES
Lauri Hoover presents today for   Chief Complaint   Patient presents with    Back Problem    Pain    Back Pain       Is someone accompanying this pt? NO    Is the patient using any DME equipment during OV? NO    Depression Screening:       No data to display                Learning Assessment:  Failed to redirect to the Timeline version of the [employer].    Abuse Screening:       No data to display                Fall Risk  Failed to redirect to the Timeline version of the [employer].    OPIOID RISK TOOL  Failed to redirect to the Timeline version of the [employer].    Coordination of Care:  1. Have you been to the ER, urgent care clinic since your last visit? NO  Hospitalized since your last visit? NO    2. Have you seen or consulted any other health care providers outside of the [facility] since your last visit? NO Include any pap smears or colon screening. NO

## 2024-04-23 ENCOUNTER — HOSPITAL ENCOUNTER (OUTPATIENT)
Facility: HOSPITAL | Age: 74
Discharge: HOME | End: 2024-04-25
Attending: PHYSICAL MEDICINE & REHABILITATION
Payer: COMMERCIAL

## 2024-04-23 DIAGNOSIS — R60.0 EDEMA OF RIGHT LOWER LEG: ICD-10-CM

## 2024-04-23 PROCEDURE — 93971 EXTREMITY STUDY: CPT

## 2024-04-23 PROCEDURE — 93971 EXTREMITY STUDY: CPT | Performed by: SURGERY

## 2024-04-24 ENCOUNTER — TELEPHONE (OUTPATIENT)
Age: 74
End: 2024-04-24

## 2024-04-24 NOTE — TELEPHONE ENCOUNTER
Attempted to contact the pt regarding his test results. He was not able to be reached. A message could not be left for the pt. The mail box is full. Will try to call the pt back at another time.

## 2024-08-04 DIAGNOSIS — M54.16 RIGHT LUMBAR RADICULOPATHY: ICD-10-CM

## 2024-08-04 RX ORDER — PREGABALIN 75 MG/1
CAPSULE ORAL
Qty: 60 CAPSULE | Refills: 0 | Status: SHIPPED | OUTPATIENT
Start: 2024-08-04 | End: 2024-09-03

## 2024-08-22 ENCOUNTER — OFFICE VISIT (OUTPATIENT)
Age: 74
End: 2024-08-22
Payer: COMMERCIAL

## 2024-08-22 VITALS
DIASTOLIC BLOOD PRESSURE: 69 MMHG | HEIGHT: 71 IN | WEIGHT: 219.8 LBS | OXYGEN SATURATION: 97 % | HEART RATE: 69 BPM | BODY MASS INDEX: 30.77 KG/M2 | SYSTOLIC BLOOD PRESSURE: 125 MMHG | TEMPERATURE: 97.6 F

## 2024-08-22 DIAGNOSIS — D17.79 EPIDURAL LIPOMATOSIS: ICD-10-CM

## 2024-08-22 DIAGNOSIS — M54.16 RIGHT LUMBAR RADICULOPATHY: Primary | ICD-10-CM

## 2024-08-22 DIAGNOSIS — M48.062 SPINAL STENOSIS OF LUMBAR REGION WITH NEUROGENIC CLAUDICATION: ICD-10-CM

## 2024-08-22 PROCEDURE — 1123F ACP DISCUSS/DSCN MKR DOCD: CPT | Performed by: PHYSICAL MEDICINE & REHABILITATION

## 2024-08-22 PROCEDURE — 99214 OFFICE O/P EST MOD 30 MIN: CPT | Performed by: PHYSICAL MEDICINE & REHABILITATION

## 2024-08-22 RX ORDER — PREGABALIN 75 MG/1
CAPSULE ORAL
Qty: 90 CAPSULE | Refills: 2 | Status: SHIPPED | OUTPATIENT
Start: 2024-08-22 | End: 2024-09-21

## 2024-08-22 NOTE — PATIENT INSTRUCTIONS
leg through the open door.  Slide your leg up the wall to straighten your knee. You should feel a gentle stretch down the back of your leg.  Hold the stretch for at least 15 to 30 seconds. Do not arch your back, point your toes, or bend either knee. Keep one heel touching the floor and the other heel touching the wall.  Repeat with your other leg.  Do 2 to 4 times for each leg.  Hip flexor stretch    Kneel on the floor with one knee bent and one leg behind you. Place your forward knee over your foot. Keep your other knee touching the floor.  Slowly push your hips forward until you feel a stretch in the upper thigh of your rear leg.  Hold the stretch for at least 15 to 30 seconds. Repeat with your other leg.  Do 2 to 4 times on each side.  Wall sit    Stand with your back 10 to 12 inches away from a wall.  Lean into the wall until your back is flat against it.  Slowly slide down until your knees are slightly bent, pressing your lower back into the wall.  Hold for about 6 seconds, then slide back up the wall.  Repeat 8 to 12 times.  Follow-up care is a key part of your treatment and safety. Be sure to make and go to all appointments, and call your doctor if you are having problems. It's also a good idea to know your test results and keep a list of the medicines you take.  Current as of: November 9, 2022               Content Version: 13.6  © 2006-2023 [employer].   Care instructions adapted under license by [employer]. If you have questions about a medical condition or this instruction, always ask your healthcare professional. [employer] disclaims any warranty or liability for your use of this information.

## 2024-08-22 NOTE — PROGRESS NOTES
pain bothers him more than his back. He denies having trouble putting his shoes or socks on. He states yard work is getting difficulty for him due to pain.      Patient admits to taking Lyrica 75 mg BID with sedation.  This was changed to 150 mg nightly.  He is tolerating this with some benefit.. Denies insomnia with this medication. He also has used Voltaren cream with benefit.     Denies red flags including persistent fevers, chills, weight changes, neurogenic bowel or bladder symptoms.    Vascular duplex RLE 4/23/24 reviewed and L MRI 2/2024 reviewed.          8/22/2024     2:04 PM   AMB PAIN ASSESSMENT   Location of Pain Back   Location Modifiers Right   Severity of Pain 5   Quality of Pain Dull;Aching   Frequency of Pain Constant   Aggravating Factors Standing;Walking   Relieving Factors Rest;Heat         Onset: chronic (18 years), injury (fall)     Investigations:   RLE Vascular duplex (04/2024): No DVT.   L MRI (02/2024): multilevel listhesis from L1 to L5. Epidural lipomatosis with severe stneosis L4, L5, S1.   BL Hip XR (02/2024): OA bilateral hips-mild. Right hip cam deformity-mild  L XR (06/2023): mild wedging T12 and L1, moderate degenerative changes L2-L3, L5-S1  L XR 10/2021: mild degenerative findings  L MRI 2018: old L1 compression fx, moderate stenosis  Spine surgery consult: none     Treatments:  Physical therapy: HEP (02/2024) no relief  Spinal injections: right L5 right S1 SNRB by Dr. Chavez 2018, reports no benefit, increased pain  Spinal surgery- no  Beneficial medications: none  Failed medications: Aleve, Icy Hot, Gabapentin (GI), Mobic, Lidoderm patches (ineffective), Toradol (short-term benefit)     Work Status: retired  Pertinent PMHx:  Hemochromatosis, neuropathy, gout, osteomyelitis right foot, third digit-requiring amputation. 01/2023 GFR = 42.       PHYSICAL EXAMINATION    /69 (Site: Left Upper Arm, Position: Sitting, Cuff Size: Large Adult)   Pulse 69   Temp 97.6 °F (36.4 °C)

## 2024-09-11 NOTE — PROGRESS NOTES
[address] [place] [address] [facility]. Pulmonary, Critical Care, and Sleep Medicine    Name: Caron Quinn MRN: [id number]   : 1950 Hospital: [address] [place] [address] Dr   Date: 2022  Admission Date: 2022     Chart and notes reviewed. Data reviewed. I have evaluated all findings. [x]I have reviewed the flowsheet and previous days notes. []The patient is unable to give any meaningful history or review of systems because the patient is:  []Intubated []Sedated   []Unresponsive      []The patient is critically ill on      []Mechanical ventilation []Pressors   []BiPAP []         Interval HPI:  Patient is a 75 y. o. male w/ PMH of hemachromatosis presenting to [facility] from home via EMS from home unresponsive. History obtained from chart as patient is unresponsive. He was found by his step daughter who reported patient had been acting \"bipolar lately\" and would not let her in the house. She is not able to give a medication list at this time. Patients general UDS negative, CT head negative for acute issues. Poison control contacted by ED- recommend supportive care. Patient required intubation due to mental status. HD initiated 5/3 due to suspected baclofen overdose. Subjective 22  Hospital Day:  Vent Day:  Mentation much improved- awake, alert, following commands. No overnight events  Ready for transition to floor. ROS:Review of systems not obtained due to patient factors. Events and notes from last 24 hours reviewed.      Patient Active Problem List   Diagnosis Code    HNP (herniated nucleus pulposus), lumbar M51.26    Neuritis M79.2    Neuropathy G62.9    AMS (altered mental status) R41.82       Vital Signs:  Visit Vitals  BP (!) 156/88   Pulse 79   Temp 98.2 °F (36.8 °C)   Resp 16   Ht 6' (1.829 m)   Wt 93.5 kg (206 lb 2.1 oz)   SpO2 100%   BMI 27.96 kg/m²       O2 Device: Humidifier,Nasal cannula   O2 Flow Rate (L/min): 4 l/min   Temp (24hrs), Av.3 °F (36.8 °C), Min:98.2 °F (36.8 °C), Max:98.4 °F (36.9 °C)       Intake/Output:   Last shift:      05/06 0701 - 05/06 1900  In: -   Out: 900 [Urine:900]  Last 3 shifts: 05/04 1901 - 05/06 0700  In: 235   Out: 800 [Urine:800]    Intake/Output Summary (Last 24 hours) at 5/6/2022 0832  Last data filed at 5/6/2022 9205  Gross per 24 hour   Intake 0 ml   Output 1260 ml   Net -1260 ml          Current Facility-Administered Medications   Medication Dose Route Frequency    piperacillin-tazobactam (ZOSYN) 3.375 g in 0.9% sodium chloride (MBP/ADV) 100 mL MBP  3.375 g IntraVENous Q8H    docusate (COLACE) 50 mg/5 mL oral liquid 100 mg  100 mg Oral DAILY    albuterol-ipratropium (DUO-NEB) 2.5 MG-0.5 MG/3 ML  3 mL Nebulization Q6H RT    chlorhexidine (PERIDEX) 0.12 % mouthwash 10 mL  10 mL Oral Q12H    pantoprazole (PROTONIX) 40 mg in 0.9% sodium chloride 10 mL injection  40 mg IntraVENous DAILY    heparin (porcine) injection 5,000 Units  5,000 Units SubCUTAneous Q8H    insulin lispro (HUMALOG) injection   SubCUTAneous Q6H         Telemetry: [x]Sinus []A-flutter []Paced    []A-fib []Multiple PVCs                  Physical Exam:      General: Awake, alert, follows commands  HEENT:  Anicteric sclerae; pink palpebral conjunctivae; mucosa moist  Resp:  Symmetrical chest expansion, no accessory muscle use; good airway entry; CTAB, on 2L NC  CV:  S1, S2 present; regular rate and rhythm  GI:  Abdomen soft, non-tender; (+) active bowel sounds  Extremities:  +2 pulses on all extremities; no edema/ cyanosis/ clubbing noted   Skin:  Warm; no rashes/ lesions noted, normal turgor/cap refill   Neurologic:  awake, alert, following commands   Devices:  HD catheter, jin      DATA:  MAR reviewed and pertinent medications noted or modified as needed    Labs:  Recent Labs     05/06/22  0400 05/05/22  0600 05/04/22  0415   WBC 10.4 10.5 11.7   HGB 10.1* 10.4* 10.9*   HCT 31.3* 32.3* 33.2*    148 188     Recent Labs     05/06/22  0407 05/05/22  0600 05/04/22  0415    140 142   K 3.7 3.8 3.3*    106 109   CO2 27 28 28   GLU 88 106* 103*   BUN 14 16 11   CREA 1.22 1.36* 1.01   CA 8.9 9.0 8.8   MG 2.2 1.8 2.7*   PHOS 2.9 2.6 2.0*   ALB 2.6* 2.6* 2.5*     No results for input(s): PH, PCO2, PO2, HCO3, FIO2 in the last 72 hours. Recent Labs     05/05/22  0332 05/04/22  0332   FIO2I 28 28   HCO3I 27.0* 29.8*   PCO2I 37.3 35.8   PHI 7.47* 7.53*   PO2I 77* 62*       Imaging:  [x]   I have personally reviewed the patients radiographs and reports  XR Results (most recent):  XR Results (most recent):  Results from Hospital Encounter encounter on 04/29/22    XR CHEST PORT    Narrative  Portable Frontal Chest.    CLINICAL HISTORY: Cordis placement for hemodialysis. TECHNIQUE: Single frontal view of the chest, obtained portably. COMPARISONS: 5/3/2022 at 4:51 AM.    FINDINGS: Patient is intubated. Endotracheal tube tip is 4 centers above the  leticia. There is enteric tube with tip in the stomach. There is a new right IJ  dialysis catheter with tip at the caval atrial junction. There is no  pneumothorax. Subsegmental opacities both bases. Cardiomediastinal silhouette stable. Blunting  left costophrenic sulcus. Impression  No pneumothorax after line placement. Bibasilar atelectasis with small left effusion. CT Results (most recent):  Results from Hospital Encounter encounter on 04/29/22    CT HEAD WO CONT    Narrative  CT HEAD UNENHANCED    CPT code: 88558    INDICATION: Altered mental status. TECHNIQUE: 5 mm collimation axial images obtained from the skull base through  the vertex without administration of nonionic intravenous contrast.    All CT scans at this facility are performed using dose optimization technique as  appropriate to this specific exam, to include automated exposure control,  adjustment of the mA and/or KP according to patient size or use of iterative  reconstruction techniques.     COMPARISON: None    FINDINGS:  Study is mildly degraded by motion and streak artifact. No parenchymal hemorrhage identified. Patchy low attenuation throughout the periventricular and deep hemispheric white  matter bilaterally, most prevalent in the frontal and parietal regions, with  distribution most consistent with chronic ischemic small vessel disease change. Rounded 5 mm hypodensity in the anterior inferior right basal ganglia adjacent  to the ventral insular cortex axial image 17. No regional mass effect. This  appears more sharply marginated and cystic on sagittal reformatted images  however. Subjectively this appears more remote, but may reflect lacunar type  infarct. MRI would be much more sensitive for the detection of infarct or ischemia in the  acute setting. No midline shift of structures. No extra-axial fluid collections. Ventricles are symmetric and not enlarged for age. Calvarium intact to the extent included. Partially included paranasal sinuses appear clear, with apparent left sided  nasal cannula in place. Impression  1. No hemorrhage identified. 2. Cystic lucency anterior inferior right basal ganglia region, subjectively  more remote appearing such as a prior lacunar type infarct. No comparison. 3. Moderate burden of presumed hemispheric small vessel disease change as above. IMPRESSION:   · Acute respiratory failure requiring mechnical ventilation in setting of encephalopathy with need for airway protection +/- CAP vs Aspiration PNA  · Acute toxic metabolic encephalopathy due to suspected medication overdose- ?tizanidine, baclofen, mobic, lisinopril, bystolic in chart. Negative UDS, ETOH, acetaminophen, salicylate. · Sepsis - elevated white count, hypothermia, UA negative, procal negative. CXR (4/30) showed infiltrate vs atelectasis.     · Elevated TSH but normal T4  · Acute on CKD stage 3-  prerenal azotemia vs ATN- appears to be at baseline   · CT head 4/29 \"cystic lucency anterior inferior right basal ganglia region\"   · EEG 5/1: \"This is an abnormal EEG with the presence generalized periodic appearing discharges. The Generalized periodic discharges are non specific and can be seen in metabolic/toxic encephalopathy or hypoxic injury. Could also be seen in non convulsive seizure. \"   · Brain MRI (5/1): \"No acute infarct. Small vessel disease such as is seen in patients with diabetes or hypertension. \"   · Hx Hemochromatosis   · BMI 27.12            Patient Active Problem List   Diagnosis Code    HNP (herniated nucleus pulposus), lumbar M51.26    Neuritis M79.2    Neuropathy G62.9    AMS (altered mental status) R41.82        RECOMMENDATIONS:   Neuro: Sedation off. Neuro following. Pulm: Aspiration precautions, HOB>30'. Wean NC. Duoneb prn. CVS:Monitor HD, MAP goal >65. GI: Colace, swallow study/speach consult  Renal: Trend Cr, UOP. nephrology following, last day HD 5/5, dc jin  Hem/Onc: Trend H/H, monitor for s/o active bleeding. Daily CBC. I/D:Trend WBCs and temperature curve. On Zosyn (will finish on 5/7), grew GPC in his blood culture (4/29), MRSA culture negative. ID consulted. Blood cultures with no growth so far. Procal downtrending. Metabolic: Daily BMP, mag, phos. Trend lytes and replace per protocol. Endocrine:Q6 glucoses. SSI. Avoid hypoglycemia. Musc/Skin: PT/OT/SLP   Psych: Consulted for possible SI and intentional baclofen OD  Full Code  Discussed in interdisciplinary rounds     Best practice :    Glycemic control  [facility] ICU bundles: Jin Bundle Followed    [facility]  Sress ulcer prophylaxis. Pepcid  DVT prophylaxis. [facility]  Need for Lines, jin assessed. Palliative care evaluation. Restraints need. Attending Non-violent Restraint Reevaluation     I have reevaluated the patient one hour after initiation of intervention.  The patient is comfortable, uninjured, but continues to pose an imminent risk of injury to self to themselves and/or serious disruption of medical treatment required to keep patient stable. The patient's current medical and behavioral conditions that warrant the use intervention include danger to self and Interference with medical equipment or treatment. Restraint or seclusion will be discontinued at the earliest possible time, regardless of the scheduled expiration of the order. Based on my evaluation, restraints will be continued: YES- SBT/SAT         This care involved high complexity decision making to assess, manipulate, and support vital system functions, to treat this degreee vital organ system failure and to prevent further life threatening deterioration of the patients condition  The services I provided to this patient were to treat and/or prevent clinically significant deterioration that could result in the failure of one or more body systems and/or organ systems due to respiratory distress, hypoxia, cardiac dysrhythmia.       Ashley Aburto DO   05/06/22  Pulmonary, Critical Care Medicine  [facility] 11-Sep-2024

## 2024-09-19 ENCOUNTER — OFFICE VISIT (OUTPATIENT)
Age: 74
End: 2024-09-19
Payer: COMMERCIAL

## 2024-09-19 VITALS
BODY MASS INDEX: 30.52 KG/M2 | WEIGHT: 218 LBS | HEART RATE: 81 BPM | HEIGHT: 71 IN | TEMPERATURE: 97.1 F | OXYGEN SATURATION: 98 % | SYSTOLIC BLOOD PRESSURE: 130 MMHG | DIASTOLIC BLOOD PRESSURE: 79 MMHG

## 2024-09-19 DIAGNOSIS — M48.062 SPINAL STENOSIS OF LUMBAR REGION WITH NEUROGENIC CLAUDICATION: Primary | ICD-10-CM

## 2024-09-19 DIAGNOSIS — M54.16 RIGHT LUMBAR RADICULOPATHY: ICD-10-CM

## 2024-09-19 DIAGNOSIS — D17.79 EPIDURAL LIPOMATOSIS: ICD-10-CM

## 2024-09-19 PROCEDURE — 99214 OFFICE O/P EST MOD 30 MIN: CPT | Performed by: PHYSICAL MEDICINE & REHABILITATION

## 2024-09-19 PROCEDURE — 1123F ACP DISCUSS/DSCN MKR DOCD: CPT | Performed by: PHYSICAL MEDICINE & REHABILITATION

## 2024-09-19 RX ORDER — ETODOLAC 400 MG
400 TABLET ORAL 2 TIMES DAILY PRN
Qty: 60 TABLET | Refills: 0 | Status: SHIPPED | OUTPATIENT
Start: 2024-09-19 | End: 2024-11-18

## 2024-10-02 ENCOUNTER — CLINICAL DOCUMENTATION (OUTPATIENT)
Age: 74
End: 2024-10-02

## 2024-10-02 NOTE — PROGRESS NOTES
Patient called to inquire about referrals to pain management.  Per notes, patient was provided a list of pain management facilities and instructed to contact his insurance to find out what facilities accept his plan.  Patient was provided 25 locations for pain management.  He was provided the phone number for customer service for [facility] plan, [phone]

## 2024-11-08 ENCOUNTER — TELEPHONE (OUTPATIENT)
Age: 74
End: 2024-11-08

## 2024-11-08 NOTE — TELEPHONE ENCOUNTER
Patient called and is requesting a call back states he is still in pain and is confused on what his next step is supposed to be states he was referred to an different office and is so confused on what office he was supposed to be seen at.        Please call and advise patient at   [phone]

## 2024-11-08 NOTE — TELEPHONE ENCOUNTER
I called and spoke to the pt. The pt was identified using 2 pt identifiers. Per the last office note, the pt was given a list of pain management providers to call to see who takes his insurance. Once he has a name, he was instructed to contact the office with the name and we will generate the referral. The pt was made aware that this process can take a couple of months. He verbalized understanding and is not able to find the list. I let the pt know that I am mailing him a new list and have high lighted the practices that may be able to provide him something more that we can provide here. He verbalized understanding and will call the office back once he has this. A list of providers has been placed in the out going mail bin.

## 2024-12-01 NOTE — PROGRESS NOTES
RENAL DAILY PROGRESS NOTE              Subjective:       Complaint: extubated yesterday and feels very well  Overnight events noted      IMPRESSION:   · TARI due to ATN from initial BP fluctuations, ? AIN from vanco and zosyn combination, Pre renal etiology  · Hypokalemia  · Baclofen toxicity causing bradycardia, hypothermia, EEG discharges? ???  · Hemochromatosis on serial phlebotomies as OP   PLAN:     · D/c Temporary dialysis cath tomorrow if stable.  Urine output is good  · I and O  · Daily weights  · D/w ICU team             Current Facility-Administered Medications   Medication Dose Route Frequency    heparin (porcine) 1,000 unit/mL injection 2,200 Units  2,200 Units Hemodialysis DIALYSIS PRN    acetaminophen (TYLENOL) tablet 650 mg  650 mg Oral Q6H PRN    piperacillin-tazobactam (ZOSYN) 3.375 g in 0.9% sodium chloride (MBP/ADV) 100 mL MBP  3.375 g IntraVENous Q8H    docusate (COLACE) 50 mg/5 mL oral liquid 100 mg  100 mg Oral DAILY    albuterol-ipratropium (DUO-NEB) 2.5 MG-0.5 MG/3 ML  3 mL Nebulization Q6H RT    chlorhexidine (PERIDEX) 0.12 % mouthwash 10 mL  10 mL Oral Q12H    albuterol-ipratropium (DUO-NEB) 2.5 MG-0.5 MG/3 ML  3 mL Nebulization Q4H PRN    pantoprazole (PROTONIX) 40 mg in 0.9% sodium chloride 10 mL injection  40 mg IntraVENous DAILY    heparin (porcine) injection 5,000 Units  5,000 Units SubCUTAneous Q8H    insulin lispro (HUMALOG) injection   SubCUTAneous Q6H    glucose chewable tablet 16 g  4 Tablet Oral PRN    glucagon (GLUCAGEN) injection 1 mg  1 mg IntraMUSCular PRN    dextrose 10% infusion 0-250 mL  0-250 mL IntraVENous PRN         Objective:     Patient Vitals for the past 24 hrs:   Temp Pulse Resp BP SpO2   05/06/22 0900 -- 90 18 (!) 148/73 96 %   05/06/22 0800 98.1 °F (36.7 °C) 88 20 (!) 151/77 97 %   05/06/22 0728 -- -- -- -- 100 %   05/06/22 0700 -- 79 16 (!) 156/88 100 %   05/06/22 0600 -- 81 20 (!) 150/72 97 %   05/06/22 0500 -- 84 20 139/61 92 %   05/06/22 0400 -- 85 19 (!) 143/71 100 %   05/06/22 0300 -- 88 16 (!) 154/68 96 %   05/06/22 0200 -- 87 25 (!) 147/77 97 %   05/06/22 0154 -- -- -- -- 100 %   05/06/22 0153 -- -- -- -- 100 %   05/06/22 0100 -- 81 19 (!) 141/82 98 %   05/06/22 0000 -- 79 23 (!) 151/70 96 %   05/05/22 2300 -- 83 21 (!) 146/76 99 %   05/05/22 2200 -- 81 25 (!) 149/71 98 %   05/05/22 2100 -- 84 18 (!) 153/70 99 %   05/05/22 2000 -- 86 20 (!) 148/78 100 %   05/05/22 1954 -- -- -- -- 99 %   05/05/22 1800 -- 83 19 (!) 148/73 99 %   05/05/22 1700 -- 86 16 133/80 99 %   05/05/22 1600 98.2 °F (36.8 °C) 87 23 136/68 95 %   05/05/22 1500 -- 90 23 138/73 --   05/05/22 1419 -- -- -- -- 96 %   05/05/22 1400 -- 90 23 (!) 140/75 94 %   05/05/22 1332 -- 90 16 -- 98 %   05/05/22 1300 -- 87 14 133/67 96 %   05/05/22 1200 98.4 °F (36.9 °C) 90 23 121/69 97 %   05/05/22 1145 -- 92 17 136/67 96 %   05/05/22 1141 -- 85 15 139/68 92 %        Weight change:      05/04 1901 - 05/06 0700  In: 235   Out: 800 [Urine:800]    Intake/Output Summary (Last 24 hours) at 5/6/2022 1131  Last data filed at 5/6/2022 0800  Gross per 24 hour   Intake --   Output 1460 ml   Net -1460 ml     Physical Exam:   HEENT sclera anicteric,  CVS: S1S2 heard,  no rub  RS: + air entry b/l,   Abd: Soft, Non tender  Neuro: alert and oriented times 3  Extrm:  no cyanosis, clubbing   Skin: no visible  Rash  Musculoskeletal: No gross joints or bone deformities         Data Review:     LABS:   Hematology:   Recent Labs     05/06/22 0400 05/05/22 0600 05/04/22 0415   WBC 10.4 10.5 11.7   HGB 10.1* 10.4* 10.9*   HCT 31.3* 32.3* 33.2*     Chemistry:   Recent Labs     05/06/22 0400 05/05/22 0600 05/04/22 0415 05/03/22 2021   BUN 14 16 11  --    CREA 1.22 1.36* 1.01  --    CA 8.9 9.0 8.8  --    ALB 2.6* 2.6* 2.5*  --    K 3.7 3.8 3.3*  --     140 142  --     106 109  --    CO2 27 28 28  --    PHOS 2.9 2.6 2.0* 2.6   GLU 88 106* 103*  --             Procedures/imaging: see electronic medical records for all procedures, Xrays and details which were not copied into this note but were reviewed prior to creation of Plan          Assessment & Plan:     See above        Leon Childers MD  5/6/2022 Stable

## 2025-01-10 ENCOUNTER — APPOINTMENT (OUTPATIENT)
Facility: HOSPITAL | Age: 75
DRG: 640 | End: 2025-01-10
Payer: MEDICARE

## 2025-01-10 ENCOUNTER — HOSPITAL ENCOUNTER (INPATIENT)
Facility: HOSPITAL | Age: 75
LOS: 3 days | Discharge: HOME | DRG: 640 | End: 2025-01-14
Attending: STUDENT IN AN ORGANIZED HEALTH CARE EDUCATION/TRAINING PROGRAM | Admitting: FAMILY MEDICINE
Payer: MEDICARE

## 2025-01-10 DIAGNOSIS — N17.9 AKI (ACUTE KIDNEY INJURY) (HCC): ICD-10-CM

## 2025-01-10 DIAGNOSIS — E87.1 HYPONATREMIA: Primary | ICD-10-CM

## 2025-01-10 LAB
ALBUMIN SERPL-MCNC: 3.7 G/DL (ref 3.4–5)
ALBUMIN/GLOB SERPL: 1.1 (ref 0.8–1.7)
ALP SERPL-CCNC: 282 U/L (ref 45–117)
ALT SERPL-CCNC: 51 U/L (ref 16–61)
ANION GAP SERPL CALC-SCNC: 10 MMOL/L (ref 3–18)
APPEARANCE UR: CLEAR
AST SERPL-CCNC: 68 U/L (ref 10–38)
B PERT DNA SPEC QL NAA+PROBE: NOT DETECTED
BACTERIA URNS QL MICRO: NEGATIVE /HPF
BASOPHILS # BLD: 0.04 K/UL (ref 0–0.1)
BASOPHILS NFR BLD: 0.4 % (ref 0–2)
BILIRUB SERPL-MCNC: 1.1 MG/DL (ref 0.2–1)
BILIRUB UR QL: NEGATIVE
BORDETELLA PARAPERTUSSIS BY PCR: NOT DETECTED
BUN SERPL-MCNC: 45 MG/DL (ref 7–18)
BUN/CREAT SERPL: 16 (ref 12–20)
C PNEUM DNA SPEC QL NAA+PROBE: NOT DETECTED
CALCIUM SERPL-MCNC: 8.6 MG/DL (ref 8.5–10.1)
CHLORIDE SERPL-SCNC: 93 MMOL/L (ref 100–111)
CO2 SERPL-SCNC: 21 MMOL/L (ref 21–32)
COLOR UR: YELLOW
CREAT SERPL-MCNC: 2.88 MG/DL (ref 0.6–1.3)
CREAT UR-MCNC: 193 MG/DL (ref 30–125)
DIFFERENTIAL METHOD BLD: ABNORMAL
EOSINOPHIL # BLD: 0.52 K/UL (ref 0–0.4)
EOSINOPHIL NFR BLD: 5.4 % (ref 0–5)
EPITH CASTS URNS QL MICRO: NORMAL /LPF (ref 0–5)
ERYTHROCYTE [DISTWIDTH] IN BLOOD BY AUTOMATED COUNT: 13.7 % (ref 11.6–14.5)
FLUAV SUBTYP SPEC NAA+PROBE: NOT DETECTED
FLUBV RNA SPEC QL NAA+PROBE: NOT DETECTED
GLOBULIN SER CALC-MCNC: 3.5 G/DL (ref 2–4)
GLUCOSE SERPL-MCNC: 106 MG/DL (ref 74–99)
GLUCOSE UR STRIP.AUTO-MCNC: NEGATIVE MG/DL
HADV DNA SPEC QL NAA+PROBE: NOT DETECTED
HCOV 229E RNA SPEC QL NAA+PROBE: NOT DETECTED
HCOV HKU1 RNA SPEC QL NAA+PROBE: NOT DETECTED
HCOV NL63 RNA SPEC QL NAA+PROBE: NOT DETECTED
HCOV OC43 RNA SPEC QL NAA+PROBE: NOT DETECTED
HCT VFR BLD AUTO: 33 % (ref 36–48)
HGB BLD-MCNC: 11.6 G/DL (ref 13–16)
HGB UR QL STRIP: NEGATIVE
HMPV RNA SPEC QL NAA+PROBE: NOT DETECTED
HPIV1 RNA SPEC QL NAA+PROBE: NOT DETECTED
HPIV2 RNA SPEC QL NAA+PROBE: NOT DETECTED
HPIV3 RNA SPEC QL NAA+PROBE: NOT DETECTED
HPIV4 RNA SPEC QL NAA+PROBE: NOT DETECTED
IMM GRANULOCYTES # BLD AUTO: 0.05 K/UL (ref 0–0.04)
IMM GRANULOCYTES NFR BLD AUTO: 0.5 % (ref 0–0.5)
INR PPP: 1.1 (ref 0.9–1.1)
KETONES UR QL STRIP.AUTO: NEGATIVE MG/DL
LEUKOCYTE ESTERASE UR QL STRIP.AUTO: NEGATIVE
LIPASE SERPL-CCNC: 26 U/L (ref 13–75)
LYMPHOCYTES # BLD: 0.66 K/UL (ref 0.9–3.6)
LYMPHOCYTES NFR BLD: 6.8 % (ref 21–52)
M PNEUMO DNA SPEC QL NAA+PROBE: NOT DETECTED
MAGNESIUM SERPL-MCNC: 1.6 MG/DL (ref 1.6–2.6)
MCH RBC QN AUTO: 33.4 PG (ref 24–34)
MCHC RBC AUTO-ENTMCNC: 35.2 G/DL (ref 31–37)
MCV RBC AUTO: 95.1 FL (ref 78–100)
MONOCYTES # BLD: 0.89 K/UL (ref 0.05–1.2)
MONOCYTES NFR BLD: 9.2 % (ref 3–10)
NEUTS SEG # BLD: 7.55 K/UL (ref 1.8–8)
NEUTS SEG NFR BLD: 77.7 % (ref 40–73)
NITRITE UR QL STRIP.AUTO: NEGATIVE
NRBC # BLD: 0 K/UL (ref 0–0.01)
NRBC BLD-RTO: 0 PER 100 WBC
OSMOLALITY SERPL: 270 MOSM/KG H2O (ref 280–301)
OSMOLALITY UR: 256 MOSM/KG H2O
PH UR STRIP: 5.5 (ref 5–8)
PHOSPHATE SERPL-MCNC: 3.1 MG/DL (ref 2.5–4.9)
PLATELET # BLD AUTO: 249 K/UL (ref 135–420)
PMV BLD AUTO: 9.9 FL (ref 9.2–11.8)
POTASSIUM SERPL-SCNC: 4.1 MMOL/L (ref 3.5–5.5)
POTASSIUM UR-SCNC: 11 MMOL/L (ref 12–62)
PROCALCITONIN SERPL-MCNC: 0.75 NG/ML
PROT SERPL-MCNC: 7.2 G/DL (ref 6.4–8.2)
PROT UR STRIP-MCNC: ABNORMAL MG/DL
PROTHROMBIN TIME: 14.4 SEC (ref 11.9–14.9)
RBC # BLD AUTO: 3.47 M/UL (ref 4.35–5.65)
RBC #/AREA URNS HPF: NEGATIVE /HPF (ref 0–5)
RSV RNA SPEC QL NAA+PROBE: NOT DETECTED
RV+EV RNA SPEC QL NAA+PROBE: NOT DETECTED
SARS-COV-2 RNA RESP QL NAA+PROBE: NOT DETECTED
SODIUM SERPL-SCNC: 124 MMOL/L (ref 136–145)
SODIUM UR-SCNC: 7 MMOL/L (ref 20–110)
SP GR UR REFRACTOMETRY: 1.01 (ref 1–1.03)
TROPONIN I SERPL HS-MCNC: 7 NG/L (ref 0–78)
TSH SERPL DL<=0.05 MIU/L-ACNC: 3.37 UIU/ML (ref 0.36–3.74)
URATE SERPL-MCNC: 6.8 MG/DL (ref 2.6–7.2)
UROBILINOGEN UR QL STRIP.AUTO: 0.2 EU/DL (ref 0.2–1)
WBC # BLD AUTO: 9.7 K/UL (ref 4.6–13.2)
WBC URNS QL MICRO: NORMAL /HPF (ref 0–5)

## 2025-01-10 PROCEDURE — 83935 ASSAY OF URINE OSMOLALITY: CPT

## 2025-01-10 PROCEDURE — 0202U NFCT DS 22 TRGT SARS-COV-2: CPT

## 2025-01-10 PROCEDURE — 84133 ASSAY OF URINE POTASSIUM: CPT

## 2025-01-10 PROCEDURE — 83930 ASSAY OF BLOOD OSMOLALITY: CPT

## 2025-01-10 PROCEDURE — 85610 PROTHROMBIN TIME: CPT

## 2025-01-10 PROCEDURE — 2500000003 HC RX 250 WO HCPCS: Performed by: STUDENT IN AN ORGANIZED HEALTH CARE EDUCATION/TRAINING PROGRAM

## 2025-01-10 PROCEDURE — 6370000000 HC RX 637 (ALT 250 FOR IP): Performed by: STUDENT IN AN ORGANIZED HEALTH CARE EDUCATION/TRAINING PROGRAM

## 2025-01-10 PROCEDURE — 81001 URINALYSIS AUTO W/SCOPE: CPT

## 2025-01-10 PROCEDURE — 82570 ASSAY OF URINE CREATININE: CPT

## 2025-01-10 PROCEDURE — 74176 CT ABD & PELVIS W/O CONTRAST: CPT

## 2025-01-10 PROCEDURE — 84443 ASSAY THYROID STIM HORMONE: CPT

## 2025-01-10 PROCEDURE — 2580000003 HC RX 258: Performed by: STUDENT IN AN ORGANIZED HEALTH CARE EDUCATION/TRAINING PROGRAM

## 2025-01-10 PROCEDURE — 93005 ELECTROCARDIOGRAM TRACING: CPT | Performed by: STUDENT IN AN ORGANIZED HEALTH CARE EDUCATION/TRAINING PROGRAM

## 2025-01-10 PROCEDURE — 84145 PROCALCITONIN (PCT): CPT

## 2025-01-10 PROCEDURE — 84100 ASSAY OF PHOSPHORUS: CPT

## 2025-01-10 PROCEDURE — 83690 ASSAY OF LIPASE: CPT

## 2025-01-10 PROCEDURE — 96374 THER/PROPH/DIAG INJ IV PUSH: CPT

## 2025-01-10 PROCEDURE — 96361 HYDRATE IV INFUSION ADD-ON: CPT

## 2025-01-10 PROCEDURE — 84550 ASSAY OF BLOOD/URIC ACID: CPT

## 2025-01-10 PROCEDURE — 6360000002 HC RX W HCPCS: Performed by: STUDENT IN AN ORGANIZED HEALTH CARE EDUCATION/TRAINING PROGRAM

## 2025-01-10 PROCEDURE — 84300 ASSAY OF URINE SODIUM: CPT

## 2025-01-10 PROCEDURE — 83735 ASSAY OF MAGNESIUM: CPT

## 2025-01-10 PROCEDURE — 99285 EMERGENCY DEPT VISIT HI MDM: CPT

## 2025-01-10 PROCEDURE — 85025 COMPLETE CBC W/AUTO DIFF WBC: CPT

## 2025-01-10 PROCEDURE — 71046 X-RAY EXAM CHEST 2 VIEWS: CPT

## 2025-01-10 PROCEDURE — 84484 ASSAY OF TROPONIN QUANT: CPT

## 2025-01-10 PROCEDURE — 80053 COMPREHEN METABOLIC PANEL: CPT

## 2025-01-10 RX ORDER — SODIUM CHLORIDE 1 G/1
1 TABLET ORAL ONCE
Status: COMPLETED | OUTPATIENT
Start: 2025-01-10 | End: 2025-01-10

## 2025-01-10 RX ORDER — DOXYCYCLINE 100 MG/1
100 CAPSULE ORAL
Status: COMPLETED | OUTPATIENT
Start: 2025-01-10 | End: 2025-01-10

## 2025-01-10 RX ORDER — 0.9 % SODIUM CHLORIDE 0.9 %
1000 INTRAVENOUS SOLUTION INTRAVENOUS ONCE
Status: COMPLETED | OUTPATIENT
Start: 2025-01-10 | End: 2025-01-10

## 2025-01-10 RX ADMIN — WATER 1000 MG: 1 INJECTION INTRAMUSCULAR; INTRAVENOUS; SUBCUTANEOUS at 22:45

## 2025-01-10 RX ADMIN — SODIUM CHLORIDE 1000 ML: 9 INJECTION, SOLUTION INTRAVENOUS at 19:21

## 2025-01-10 RX ADMIN — DOXYCYCLINE HYCLATE 100 MG: 100 CAPSULE ORAL at 22:46

## 2025-01-10 RX ADMIN — Medication 1 G: at 22:46

## 2025-01-10 ASSESSMENT — PAIN - FUNCTIONAL ASSESSMENT: PAIN_FUNCTIONAL_ASSESSMENT: NONE - DENIES PAIN

## 2025-01-10 NOTE — ED PROVIDER NOTES
[facility] EMERGENCY DEPARTMENT  EMERGENCY DEPARTMENT ENCOUNTER      Pt Name: Lauri Hoover  MRN: [id number]  Birthdate 1950  Date of evaluation: 1/10/2025  Provider: Ivanna Álvarez MD    CHIEF COMPLAINT       Chief Complaint   Patient presents with    Fatigue    Diarrhea         HISTORY OF PRESENT ILLNESS   (Location/Symptom, Timing/Onset, Context/Setting, Quality, Duration, Modifying Factors, Severity)  Note limiting factors.   Lauri Hoover is a 74 y.o. male who presents to the emergency department for several weeks of fatigue and diarrhea.  He states he has approximately 5-6 loose bowel movements per day.  Has not noticed any blood in his stool.  Denies any dysuria, hematuria or increased urinary frequency.  States over the last several days he is also lost his appetite just has poor oral intake.  He does note some worsening lower abdominal discomfort with eating over the last couple days.  Has had some mild nasal congestion but denies any recent sore throat, cough, chest pain or difficulty breathing.  He denies any dizziness.  Denies any blood coming from anywhere.  Denies any swelling in his legs.  Denies any focal weakness.  Denies any recent sick contacts or travel outside the country.  Denies any significant medical issues.  He also notes that he is lost about 25 pounds in the past 2 months.     Nursing Notes were reviewed.    REVIEW OF SYSTEMS    (2-9 systems for level 4, 10 or more for level 5)     Constitutional: No fever  HENT: No ear pain  Eyes: No change in vision  Respiratory: No SOB  Cardio: No chest pain  GI: No blood in stool  : No hematuria  MSK: No back pain  Skin: No rashes  Neuro: No headache    Except as noted above the remainder of the review of systems was reviewed and negative.       PAST MEDICAL HISTORY     Past Medical History:   Diagnosis Date    Arthritis     Hemochromatosis     Ill-defined condition     GOUT         SURGICAL HISTORY       Past Surgical  History:   Procedure Laterality Date    ORTHOPEDIC SURGERY      LEFT HAND         CURRENT MEDICATIONS       Previous Medications    ALLOPURINOL (ZYLOPRIM) 300 MG TABLET    Take 1 tablet by mouth daily    AMLODIPINE (NORVASC) 5 MG TABLET    Take 1 tablet by mouth daily    COLCHICINE (COLCRYS) 0.6 MG TABLET    Take 1 tablet by mouth as needed    DICLOFENAC SODIUM (VOLTAREN) 1 % GEL    Apply 2 g topically 4 times daily as needed    ETODOLAC (LODINE) 400 MG TABLET    Take 1 tablet by mouth 2 times daily as needed (pain)    LISINOPRIL (PRINIVIL;ZESTRIL) 10 MG TABLET    Take 1 tablet by mouth daily    PREGABALIN (LYRICA) 75 MG CAPSULE    One po qd and take 2 capsules by mouth at bedtime    ZOLPIDEM (AMBIEN) 10 MG TABLET    Take 1 tablet by mouth.       ALLERGIES     Patient has no known allergies.    FAMILY HISTORY     History reviewed. No pertinent family history.       SOCIAL HISTORY       Social History     Socioeconomic History    Marital status: Single     Spouse name: None    Number of children: None    Years of education: None    Highest education level: None   Tobacco Use    Smoking status: Never    Smokeless tobacco: Never   Substance and Sexual Activity    Alcohol use: Yes     Alcohol/week: 6.0 standard drinks of alcohol       SCREENINGS         [place] Coma Scale  Eye Opening: Spontaneous  Best Verbal Response: Oriented  Best Motor Response: Obeys commands  [place] Coma Scale Score: 15                     CIWA Assessment  BP: 113/65  Pulse: (!) 101                 PHYSICAL EXAM    (up to 7 for level 4, 8 or more for level 5)     ED Triage Vitals 01/10/25 1750   BP Systolic BP Percentile Diastolic BP Percentile Temp Temp Source Pulse Respirations SpO2   113/65 -- -- 97.5 °F (36.4 °C) Oral (!) 101 16 98 %      Height Weight - Scale         1.803 m (5' 11\") 87.1 kg (192 lb)             Physical Exam    General: No acute distress  Head: Normocephalic, atraumatic  Psych: Cooperative and alert  Eyes: Pale  conjunctiva  ENT: Moist oral mucosa  Neck: Supple  CV: Regular rate and rhythm, no pitting edema, palpable radial pulses  Pulm: Clear breath sounds bilaterally without any wheezing or rhonchi, normal respiratory rate  GI: Normal bowel sounds, soft, non-tender  MSK: Moves all four extremities  Skin: No rashes  Neuro: Alert and conversive      DIAGNOSTIC RESULTS     EKG: All EKG's are interpreted by the Emergency Department Physician who either signs or Co-signs this chart in the absence of a cardiologist.    EKG shows normal sinus rhythm at a rate of 94 bpm.  QRS is narrow, axis is normal, R wave progression is pericardium is satisfactory.  No specific ST elevation or depression noted.  Overall shows no signs of ACS or arrhythmia.    RADIOLOGY:   Non-plain film images such as CT, Ultrasound and MRI are read by the radiologist. Plain radiographic images are visualized and preliminarily interpreted by the emergency physician with the below findings:        Interpretation per the Radiologist below, if available at the time of this note:    CT ABDOMEN PELVIS WO CONTRAST Additional Contrast? None   Final Result   1.  Left lower lobe infiltrates. Follow-up to resolution is recommended.         Electronically signed by Delvin Chavez      XR CHEST (2 VW)   Final Result   1.  No acute cardiopulmonary process.       Electronically signed by Delvin Chavez            ED BEDSIDE ULTRASOUND:   Performed by ED Physician - none    LABS:  Labs Reviewed   CBC WITH AUTO DIFFERENTIAL - Abnormal; Notable for the following components:       Result Value    RBC 3.47 (*)     Hemoglobin 11.6 (*)     Hematocrit 33.0 (*)     Neutrophils % 77.7 (*)     Lymphocytes % 6.8 (*)     Eosinophils % 5.4 (*)     Lymphocytes Absolute 0.66 (*)     Eosinophils Absolute 0.52 (*)     Immature Granulocytes Absolute 0.05 (*)     All other components within normal limits   COMPREHENSIVE METABOLIC PANEL - Abnormal; Notable for the following components:    Sodium

## 2025-01-10 NOTE — ED TRIAGE NOTES
Pt reports fatigue x 3 weeks and loss of appetite x 5 days. Pt reports diarrhea. Pt endorses congestion as well.

## 2025-01-11 ENCOUNTER — APPOINTMENT (OUTPATIENT)
Facility: HOSPITAL | Age: 75
DRG: 640 | End: 2025-01-11
Payer: MEDICARE

## 2025-01-11 PROBLEM — E87.1 HYPONATREMIA: Status: ACTIVE | Noted: 2025-01-11

## 2025-01-11 LAB
ALBUMIN SERPL-MCNC: 3.2 G/DL (ref 3.4–5)
ALBUMIN/GLOB SERPL: 0.9 (ref 0.8–1.7)
ALP SERPL-CCNC: 298 U/L (ref 45–117)
ALT SERPL-CCNC: 57 U/L (ref 16–61)
ANION GAP SERPL CALC-SCNC: 11 MMOL/L (ref 3–18)
AST SERPL-CCNC: 75 U/L (ref 10–38)
BASOPHILS # BLD: 0.03 K/UL (ref 0–0.1)
BASOPHILS NFR BLD: 0.4 % (ref 0–2)
BILIRUB SERPL-MCNC: 1 MG/DL (ref 0.2–1)
BUN SERPL-MCNC: 37 MG/DL (ref 7–18)
BUN/CREAT SERPL: 17 (ref 12–20)
CALCIUM SERPL-MCNC: 8.8 MG/DL (ref 8.5–10.1)
CHLORIDE SERPL-SCNC: 102 MMOL/L (ref 100–111)
CO2 SERPL-SCNC: 18 MMOL/L (ref 21–32)
CREAT SERPL-MCNC: 2.24 MG/DL (ref 0.6–1.3)
DIFFERENTIAL METHOD BLD: ABNORMAL
EKG ATRIAL RATE: 94 BPM
EKG DIAGNOSIS: NORMAL
EKG P AXIS: -5 DEGREES
EKG P-R INTERVAL: 190 MS
EKG Q-T INTERVAL: 350 MS
EKG QRS DURATION: 88 MS
EKG QTC CALCULATION (BAZETT): 437 MS
EKG R AXIS: -23 DEGREES
EKG T AXIS: 37 DEGREES
EKG VENTRICULAR RATE: 94 BPM
EOSINOPHIL # BLD: 0.63 K/UL (ref 0–0.4)
EOSINOPHIL NFR BLD: 8.5 % (ref 0–5)
ERYTHROCYTE [DISTWIDTH] IN BLOOD BY AUTOMATED COUNT: 13.8 % (ref 11.6–14.5)
GLOBULIN SER CALC-MCNC: 3.6 G/DL (ref 2–4)
GLUCOSE SERPL-MCNC: 110 MG/DL (ref 74–99)
HCT VFR BLD AUTO: 30.9 % (ref 36–48)
HGB BLD-MCNC: 10.8 G/DL (ref 13–16)
IMM GRANULOCYTES # BLD AUTO: 0.03 K/UL (ref 0–0.04)
IMM GRANULOCYTES NFR BLD AUTO: 0.4 % (ref 0–0.5)
L PNEUMO AG UR QL IA: NEGATIVE
LYMPHOCYTES # BLD: 0.45 K/UL (ref 0.9–3.6)
LYMPHOCYTES NFR BLD: 6 % (ref 21–52)
MCH RBC QN AUTO: 33.1 PG (ref 24–34)
MCHC RBC AUTO-ENTMCNC: 35 G/DL (ref 31–37)
MCV RBC AUTO: 94.8 FL (ref 78–100)
MONOCYTES # BLD: 0.75 K/UL (ref 0.05–1.2)
MONOCYTES NFR BLD: 10.1 % (ref 3–10)
NEUTS SEG # BLD: 5.55 K/UL (ref 1.8–8)
NEUTS SEG NFR BLD: 74.6 % (ref 40–73)
NRBC # BLD: 0 K/UL (ref 0–0.01)
NRBC BLD-RTO: 0 PER 100 WBC
PLATELET # BLD AUTO: 207 K/UL (ref 135–420)
PMV BLD AUTO: 9.6 FL (ref 9.2–11.8)
POTASSIUM SERPL-SCNC: 3.9 MMOL/L (ref 3.5–5.5)
PROT SERPL-MCNC: 6.8 G/DL (ref 6.4–8.2)
RBC # BLD AUTO: 3.26 M/UL (ref 4.35–5.65)
S PNEUM AG UR QL: NEGATIVE
SODIUM SERPL-SCNC: 130 MMOL/L (ref 136–145)
SODIUM SERPL-SCNC: 131 MMOL/L (ref 136–145)
SODIUM SERPL-SCNC: 131 MMOL/L (ref 136–145)
WBC # BLD AUTO: 7.4 K/UL (ref 4.6–13.2)

## 2025-01-11 PROCEDURE — 93010 ELECTROCARDIOGRAM REPORT: CPT | Performed by: INTERNAL MEDICINE

## 2025-01-11 PROCEDURE — 94761 N-INVAS EAR/PLS OXIMETRY MLT: CPT

## 2025-01-11 PROCEDURE — 71250 CT THORAX DX C-: CPT

## 2025-01-11 PROCEDURE — 84295 ASSAY OF SERUM SODIUM: CPT

## 2025-01-11 PROCEDURE — 6360000002 HC RX W HCPCS

## 2025-01-11 PROCEDURE — 80053 COMPREHEN METABOLIC PANEL: CPT

## 2025-01-11 PROCEDURE — 6370000000 HC RX 637 (ALT 250 FOR IP)

## 2025-01-11 PROCEDURE — 36415 COLL VENOUS BLD VENIPUNCTURE: CPT

## 2025-01-11 PROCEDURE — 87449 NOS EACH ORGANISM AG IA: CPT

## 2025-01-11 PROCEDURE — 2140000001 HC CVICU INTERMEDIATE R&B

## 2025-01-11 PROCEDURE — 2500000003 HC RX 250 WO HCPCS

## 2025-01-11 PROCEDURE — 85025 COMPLETE CBC W/AUTO DIFF WBC: CPT

## 2025-01-11 PROCEDURE — 2580000003 HC RX 258

## 2025-01-11 RX ORDER — ACETAMINOPHEN 650 MG/1
650 SUPPOSITORY RECTAL EVERY 6 HOURS PRN
Status: DISCONTINUED | OUTPATIENT
Start: 2025-01-11 | End: 2025-01-14 | Stop reason: HOSPADM

## 2025-01-11 RX ORDER — ONDANSETRON 4 MG/1
4 TABLET, ORALLY DISINTEGRATING ORAL EVERY 8 HOURS PRN
Status: DISCONTINUED | OUTPATIENT
Start: 2025-01-11 | End: 2025-01-14 | Stop reason: HOSPADM

## 2025-01-11 RX ORDER — SODIUM CHLORIDE 0.9 % (FLUSH) 0.9 %
5-40 SYRINGE (ML) INJECTION EVERY 12 HOURS SCHEDULED
Status: DISCONTINUED | OUTPATIENT
Start: 2025-01-11 | End: 2025-01-14 | Stop reason: HOSPADM

## 2025-01-11 RX ORDER — SODIUM CHLORIDE 9 MG/ML
INJECTION, SOLUTION INTRAVENOUS CONTINUOUS
Status: DISCONTINUED | OUTPATIENT
Start: 2025-01-11 | End: 2025-01-11

## 2025-01-11 RX ORDER — ACETAMINOPHEN 325 MG/1
650 TABLET ORAL EVERY 6 HOURS PRN
Status: DISCONTINUED | OUTPATIENT
Start: 2025-01-11 | End: 2025-01-14 | Stop reason: HOSPADM

## 2025-01-11 RX ORDER — ALLOPURINOL 300 MG/1
300 TABLET ORAL DAILY
Status: DISCONTINUED | OUTPATIENT
Start: 2025-01-11 | End: 2025-01-14 | Stop reason: HOSPADM

## 2025-01-11 RX ORDER — ENOXAPARIN SODIUM 100 MG/ML
30 INJECTION SUBCUTANEOUS DAILY
Status: DISCONTINUED | OUTPATIENT
Start: 2025-01-11 | End: 2025-01-12

## 2025-01-11 RX ORDER — LISINOPRIL 10 MG/1
10 TABLET ORAL DAILY
Status: DISCONTINUED | OUTPATIENT
Start: 2025-01-11 | End: 2025-01-14 | Stop reason: HOSPADM

## 2025-01-11 RX ORDER — ZOLPIDEM TARTRATE 5 MG/1
10 TABLET ORAL NIGHTLY PRN
Status: DISCONTINUED | OUTPATIENT
Start: 2025-01-11 | End: 2025-01-14 | Stop reason: HOSPADM

## 2025-01-11 RX ORDER — POLYETHYLENE GLYCOL 3350 17 G/17G
17 POWDER, FOR SOLUTION ORAL DAILY PRN
Status: DISCONTINUED | OUTPATIENT
Start: 2025-01-11 | End: 2025-01-14 | Stop reason: HOSPADM

## 2025-01-11 RX ORDER — ONDANSETRON 2 MG/ML
4 INJECTION INTRAMUSCULAR; INTRAVENOUS EVERY 6 HOURS PRN
Status: DISCONTINUED | OUTPATIENT
Start: 2025-01-11 | End: 2025-01-14 | Stop reason: HOSPADM

## 2025-01-11 RX ORDER — SODIUM CHLORIDE 9 MG/ML
INJECTION, SOLUTION INTRAVENOUS PRN
Status: DISCONTINUED | OUTPATIENT
Start: 2025-01-11 | End: 2025-01-14 | Stop reason: HOSPADM

## 2025-01-11 RX ORDER — AMLODIPINE BESYLATE 5 MG/1
5 TABLET ORAL DAILY
Status: DISCONTINUED | OUTPATIENT
Start: 2025-01-11 | End: 2025-01-14 | Stop reason: HOSPADM

## 2025-01-11 RX ORDER — TRAMADOL HYDROCHLORIDE 50 MG/1
25 TABLET ORAL EVERY 8 HOURS PRN
Status: DISCONTINUED | OUTPATIENT
Start: 2025-01-11 | End: 2025-01-14 | Stop reason: HOSPADM

## 2025-01-11 RX ORDER — SODIUM CHLORIDE 0.9 % (FLUSH) 0.9 %
5-40 SYRINGE (ML) INJECTION PRN
Status: DISCONTINUED | OUTPATIENT
Start: 2025-01-11 | End: 2025-01-14 | Stop reason: HOSPADM

## 2025-01-11 RX ADMIN — SODIUM CHLORIDE: 9 INJECTION, SOLUTION INTRAVENOUS at 09:26

## 2025-01-11 RX ADMIN — LISINOPRIL 10 MG: 10 TABLET ORAL at 09:19

## 2025-01-11 RX ADMIN — SODIUM CHLORIDE 3000 MG: 900 INJECTION INTRAVENOUS at 19:50

## 2025-01-11 RX ADMIN — SODIUM CHLORIDE, PRESERVATIVE FREE 10 ML: 5 INJECTION INTRAVENOUS at 20:00

## 2025-01-11 RX ADMIN — TRAMADOL HYDROCHLORIDE 25 MG: 50 TABLET, COATED ORAL at 13:50

## 2025-01-11 RX ADMIN — ZOLPIDEM TARTRATE 10 MG: 5 TABLET, FILM COATED ORAL at 21:15

## 2025-01-11 RX ADMIN — SODIUM CHLORIDE 3000 MG: 900 INJECTION INTRAVENOUS at 13:49

## 2025-01-11 RX ADMIN — ALLOPURINOL 300 MG: 100 TABLET ORAL at 09:17

## 2025-01-11 RX ADMIN — AMLODIPINE BESYLATE 5 MG: 5 TABLET ORAL at 09:17

## 2025-01-11 RX ADMIN — SODIUM CHLORIDE, PRESERVATIVE FREE 10 ML: 5 INJECTION INTRAVENOUS at 09:19

## 2025-01-11 RX ADMIN — ENOXAPARIN SODIUM 30 MG: 100 INJECTION SUBCUTANEOUS at 09:26

## 2025-01-11 ASSESSMENT — PAIN - FUNCTIONAL ASSESSMENT
PAIN_FUNCTIONAL_ASSESSMENT: PREVENTS OR INTERFERES SOME ACTIVE ACTIVITIES AND ADLS
PAIN_FUNCTIONAL_ASSESSMENT: PREVENTS OR INTERFERES SOME ACTIVE ACTIVITIES AND ADLS
PAIN_FUNCTIONAL_ASSESSMENT: ACTIVITIES ARE NOT PREVENTED
PAIN_FUNCTIONAL_ASSESSMENT: 0-10

## 2025-01-11 ASSESSMENT — PAIN DESCRIPTION - PAIN TYPE: TYPE: ACUTE PAIN

## 2025-01-11 ASSESSMENT — PAIN DESCRIPTION - ORIENTATION
ORIENTATION: UPPER
ORIENTATION: POSTERIOR
ORIENTATION: POSTERIOR

## 2025-01-11 ASSESSMENT — PAIN SCALES - GENERAL
PAINLEVEL_OUTOF10: 6
PAINLEVEL_OUTOF10: 0
PAINLEVEL_OUTOF10: 6
PAINLEVEL_OUTOF10: 6

## 2025-01-11 ASSESSMENT — PAIN DESCRIPTION - DESCRIPTORS
DESCRIPTORS: DISCOMFORT
DESCRIPTORS: ACHING
DESCRIPTORS: ACHING

## 2025-01-11 ASSESSMENT — PAIN DESCRIPTION - LOCATION
LOCATION: BACK

## 2025-01-11 NOTE — CONSULTS
Consult Note    Assessment:   TAN on CKD 3B due to volume depletion. Improving.   CKD 3 B is presumably due to htn. Urine sediment is bland and proteinuria on the dipstick is minimal.   Most likely acute hypotonic hyponatremia, hypovolemic in terms of the volume status. Urine studies are consistent with the hypovolemia. Asymptomatic, Na is improving.   HTN, controlled.   CAP, on abx.     Recommendations:   Resume gentle NS, continue till oral intake of solutes and protein is adequate. Patient was asked not to push water.   Add protein supplement.   Continue current antihtn.   Obtain renal us.   Avoid NSAID's, IV dye.  Please dose all medications for approximate creatinine clearance  45-30.         Thank you.     Consult requested by: Marci Deutsch MD    ADMIT DATE: 1/10/2025  CONSULT DATE: January 11, 2025                 Admission diagnosis: Hyponatremia   Reason for Nephrology Consultation: the same.     HPI: Lauri Hoover is a 74 y.o. male White (non-) with a h/o htn, gout, ckd 3 and a chronic low back pain who states his appetite hasn't been great over the past couple of month. Lat week he acutely los appetite, started having dry heaves, nausea and diarrhea. Patient was pushing water at home. In the ED he was stable. Patient was found to have a lll infiltrated, started on abx. Scr was 1.71 in 223. On admission  scr was 2.88, today it is better at 2.24. Na was 124, up to 131 with the ivf.        Past Medical History:   Diagnosis Date    Arthritis     Hemochromatosis     Ill-defined condition     GOUT      Past Surgical History:   Procedure Laterality Date    ORTHOPEDIC SURGERY      LEFT HAND       Social History     Socioeconomic History    Marital status: Single     Spouse name: Not on file    Number of children: Not on file    Years of education: Not on file    Highest education level: Not on file   Occupational History    Not on file   Tobacco Use    Smoking status: Never    Smokeless  tobacco: Never   Substance and Sexual Activity    Alcohol use: Yes     Alcohol/week: 6.0 standard drinks of alcohol    Drug use: Not on file    Sexual activity: Not on file   Other Topics Concern    Not on file   Social History Narrative    Not on file     Social Determinants of Health     Financial Resource Strain: Not on file   Food Insecurity: Not on file   Transportation Needs: Not on file   Physical Activity: Not on file   Stress: Not on file   Social Connections: Not on file   Intimate Partner Violence: Not on file   Housing Stability: Not on file       History reviewed. No pertinent family history.  No Known Allergies     Home Medications:   @[facility]@    Current Inpatient Medications:     Current Facility-Administered Medications   Medication Dose Route Frequency    [Held by provider] allopurinol (ZYLOPRIM) tablet 300 mg  300 mg Oral Daily    amLODIPine (NORVASC) tablet 5 mg  5 mg Oral Daily    lisinopril (PRINIVIL;ZESTRIL) tablet 10 mg  10 mg Oral Daily    zolpidem (AMBIEN) tablet 10 mg  10 mg Oral Nightly PRN    sodium chloride flush 0.9 % injection 5-40 mL  5-40 mL IntraVENous 2 times per day    sodium chloride flush 0.9 % injection 5-40 mL  5-40 mL IntraVENous PRN    0.9 % sodium chloride infusion   IntraVENous PRN    enoxaparin Sodium (LOVENOX) injection 30 mg  30 mg SubCUTAneous Daily    ondansetron (ZOFRAN-ODT) disintegrating tablet 4 mg  4 mg Oral Q8H PRN    Or    ondansetron (ZOFRAN) injection 4 mg  4 mg IntraVENous Q6H PRN    polyethylene glycol (GLYCOLAX) packet 17 g  17 g Oral Daily PRN    acetaminophen (TYLENOL) tablet 650 mg  650 mg Oral Q6H PRN    Or    acetaminophen (TYLENOL) suppository 650 mg  650 mg Rectal Q6H PRN    ampicillin-sulbactam (UNASYN) 3,000 mg in sodium chloride 0.9 % 100 mL IVPB (mini-bag)  3,000 mg IntraVENous Q6H    traMADol (ULTRAM) tablet 25 mg  25 mg Oral Q8H PRN       Review of Systems:   No sore throat. No cough or hemoptysis. No shortness of breath or chest pain.  results for input(s): \"IRON\" in the last 72 hours.    Invalid input(s): \"TIBCCALC\"   PTH/VIT D No results for input(s): \"PTH\" in the last 72 hours.    Invalid input(s): \"VITD\"           Rk Zheng M.D  [facility]  Office [phone]  Pager [phone]    January 11, 2025

## 2025-01-11 NOTE — H&P
[facility] Medicine  Admission History and Physical      Patient:    Lauri Hoover      74 y.o. male            MRN:       [id number]                                                                                    Admission Date:         1/10/2025  Code status:                DNR    ASSESSMENT AND PLAN  Lauri Hoover is a 74 y.o. year old male with PMH of gout, hypertension, right lumbar radiculopathy, stenosis of lumbar region with neurogenic claudication admitted for Hyponatremia [E87.1].       Hyponatremia in the setting of diarrhea  -c/o fatigue and diarrhea with 5-6 loose stools daily  -patient with 25 pound weight loss within the last 2 months  -Sodium 124 on admission  -CT abd/pelvis did incidentally show a lung infiltrate, so a lung cancer paraneoplastic hyponatremia is possible.   Plan:  -Admit to [facility]  -Telemetry  -Follow-up urine osmolality, urine sodium  -Urine Legionella and strep pneumonia  -q4h Na checks  -ED consulted Dr. Leon (nephrology), follow up on recommendations  -Start IV fluids    Left lower lobe infiltrate  -Seen on CT abdomen/pelvis  Plan:  -CT chest ordered to evaluate     Acute kidney injury  CKD stage IV   -GFR 22, creatinine 2.88 (1.7 in 1/2023)  Plan:  -f/u nephrology reccs  -daily CMP  -renal diet      Stenosis of lumbar region with neurogenic claudication  Right lumbar radiculopathy  -Followed by [facility].  Last visit in 9/2024.  Per Ortho note, patient failed gabapentin, Mobic, and Celebrex. Patient also with intolerance to Lyrica (sedation).  Etodolac was prescribed at last follow up visit.  Patient reports that etodolac has not helped with his back pain.  He reports that he has been using heating pads to help with his back pain.  Plan:  -Tylenol as needed for pain     Other stable conditions:  -Hypertension: Continued home amlodipine 5 mg daily, lisinopril 20 mg daily  -Gout: Continue home allopurinol 30 mg  22 (L) >60 ml/min/1.73m2    Calcium 8.6 8.5 - 10.1 MG/DL    Total Bilirubin 1.1 (H) 0.2 - 1.0 MG/DL    ALT 51 16 - 61 U/L    AST 68 (H) 10 - 38 U/L    Alk Phosphatase 282 (H) 45 - 117 U/L    Total Protein 7.2 6.4 - 8.2 g/dL    Albumin 3.7 3.4 - 5.0 g/dL    Globulin 3.5 2.0 - 4.0 g/dL    Albumin/Globulin Ratio 1.1 0.8 - 1.7     Lipase    Collection Time: 01/10/25  7:01 PM   Result Value Ref Range    Lipase 26 13 - 75 U/L   Magnesium    Collection Time: 01/10/25  7:01 PM   Result Value Ref Range    Magnesium 1.6 1.6 - 2.6 mg/dL   Protime-INR    Collection Time: 01/10/25  7:01 PM   Result Value Ref Range    Protime 14.4 11.9 - 14.9 sec    INR 1.1 0.9 - 1.1     Troponin    Collection Time: 01/10/25  7:01 PM   Result Value Ref Range    Troponin, High Sensitivity 7 0 - 78 ng/L   TSH    Collection Time: 01/10/25  7:01 PM   Result Value Ref Range    TSH, 3rd Generation 3.37 0.36 - 3.74 uIU/mL   Procalcitonin    Collection Time: 01/10/25  7:01 PM   Result Value Ref Range    Procalcitonin 0.75 ng/mL   Osmolality    Collection Time: 01/10/25  7:01 PM   Result Value Ref Range    Serum Osmolality 270 (L) 280 - 301 MOSM/kg H2O   Phosphorus    Collection Time: 01/10/25  7:01 PM   Result Value Ref Range    Phosphorus 3.1 2.5 - 4.9 MG/DL   Uric Acid    Collection Time: 01/10/25  7:01 PM   Result Value Ref Range    Uric Acid 6.8 2.6 - 7.2 MG/DL   Urinalysis    Collection Time: 01/10/25  7:02 PM   Result Value Ref Range    Color, UA YELLOW      Appearance CLEAR      Specific Gravity, UA 1.010 1.005 - 1.030      pH, Urine 5.5 5.0 - 8.0      Protein, UA TRACE (A) NEG mg/dL    Glucose, Ur Negative NEG mg/dL    Ketones, Urine Negative NEG mg/dL    Bilirubin, Urine Negative NEG      Blood, Urine Negative NEG      Urobilinogen, Urine 0.2 0.2 - 1.0 EU/dL    Nitrite, Urine Negative NEG      Leukocyte Esterase, Urine Negative NEG     Urinalysis, Micro    Collection Time: 01/10/25  7:02 PM   Result Value Ref Range    WBC, UA 0-3 0 - 5 /hpf     Legionella and strep pneumonia.  Will hold off on antibiotics as patient is mostly asymptomatic in that regards.    See above for additional information which I assisted with.    Vitals, labs and imaging reviewed     For additional problem list, assessment, and plan see intern note.    SeniorResident Khalif Caicedo , PGY-3   [facility] Medicine   January 11, 2025, 4:24 AM

## 2025-01-11 NOTE — ED NOTES
TRANSFER - OUT REPORT:    Verbal report given to Jannet on Lauri Hoover  being transferred to [facility] for routine progression of patient care       Report consisted of patient's Situation, Background, Assessment and   Recommendations(SBAR).     Information from the following report(s) Nurse Handoff Report and ED Encounter Summary was reviewed with the receiving nurse.    [place] Assessment:                           Lines:   Peripheral IV 01/10/25 Right Antecubital (Active)        Opportunity for questions and clarification was provided.      Patient transported with:  Tech

## 2025-01-11 NOTE — PROGRESS NOTES
[facility]  POST-ROUNDING NOTE    Assessment & Plan:  -Hyponatremia workup returned, likely hypovolemic hyponatremia due to volume loss from diarrhea  -Stool norovirus and enteric bacterial panel ordered for diarrhea workup  -A.m. cortisol ordered for tomorrow morning  -Sodium 131, increased from 124 yesterday evening.  As a 7 point increase, will stop IV fluids.  Continue to encourage p.o. intake.  Ensure clears ordered with meals  -CT chest confirming left lower lobe pneumonia.  IV Unasyn started  -Continue with every 4 hours sodium checks  -Nephro on board, appreciate recs  -Chronic low back pain, not candidate for NSAIDs at this time.  Will start low-dose as needed p.o. tramadol for pain in addition to p.o. Tylenol.  -Will likely need repeat CT chest outpatient to ensure resolution of LLL findings      The above patient and plan were discussed with my supervising physician.     See daily progress note for full assessment/plan.      Luis Angel Rodrigues MD, PGY-3  [facility]  1/11/2025, 1:55 PM

## 2025-01-12 PROBLEM — E43 SEVERE PROTEIN-CALORIE MALNUTRITION (HCC): Status: ACTIVE | Noted: 2025-01-12

## 2025-01-12 LAB
ALBUMIN SERPL-MCNC: 2.9 G/DL (ref 3.4–5)
ALBUMIN/GLOB SERPL: 0.8 (ref 0.8–1.7)
ALP SERPL-CCNC: 331 U/L (ref 45–117)
ALT SERPL-CCNC: 66 U/L (ref 16–61)
ANION GAP SERPL CALC-SCNC: 10 MMOL/L (ref 3–18)
AST SERPL-CCNC: 79 U/L (ref 10–38)
BASOPHILS # BLD: 0.04 K/UL (ref 0–0.1)
BASOPHILS NFR BLD: 0.5 % (ref 0–2)
BILIRUB SERPL-MCNC: 1.4 MG/DL (ref 0.2–1)
BUN SERPL-MCNC: 30 MG/DL (ref 7–18)
BUN/CREAT SERPL: 16 (ref 12–20)
CALCIUM SERPL-MCNC: 8.6 MG/DL (ref 8.5–10.1)
CHLORIDE SERPL-SCNC: 101 MMOL/L (ref 100–111)
CO2 SERPL-SCNC: 20 MMOL/L (ref 21–32)
CREAT SERPL-MCNC: 1.93 MG/DL (ref 0.6–1.3)
DIFFERENTIAL METHOD BLD: ABNORMAL
EOSINOPHIL # BLD: 0.51 K/UL (ref 0–0.4)
EOSINOPHIL NFR BLD: 6.5 % (ref 0–5)
ERYTHROCYTE [DISTWIDTH] IN BLOOD BY AUTOMATED COUNT: 13.9 % (ref 11.6–14.5)
GLOBULIN SER CALC-MCNC: 3.6 G/DL (ref 2–4)
GLUCOSE SERPL-MCNC: 102 MG/DL (ref 74–99)
HCT VFR BLD AUTO: 27.8 % (ref 36–48)
HGB BLD-MCNC: 10.1 G/DL (ref 13–16)
IMM GRANULOCYTES # BLD AUTO: 0.05 K/UL (ref 0–0.04)
IMM GRANULOCYTES NFR BLD AUTO: 0.6 % (ref 0–0.5)
LYMPHOCYTES # BLD: 0.72 K/UL (ref 0.9–3.6)
LYMPHOCYTES NFR BLD: 9.2 % (ref 21–52)
MCH RBC QN AUTO: 34.4 PG (ref 24–34)
MCHC RBC AUTO-ENTMCNC: 36.3 G/DL (ref 31–37)
MCV RBC AUTO: 94.6 FL (ref 78–100)
MONOCYTES # BLD: 0.7 K/UL (ref 0.05–1.2)
MONOCYTES NFR BLD: 9 % (ref 3–10)
NEUTS SEG # BLD: 5.8 K/UL (ref 1.8–8)
NEUTS SEG NFR BLD: 74.2 % (ref 40–73)
NRBC # BLD: 0 K/UL (ref 0–0.01)
NRBC BLD-RTO: 0 PER 100 WBC
PLATELET # BLD AUTO: 205 K/UL (ref 135–420)
PMV BLD AUTO: 10.1 FL (ref 9.2–11.8)
POTASSIUM SERPL-SCNC: 4 MMOL/L (ref 3.5–5.5)
PROT SERPL-MCNC: 6.5 G/DL (ref 6.4–8.2)
RBC # BLD AUTO: 2.94 M/UL (ref 4.35–5.65)
SODIUM SERPL-SCNC: 131 MMOL/L (ref 136–145)
SODIUM SERPL-SCNC: 131 MMOL/L (ref 136–145)
SODIUM SERPL-SCNC: 133 MMOL/L (ref 136–145)
SODIUM SERPL-SCNC: 133 MMOL/L (ref 136–145)
WBC # BLD AUTO: 7.8 K/UL (ref 4.6–13.2)

## 2025-01-12 PROCEDURE — 87506 IADNA-DNA/RNA PROBE TQ 6-11: CPT

## 2025-01-12 PROCEDURE — 6360000002 HC RX W HCPCS

## 2025-01-12 PROCEDURE — 80053 COMPREHEN METABOLIC PANEL: CPT

## 2025-01-12 PROCEDURE — 36415 COLL VENOUS BLD VENIPUNCTURE: CPT

## 2025-01-12 PROCEDURE — 2580000003 HC RX 258

## 2025-01-12 PROCEDURE — 2140000001 HC CVICU INTERMEDIATE R&B

## 2025-01-12 PROCEDURE — 2500000003 HC RX 250 WO HCPCS

## 2025-01-12 PROCEDURE — 84154 ASSAY OF PSA FREE: CPT

## 2025-01-12 PROCEDURE — 84153 ASSAY OF PSA TOTAL: CPT

## 2025-01-12 PROCEDURE — 87798 DETECT AGENT NOS DNA AMP: CPT

## 2025-01-12 PROCEDURE — 85025 COMPLETE CBC W/AUTO DIFF WBC: CPT

## 2025-01-12 PROCEDURE — 82533 TOTAL CORTISOL: CPT

## 2025-01-12 PROCEDURE — 97165 OT EVAL LOW COMPLEX 30 MIN: CPT

## 2025-01-12 PROCEDURE — 84295 ASSAY OF SERUM SODIUM: CPT

## 2025-01-12 PROCEDURE — 6370000000 HC RX 637 (ALT 250 FOR IP)

## 2025-01-12 RX ORDER — ENOXAPARIN SODIUM 100 MG/ML
40 INJECTION SUBCUTANEOUS DAILY
Status: DISCONTINUED | OUTPATIENT
Start: 2025-01-13 | End: 2025-01-14 | Stop reason: HOSPADM

## 2025-01-12 RX ORDER — SODIUM CHLORIDE 9 MG/ML
INJECTION, SOLUTION INTRAVENOUS CONTINUOUS
Status: DISCONTINUED | OUTPATIENT
Start: 2025-01-12 | End: 2025-01-14 | Stop reason: HOSPADM

## 2025-01-12 RX ADMIN — SODIUM CHLORIDE: 9 INJECTION, SOLUTION INTRAVENOUS at 12:41

## 2025-01-12 RX ADMIN — SODIUM CHLORIDE 3000 MG: 900 INJECTION INTRAVENOUS at 01:22

## 2025-01-12 RX ADMIN — ENOXAPARIN SODIUM 30 MG: 100 INJECTION SUBCUTANEOUS at 08:54

## 2025-01-12 RX ADMIN — SODIUM CHLORIDE 3000 MG: 900 INJECTION INTRAVENOUS at 13:11

## 2025-01-12 RX ADMIN — ZOLPIDEM TARTRATE 10 MG: 5 TABLET, FILM COATED ORAL at 21:24

## 2025-01-12 RX ADMIN — SODIUM CHLORIDE 3000 MG: 900 INJECTION INTRAVENOUS at 19:31

## 2025-01-12 RX ADMIN — SODIUM CHLORIDE 3000 MG: 900 INJECTION INTRAVENOUS at 06:34

## 2025-01-12 RX ADMIN — SODIUM CHLORIDE, PRESERVATIVE FREE 10 ML: 5 INJECTION INTRAVENOUS at 08:50

## 2025-01-12 RX ADMIN — SODIUM CHLORIDE, PRESERVATIVE FREE 10 ML: 5 INJECTION INTRAVENOUS at 20:54

## 2025-01-12 ASSESSMENT — PAIN SCALES - GENERAL
PAINLEVEL_OUTOF10: 0

## 2025-01-12 NOTE — DISCHARGE INSTR - DIET
Good nutrition is important when healing from an illness, injury, or surgery.  Follow any nutrition recommendations given to you during your hospital stay.   If you were given an oral nutrition supplement while in the hospital, continue to take this supplement at home.  You can take it with meals, in-between meals, and/or before bedtime. These supplements can be purchased at most local grocery stores, pharmacies, and chain [email or web address].   If you have any questions about your diet or nutrition, call the hospital and ask for the dietitian.     walk before you eat (with health care provider’s approval).  Light or moderate physical activity can help you maintain muscle and increase your appetite.  Make Eating Enjoyable  Taking steps to make the experience enjoyable may help to increase your interest in eating and improve your appetite.  Strategies:  Eat with others whenever possible.  Include your favorite foods to make meals more enjoyable.  Try new foods.  Save your beverage for the end of the meal so that you have more room for food before you get full.  Add Calories to Your Meals and Snacks  Try adding calorie-dense foods so that each bite provides more nutrition.  Strategies  Drink milk, chocolate milk, soy milk, or smoothies instead of low-calorie beverages such as diet drinks or water.  Cook with milk or soy milk instead of water when making dishes such as hot cereal, cocoa, or pudding.  Add jelly, jam, honey, butter or margarine to bread and crackers. Add jam or fruit to ice cream and as a topping over cake.  Mix dried fruit, nuts, granola, honey, or dry cereal with yogurt or hot cereals.  Enjoy snacks such as milkshakes, smoothies, pudding, ice cream, or custard.  Blend a fruit smoothie of a banana, frozen berries, milk or soy milk, and 1 tablespoon nonfat powdered milk or protein powder.  Add Protein to Your Meals and Snacks  Choose at least one protein food at each meal and snack to increase your daily intake.  Strategies  Add ¼ cup nonfat dry milk powder or protein powder to make a high-protein milk to drink or to use in recipes that call for milk. Vanilla or peppermint extract or unsweetened cocoa powder could help to boost the flavor.  Add hard-cooked eggs, leftover meat, grated cheese, canned beans or tofu to noodles, rice, salads, sandwiches, soups, casseroles, pasta, tuna and other mixed dishes.  Add powdered milk or protein powder to hot cereals, meatloaf, casseroles, scrambled eggs, sauces, cream soups, and shakes.  Add beans and lentils

## 2025-01-12 NOTE — PLAN OF CARE
Problem: Discharge Planning  Goal: Discharge to home or other facility with appropriate resources  Outcome: Progressing   Identify barriers to discharge with patient and caregiver   Arrange for needed discharge resources and transportation as appropriate   Identify discharge learning needs (meds, wound care, etc)     Problem: Pain  Goal: Verbalizes/displays adequate comfort level or baseline comfort level  Outcome: Progressing   Assess pain using appropriate pain scale   Administer analgesics based on type and severity of pain and evaluate response   Implement non-pharmacological measures as appropriate and evaluate response     Problem: Metabolic/Fluid and Electrolytes - Adult  Goal: Electrolytes maintained within normal limits  Outcome: Progressing   Monitor labs and assess patient for signs and symptoms of electrolyte imbalances   Administer electrolyte replacement as ordered   Monitor response to electrolyte replacements, including repeat lab results as appropriate

## 2025-01-12 NOTE — CONSULTS
Comprehensive Nutrition Assessment    Type and Reason for Visit:  Initial, Consult    Nutrition Recommendations/Plan:   Continue current diet as tolerated.  Order Nepro with Carb Steady (each provides 425 kcal, 19g protein) TID  Daily wts.  Continue to monitor tolerance of PO, compliance of oral supplements, weight, labs, and plan of care during admission.     Malnutrition Assessment:  Malnutrition Status:  Severe malnutrition (01/12/25 1456)    Context:  Acute Illness     Findings of the 6 clinical characteristics of malnutrition:  Energy Intake:  50% or less of estimated energy requirements for 5 or more days  Weight Loss:  Greater than 7.5% over 3 months     Body Fat Loss:  Mild body fat loss Buccal region, Orbital   Muscle Mass Loss:  Mild muscle mass loss Temples (temporalis)  Fluid Accumulation:  No fluid accumulation     Strength:  Not Performed    Nutrition History and Allergies:      Past Medical History:   Diagnosis Date    Arthritis     Hemochromatosis     Ill-defined condition     GOUT     Met with pt at bedside who reported appetite poor x3 weeks PTA (consuming <50% of estimated needs) related to lack of appetite/no taste. Reported noticing a decrease in strength and ability to get around. Also noticed dizziness with standing (symptom of malnutrition).    Weight Hx: pt reported recently losing !25 lbs. Per EMR, wt change: 26 lb (12%) x 4 months - significant.   Wt Readings from Last 10 Encounters:   01/10/25 87.1 kg (192 lb)   09/19/24 98.9 kg (218 lb)   08/22/24 99.7 kg (219 lb 12.8 oz)   04/18/24 103.6 kg (228 lb 6.4 oz)   03/11/24 100.7 kg (222 lb)   02/07/24 99.8 kg (220 lb)   06/20/23 95.3 kg (210 lb)   11/24/21 98.4 kg (217 lb)     NFPE: NFPE detailed above. Food Allergies: NKFA    Nutrition Assessment:    Admitted for hyponatemia. Pt seen for - Consult: General Nutrition Management - weight loss/malnutrition . Pt drinking Ensure on house, consumed ~50% of breakfast tray this AM. Provided

## 2025-01-12 NOTE — DISCHARGE INSTRUCTIONS
A high-calorie, high-protein diet has been recommended to you. Your registered dietitian nutritionist (RDN) may have recommended this diet because you are having difficulty eating enough calories throughout the day, you have lost weight, and/or you need to add protein to your diet.  Sometimes you may not feel like eating, even if you know the importance of good nutrition. The recommendations in this handout can help you with the following:  Regaining your strength and energy  Keeping your body healthy  Healing and recovering from surgery or illness and fighting infection    Tips  Schedule Your Meals and Snacks  Several small meals and snacks are often better tolerated and digested than large meals.  Strategies  Plan to eat 3 meals and 3 snacks daily.  [place] with timing meals to find out when you have a larger appetite.  Appetite may be greatest in the morning after not eating all night so you may prefer to eat your larger meals and snacks in the morning and at lunch.  Breakfast-type foods are often better tolerated so eat foods such as eggs, pancakes, waffles and cereal for any meal or snack.  Carry snacks with you so you are prepared to eat every 2 to 3 hours.  Determine what works best for you if your body’s cues for feeling hungry or full are not working.  Eat a small meal or snack even if you don’t feel hungry.  Set a timer to remind you when it is time to eat.  Take a walk before you eat (with health care provider’s approval).  Light or moderate physical activity can help you maintain muscle and increase your appetite.  Make Eating Enjoyable  Taking steps to make the experience enjoyable may help to increase your interest in eating and improve your appetite.  Strategies:  Eat with others whenever possible.  Include your favorite foods to make meals more enjoyable.  Try new foods.  Save your beverage for the end of the meal so that you have more room for food before you get full.  Add Calories to Your Meals

## 2025-01-12 NOTE — PROGRESS NOTES
Pharmacist Review and Automatic Dose Adjustment of Prophylactic Enoxaparin    *Review reason for admission/hospital problem list*    The reviewing pharmacist has made an adjustment to the ordered enoxaparin dose or converted to UFH per the approved [facility] protocol and table as identified below.        Lauri Hoover is a 74 y.o. male.     Recent Labs     01/10/25  1901 01/11/25  1040 01/12/25  0556   CREATININE 2.88* 2.24* 1.93*       Estimated Creatinine Clearance: 36 mL/min (A) (based on SCr of 1.93 mg/dL (H)).    Height:   Ht Readings from Last 1 Encounters:   01/10/25 1.803 m (5' 11\")     Weight:  Wt Readings from Last 1 Encounters:   01/10/25 87.1 kg (192 lb)               Plan: Based upon the patient's weight and renal function, the ordered enoxaparin dose of 30mg daily has been changed/converted to 40mg daily      Thank you,  MINESH DILLON, [facility]

## 2025-01-12 NOTE — PROGRESS NOTES
[facility] Medicine  DAILY PROGRESS NOTE      Patient:    Lauri Hoover , 74 y.o. male   MRN:  [id number]  Room/Bed:  2308/01  Admission Date:   1/10/2025  Code status:  DNR    Reason for Admission: hyponatremia  Barriers to Discharge: hyponatremia, IV antibiotics  Anticipated Date of Discharge: 1/14/25      ASSESSMENT AND PLAN:   Lauri Hoover is a 74 y.o. year old male with PMH of gout, hypertension, right lumbar radiculopathy, stenosis of lumbar region with neurogenic claudication admitted for Hyponatremia [E87.1].         Hyponatremia in the setting of diarrhea  -reports diarrhea has resolved  -Sodium 124 > 131 > 130 > 131  -nephrology on board  -CT abd/pelvis did incidentally show a lung infiltrate, so a lung cancer paraneoplastic hyponatremia is possible.   Plan:  -Follow-up urine osmolality, urine sodium  -Urine Legionella and strep pneumonia  -q4h Na checks  -Appreciate nephro recs  -Restart NS at 75ml/hr  -Does not like Ensure clears and diarrhea resolved so switch to regular Ensures  -Fluid restrict 1800ml daily    Unintentional weight loss  -Patient with 25 pound weight loss within the last 2 months  -CT C/A/P without masses  Plan:  -Nutrition consulted  -Follow up PSA  -Confirm colonoscopy UTD (per patient, he is)     Left lower lobe pneumonia  -VSS, on RA  -Left-sided crackles on auscultation  -IV unasyn started 1/11  -Urine strep/legionella negative  Plan:  -Continue IV unasyn  -Switch to PO abx prior to discharge  -Supplemental O2 prn     Acute kidney injury  CKD stage IV   -GFR 22, creatinine 2.88 (1.7 in 1/2023) > 1.9 this AM  Plan:  -f/u nephrology reccs  -daily CMP  -renal diet      Stenosis of lumbar region with neurogenic claudication  Right lumbar radiculopathy  -Followed by [facility].  Last visit in 9/2024.  Per Ortho note, patient failed gabapentin, Mobic, and Celebrex. Patient also with intolerance to Lyrica (sedation).  Etodolac was prescribed at

## 2025-01-12 NOTE — PROGRESS NOTES
Occupational Therapy  OCCUPATIONAL THERAPY EVALUATION/DISCHARGE    Patient: Lauri Hoover (74 y.o. male)  Date: 1/12/2025  Primary Diagnosis: Hyponatremia [E87.1]  TAN (acute kidney injury) (HCC) [N17.9]   Precautions: Fall Risk  PLOF: Pt lives alone in a story home, Marissa in all self care tasks      ASSESSMENT AND RECOMMENDATIONS:  Pt cleared for OT evaluation and pt agreeable to participate. Pt presents at baseline functioning for self care tasks. Marissa for bed mobility, functional transfers, functional mobility and toileting. No concerns regarding taking care of himself at home. No acute deficits warranting continued occupational therapy at current level of care. Pt left lying comfortably in bed with all needs met and call bell within reach.     Further Equipment Recommendations for Discharge: None    AMPAC: AM-PAC Inpatient Daily Activity Raw Score: 24    Current research shows that an AM-PAC score of 18 or greater is associated with a discharge to the patient's home setting.    This AMPAC score should be considered in conjunction with interdisciplinary team recommendations to determine the most appropriate discharge setting. Patient's social support, diagnosis, medical stability, and prior level of function should also be taken into consideration.     SUBJECTIVE:   Patient stated “I think I am going to take a little nap.”    OBJECTIVE DATA SUMMARY:     Past Medical History:   Diagnosis Date    Arthritis     Hemochromatosis     Ill-defined condition     GOUT     Past Surgical History:   Procedure Laterality Date    ORTHOPEDIC SURGERY      LEFT HAND     Home Situation:   Social/Functional History  Lives With: Alone  Type of Home: House  Home Layout: One level  Home Access: Stairs to enter with rails  Entrance Stairs - Number of Steps: 3  Bathroom Shower/Tub: Tub/Shower unit  Bathroom Toilet: Handicap height  Bathroom Equipment: None  Bathroom Accessibility: Accessible  Home Equipment: None  Prior Level of  Assist for ADLs: Independent  Prior Level of Assist for Homemaking: Independent  Prior Level of Assist for Transfers: Independent  []  Right hand dominant   []  Left hand dominant    Cognitive/Behavioral Status:  Orientation  Overall Orientation Status: Within Normal Limits  Orientation Level: Oriented X4  Cognition  Overall Cognitive Status: WFL    Skin: intact  Edema: none noted     Vision/Perceptual:    Vision  Vision: Within Functional Limits       Coordination: BUE  Coordination: Within functional limits    Balance:  Balance  Sitting: Intact  Standing: Intact    Strength: BUE  Strength: Within functional limits    Tone & Sensation: BUE  Tone: Normal  Sensation: Intact    Range of Motion: BUE  AROM: Within functional limits    Functional Mobility and Transfers for ADLs:  Bed Mobility:  Bed Mobility Training  Bed Mobility Training: Yes  Supine to Sit: Modified independent  Sit to Supine: Modified independent  Scooting: Modified independent  Transfers:  Transfer Training  Transfer Training: Yes  Sit to Stand: Modified independent  Stand to Sit: Modified independent    ADL Assessment:   Feeding: Modified independent   Grooming: Modified independent   UE Bathing: Modified independent   LE Bathing: Modified independent   UE Dressing: Modified independent   LE Dressing: Modified independent   Toileting: Modified independent   Functional Mobility: Modified independent     Pain:  Intensity Pre-treatment: 0/10   Intensity Post-treatment: 0/10  Scale: Numeric Rating Scale    Activity Tolerance:   Activity Tolerance: Patient Tolerated treatment well  Please refer to the flowsheet for vital signs taken during this treatment.    After treatment:   [] Patient left in no apparent distress sitting up in chair  [x] Patient left in no apparent distress in bed  [x] Call bell left within reach  [x] Nursing notified  [] Caregiver present  [] Bed alarm activated    COMMUNICATION/EDUCATION:   Patient Education  Education Given To:

## 2025-01-12 NOTE — PROGRESS NOTES
RENAL PROGRESS NOTE        Lauri Hoover         Assessment/Plan:   TAN on CKD 3B due to volume depletion. Improving to the baseline.   CKD 3 B is presumably due to htn. Urine sediment is bland and proteinuria on the dipstick is minimal.   Most likely acute hypotonic hyponatremia, hypovolemic in terms of the volume status.  Urine studies are consistent with the hypovolemia. Asymptomatic. Na is improving, reduce NS given improving oral intake. Coontinue to limit free water.  HTN, bp is soft, continue to  hold antihtn.    CAP, on abx.                                                                                                                                  Subjective:  Patient complaints off: Feels better.  No SOB/CP/N/V. Appetite is better, drinking ensure.       Patient Active Problem List   Diagnosis    HNP (herniated nucleus pulposus), lumbar    Neuropathy    Neuritis    AMS (altered mental status)    Seizure (HCC)    Infected abrasion of third toe    Acute hematogenous osteomyelitis of right foot    Right groin pain    Right lumbar radiculopathy    Lumbar spondylosis    Hyponatremia    Severe protein-calorie malnutrition (HCC)       Current Facility-Administered Medications   Medication Dose Route Frequency Provider Last Rate Last Admin    0.9 % sodium chloride infusion   IntraVENous Continuous Rebekah Garcia MD 75 mL/hr at 01/12/25 1241 New Bag at 01/12/25 1241    [START ON 1/13/2025] enoxaparin (LOVENOX) injection 40 mg  40 mg SubCUTAneous Daily Marci Deutsch MD        [Held by provider] allopurinol (ZYLOPRIM) tablet 300 mg  300 mg Oral Daily Cecily Tan DO   300 mg at 01/11/25 0917    [Held by provider] amLODIPine (NORVASC) tablet 5 mg  5 mg Oral Daily Cecily Tan DO   5 mg at 01/11/25 0917    [Held by provider] lisinopril (PRINIVIL;ZESTRIL) tablet 10 mg  10 mg Oral Daily Cecily Tan DO   10 mg at 01/11/25 0919    zolpidem (AMBIEN) tablet 10 mg  10 mg Oral Nightly PRN

## 2025-01-13 ENCOUNTER — APPOINTMENT (OUTPATIENT)
Facility: HOSPITAL | Age: 75
DRG: 640 | End: 2025-01-13
Payer: MEDICARE

## 2025-01-13 LAB
ALBUMIN SERPL-MCNC: 2.5 G/DL (ref 3.4–5)
ALBUMIN SERPL-MCNC: 2.6 G/DL (ref 3.4–5)
ALBUMIN/GLOB SERPL: 0.7 (ref 0.8–1.7)
ALBUMIN/GLOB SERPL: 0.8 (ref 0.8–1.7)
ALP SERPL-CCNC: 266 U/L (ref 45–117)
ALP SERPL-CCNC: 294 U/L (ref 45–117)
ALT SERPL-CCNC: 42 U/L (ref 16–61)
ALT SERPL-CCNC: 47 U/L (ref 16–61)
ANION GAP SERPL CALC-SCNC: 8 MMOL/L (ref 3–18)
ANION GAP SERPL CALC-SCNC: 9 MMOL/L (ref 3–18)
AST SERPL-CCNC: 43 U/L (ref 10–38)
AST SERPL-CCNC: 49 U/L (ref 10–38)
BASOPHILS # BLD: 0.02 K/UL (ref 0–0.1)
BASOPHILS NFR BLD: 0.3 % (ref 0–2)
BILIRUB SERPL-MCNC: 0.7 MG/DL (ref 0.2–1)
BILIRUB SERPL-MCNC: 1 MG/DL (ref 0.2–1)
BUN SERPL-MCNC: 27 MG/DL (ref 7–18)
BUN SERPL-MCNC: 28 MG/DL (ref 7–18)
BUN/CREAT SERPL: 15 (ref 12–20)
BUN/CREAT SERPL: 16 (ref 12–20)
C COLI+JEJUNI TUF STL QL NAA+PROBE: NEGATIVE
CALCIUM SERPL-MCNC: 8.2 MG/DL (ref 8.5–10.1)
CALCIUM SERPL-MCNC: 8.4 MG/DL (ref 8.5–10.1)
CHLORIDE SERPL-SCNC: 102 MMOL/L (ref 100–111)
CHLORIDE SERPL-SCNC: 103 MMOL/L (ref 100–111)
CO2 SERPL-SCNC: 20 MMOL/L (ref 21–32)
CO2 SERPL-SCNC: 20 MMOL/L (ref 21–32)
CREAT SERPL-MCNC: 1.79 MG/DL (ref 0.6–1.3)
CREAT SERPL-MCNC: 1.82 MG/DL (ref 0.6–1.3)
DIFFERENTIAL METHOD BLD: ABNORMAL
EC STX1+STX2 GENES STL QL NAA+PROBE: NEGATIVE
EOSINOPHIL # BLD: 0.56 K/UL (ref 0–0.4)
EOSINOPHIL NFR BLD: 8.2 % (ref 0–5)
ERYTHROCYTE [DISTWIDTH] IN BLOOD BY AUTOMATED COUNT: 14.2 % (ref 11.6–14.5)
ETEC ELTA+ESTB GENES STL QL NAA+PROBE: NEGATIVE
GLOBULIN SER CALC-MCNC: 3.3 G/DL (ref 2–4)
GLOBULIN SER CALC-MCNC: 3.4 G/DL (ref 2–4)
GLUCOSE SERPL-MCNC: 101 MG/DL (ref 74–99)
GLUCOSE SERPL-MCNC: 109 MG/DL (ref 74–99)
HCT VFR BLD AUTO: 28.9 % (ref 36–48)
HGB BLD-MCNC: 10.2 G/DL (ref 13–16)
IMM GRANULOCYTES # BLD AUTO: 0.05 K/UL (ref 0–0.04)
IMM GRANULOCYTES NFR BLD AUTO: 0.7 % (ref 0–0.5)
LYMPHOCYTES # BLD: 0.76 K/UL (ref 0.9–3.6)
LYMPHOCYTES NFR BLD: 11.1 % (ref 21–52)
MCH RBC QN AUTO: 33.7 PG (ref 24–34)
MCHC RBC AUTO-ENTMCNC: 35.3 G/DL (ref 31–37)
MCV RBC AUTO: 95.4 FL (ref 78–100)
MONOCYTES # BLD: 0.41 K/UL (ref 0.05–1.2)
MONOCYTES NFR BLD: 6 % (ref 3–10)
NEUTS SEG # BLD: 5.03 K/UL (ref 1.8–8)
NEUTS SEG NFR BLD: 73.7 % (ref 40–73)
NRBC # BLD: 0 K/UL (ref 0–0.01)
NRBC BLD-RTO: 0 PER 100 WBC
P SHIGELLOIDES DNA STL QL NAA+PROBE: NEGATIVE
PLATELET # BLD AUTO: 187 K/UL (ref 135–420)
PMV BLD AUTO: 10.2 FL (ref 9.2–11.8)
POTASSIUM SERPL-SCNC: 3.7 MMOL/L (ref 3.5–5.5)
POTASSIUM SERPL-SCNC: 3.8 MMOL/L (ref 3.5–5.5)
PROT SERPL-MCNC: 5.9 G/DL (ref 6.4–8.2)
PROT SERPL-MCNC: 5.9 G/DL (ref 6.4–8.2)
RBC # BLD AUTO: 3.03 M/UL (ref 4.35–5.65)
SALMONELLA SP SPAO STL QL NAA+PROBE: NEGATIVE
SHIGELLA SP+EIEC IPAH STL QL NAA+PROBE: NEGATIVE
SODIUM SERPL-SCNC: 130 MMOL/L (ref 136–145)
SODIUM SERPL-SCNC: 132 MMOL/L (ref 136–145)
V CHOL+PARA+VUL DNA STL QL NAA+NON-PROBE: NEGATIVE
WBC # BLD AUTO: 6.8 K/UL (ref 4.6–13.2)
Y ENTEROCOL DNA STL QL NAA+NON-PROBE: NEGATIVE

## 2025-01-13 PROCEDURE — 76770 US EXAM ABDO BACK WALL COMP: CPT

## 2025-01-13 PROCEDURE — 2140000001 HC CVICU INTERMEDIATE R&B

## 2025-01-13 PROCEDURE — 94761 N-INVAS EAR/PLS OXIMETRY MLT: CPT

## 2025-01-13 PROCEDURE — 85025 COMPLETE CBC W/AUTO DIFF WBC: CPT

## 2025-01-13 PROCEDURE — 6360000002 HC RX W HCPCS

## 2025-01-13 PROCEDURE — 36415 COLL VENOUS BLD VENIPUNCTURE: CPT

## 2025-01-13 PROCEDURE — 6370000000 HC RX 637 (ALT 250 FOR IP)

## 2025-01-13 PROCEDURE — 6360000002 HC RX W HCPCS: Performed by: FAMILY MEDICINE

## 2025-01-13 PROCEDURE — 2580000003 HC RX 258: Performed by: INTERNAL MEDICINE

## 2025-01-13 PROCEDURE — 80053 COMPREHEN METABOLIC PANEL: CPT

## 2025-01-13 PROCEDURE — 2580000003 HC RX 258

## 2025-01-13 PROCEDURE — 2500000003 HC RX 250 WO HCPCS

## 2025-01-13 RX ORDER — DIPHENHYDRAMINE HCL 25 MG
25 CAPSULE ORAL ONCE
Status: COMPLETED | OUTPATIENT
Start: 2025-01-13 | End: 2025-01-13

## 2025-01-13 RX ORDER — AZITHROMYCIN 250 MG/1
500 TABLET, FILM COATED ORAL DAILY
Status: COMPLETED | OUTPATIENT
Start: 2025-01-13 | End: 2025-01-13

## 2025-01-13 RX ORDER — SODIUM CHLORIDE 9 MG/ML
INJECTION, SOLUTION INTRAVENOUS CONTINUOUS
Status: DISCONTINUED | OUTPATIENT
Start: 2025-01-13 | End: 2025-01-13 | Stop reason: SDUPTHER

## 2025-01-13 RX ORDER — AZITHROMYCIN 250 MG/1
250 TABLET, FILM COATED ORAL DAILY
Status: DISCONTINUED | OUTPATIENT
Start: 2025-01-14 | End: 2025-01-14 | Stop reason: HOSPADM

## 2025-01-13 RX ADMIN — SODIUM CHLORIDE: 9 INJECTION, SOLUTION INTRAVENOUS at 05:07

## 2025-01-13 RX ADMIN — AZITHROMYCIN DIHYDRATE 500 MG: 250 TABLET ORAL at 13:18

## 2025-01-13 RX ADMIN — AMOXICILLIN AND CLAVULANATE POTASSIUM 1 TABLET: 875; 125 TABLET, FILM COATED ORAL at 13:18

## 2025-01-13 RX ADMIN — DIPHENHYDRAMINE HYDROCHLORIDE 25 MG: 25 CAPSULE ORAL at 08:38

## 2025-01-13 RX ADMIN — SODIUM CHLORIDE, PRESERVATIVE FREE 10 ML: 5 INJECTION INTRAVENOUS at 08:30

## 2025-01-13 RX ADMIN — SODIUM CHLORIDE 3000 MG: 900 INJECTION INTRAVENOUS at 00:56

## 2025-01-13 RX ADMIN — ZOLPIDEM TARTRATE 10 MG: 5 TABLET, FILM COATED ORAL at 21:40

## 2025-01-13 RX ADMIN — SODIUM CHLORIDE, PRESERVATIVE FREE 10 ML: 5 INJECTION INTRAVENOUS at 21:40

## 2025-01-13 RX ADMIN — SODIUM CHLORIDE 3000 MG: 900 INJECTION INTRAVENOUS at 06:54

## 2025-01-13 RX ADMIN — ENOXAPARIN SODIUM 40 MG: 100 INJECTION SUBCUTANEOUS at 08:24

## 2025-01-13 RX ADMIN — DIPHENHYDRAMINE HYDROCHLORIDE 25 MG: 25 CAPSULE ORAL at 21:40

## 2025-01-13 ASSESSMENT — PAIN SCALES - GENERAL
PAINLEVEL_OUTOF10: 0

## 2025-01-13 NOTE — PLAN OF CARE
Problem: Discharge Planning  Goal: Discharge to home or other facility with appropriate resources  Outcome: Progressing   Identify barriers to discharge with patient and caregiver   Identify discharge learning needs (meds, wound care, etc)     Problem: Pain  Goal: Verbalizes/displays adequate comfort level or baseline comfort level  Outcome: Progressing   Assess pain using appropriate pain scale   Administer analgesics based on type and severity of pain and evaluate response   Implement non-pharmacological measures as appropriate and evaluate response     Problem: Metabolic/Fluid and Electrolytes - Adult  Goal: Electrolytes maintained within normal limits  Outcome: Progressing   Monitor labs and assess patient for signs and symptoms of electrolyte imbalances   Administer electrolyte replacement as ordered   Monitor response to electrolyte replacements, including repeat lab results as appropriate

## 2025-01-13 NOTE — CARE COORDINATION
01/13/25 1520   Service Assessment   Patient Orientation Alert and Oriented   Cognition Alert   History Provided By Patient   Primary Caregiver Self   Support Systems Children;Family Members;Friends/Neighbors   Patient's Healthcare Decision Maker is: Legal Next of Kin   PCP Verified by CM Yes   Last Visit to PCP Within last 3 months   Prior Functional Level Independent in ADLs/IADLs   Current Functional Level Independent in ADLs/IADLs;Mobility   Can patient return to prior living arrangement Yes   Ability to make needs known: Good   Family able to assist with home care needs: Yes   Would you like for me to discuss the discharge plan with any other family members/significant others, and if so, who? Yes  (stepdaughter Rodrigue)   Financial Resources Medicare   Community Resources None   Social/Functional History   Lives With Alone   Type of Home House   Home Layout One level   Home Access Stairs to enter with rails   Entrance Stairs - Number of Steps 3   Bathroom Shower/Tub Tub/Shower unit   Bathroom Toilet Handicap height   Bathroom Equipment None   Home Equipment None   Prior Level of Assist for ADLs Independent   Prior Level of Assist for Homemaking Independent   Ambulation Assistance Independent   Prior Level of Assist for Transfers Independent   Mode of Transportation Car   Occupation Retired   Discharge Planning   Type of Residence House   Living Arrangements Alone   Current Services Prior To Admission None   Potential Assistance Needed N/A   DME Ordered? No   Potential Assistance Purchasing Medications No   Type of Home Care Services None   Patient expects to be discharged to: House   Services At/After Discharge   Transition of Care Consult (CM Consult) Discharge Planning   Services At/After Discharge None    Resource Information Provided? No   Mode of Transport at Discharge Other (see comment)  (stepdaughter)   Confirm Follow Up Transport Family   Condition of Participation: Discharge Planning   The

## 2025-01-13 NOTE — PROGRESS NOTES
Physical Therapy  PT order received and chart reviewed. Spoke with pt at bedside who endorses independence with functional mobility and gets up ad duane with in room, no mobility concerns. Will sign off. Thank you for this referral. Maye Storm, PT, DPT

## 2025-01-13 NOTE — CARE COORDINATION
01/13/25 1518   IMM Letter   IMM Letter given to Patient/Family/Significant other/Guardian/POA/by: Chanel Kingsley   IMM Letter date given: 01/13/25   IMM Letter time given: 1227       Medicare pt has received, reviewed, and signed 2nd IM letter informing them of their right to appeal the discharge.  Signed copy has been placed on pt bedside chart.        JADYN Treviño RN  Care Management

## 2025-01-13 NOTE — PROGRESS NOTES
[facility] Family Medicine  DAILY PROGRESS NOTE      Patient:    Lauri Hoover , 74 y.o. male   MRN:  [id number]  Room/Bed:  2308/01  Admission Date:   1/10/2025  Code status:  DNR    Reason for Admission: hyponatremia  Barriers to Discharge: hyponatremia, IV antibiotics  Anticipated Date of Discharge: 1/14/25      ASSESSMENT AND PLAN:   Lauri Hoover is a 74 y.o. year old male with PMH of gout, hypertension, right lumbar radiculopathy, stenosis of lumbar region with neurogenic claudication admitted for Hyponatremia [E87.1].      Systemic urticaria, mild to moderate allergic reaction  -noted diffuse urticaria on the legs, with pruritus on the back/shoulders began night of 1/12/2025  -mildly tachycardic, mild hypotension, denies SOB, nausea, other systemic symptoms  -possibly related to Unasyn  Plan:  -discontinue unasyn  -augmentin today x 5 days  -25 mg benadryl, again in 4-6 hours as needed based on assesment   -will follow closely, notify MD of any changes    Hyponatremia in the setting of diarrhea  -reports diarrhea has resolved  -Sodium 124 > 131 > 130 > 131>130  -nephrology on board  -CT abd/pelvis did incidentally show a lung infiltrate, so a lung cancer paraneoplastic hyponatremia is possible.   Plan:  -Follow-up urine osmolality, urine sodium  -Urine Legionella and strep pneumonia  -q4h Na checks  -Appreciate nephro recs  -NS at 50 ml/hr  -continue regular Ensures, patient is amenable  -Fluid restrict 1800ml daily  -enteric bacterial panel pending    Unintentional weight loss  -Patient with 25 pound weight loss within the last 2 months  -CT C/A/P without masses  Plan:  -Nutrition consulted  -PSA pending  -Confirm colonoscopy UTD (per patient, he is)     Left lower lobe pneumonia  -VSS, on RA  -Left-sided crackles on auscultation  -IV unasyn started 1/11-1/13 discontinue due to mild-moderate allergic reaction  -Urine strep/legionella negative  Plan:  -discontinue Unasyn out of concern for systemic  - 20      Est, Glom Filt Rate 40 (L) >60 ml/min/1.73m2    Calcium 8.2 (L) 8.5 - 10.1 MG/DL    Total Bilirubin 1.0 0.2 - 1.0 MG/DL    ALT 47 16 - 61 U/L    AST 49 (H) 10 - 38 U/L    Alk Phosphatase 294 (H) 45 - 117 U/L    Total Protein 5.9 (L) 6.4 - 8.2 g/dL    Albumin 2.6 (L) 3.4 - 5.0 g/dL    Globulin 3.3 2.0 - 4.0 g/dL    Albumin/Globulin Ratio 0.8 0.8 - 1.7     CBC with Auto Differential    Collection Time: 01/13/25  3:43 AM   Result Value Ref Range    WBC 6.8 4.6 - 13.2 K/uL    RBC 3.03 (L) 4.35 - 5.65 M/uL    Hemoglobin 10.2 (L) 13.0 - 16.0 g/dL    Hematocrit 28.9 (L) 36.0 - 48.0 %    MCV 95.4 78.0 - 100.0 FL    MCH 33.7 24.0 - 34.0 PG    MCHC 35.3 31.0 - 37.0 g/dL    RDW 14.2 11.6 - 14.5 %    Platelets 187 135 - 420 K/uL    MPV 10.2 9.2 - 11.8 FL    Nucleated RBCs 0.0 0  WBC    nRBC 0.00 0.00 - 0.01 K/uL    Neutrophils % 73.7 (H) 40.0 - 73.0 %    Lymphocytes % 11.1 (L) 21.0 - 52.0 %    Monocytes % 6.0 3.0 - 10.0 %    Eosinophils % 8.2 (H) 0.0 - 5.0 %    Basophils % 0.3 0.0 - 2.0 %    Immature Granulocytes % 0.7 (H) 0.0 - 0.5 %    Neutrophils Absolute 5.03 1.80 - 8.00 K/UL    Lymphocytes Absolute 0.76 (L) 0.90 - 3.60 K/UL    Monocytes Absolute 0.41 0.05 - 1.20 K/UL    Eosinophils Absolute 0.56 (H) 0.00 - 0.40 K/UL    Basophils Absolute 0.02 0.00 - 0.10 K/UL    Immature Granulocytes Absolute 0.05 (H) 0.00 - 0.04 K/UL    Differential Type AUTOMATED         IMAGING AND PROCEDURES (LAST 24 HOURS)  CT CHEST WO CONTRAST    Result Date: 1/11/2025  Stable appearance of lateral left lower lobe consolidation. Recommend follow-up imaging after appropriate treatment to assess resolution. No other acute findings in the chest. _______________ Electronically signed by Terrie Art    CT ABDOMEN PELVIS WO CONTRAST Additional Contrast? None    Result Date: 1/10/2025  1.  Left lower lobe infiltrates. Follow-up to resolution is recommended. Electronically signed by Delvin Chavez    XR CHEST (2 VW)    Result Date:

## 2025-01-13 NOTE — PROGRESS NOTES
[facility] Family Medicine  POST-ROUNDING NOTE    Assessment & Plan:    -Discontinued IV Unasyn (suspected mild-moderate systemic allergic reaction) and switched to PO Augmentin to finish out 7-day course.  Will also start 5-day course of azithromycin.  -Still investigating unknown cause of 25 pound unintended weight loss.  Noted persistent left lower lobe consolidation. renal ultrasound pending.  Will elucidate date of patient's most recent colonoscopy. May need repeat colonoscopy/EGD ID outpatient. PSA pending    The above patient and plan were discussed with my supervising physician.     See daily progress note for full assessment/plan.      Bakari Silva MD, PGY-1  [facility] Family Medicine  1/13/2025, 4:23 PM

## 2025-01-13 NOTE — PLAN OF CARE
Problem: Discharge Planning  Goal: Discharge to home or other facility with appropriate resources  1/13/2025 1024 by Angeles Rausch, RN  Outcome: Progressing  Flowsheets (Taken 1/13/2025 0800)  Discharge to home or other facility with appropriate resources: Identify barriers to discharge with patient and caregiver     Problem: Pain  Goal: Verbalizes/displays adequate comfort level or baseline comfort level  1/13/2025 1024 by Angeles Rausch, RN  Outcome: Progressing     Problem: Metabolic/Fluid and Electrolytes - Adult  Goal: Electrolytes maintained within normal limits  1/13/2025 1024 by Angeles Rausch, RN  Outcome: Progressing  Flowsheets (Taken 1/13/2025 0800)  Electrolytes maintained within normal limits: Monitor labs and assess patient for signs and symptoms of electrolyte imbalances     Problem: Metabolic/Fluid and Electrolytes - Adult  Goal: Hemodynamic stability and optimal renal function maintained  Outcome: Progressing  Flowsheets (Taken 1/13/2025 0800)  Hemodynamic stability and optimal renal function maintained: Monitor labs and assess for signs and symptoms of volume excess or deficit

## 2025-01-13 NOTE — PROGRESS NOTES
0830 Patient complaining of skin itchiness. Noted rashes all over skin (arms, legs, back, abdomen). Informed [facility].

## 2025-01-14 VITALS
SYSTOLIC BLOOD PRESSURE: 118 MMHG | HEIGHT: 71 IN | DIASTOLIC BLOOD PRESSURE: 61 MMHG | WEIGHT: 192 LBS | RESPIRATION RATE: 19 BRPM | TEMPERATURE: 97.5 F | HEART RATE: 86 BPM | OXYGEN SATURATION: 99 % | BODY MASS INDEX: 26.88 KG/M2

## 2025-01-14 LAB
ALBUMIN SERPL-MCNC: 2.5 G/DL (ref 3.4–5)
ALBUMIN/GLOB SERPL: 0.8 (ref 0.8–1.7)
ALP SERPL-CCNC: 273 U/L (ref 45–117)
ALT SERPL-CCNC: 36 U/L (ref 16–61)
ANION GAP SERPL CALC-SCNC: 8 MMOL/L (ref 3–18)
AST SERPL-CCNC: 36 U/L (ref 10–38)
BASOPHILS # BLD: 0 K/UL (ref 0–0.1)
BASOPHILS NFR BLD: 0 % (ref 0–2)
BILIRUB SERPL-MCNC: 0.5 MG/DL (ref 0.2–1)
BUN SERPL-MCNC: 25 MG/DL (ref 7–18)
BUN/CREAT SERPL: 15 (ref 12–20)
CALCIUM SERPL-MCNC: 8.3 MG/DL (ref 8.5–10.1)
CHLORIDE SERPL-SCNC: 104 MMOL/L (ref 100–111)
CO2 SERPL-SCNC: 20 MMOL/L (ref 21–32)
CREAT SERPL-MCNC: 1.71 MG/DL (ref 0.6–1.3)
DIFFERENTIAL METHOD BLD: ABNORMAL
EOSINOPHIL # BLD: 1.3 K/UL (ref 0–0.4)
EOSINOPHIL NFR BLD: 16 % (ref 0–5)
ERYTHROCYTE [DISTWIDTH] IN BLOOD BY AUTOMATED COUNT: 14.3 % (ref 11.6–14.5)
GLOBULIN SER CALC-MCNC: 3.1 G/DL (ref 2–4)
GLUCOSE SERPL-MCNC: 90 MG/DL (ref 74–99)
HCT VFR BLD AUTO: 26 % (ref 36–48)
HGB BLD-MCNC: 8.9 G/DL (ref 13–16)
IMM GRANULOCYTES # BLD AUTO: 0 K/UL (ref 0–0.04)
IMM GRANULOCYTES NFR BLD AUTO: 0 % (ref 0–0.5)
LYMPHOCYTES # BLD: 1.13 K/UL (ref 0.9–3.6)
LYMPHOCYTES NFR BLD: 14 % (ref 21–52)
MCH RBC QN AUTO: 33.2 PG (ref 24–34)
MCHC RBC AUTO-ENTMCNC: 34.2 G/DL (ref 31–37)
MCV RBC AUTO: 97 FL (ref 78–100)
MONOCYTES # BLD: 0.24 K/UL (ref 0.05–1.2)
MONOCYTES NFR BLD: 3 % (ref 3–10)
NEUTS SEG # BLD: 5.43 K/UL (ref 1.8–8)
NEUTS SEG NFR BLD: 67 % (ref 40–73)
NRBC # BLD: 0 K/UL (ref 0–0.01)
NRBC BLD-RTO: 0 PER 100 WBC
PLATELET # BLD AUTO: 204 K/UL (ref 135–420)
PLATELET COMMENT: ABNORMAL
PMV BLD AUTO: 10.2 FL (ref 9.2–11.8)
POTASSIUM SERPL-SCNC: 3.9 MMOL/L (ref 3.5–5.5)
PROT SERPL-MCNC: 5.6 G/DL (ref 6.4–8.2)
PSA FREE MFR SERPL: 31.7 %
PSA FREE SERPL-MCNC: 0.19 NG/ML
PSA SERPL-MCNC: 0.6 NG/ML (ref 0–4)
RBC # BLD AUTO: 2.68 M/UL (ref 4.35–5.65)
RBC MORPH BLD: ABNORMAL
SODIUM SERPL-SCNC: 132 MMOL/L (ref 136–145)
WBC # BLD AUTO: 8.1 K/UL (ref 4.6–13.2)

## 2025-01-14 PROCEDURE — 82378 CARCINOEMBRYONIC ANTIGEN: CPT

## 2025-01-14 PROCEDURE — 2500000003 HC RX 250 WO HCPCS

## 2025-01-14 PROCEDURE — 36415 COLL VENOUS BLD VENIPUNCTURE: CPT

## 2025-01-14 PROCEDURE — 6360000002 HC RX W HCPCS: Performed by: FAMILY MEDICINE

## 2025-01-14 PROCEDURE — 6370000000 HC RX 637 (ALT 250 FOR IP)

## 2025-01-14 PROCEDURE — 82105 ALPHA-FETOPROTEIN SERUM: CPT

## 2025-01-14 PROCEDURE — 85025 COMPLETE CBC W/AUTO DIFF WBC: CPT

## 2025-01-14 PROCEDURE — 2580000003 HC RX 258: Performed by: INTERNAL MEDICINE

## 2025-01-14 PROCEDURE — 80053 COMPREHEN METABOLIC PANEL: CPT

## 2025-01-14 PROCEDURE — 94761 N-INVAS EAR/PLS OXIMETRY MLT: CPT

## 2025-01-14 RX ORDER — AZITHROMYCIN 250 MG/1
250 TABLET, FILM COATED ORAL DAILY
Qty: 3 TABLET | Refills: 0 | Status: SHIPPED | OUTPATIENT
Start: 2025-01-15 | End: 2025-01-18

## 2025-01-14 RX ORDER — CEFDINIR 300 MG/1
300 CAPSULE ORAL 2 TIMES DAILY
Status: DISCONTINUED | OUTPATIENT
Start: 2025-01-14 | End: 2025-01-14 | Stop reason: HOSPADM

## 2025-01-14 RX ORDER — CEFDINIR 300 MG/1
300 CAPSULE ORAL 2 TIMES DAILY
Qty: 9 CAPSULE | Refills: 0 | Status: SHIPPED | OUTPATIENT
Start: 2025-01-14 | End: 2025-01-18

## 2025-01-14 RX ADMIN — SODIUM CHLORIDE: 9 INJECTION, SOLUTION INTRAVENOUS at 07:30

## 2025-01-14 RX ADMIN — ALLOPURINOL 300 MG: 100 TABLET ORAL at 09:47

## 2025-01-14 RX ADMIN — SODIUM CHLORIDE, PRESERVATIVE FREE 10 ML: 5 INJECTION INTRAVENOUS at 09:48

## 2025-01-14 RX ADMIN — ENOXAPARIN SODIUM 40 MG: 100 INJECTION SUBCUTANEOUS at 09:47

## 2025-01-14 RX ADMIN — AZITHROMYCIN DIHYDRATE 250 MG: 250 TABLET ORAL at 13:19

## 2025-01-14 RX ADMIN — CEFDINIR 300 MG: 300 CAPSULE ORAL at 09:47

## 2025-01-14 ASSESSMENT — PAIN SCALES - GENERAL
PAINLEVEL_OUTOF10: 0
PAINLEVEL_OUTOF10: 0

## 2025-01-14 NOTE — DISCHARGE SUMMARY
Prominent diffuse disc bulge. Central annular fissure.  Bilateral facet arthropathy. Epidural lipomatosis. Moderate thecal sac  narrowing, moderate left and mild right foraminal narrowing.    -L3-4: Minimal retrolisthesis. Diffuse disc bulge. Epidural lipomatosis.  Bilateral facet arthritis with some thickening of the ligamentum flavum.  Moderate thecal sac narrowing predominantly due to epidural lipomatosis. Mild  bilateral foraminal narrowing, left worse than right.    -L4-5: Retrolisthesis. Diffuse disc bulge. Bilateral facet arthritis with  thickening of the ligamentum flavum. Epidural lipomatosis. Severe thecal sac  narrowing, predominantly due to extensive epidural lipomatosis. Moderate right  and mild left foraminal narrowing.    -L5-S1: Prominent diffuse disc bulge. Central annular fissure. Herniated disc  material in the right foraminal zone. Bilateral facet arthritis with bony  hypertrophy and thickening of the ligamentum flavum. Epidural lipomatosis.  Severe thecal sac narrowing related almost entirely to epidural lipomatosis.  Severe right foraminal narrowing and moderate left foraminal narrowing.    Impression  Severe right foraminal stenosis at L5-S1, multifactorial as above and similar  since 2018.    Moderate left foraminal narrowing at L2-L3 and moderate right foraminal  narrowing at L4-L5.    Extensive epidural lipomatosis in the lumbar spine which results in moderate to  severe narrowing of the thecal sac at each level from L2-S1, progressed from  prior imaging in 2018.    Chronic L1 and L4 superior endplate compression deformities.     CT Result (most recent):  CT CHEST WO CONTRAST 01/11/2025    Narrative  EXAM: CT CHEST WO CONTRAST.    CLINICAL INDICATION/HISTORY: lung consolidation.  -Additional: Left lower lobe consolidation seen on recent CT of the  abdomen/pelvis.    COMPARISON: Correlated with CT abdomen/pelvis January 10, 2025.    TECHNIQUE: Unenhanced CT imaging of the chest was  CAMPYLOBACTER SPECIES, DNA Negative        VIBRIO SPECIES, DNA Negative        ENTEROTOXIGEN E COLI, DNA Negative        SHIGA TOXIN PRODUCING, DNA Negative        SALMONELLA SPECIES, DNA Negative        P. SHIGELLOIDES, DNA Negative        Y. ENTEROCOLITICA, DNA Negative       Strep Pneumoniae Antigen [[id number]] Collected: 01/11/25 1845    Order Status: Canceled Specimen: Urine, clean catch     Legionella antigen, urine [[id number]] Collected: 01/11/25 1840    Order Status: Completed Specimen: Urine, random Updated: 01/11/25 1949     Legionella Antigen, Urine Negative       Strep Pneumoniae Antigen [[id number]] Collected: 01/11/25 1840    Order Status: Completed Specimen: Urine, random Updated: 01/11/25 1949     STREP PNEUMONIAE ANTIGEN, URINE Negative       Enteric Bact Panel, DNA [[id number]]     Order Status: Canceled Specimen: Stool     Legionella antigen, urine [[id number]]     Order Status: Canceled Specimen: Urine     Strep Pneumoniae Antigen [[id number]]     Order Status: Canceled Specimen: Urine, clean catch     Respiratory Panel, Molecular, with COVID-19 (Restricted: peds pts or suitable admitted adults) [[id number]] Collected: 01/10/25 1903    Order Status: Completed Specimen: Nasopharyngeal Updated: 01/10/25 2101     Adenovirus by PCR Not detected        Coronavirus 229E by PCR Not detected        Coronavirus HKU1 by PCR Not detected        Coronavirus NL63 by PCR Not detected        Coronavirus OC43 by PCR Not detected        SARS-CoV-2, PCR Not detected        Human Metapneumovirus by PCR Not detected        Rhinovirus Enterovirus PCR Not detected        Influenza A by PCR Not detected        Influenza B PCR Not detected        Parainfluenza 1 PCR Not detected        Parainfluenza 2 PCR Not detected        Parainfluenza 3 PCR Not detected        Parainfluenza 4 PCR Not detected        Respiratory Syncytial Virus by PCR Not detected        Bordetella parapertussis by PCR Not detected

## 2025-01-14 NOTE — PROGRESS NOTES
0767- Bedside and Verbal shift change report given to Stephany RN (oncoming nurse) by Akosua RN (offgoing nurse). Report included the following information SBAR, Intake/Output, MAR, Recent Results, and Cardiac Rhythm NSR .     1540- Patient was provided with discharge instructions and education. The patient verbalized understanding of the discharge information. Time for questions was provided. No questions voiced at this time.     1600- The patient was discharged home via Wheelchair with hospital transport.

## 2025-01-14 NOTE — PROGRESS NOTES
[facility]  DAILY PROGRESS NOTE      Patient:    Lauri Hoover , 74 y.o. male   MRN:  [id number]  Room/Bed:  2308/01  Admission Date:   1/10/2025  Code status:  DNR    Reason for Admission: hyponatremia  Barriers to Discharge: hyponatremia, IV antibiotics  Anticipated Date of Discharge: 1/14/25      ASSESSMENT AND PLAN:   Lauri Hoover is a 74 y.o. year old male with PMH of gout, hypertension, right lumbar radiculopathy, stenosis of lumbar region with neurogenic claudication admitted for Hyponatremia [E87.1].      Systemic urticaria, mild to moderate allergic reaction  -noted diffuse urticaria on the legs, with pruritus on the back/shoulders began night of 1/12/2025, worse today, now on face/ arms     -required another 25 mg benadryl overnight for pruritus     -denies n/v/d, SOB  -likely related to Unasyn/Augmentin  Plan:  -discontinue augmentin  -continue Azithromycin  -Cefdinir today x 4 more days to finish 7 day course  -25 mg benadryl, 4-6 hours as needed based on assesment   -will follow closely, notify MD of any changes    Hyponatremia in the setting of diarrhea  -reports diarrhea has resolved  -Sodium 124 > 131 > 130 > 131>130>132  -nephrology on board  -CT abd/pelvis did incidentally show a lung infiltrate, so a lung cancer paraneoplastic hyponatremia is possible.  -enteric bacteria negative   -Urine Legionella and strep pneumonia negative  Plan:  -norovirus pcr pending  -q4h Na checks  -Appreciate nephro recs  -NS at 50 ml/hr - held, will decide whether to d/c or continue pending next BMP  -continue regular Ensures, patient is amenable, adherent  -Fluid restrict 1800ml daily  -enteric bacterial panel pending    Unintentional weight loss  -Patient with 25 pound weight loss within the last 2 months  -CT C/A/P without masses  Plan:  -Nutrition consulted  -PSA negative  -bilateral renal US unremarkable  -colonoscopy due this year  -CT chest, stable consolidation  -CT has possible liver

## 2025-01-14 NOTE — PROGRESS NOTES
conducted an initial consultation and Spiritual Assessment for Lauri Hoover, who is a 74 y.o.,male. Patient’s Primary Language is: English.   According to the patient’s EMR Restorationism Affiliation is: Other.     The  provided the following Interventions:  Initiated a relationship of care and support  with patient in bed 2308.   Explored issues of malini, belief, spirituality and Anabaptist/ritual needs while hospitalized. Patient is not active in a local Latter-day setting and is hoping to be discharged this afternoon.  There is no advance directiveon file yet but patient was left with a copy of the advance directive document so he could talk with family about this.  Offered prayer and assurance of continued prayers on patients behalf.     The following outcomes were achieved:  Patient shared limited information about his medical narrative and spiritual journey/beliefs.  Patient processed feeling about current hospitalization.  Patient expressed gratitude for pastoral care visit.    Spiritual Health History and Assessment/Progress Note  [facility]    Initial Encounter, Crisis (iv-sa-eje), Follow up,  ,      Name: Lauri Hoover MRN: [id number]    Age: 74 y.o.     Sex: male   Language: English   Yarsanism: Other   Hyponatremia     Date: 1/14/2025            Total Time Calculated: 7 min              Spiritual Assessment began in [facility] 2 CV STEPDOWN        Referral/Consult From: Rounding   Encounter Overview/Reason: Initial Encounter, Crisis (iv-sa-eje)       Malini, Belief, Meaning:   Patient Other: is not connected to any spiritual group or community  Family/Friends No family/friends present      Importance and Influence:  Patient has no beliefs influential to healthcare decision-making identified during this visit  Family/Friends No family/friends present    Community:  Patient feels well-supported. Support system includes: Children  Family/Friends No family/friends present    Assessment

## 2025-01-14 NOTE — CARE COORDINATION
Discharge order noted for today.  Orders reviewed.  No needs identified at this time.  Met with pt.  He stated he brought himself to the hospital and his truck is at the ED parking lot.          MANSI TreviñoN RN  Care Management

## 2025-01-14 NOTE — PLAN OF CARE
Problem: Discharge Planning  Goal: Discharge to home or other facility with appropriate resources  Outcome: Progressing  Flowsheets (Taken 1/14/2025 0044)  Discharge to home or other facility with appropriate resources:   Identify barriers to discharge with patient and caregiver   Arrange for needed discharge resources and transportation as appropriate   Identify discharge learning needs (meds, wound care, etc)     Problem: Pain  Goal: Verbalizes/displays adequate comfort level or baseline comfort level  Outcome: Progressing  Flowsheets (Taken 1/14/2025 0044)  Verbalizes/displays adequate comfort level or baseline comfort level:   Encourage patient to monitor pain and request assistance   Assess pain using appropriate pain scale   Administer analgesics based on type and severity of pain and evaluate response   Implement non-pharmacological measures as appropriate and evaluate response     Problem: Metabolic/Fluid and Electrolytes - Adult  Goal: Electrolytes maintained within normal limits  Outcome: Progressing  Flowsheets (Taken 1/14/2025 0044)  Electrolytes maintained within normal limits:   Monitor labs and assess patient for signs and symptoms of electrolyte imbalances   Administer electrolyte replacement as ordered   Monitor response to electrolyte replacements, including repeat lab results as appropriate     Problem: Metabolic/Fluid and Electrolytes - Adult  Goal: Hemodynamic stability and optimal renal function maintained  Outcome: Progressing  Flowsheets (Taken 1/14/2025 0044)  Hemodynamic stability and optimal renal function maintained:   Monitor labs and assess for signs and symptoms of volume excess or deficit   Monitor intake, output and patient weight   Monitor urine specific gravity, serum osmolarity and serum sodium as indicated or ordered   Monitor response to interventions for patient's volume status, including labs, urine output, blood pressure (other measures as available)     Problem:

## 2025-01-14 NOTE — PLAN OF CARE
Problem: Discharge Planning  Goal: Discharge to home or other facility with appropriate resources  1/14/2025 1100 by Stephany Franco RN  Outcome: Progressing  Flowsheets (Taken 1/14/2025 0945)  Discharge to home or other facility with appropriate resources: Identify barriers to discharge with patient and caregiver  1/14/2025 0044 by Akosua Goins RN  Outcome: Progressing  Flowsheets (Taken 1/14/2025 0044)  Discharge to home or other facility with appropriate resources:   Identify barriers to discharge with patient and caregiver   Arrange for needed discharge resources and transportation as appropriate   Identify discharge learning needs (meds, wound care, etc)     Problem: Pain  Goal: Verbalizes/displays adequate comfort level or baseline comfort level  1/14/2025 1100 by Stephany Franco RN  Outcome: Progressing  Flowsheets (Taken 1/14/2025 0044 by Akosua Goins RN)  Verbalizes/displays adequate comfort level or baseline comfort level:   Encourage patient to monitor pain and request assistance   Assess pain using appropriate pain scale   Administer analgesics based on type and severity of pain and evaluate response   Implement non-pharmacological measures as appropriate and evaluate response  1/14/2025 0044 by Akosua Goins RN  Outcome: Progressing  Flowsheets (Taken 1/14/2025 0044)  Verbalizes/displays adequate comfort level or baseline comfort level:   Encourage patient to monitor pain and request assistance   Assess pain using appropriate pain scale   Administer analgesics based on type and severity of pain and evaluate response   Implement non-pharmacological measures as appropriate and evaluate response     Problem: Metabolic/Fluid and Electrolytes - Adult  Goal: Electrolytes maintained within normal limits  1/14/2025 1100 by Stephany Franco RN  Outcome: Progressing  Flowsheets (Taken 1/14/2025 0945)  Electrolytes maintained within normal limits:   Monitor labs and assess patient for signs and symptoms of

## 2025-01-15 LAB
AFP-TM SERPL-MCNC: 4.5 NG/ML (ref 0–8.4)
CEA SERPL-MCNC: 6.2 NG/ML

## 2025-01-16 LAB
CORTIS AM PEAK SERPL-MCNC: 13.9 UG/DL (ref 4.3–22.45)
NOROVIRUS GI RNA STL QL NAA+PROBE: NEGATIVE
NOROVIRUS GII RNA STL QL NAA+PROBE: NEGATIVE

## 2025-01-20 NOTE — PROGRESS NOTES
Physician Progress Note      PATIENT:               IGNACIA MADSEN  [facility] #:                  [id number]  :                       1950  ADMIT DATE:       1/10/2025 5:56 PM  DISCH DATE:        2025 4:06 PM  RESPONDING  PROVIDER #:        Bakari Silva          QUERY TEXT:    Patient admitted with CAP. Noted to have severe malnutrition in RD Note .   If possible, please document in progress notes and discharge summary if you   are evaluating and /or treating any of the following:    The medical record reflects the following:  Risk Factors: 74 year old male, hyponatremia, unintentional weight loss, PNA    Clinical Indicators:  RD Note -    Malnutrition Assessment:  Malnutrition Status:  Severe malnutrition ([id number])  Context:  Acute Illness  Findings of the 6 clinical characteristics of malnutrition:  Energy Intake:  50% or less of estimated energy requirements for 5 or more   days  Weight Loss:  Greater than 7.5% over 3 months  Body Fat Loss:  Mild body fat loss Buccal region, Orbital  Muscle Mass Loss:  Mild muscle mass loss Temples (temporalis)  Fluid Accumulation:  No fluid accumulation   Strength:  Not Performed    Treatment:  Continue current diet as tolerated.  Order Nepro with Carb Steady (each provides 425 kcal, 19g protein) TID  Daily wts.  Continue to monitor tolerance of PO, compliance of oral supplements, weight,   labs, and plan of care during admission.      ASPEN Criteria:    https://aspenjournals.onlinelibrary.alarcon.com/doi/full/10.1177/365182057185936  5  Options provided:  -- Protein calorie malnutrition severe  -- Other - I will add my own diagnosis  -- Disagree - Not applicable / Not valid  -- Disagree - Clinically unable to determine / Unknown  -- Refer to Clinical Documentation Reviewer    PROVIDER RESPONSE TEXT:    This patient has severe protein calorie malnutrition.    Query created by: Ivanna Luong on 2025 8:30 AM      Electronically signed by:  Bakari

## 2025-05-01 ENCOUNTER — APPOINTMENT (OUTPATIENT)
Facility: HOSPITAL | Age: 75
DRG: 682 | End: 2025-05-01
Payer: MEDICARE

## 2025-05-01 ENCOUNTER — HOSPITAL ENCOUNTER (INPATIENT)
Facility: HOSPITAL | Age: 75
LOS: 6 days | Discharge: HOME HEALTH | DRG: 682 | End: 2025-05-07
Attending: STUDENT IN AN ORGANIZED HEALTH CARE EDUCATION/TRAINING PROGRAM | Admitting: FAMILY MEDICINE
Payer: MEDICARE

## 2025-05-01 DIAGNOSIS — R55 SYNCOPE AND COLLAPSE: ICD-10-CM

## 2025-05-01 DIAGNOSIS — R11.10 VOMITING AND DIARRHEA: ICD-10-CM

## 2025-05-01 DIAGNOSIS — E87.1 HYPONATREMIA: Primary | ICD-10-CM

## 2025-05-01 DIAGNOSIS — R19.7 VOMITING AND DIARRHEA: ICD-10-CM

## 2025-05-01 DIAGNOSIS — N17.9 AKI (ACUTE KIDNEY INJURY): ICD-10-CM

## 2025-05-01 LAB
ALBUMIN SERPL-MCNC: 3.8 G/DL (ref 3.4–5)
ALBUMIN/GLOB SERPL: 1.3 (ref 0.8–1.7)
ALP SERPL-CCNC: 238 U/L (ref 45–117)
ALT SERPL-CCNC: 28 U/L (ref 10–50)
ANION GAP SERPL CALC-SCNC: 16 MMOL/L (ref 3–18)
ANION GAP SERPL CALC-SCNC: 21 MMOL/L (ref 3–18)
APPEARANCE UR: CLEAR
AST SERPL-CCNC: 90 U/L (ref 10–38)
B PERT DNA SPEC QL NAA+PROBE: NOT DETECTED
BACTERIA URNS QL MICRO: ABNORMAL /HPF
BASOPHILS # BLD: 0.03 K/UL (ref 0–0.1)
BASOPHILS NFR BLD: 0.3 % (ref 0–2)
BILIRUB SERPL-MCNC: 0.7 MG/DL (ref 0.2–1)
BILIRUB UR QL: NEGATIVE
BORDETELLA PARAPERTUSSIS BY PCR: NOT DETECTED
BUN SERPL-MCNC: 67 MG/DL (ref 6–23)
BUN SERPL-MCNC: 72 MG/DL (ref 6–23)
BUN/CREAT SERPL: 11 (ref 12–20)
BUN/CREAT SERPL: 12 (ref 12–20)
C PNEUM DNA SPEC QL NAA+PROBE: NOT DETECTED
CALCIUM SERPL-MCNC: 7.3 MG/DL (ref 8.5–10.1)
CALCIUM SERPL-MCNC: 8 MG/DL (ref 8.5–10.1)
CHLORIDE SERPL-SCNC: 84 MMOL/L (ref 98–107)
CHLORIDE SERPL-SCNC: 90 MMOL/L (ref 98–107)
CHP ED QC CHECK: 119
CO2 SERPL-SCNC: 15 MMOL/L (ref 21–32)
CO2 SERPL-SCNC: 16 MMOL/L (ref 21–32)
COLOR UR: YELLOW
CREAT SERPL-MCNC: 5.45 MG/DL (ref 0.6–1.3)
CREAT SERPL-MCNC: 6.26 MG/DL (ref 0.6–1.3)
DIFFERENTIAL METHOD BLD: ABNORMAL
EOSINOPHIL # BLD: 0.09 K/UL (ref 0–0.4)
EOSINOPHIL NFR BLD: 1 % (ref 0–5)
EPITH CASTS URNS QL MICRO: ABNORMAL /LPF (ref 0–5)
ERYTHROCYTE [DISTWIDTH] IN BLOOD BY AUTOMATED COUNT: 13.3 % (ref 11.6–14.5)
ETHANOL SERPL-MCNC: <11 MG/DL (ref 0–0.08)
FLUAV SUBTYP SPEC NAA+PROBE: NOT DETECTED
FLUBV RNA SPEC QL NAA+PROBE: NOT DETECTED
GLOBULIN SER CALC-MCNC: 2.9 G/DL (ref 2–4)
GLUCOSE BLD STRIP.AUTO-MCNC: 119 MG/DL (ref 70–110)
GLUCOSE SERPL-MCNC: 85 MG/DL (ref 74–108)
GLUCOSE SERPL-MCNC: 93 MG/DL (ref 74–108)
GLUCOSE UR STRIP.AUTO-MCNC: NEGATIVE MG/DL
HADV DNA SPEC QL NAA+PROBE: NOT DETECTED
HCOV 229E RNA SPEC QL NAA+PROBE: NOT DETECTED
HCOV HKU1 RNA SPEC QL NAA+PROBE: NOT DETECTED
HCOV NL63 RNA SPEC QL NAA+PROBE: NOT DETECTED
HCOV OC43 RNA SPEC QL NAA+PROBE: NOT DETECTED
HCT VFR BLD AUTO: 36.9 % (ref 36–48)
HGB BLD-MCNC: 13.4 G/DL (ref 13–16)
HGB UR QL STRIP: ABNORMAL
HMPV RNA SPEC QL NAA+PROBE: NOT DETECTED
HPIV1 RNA SPEC QL NAA+PROBE: NOT DETECTED
HPIV2 RNA SPEC QL NAA+PROBE: NOT DETECTED
HPIV3 RNA SPEC QL NAA+PROBE: NOT DETECTED
HPIV4 RNA SPEC QL NAA+PROBE: NOT DETECTED
IMM GRANULOCYTES # BLD AUTO: 0.17 K/UL (ref 0–0.04)
IMM GRANULOCYTES NFR BLD AUTO: 1.9 % (ref 0–0.5)
KETONES UR QL STRIP.AUTO: ABNORMAL MG/DL
LACTATE BLD-SCNC: 3.35 MMOL/L (ref 0.4–2)
LACTATE BLD-SCNC: 4.54 MMOL/L (ref 0.4–2)
LEUKOCYTE ESTERASE UR QL STRIP.AUTO: NEGATIVE
LIPASE SERPL-CCNC: 35 U/L (ref 13–75)
LYMPHOCYTES # BLD: 0.73 K/UL (ref 0.9–3.6)
LYMPHOCYTES NFR BLD: 8.4 % (ref 21–52)
M PNEUMO DNA SPEC QL NAA+PROBE: NOT DETECTED
MCH RBC QN AUTO: 32.7 PG (ref 24–34)
MCHC RBC AUTO-ENTMCNC: 36.3 G/DL (ref 31–37)
MCV RBC AUTO: 90 FL (ref 78–100)
MONOCYTES # BLD: 0.65 K/UL (ref 0.05–1.2)
MONOCYTES NFR BLD: 7.4 % (ref 3–10)
NEUTS SEG # BLD: 7.07 K/UL (ref 1.8–8)
NEUTS SEG NFR BLD: 81 % (ref 40–73)
NITRITE UR QL STRIP.AUTO: NEGATIVE
NRBC # BLD: 0 K/UL (ref 0–0.01)
NRBC BLD-RTO: 0 PER 100 WBC
PH UR STRIP: 5 (ref 5–8)
PLATELET # BLD AUTO: 162 K/UL (ref 135–420)
PMV BLD AUTO: 10.5 FL (ref 9.2–11.8)
POTASSIUM SERPL-SCNC: 5.1 MMOL/L (ref 3.5–5.5)
POTASSIUM SERPL-SCNC: 5.3 MMOL/L (ref 3.5–5.5)
PROCALCITONIN SERPL-MCNC: 0.84 NG/ML
PROT SERPL-MCNC: 6.8 G/DL (ref 6.4–8.2)
PROT UR STRIP-MCNC: 30 MG/DL
RBC # BLD AUTO: 4.1 M/UL (ref 4.35–5.65)
RBC #/AREA URNS HPF: NEGATIVE /HPF (ref 0–5)
RSV RNA SPEC QL NAA+PROBE: NOT DETECTED
RV+EV RNA SPEC QL NAA+PROBE: NOT DETECTED
SARS-COV-2 RNA RESP QL NAA+PROBE: NOT DETECTED
SODIUM SERPL-SCNC: 120 MMOL/L (ref 136–145)
SODIUM SERPL-SCNC: 122 MMOL/L (ref 136–145)
SP GR UR REFRACTOMETRY: 1.01 (ref 1–1.03)
TROPONIN T SERPL HS-MCNC: 57.3 NG/L (ref 0–22)
UROBILINOGEN UR QL STRIP.AUTO: 0.2 EU/DL (ref 0.2–1)
WBC # BLD AUTO: 8.7 K/UL (ref 4.6–13.2)
WBC URNS QL MICRO: ABNORMAL /HPF (ref 0–5)

## 2025-05-01 PROCEDURE — 96375 TX/PRO/DX INJ NEW DRUG ADDON: CPT

## 2025-05-01 PROCEDURE — 84145 PROCALCITONIN (PCT): CPT

## 2025-05-01 PROCEDURE — 84484 ASSAY OF TROPONIN QUANT: CPT

## 2025-05-01 PROCEDURE — 87449 NOS EACH ORGANISM AG IA: CPT

## 2025-05-01 PROCEDURE — 87209 SMEAR COMPLEX STAIN: CPT

## 2025-05-01 PROCEDURE — 93005 ELECTROCARDIOGRAM TRACING: CPT | Performed by: STUDENT IN AN ORGANIZED HEALTH CARE EDUCATION/TRAINING PROGRAM

## 2025-05-01 PROCEDURE — 87040 BLOOD CULTURE FOR BACTERIA: CPT

## 2025-05-01 PROCEDURE — 87186 SC STD MICRODIL/AGAR DIL: CPT

## 2025-05-01 PROCEDURE — 87177 OVA AND PARASITES SMEARS: CPT

## 2025-05-01 PROCEDURE — 87088 URINE BACTERIA CULTURE: CPT

## 2025-05-01 PROCEDURE — 71045 X-RAY EXAM CHEST 1 VIEW: CPT

## 2025-05-01 PROCEDURE — 84300 ASSAY OF URINE SODIUM: CPT

## 2025-05-01 PROCEDURE — 99285 EMERGENCY DEPT VISIT HI MDM: CPT

## 2025-05-01 PROCEDURE — 83690 ASSAY OF LIPASE: CPT

## 2025-05-01 PROCEDURE — 2580000003 HC RX 258: Performed by: STUDENT IN AN ORGANIZED HEALTH CARE EDUCATION/TRAINING PROGRAM

## 2025-05-01 PROCEDURE — 83605 ASSAY OF LACTIC ACID: CPT

## 2025-05-01 PROCEDURE — 83935 ASSAY OF URINE OSMOLALITY: CPT

## 2025-05-01 PROCEDURE — 85025 COMPLETE CBC W/AUTO DIFF WBC: CPT

## 2025-05-01 PROCEDURE — 74176 CT ABD & PELVIS W/O CONTRAST: CPT

## 2025-05-01 PROCEDURE — 6360000002 HC RX W HCPCS: Performed by: STUDENT IN AN ORGANIZED HEALTH CARE EDUCATION/TRAINING PROGRAM

## 2025-05-01 PROCEDURE — 87506 IADNA-DNA/RNA PROBE TQ 6-11: CPT

## 2025-05-01 PROCEDURE — 0202U NFCT DS 22 TRGT SARS-COV-2: CPT

## 2025-05-01 PROCEDURE — 82962 GLUCOSE BLOOD TEST: CPT

## 2025-05-01 PROCEDURE — 87086 URINE CULTURE/COLONY COUNT: CPT

## 2025-05-01 PROCEDURE — 96374 THER/PROPH/DIAG INJ IV PUSH: CPT

## 2025-05-01 PROCEDURE — 2140000001 HC CVICU INTERMEDIATE R&B

## 2025-05-01 PROCEDURE — 87324 CLOSTRIDIUM AG IA: CPT

## 2025-05-01 PROCEDURE — 82077 ASSAY SPEC XCP UR&BREATH IA: CPT

## 2025-05-01 PROCEDURE — 80053 COMPREHEN METABOLIC PANEL: CPT

## 2025-05-01 PROCEDURE — 81001 URINALYSIS AUTO W/SCOPE: CPT

## 2025-05-01 RX ORDER — ONDANSETRON 4 MG/1
4 TABLET, ORALLY DISINTEGRATING ORAL EVERY 8 HOURS PRN
Status: DISCONTINUED | OUTPATIENT
Start: 2025-05-01 | End: 2025-05-07 | Stop reason: HOSPADM

## 2025-05-01 RX ORDER — ZOLPIDEM TARTRATE 5 MG/1
10 TABLET ORAL NIGHTLY
Status: DISCONTINUED | OUTPATIENT
Start: 2025-05-01 | End: 2025-05-01

## 2025-05-01 RX ORDER — LISINOPRIL 10 MG/1
10 TABLET ORAL DAILY
Status: DISCONTINUED | OUTPATIENT
Start: 2025-05-02 | End: 2025-05-07 | Stop reason: HOSPADM

## 2025-05-01 RX ORDER — ACETAMINOPHEN 650 MG/1
650 SUPPOSITORY RECTAL EVERY 6 HOURS PRN
Status: DISCONTINUED | OUTPATIENT
Start: 2025-05-01 | End: 2025-05-07 | Stop reason: HOSPADM

## 2025-05-01 RX ORDER — POLYETHYLENE GLYCOL 3350 17 G/17G
17 POWDER, FOR SOLUTION ORAL DAILY PRN
Status: DISCONTINUED | OUTPATIENT
Start: 2025-05-01 | End: 2025-05-07 | Stop reason: HOSPADM

## 2025-05-01 RX ORDER — SODIUM CHLORIDE 9 MG/ML
INJECTION, SOLUTION INTRAVENOUS PRN
Status: DISCONTINUED | OUTPATIENT
Start: 2025-05-01 | End: 2025-05-07 | Stop reason: HOSPADM

## 2025-05-01 RX ORDER — COLCHICINE 0.6 MG/1
0.6 TABLET ORAL DAILY
Status: DISCONTINUED | OUTPATIENT
Start: 2025-05-02 | End: 2025-05-01

## 2025-05-01 RX ORDER — ONDANSETRON 2 MG/ML
4 INJECTION INTRAMUSCULAR; INTRAVENOUS ONCE
Status: COMPLETED | OUTPATIENT
Start: 2025-05-01 | End: 2025-05-01

## 2025-05-01 RX ORDER — ACETAMINOPHEN 325 MG/1
650 TABLET ORAL EVERY 6 HOURS PRN
Status: DISCONTINUED | OUTPATIENT
Start: 2025-05-01 | End: 2025-05-07 | Stop reason: HOSPADM

## 2025-05-01 RX ORDER — 0.9 % SODIUM CHLORIDE 0.9 %
1000 INTRAVENOUS SOLUTION INTRAVENOUS ONCE
Status: COMPLETED | OUTPATIENT
Start: 2025-05-01 | End: 2025-05-01

## 2025-05-01 RX ORDER — SODIUM CHLORIDE 0.9 % (FLUSH) 0.9 %
5-40 SYRINGE (ML) INJECTION EVERY 12 HOURS SCHEDULED
Status: DISCONTINUED | OUTPATIENT
Start: 2025-05-01 | End: 2025-05-07 | Stop reason: HOSPADM

## 2025-05-01 RX ORDER — ALLOPURINOL 300 MG/1
300 TABLET ORAL DAILY
Status: DISCONTINUED | OUTPATIENT
Start: 2025-05-02 | End: 2025-05-07 | Stop reason: HOSPADM

## 2025-05-01 RX ORDER — ZOLPIDEM TARTRATE 5 MG/1
10 TABLET ORAL NIGHTLY PRN
Status: DISCONTINUED | OUTPATIENT
Start: 2025-05-01 | End: 2025-05-07 | Stop reason: HOSPADM

## 2025-05-01 RX ORDER — SODIUM CHLORIDE 9 MG/ML
INJECTION, SOLUTION INTRAVENOUS CONTINUOUS
Status: DISCONTINUED | OUTPATIENT
Start: 2025-05-01 | End: 2025-05-02

## 2025-05-01 RX ORDER — HEPARIN SODIUM 5000 [USP'U]/ML
5000 INJECTION, SOLUTION INTRAVENOUS; SUBCUTANEOUS EVERY 8 HOURS SCHEDULED
Status: DISCONTINUED | OUTPATIENT
Start: 2025-05-01 | End: 2025-05-07 | Stop reason: HOSPADM

## 2025-05-01 RX ORDER — ONDANSETRON 2 MG/ML
4 INJECTION INTRAMUSCULAR; INTRAVENOUS EVERY 6 HOURS PRN
Status: DISCONTINUED | OUTPATIENT
Start: 2025-05-01 | End: 2025-05-07 | Stop reason: HOSPADM

## 2025-05-01 RX ORDER — SODIUM CHLORIDE 0.9 % (FLUSH) 0.9 %
5-40 SYRINGE (ML) INJECTION PRN
Status: DISCONTINUED | OUTPATIENT
Start: 2025-05-01 | End: 2025-05-07 | Stop reason: HOSPADM

## 2025-05-01 RX ADMIN — ONDANSETRON 4 MG: 2 INJECTION, SOLUTION INTRAMUSCULAR; INTRAVENOUS at 17:31

## 2025-05-01 RX ADMIN — FAMOTIDINE 20 MG: 10 INJECTION, SOLUTION INTRAVENOUS at 17:26

## 2025-05-01 RX ADMIN — SODIUM CHLORIDE 1000 ML: 0.9 INJECTION, SOLUTION INTRAVENOUS at 17:25

## 2025-05-01 ASSESSMENT — PAIN DESCRIPTION - PAIN TYPE
TYPE: ACUTE PAIN
TYPE: ACUTE PAIN

## 2025-05-01 ASSESSMENT — PAIN - FUNCTIONAL ASSESSMENT
PAIN_FUNCTIONAL_ASSESSMENT: 0-10
PAIN_FUNCTIONAL_ASSESSMENT: 0-10

## 2025-05-01 ASSESSMENT — PAIN DESCRIPTION - LOCATION
LOCATION: ABDOMEN
LOCATION: HEAD

## 2025-05-01 ASSESSMENT — PAIN SCALES - GENERAL
PAINLEVEL_OUTOF10: 5
PAINLEVEL_OUTOF10: 6

## 2025-05-01 ASSESSMENT — PAIN DESCRIPTION - DESCRIPTORS: DESCRIPTORS: ACHING

## 2025-05-01 ASSESSMENT — PAIN DESCRIPTION - ORIENTATION: ORIENTATION: LEFT

## 2025-05-01 NOTE — ED TRIAGE NOTES
Pt came via EMS stretcher from home c/o unwitnessed syncopal episode at home. Per pt, he fell on his bed and unsure how long he was out.    Per EMS, pt reports abdominal pain, nausea and vomiting. Per pt, \"I feel like there's a fireball in my stomach.\"

## 2025-05-01 NOTE — ED PROVIDER NOTES
[facility] EMERGENCY DEPARTMENT  EMERGENCY DEPARTMENT ENCOUNTER      Pt Name: Lauri Hoover  MRN: [id number]  Birthdate 1950  Date of evaluation: 5/1/2025  Provider: Ivanna Álvarez MD    CHIEF COMPLAINT       Chief Complaint   Patient presents with    Loss of Consciousness     No witness    Abdominal Pain    Nausea    Vomiting         HISTORY OF PRESENT ILLNESS   (Location/Symptom, Timing/Onset, Context/Setting, Quality, Duration, Modifying Factors, Severity)  Note limiting factors.   Lauri Hoover is a 75 y.o. male who presents to the emergency department for weakness.  Patient states that for the last several days she has had multiple bouts of vomiting and diarrhea.  Nonbloody nonbilious.  Associated with a diffuse burning abdominal pain.  Not sure what started first.  Denies any recent sick contacts or travel outside the country.  States he has been feeling very weak over this time and today he stood up and passed out.  Unsure how long he was out for.  Denies any injuries from the fall.  States that he just does not feel well.  Denies any fever, denies any dysuria, hematuria or increased urinary frequency.     Nursing Notes were reviewed.    REVIEW OF SYSTEMS    (2-9 systems for level 4, 10 or more for level 5)     Constitutional: No fever  HENT: No ear pain  Eyes: No change in vision  Respiratory: No SOB  Cardio: No chest pain  GI: No blood in stool  : No hematuria  MSK: No back pain  Skin: No rashes  Neuro: No headache    Except as noted above the remainder of the review of systems was reviewed and negative.       PAST MEDICAL HISTORY     Past Medical History:   Diagnosis Date    Arthritis     Hemochromatosis     Ill-defined condition     GOUT         SURGICAL HISTORY       Past Surgical History:   Procedure Laterality Date    ORTHOPEDIC SURGERY      LEFT HAND         CURRENT MEDICATIONS       Previous Medications    ALLOPURINOL (ZYLOPRIM) 300 MG TABLET    Take 1 tablet by mouth  evaluation as on the previous exam of January 2025 focal left lateral lower lobe consolidation has been present for which   follow-up has been recommended      Electronically signed by Josefina Curtis            ED BEDSIDE ULTRASOUND:   Performed by ED Physician - none    LABS:  Labs Reviewed   CBC WITH AUTO DIFFERENTIAL - Abnormal; Notable for the following components:       Result Value    RBC 4.10 (*)     Neutrophils % 81.0 (*)     Lymphocytes % 8.4 (*)     Immature Granulocytes % 1.9 (*)     Lymphocytes Absolute 0.73 (*)     Immature Granulocytes Absolute 0.17 (*)     All other components within normal limits   COMPREHENSIVE METABOLIC PANEL - Abnormal; Notable for the following components:    Sodium 120 (*)     Chloride 84 (*)     CO2 15 (*)     Anion Gap 21 (*)     BUN 72 (*)     Creatinine 6.26 (*)     BUN/Creatinine Ratio 11 (*)     Est, Glom Filt Rate 9 (*)     Calcium 8.0 (*)     AST 90 (*)     Alk Phosphatase 238 (*)     All other components within normal limits   TROPONIN - Abnormal; Notable for the following components:    Troponin T 57.3 (*)     All other components within normal limits   BASIC METABOLIC PANEL - Abnormal; Notable for the following components:    Sodium 122 (*)     Chloride 90 (*)     CO2 16 (*)     BUN 67 (*)     Creatinine 5.45 (*)     Est, Glom Filt Rate 10 (*)     Calcium 7.3 (*)     All other components within normal limits   POCT GLUCOSE - Abnormal; Notable for the following components:    POC Glucose 119 (*)     All other components within normal limits   POCT LACTIC ACID (LACTATE) - Abnormal; Notable for the following components:    POC Lactic Acid 4.54 (*)     All other components within normal limits   POCT LACTIC ACID (LACTATE) - Abnormal; Notable for the following components:    POC Lactic Acid 3.35 (*)     All other components within normal limits   POCT GLUCOSE - Normal   RESPIRATORY PANEL, MOLECULAR, WITH COVID-19   CULTURE, BLOOD 1   CULTURE, BLOOD 2   CULTURE, URINE

## 2025-05-02 ENCOUNTER — APPOINTMENT (OUTPATIENT)
Facility: HOSPITAL | Age: 75
DRG: 682 | End: 2025-05-02
Payer: MEDICARE

## 2025-05-02 LAB
ANION GAP SERPL CALC-SCNC: 17 MMOL/L (ref 3–18)
ANION GAP SERPL CALC-SCNC: 19 MMOL/L (ref 3–18)
ANION GAP SERPL CALC-SCNC: 19 MMOL/L (ref 3–18)
ANION GAP SERPL CALC-SCNC: 20 MMOL/L (ref 3–18)
ANION GAP SERPL CALC-SCNC: 21 MMOL/L (ref 3–18)
ANION GAP SERPL CALC-SCNC: 21 MMOL/L (ref 3–18)
BASOPHILS # BLD: 0.03 K/UL (ref 0–0.1)
BASOPHILS NFR BLD: 0.4 % (ref 0–2)
BUN SERPL-MCNC: 71 MG/DL (ref 6–23)
BUN SERPL-MCNC: 74 MG/DL (ref 6–23)
BUN SERPL-MCNC: 75 MG/DL (ref 6–23)
BUN SERPL-MCNC: 76 MG/DL (ref 6–23)
BUN SERPL-MCNC: 76 MG/DL (ref 6–23)
BUN SERPL-MCNC: 80 MG/DL (ref 6–23)
BUN/CREAT SERPL: 12 (ref 12–20)
BUN/CREAT SERPL: 14 (ref 12–20)
BUN/CREAT SERPL: 17 (ref 12–20)
CALCIUM SERPL-MCNC: 7.9 MG/DL (ref 8.5–10.1)
CALCIUM SERPL-MCNC: 8 MG/DL (ref 8.5–10.1)
CALCIUM SERPL-MCNC: 8.1 MG/DL (ref 8.5–10.1)
CALCIUM SERPL-MCNC: 8.1 MG/DL (ref 8.5–10.1)
CHLORIDE SERPL-SCNC: 87 MMOL/L (ref 98–107)
CHLORIDE SERPL-SCNC: 90 MMOL/L (ref 98–107)
CHLORIDE SERPL-SCNC: 90 MMOL/L (ref 98–107)
CHLORIDE SERPL-SCNC: 91 MMOL/L (ref 98–107)
CHLORIDE SERPL-SCNC: 91 MMOL/L (ref 98–107)
CHLORIDE SERPL-SCNC: 94 MMOL/L (ref 98–107)
CO2 SERPL-SCNC: 12 MMOL/L (ref 21–32)
CO2 SERPL-SCNC: 13 MMOL/L (ref 21–32)
CO2 SERPL-SCNC: 14 MMOL/L (ref 21–32)
CO2 SERPL-SCNC: 14 MMOL/L (ref 21–32)
CO2 SERPL-SCNC: 15 MMOL/L (ref 21–32)
CO2 SERPL-SCNC: 15 MMOL/L (ref 21–32)
CREAT SERPL-MCNC: 4.66 MG/DL (ref 0.6–1.3)
CREAT SERPL-MCNC: 5.24 MG/DL (ref 0.6–1.3)
CREAT SERPL-MCNC: 5.3 MG/DL (ref 0.6–1.3)
CREAT SERPL-MCNC: 5.32 MG/DL (ref 0.6–1.3)
CREAT SERPL-MCNC: 5.38 MG/DL (ref 0.6–1.3)
CREAT SERPL-MCNC: 5.72 MG/DL (ref 0.6–1.3)
CREAT UR-MCNC: 157 MG/DL (ref 30–125)
DIFFERENTIAL METHOD BLD: ABNORMAL
EKG ATRIAL RATE: 84 BPM
EKG DIAGNOSIS: NORMAL
EKG P AXIS: 14 DEGREES
EKG P-R INTERVAL: 194 MS
EKG Q-T INTERVAL: 382 MS
EKG QRS DURATION: 88 MS
EKG QTC CALCULATION (BAZETT): 451 MS
EKG R AXIS: -5 DEGREES
EKG T AXIS: 60 DEGREES
EKG VENTRICULAR RATE: 84 BPM
EOSINOPHIL # BLD: 0.13 K/UL (ref 0–0.4)
EOSINOPHIL NFR BLD: 1.8 % (ref 0–5)
ERYTHROCYTE [DISTWIDTH] IN BLOOD BY AUTOMATED COUNT: 13.4 % (ref 11.6–14.5)
EST. AVERAGE GLUCOSE BLD GHB EST-MCNC: 106 MG/DL
GLUCOSE SERPL-MCNC: 69 MG/DL (ref 74–108)
GLUCOSE SERPL-MCNC: 74 MG/DL (ref 74–108)
GLUCOSE SERPL-MCNC: 79 MG/DL (ref 74–108)
GLUCOSE SERPL-MCNC: 86 MG/DL (ref 74–108)
HBA1C MFR BLD: 5.3 % (ref 4.2–5.6)
HCT VFR BLD AUTO: 34.5 % (ref 36–48)
HGB BLD-MCNC: 12.3 G/DL (ref 13–16)
IMM GRANULOCYTES # BLD AUTO: 0.11 K/UL (ref 0–0.04)
IMM GRANULOCYTES NFR BLD AUTO: 1.5 % (ref 0–0.5)
LACTATE SERPL-SCNC: 1.4 MMOL/L (ref 0.4–2)
LACTATE SERPL-SCNC: 1.5 MMOL/L (ref 0.4–2)
LYMPHOCYTES # BLD: 1.15 K/UL (ref 0.9–3.6)
LYMPHOCYTES NFR BLD: 16 % (ref 21–52)
MAGNESIUM SERPL-MCNC: 1.1 MG/DL (ref 1.6–2.6)
MAGNESIUM SERPL-MCNC: 1.2 MG/DL (ref 1.6–2.6)
MAGNESIUM SERPL-MCNC: 1.5 MG/DL (ref 1.6–2.6)
MCH RBC QN AUTO: 32.5 PG (ref 24–34)
MCHC RBC AUTO-ENTMCNC: 35.7 G/DL (ref 31–37)
MCV RBC AUTO: 91.3 FL (ref 78–100)
MONOCYTES # BLD: 0.57 K/UL (ref 0.05–1.2)
MONOCYTES NFR BLD: 7.9 % (ref 3–10)
NEUTS SEG # BLD: 5.2 K/UL (ref 1.8–8)
NEUTS SEG NFR BLD: 72.4 % (ref 40–73)
NRBC # BLD: 0 K/UL (ref 0–0.01)
NRBC BLD-RTO: 0 PER 100 WBC
OSMOLALITY SERPL: 286 MOSM/KG H2O (ref 280–301)
OSMOLALITY UR: 268 MOSM/KG H2O
PHOSPHATE SERPL-MCNC: 5.3 MG/DL (ref 2.5–4.9)
PLATELET # BLD AUTO: 161 K/UL (ref 135–420)
PMV BLD AUTO: 10.8 FL (ref 9.2–11.8)
POTASSIUM SERPL-SCNC: 4.4 MMOL/L (ref 3.5–5.5)
POTASSIUM SERPL-SCNC: 4.6 MMOL/L (ref 3.5–5.5)
POTASSIUM SERPL-SCNC: 4.7 MMOL/L (ref 3.5–5.5)
POTASSIUM SERPL-SCNC: 4.8 MMOL/L (ref 3.5–5.5)
RBC # BLD AUTO: 3.78 M/UL (ref 4.35–5.65)
SODIUM SERPL-SCNC: 122 MMOL/L (ref 136–145)
SODIUM SERPL-SCNC: 123 MMOL/L (ref 136–145)
SODIUM SERPL-SCNC: 123 MMOL/L (ref 136–145)
SODIUM SERPL-SCNC: 124 MMOL/L (ref 136–145)
SODIUM SERPL-SCNC: 124 MMOL/L (ref 136–145)
SODIUM SERPL-SCNC: 126 MMOL/L (ref 136–145)
SODIUM UR-SCNC: <20 MMOL/L (ref 40–220)
TROPONIN T SERPL HS-MCNC: 52.1 NG/L (ref 0–22)
WBC # BLD AUTO: 7.2 K/UL (ref 4.6–13.2)

## 2025-05-02 PROCEDURE — 2500000003 HC RX 250 WO HCPCS

## 2025-05-02 PROCEDURE — 83036 HEMOGLOBIN GLYCOSYLATED A1C: CPT

## 2025-05-02 PROCEDURE — 36415 COLL VENOUS BLD VENIPUNCTURE: CPT

## 2025-05-02 PROCEDURE — 6370000000 HC RX 637 (ALT 250 FOR IP): Performed by: FAMILY MEDICINE

## 2025-05-02 PROCEDURE — 84100 ASSAY OF PHOSPHORUS: CPT

## 2025-05-02 PROCEDURE — 2140000001 HC CVICU INTERMEDIATE R&B

## 2025-05-02 PROCEDURE — 97530 THERAPEUTIC ACTIVITIES: CPT

## 2025-05-02 PROCEDURE — 82570 ASSAY OF URINE CREATININE: CPT

## 2025-05-02 PROCEDURE — 84300 ASSAY OF URINE SODIUM: CPT

## 2025-05-02 PROCEDURE — 2500000003 HC RX 250 WO HCPCS: Performed by: INTERNAL MEDICINE

## 2025-05-02 PROCEDURE — 85025 COMPLETE CBC W/AUTO DIFF WBC: CPT

## 2025-05-02 PROCEDURE — 6370000000 HC RX 637 (ALT 250 FOR IP): Performed by: INTERNAL MEDICINE

## 2025-05-02 PROCEDURE — 84484 ASSAY OF TROPONIN QUANT: CPT

## 2025-05-02 PROCEDURE — 76770 US EXAM ABDO BACK WALL COMP: CPT

## 2025-05-02 PROCEDURE — 93010 ELECTROCARDIOGRAM REPORT: CPT | Performed by: INTERNAL MEDICINE

## 2025-05-02 PROCEDURE — 83930 ASSAY OF BLOOD OSMOLALITY: CPT

## 2025-05-02 PROCEDURE — 6370000000 HC RX 637 (ALT 250 FOR IP)

## 2025-05-02 PROCEDURE — 6360000002 HC RX W HCPCS

## 2025-05-02 PROCEDURE — 97535 SELF CARE MNGMENT TRAINING: CPT

## 2025-05-02 PROCEDURE — 83735 ASSAY OF MAGNESIUM: CPT

## 2025-05-02 PROCEDURE — 97166 OT EVAL MOD COMPLEX 45 MIN: CPT

## 2025-05-02 PROCEDURE — 97162 PT EVAL MOD COMPLEX 30 MIN: CPT

## 2025-05-02 PROCEDURE — 2580000003 HC RX 258: Performed by: INTERNAL MEDICINE

## 2025-05-02 PROCEDURE — 80048 BASIC METABOLIC PNL TOTAL CA: CPT

## 2025-05-02 PROCEDURE — 83605 ASSAY OF LACTIC ACID: CPT

## 2025-05-02 PROCEDURE — 2580000003 HC RX 258

## 2025-05-02 PROCEDURE — 71250 CT THORAX DX C-: CPT

## 2025-05-02 RX ORDER — MIDODRINE HYDROCHLORIDE 10 MG/1
10 TABLET ORAL
Status: DISCONTINUED | OUTPATIENT
Start: 2025-05-02 | End: 2025-05-07 | Stop reason: HOSPADM

## 2025-05-02 RX ORDER — MAGNESIUM SULFATE HEPTAHYDRATE 40 MG/ML
2000 INJECTION, SOLUTION INTRAVENOUS ONCE
Status: COMPLETED | OUTPATIENT
Start: 2025-05-02 | End: 2025-05-02

## 2025-05-02 RX ORDER — NEPHROCAP 1 MG
1 CAP ORAL DAILY
Status: DISCONTINUED | OUTPATIENT
Start: 2025-05-02 | End: 2025-05-07 | Stop reason: HOSPADM

## 2025-05-02 RX ADMIN — Medication 1 MG: at 14:36

## 2025-05-02 RX ADMIN — SODIUM CHLORIDE, PRESERVATIVE FREE 10 ML: 5 INJECTION INTRAVENOUS at 01:26

## 2025-05-02 RX ADMIN — ZOLPIDEM TARTRATE 10 MG: 5 TABLET ORAL at 21:48

## 2025-05-02 RX ADMIN — SODIUM BICARBONATE: 84 INJECTION, SOLUTION INTRAVENOUS at 18:20

## 2025-05-02 RX ADMIN — SODIUM CHLORIDE: 0.9 INJECTION, SOLUTION INTRAVENOUS at 00:16

## 2025-05-02 RX ADMIN — ALLOPURINOL 300 MG: 300 TABLET ORAL at 08:56

## 2025-05-02 RX ADMIN — HEPARIN SODIUM 5000 UNITS: 5000 INJECTION INTRAVENOUS; SUBCUTANEOUS at 08:57

## 2025-05-02 RX ADMIN — MAGNESIUM SULFATE HEPTAHYDRATE 2000 MG: 40 INJECTION, SOLUTION INTRAVENOUS at 11:49

## 2025-05-02 RX ADMIN — MIDODRINE HYDROCHLORIDE 10 MG: 10 TABLET ORAL at 16:35

## 2025-05-02 RX ADMIN — BISMUTH SUBSALICYLATE 30 ML: 525 LIQUID ORAL at 21:48

## 2025-05-02 RX ADMIN — HEPARIN SODIUM 5000 UNITS: 5000 INJECTION INTRAVENOUS; SUBCUTANEOUS at 14:36

## 2025-05-02 RX ADMIN — SODIUM CHLORIDE, PRESERVATIVE FREE 10 ML: 5 INJECTION INTRAVENOUS at 08:57

## 2025-05-02 RX ADMIN — HEPARIN SODIUM 5000 UNITS: 5000 INJECTION INTRAVENOUS; SUBCUTANEOUS at 21:06

## 2025-05-02 RX ADMIN — SODIUM CHLORIDE, PRESERVATIVE FREE 10 ML: 5 INJECTION INTRAVENOUS at 21:07

## 2025-05-02 RX ADMIN — HEPARIN SODIUM 5000 UNITS: 5000 INJECTION INTRAVENOUS; SUBCUTANEOUS at 01:08

## 2025-05-02 ASSESSMENT — PAIN SCALES - GENERAL
PAINLEVEL_OUTOF10: 0

## 2025-05-02 NOTE — PROGRESS NOTES
4 Eyes Skin Assessment     NAME:  Lauri Hoover  YOB: 1950  MEDICAL RECORD NUMBER:  [id number]    The patient is being assessed for  Admission    I agree that at least one RN has performed a thorough Head to Toe Skin Assessment on the patient. ALL assessment sites listed below have been assessed.      Areas assessed by both nurses:    Head, Face, Ears, Shoulders, Back, Chest, Arms, Elbows, Hands, Sacrum. Buttock, Coccyx, Ischium, and Legs. Feet and Heels        Does the Patient have a Wound? Yes wound(s) were present on assessment. LDA wound assessment was Initiated and completed by RN right plantar traumatic/penetrating injury from fall 2wks ago per pt.       Levy Prevention initiated by RN: Yes  Wound Care Orders initiated by RN: Yes    Pressure Injury (Stage 3,4, Unstageable, DTI, NWPT, and Complex wounds) if present, place Wound referral order by RN under : Yes    New Ostomies, if present place, Ostomy referral order under : No     Nurse 1 eSignature: Electronically signed by Trey Mancuso RN on 5/2/25 at 2:42 AM EDT    **SHARE this note so that the co-signing nurse can place an eSignature**    Nurse 2 eSignature: {Esignature:[id number]}

## 2025-05-02 NOTE — ED NOTES
TRANSFER - OUT REPORT:    Verbal report given to Trey on Lauri Hoover  being transferred to 2303 for routine progression of patient care       Report consisted of patient's Situation, Background, Assessment and   Recommendations(SBAR).     Information from the following report(s) Nurse Handoff Report was reviewed with the receiving nurse.    [place] Fall Assessment:    Presents to emergency department  because of falls (Syncope, seizure, or loss of consciousness): Yes  Age > 70: Yes  Altered Mental Status, Intoxication with alcohol or substance confusion (Disorientation, impaired judgment, poor safety awaremess, or inability to follow instructions): No  Impaired Mobility: Ambulates or transfers with assistive devices or assistance; Unable to ambulate or transer.: Yes  Nursing Judgement: Yes          Lines:   Peripheral IV 05/01/25 Left Antecubital (Active)       Peripheral IV 05/01/25 Distal;Right;Anterior Cephalic (Active)        CRITICAL LABS:     Verbally repeated the following test results Lab/POC: Na 122, BUN 67, Creatinine 5.45, Lactic 3.35 to CASANDRA Yang .      Shruti Shaikh RN     Opportunity for questions and clarification was provided.      Patient transported with:  Registered Nurse

## 2025-05-02 NOTE — PROGRESS NOTES
conducted an initial consultation and Spiritual Assessment for Lauri Hoover, who is a 75 y.o.,male. Patient’s Primary Language is: English.   According to the patient’s EMR Islam Affiliation is: Other.    The  provided the following Interventions:  Initiated a relationship of care and support. To the patient in bed 2303 where he has been for the last 16 hours due to  Hyponatremia  Explored issues of malini, belief, spirituality and Orthodox/ritual needs while hospitalized.  Patient is not involved with a local Shinto family.  Listened empathically. There is no advance directive.on file   Offered prayer and assurance of continued prayers on patients behalf.     The following outcomes were achieved:  Patient shared limited information about his medical narrative and spiritual journey/beliefs.  Patient processed feeling about current hospitalization.  Patient expressed gratitude for pastoral care visit.    Spiritual Health History and Assessment/Progress Note  [facility]    Initial Encounter, Crisis (iv-sa-eje), Follow up (C-Diff, vomiting),  ,      Name: Lauri Hoover MRN: [id number]    Age: 75 y.o.     Sex: male   Language: English   Bahai: Other   Hyponatremia     Date: 5/2/2025            Total Time Calculated: 8 min              Spiritual Assessment began in [facility] 2 CV STEPDOWN        Referral/Consult From: Rounding   Encounter Overview/Reason: Initial Encounter, Crisis (iv-sa-eje)  Service Provided For: Patient    Malini, Belief, Meaning:   Patient Other: is not connected to malini community  Family/Friends       Importance and Influence:  Patient   Family/Friends No family/friends present    Community:  Patient feels well-supported. Support system includes: Children  Family/Friends No family/friends present    Assessment and Plan of Care:     Patient Interventions include: Affirmed coping skills/support systems  Family/Friends Interventions include: No family/friends

## 2025-05-02 NOTE — H&P
[facility] Medicine  Admission History and Physical      Patient:    Lauri Hoover      75 y.o. male            MRN:       [id number]                                                                                    Admission Date:         5/1/2025  Code status:               Full      SUBJECTIVE:  History of Present Illness:  Lauri Hoover is a 75 y.o.  male with PMHx of gout, HTN, CKD3 who presented to [facility] ED on 5/1/2025 with complaint of a few days of nausea, vomiting, diarrhea, and weakness. Patient reports that for the last few days he has been feeling weak. Has been having daily nausea and non bloody, non-bilious vomiting with last episode of vomiting this morning. Also has been having what he reports is anywhere between 6-10 bouts of watery diarrhea a day. Endorses that he describes as a \"fiery\" abdominal pain diffusely, not worse in any specific area. Passed out and fell onto his bed this morning upon standing up prompting him to come to the ED. Has had no appetite the last few months and endorses what he estimates to be a 30-40 lbs weight loss in the last few months. Also notes that 2 weeks ago he cut the sole of his right foot while walking outside. Has been wrapping it daily at home himself and applying medihoney. Denies fevers, chills, dysuria, hematuria, hematochezia, CP, SOB, LE edema.     ED Course:  -Afebrile, RR 13, HR 92, BP 98/65, satting well on RA  -Labs: CMP: Na 120>122, BUN 72, Cr 6.26, anion gap 21>16, Ca 8.0, LA 4.54>3.35, troponin 57.3, alk phos 238, AST 90. Etoh <11, CBC, blood cx x2, (-) RPP,   -Imaging: CT abd/pelv: solid, oval  3 cm mass w/ small region of eccentric cavitation in LLL recommend CT PET scan, coloenteritis, hiatal hernia, likely reactive adenopathy at the celiac axis and rian hepatis. CXR: persistent parenchymal abnormality in the LLL not significantly changed from 1/2025, rec CT chest  -EKG: none  -Meds: Pepcid 20 mg IV x1, zofran 4 mg IV x1  -IVF: 1L NS  NL63 by PCR Not detected NOTD      Coronavirus OC43 by PCR Not detected NOTD      SARS-CoV-2, PCR Not detected NOTD      Human Metapneumovirus by PCR Not detected NOTD      Rhinovirus Enterovirus PCR Not detected NOTD      Influenza A by PCR Not detected NOTD      Influenza B PCR Not detected NOTD      Parainfluenza 1 PCR Not detected NOTD      Parainfluenza 2 PCR Not detected NOTD      Parainfluenza 3 PCR Not detected NOTD      Parainfluenza 4 PCR Not detected NOTD      Respiratory Syncytial Virus by PCR Not detected NOTD      Bordetella parapertussis by PCR Not detected NOTD      Bordetella pertussis by PCR Not detected NOTD      Chlamydophila Pneumonia PCR Not detected NOTD      Mycoplasma pneumo by PCR Not detected NOTD     POCT Glucose    Collection Time: 05/01/25  4:53 PM   Result Value Ref Range    QC OK? 119    POCT Glucose    Collection Time: 05/01/25  4:53 PM   Result Value Ref Range    POC Glucose 119 (H) 70 - 110 mg/dL   CBC with Auto Differential    Collection Time: 05/01/25  5:24 PM   Result Value Ref Range    WBC 8.7 4.6 - 13.2 K/uL    RBC 4.10 (L) 4.35 - 5.65 M/uL    Hemoglobin 13.4 13.0 - 16.0 g/dL    Hematocrit 36.9 36.0 - 48.0 %    MCV 90.0 78.0 - 100.0 FL    MCH 32.7 24.0 - 34.0 PG    MCHC 36.3 31.0 - 37.0 g/dL    RDW 13.3 11.6 - 14.5 %    Platelets 162 135 - 420 K/uL    MPV 10.5 9.2 - 11.8 FL    Nucleated RBCs 0.0 0  WBC    nRBC 0.00 0.00 - 0.01 K/uL    Neutrophils % 81.0 (H) 40.0 - 73.0 %    Lymphocytes % 8.4 (L) 21.0 - 52.0 %    Monocytes % 7.4 3.0 - 10.0 %    Eosinophils % 1.0 0.0 - 5.0 %    Basophils % 0.3 0.0 - 2.0 %    Immature Granulocytes % 1.9 (H) 0.0 - 0.5 %    Neutrophils Absolute 7.07 1.80 - 8.00 K/UL    Lymphocytes Absolute 0.73 (L) 0.90 - 3.60 K/UL    Monocytes Absolute 0.65 0.05 - 1.20 K/UL    Eosinophils Absolute 0.09 0.00 - 0.40 K/UL    Basophils Absolute 0.03 0.00 - 0.10 K/UL    Immature Granulocytes Absolute 0.17 (H) 0.00 - 0.04 K/UL    Differential Type AUTOMATED

## 2025-05-02 NOTE — PROGRESS NOTES
Comprehensive Nutrition Assessment    Type and Reason for Visit:  Initial, Positive nutrition screen    Nutrition Recommendations/Plan:   Modify PO diet remove K restriction/add Phos restriction   Order Nepro with Carb Steady (each provides 425 kcal, 19g protein) TID  Recommend/order virt-caps supplementation daily, Daily wts.  Continue to monitor tolerance of PO, compliance of oral supplements, weight, labs, and plan of care during admission.     Malnutrition Assessment:  Malnutrition Status:  Severe malnutrition (05/02/25 1237)    Context:  Acute Illness     Findings of the 6 clinical characteristics of malnutrition:  Energy Intake:  50% or less of estimated energy requirements for 5 or more days  Weight Loss:  Greater than 7.5% over 3 months (-8.3% x4 months)     Body Fat Loss:  Mild body fat loss Triceps, Buccal region   Muscle Mass Loss:  Moderate muscle mass loss Temples (temporalis), Clavicles (pectoralis & deltoids), Thigh (quadriceps), Hand (interosseous), Scapula (trapezius)  Fluid Accumulation:  No fluid accumulation     Strength:  Not Performed    Nutrition History and Allergies:      Past Medical History:   Diagnosis Date    Arthritis     Hemochromatosis     Ill-defined condition     GOUT     PTA: Per pt decreased appetite x3-4 days pta; endorsed no po intake during this time. Pt reports + with recent/unintentional weight loss of 33 lbs in the past x2 months.     Weight Hx: 192 lbs Wt change: -16 lb (-8.3%) x 4 months - significant. (-22.8% x 12 months)  Wt Readings from Last 10 Encounters:   05/02/25 80.2 kg (176 lb 12.9 oz)   01/10/25 87.1 kg (192 lb)   09/19/24 98.9 kg (218 lb)   08/22/24 99.7 kg (219 lb 12.8 oz)   04/18/24 103.6 kg (228 lb 6.4 oz)   03/11/24 100.7 kg (222 lb)   02/07/24 99.8 kg (220 lb)   06/20/23 95.3 kg (210 lb)   11/24/21 98.4 kg (217 lb)     NFPE: NFPE detailed above. Food Allergies: NKFA    Nutrition Assessment:    Admitted for hyponatremia; CKD3, several days n/v      Zari Pemberton MS, RDN, LDN  Contact: [phone]    Available via [employer]

## 2025-05-02 NOTE — PROGRESS NOTES
[facility]  DAILY PROGRESS NOTE      Patient:    Lauri Hoover , 75 y.o. male   MRN:  [id number]  Room/Bed:  2303/01  Admission Date:   5/1/2025  Code status:  Full Code    Reason for Admission: Hyponatremia  Barriers to Discharge: Hyponatremia  Anticipated Date of Discharge: 05/04/25      SUBJECTIVE:   Events of the last 24 hours:   No acute events overnight.   Patient seen at beside. Denies pain, CP, or SOB. States he came in due to constant vomiting/nausea, and an episode of passing out after standing up and feeling lightheaded. States he has little appetite. States he last drank alcohol 2 weeks ago, previously was drinking 6-8 beers a day.    ROS as above  CURRENT INPATIENT MEDICATIONS:  Current Facility-Administered Medications   Medication Dose Route Frequency Provider Last Rate Last Admin    sodium chloride flush 0.9 % injection 5-40 mL  5-40 mL IntraVENous 2 times per day Hannah Morocho MD   10 mL at 05/02/25 0857    sodium chloride flush 0.9 % injection 5-40 mL  5-40 mL IntraVENous PRN Hannah Morocho MD        0.9 % sodium chloride infusion   IntraVENous PRN Hannah Morocho MD        heparin (porcine) injection 5,000 Units  5,000 Units SubCUTAneous 3 times per day Hannah Morocho MD   5,000 Units at 05/02/25 0857    ondansetron (ZOFRAN-ODT) disintegrating tablet 4 mg  4 mg Oral Q8H PRN Hannah Morocho MD        Or    ondansetron (ZOFRAN) injection 4 mg  4 mg IntraVENous Q6H PRN Hannah Morocho MD        polyethylene glycol (GLYCOLAX) packet 17 g  17 g Oral Daily PRN Hannah Morocho MD        acetaminophen (TYLENOL) tablet 650 mg  650 mg Oral Q6H PRN Hannah Morocho MD        Or    acetaminophen (TYLENOL) suppository 650 mg  650 mg Rectal Q6H PRN Hannah Morocho MD        allopurinol (ZYLOPRIM) tablet 300 mg  300 mg Oral Daily Hannah Morocho MD   300 mg at 05/02/25 0856    lisinopril (PRINIVIL;ZESTRIL) tablet 10 mg  10 mg Oral Daily Irons, MD judit Mcfarland

## 2025-05-02 NOTE — CONSULTS
Room #: 2303      RANDALL: [id number]      Situation: Wound Care Consult    Background:    PMH:   Active Ambulatory Problems     Diagnosis Date Noted    HNP (herniated nucleus pulposus), lumbar 04/25/2018    Neuropathy 03/11/2020    Neuritis 04/25/2018    AMS (altered mental status) 04/29/2022    Seizure (HCC) 07/17/2022    Infected abrasion of third toe 12/21/2022    Acute hematogenous osteomyelitis of right foot (HCC) 12/27/2022    Right groin pain 02/07/2024    Right lumbar radiculopathy 02/07/2024    Lumbar spondylosis 02/07/2024    Hyponatremia 01/11/2025    Severe protein-calorie malnutrition 01/12/2025     Resolved Ambulatory Problems     Diagnosis Date Noted    Osteomyelitis (HCC) 12/21/2022     Past Medical History:   Diagnosis Date    Arthritis     Hemochromatosis     Ill-defined condition         Levy Score: 18/23   BMI: Body mass index is 24.66 kg/m².   Preventive measures in place: limited layers    Assessment:   Patient found reclined.  Patient is Awake and alert, Oriented x person, place, time and situation, and Pleasant and conversant.     Wound(s) Description:           Wound 05/02/25 Right;Plantar (Active)   Wound Image   05/02/25 1210   Wound Etiology Neuropathic 05/02/25 1210   Dressing Status New dressing applied 05/02/25 1210   Wound Cleansed Cleansed with saline 05/02/25 1210   Dressing/Treatment Alginate with Ag;Honey gel/honey paste;Silicone border 05/02/25 1210   Wound Length (cm) 3 cm 05/02/25 1210   Wound Width (cm) 2 cm 05/02/25 1210   Wound Depth (cm) 1.5 cm 05/02/25 1210   Wound Surface Area (cm^2) 6 cm^2 05/02/25 1210   Change in Wound Size % (l*w) 0 05/02/25 1210   Wound Volume (cm^3) 9 cm^3 05/02/25 1210   Wound Assessment Hyper granulation tissue;Slough 05/02/25 1210   Drainage Amount Moderate (25-50%) 05/02/25 1210   Drainage Description Serosanguinous 05/02/25 1210   Odor None 05/02/25 1210   Anastasiya-wound Assessment Hyperkeratosis (callous);Intact 05/02/25 1210   Number of days: 0

## 2025-05-02 NOTE — PROGRESS NOTES
[facility]    [address] [facility] [address]    Phone:  [phone]       Thank You for choosing us for your health care visit. We are glad to serve you and happy to provide you with this summary of your visit. Please help us to ensure we have accurate records. If you find anything that needs to be changed, please let our staff know as soon as possible.          Your Demographic Information     Patient Name Sex Sonam Rowan Female 1979       Ethnic Group Patient Race    Not of  or  Origin White      Your Visit Details     Date & Time Provider Department    3/15/2017 5:30 PM Lei Waite MD [facility]      Your Vitals Were     BP Pulse Temp Resp Weight SpO2    114/72 72 98.1 °F (36.7 °C) (Oral) 20 158 lb (71.7 kg) 98%    BMI Smoking Status                27.12 kg/m2 Current Every Day Smoker          Medications Prescribed or Re-Ordered Today     promethazine-dextromethorphan (PROMETHAZINE-DM) 6.25-15 MG/5ML syrup    Sig - Route: Take 5 mLs by mouth 4 times daily as needed for Cough. - Oral    Class: Eprescribe    Pharmacy: [facility] [address] [place] [address] AT [address] [place] [address] Ph #: [phone]    doxycycline hyclate (VIBRA-TABS) 100 MG tablet    Sig - Route: Take 1 tablet by mouth 2 times daily for 10 days. - Oral    Class: Eprescribe    Pharmacy: [facility] [address] [place] [address] AT [address] [place] [address] Ph #: [phone]      Your Current Medications Are        Disp Refills Start End    promethazine-dextromethorphan (PROMETHAZINE-DM) 6.25-15 MG/5ML syrup 180 mL 0 3/15/2017     Sig - Route: Take 5 mLs by mouth 4 times daily as needed for Cough. - Oral    Class: Eprescribe    doxycycline hyclate (VIBRA-TABS) 100 MG tablet 20 tablet 0 3/15/2017 3/25/2017    Sig - Route: Take 1 tablet by mouth 2 times daily for 10 days. - Oral    Class: Eprescribe    citalopram    Physician Progress Note      PATIENT:               IGNACIA MADSEN  CSN #:                  [id number]  :                       1950  ADMIT DATE:       2025 4:18 PM  DISCH DATE:  RESPONDING  PROVIDER #:        Marci Deutsch MD          QUERY TEXT:    Please clarify the patient?s nutritional status:    The clinical indicators include:   Registered Dietician note: \"Malnutrition Assessment: Malnutrition   Status:  Severe malnutrition (25 1237) Context:  Acute Illness Findings   of the 6 clinical characteristics of malnutrition: Energy Intake:  50% or less   of estimated energy requirements for 5 or more days Weight Loss:  Greater   than 7.5% over 3 months (-8.3% x4 months) Body Fat Loss:  Mild body fat loss   Triceps, Buccal region Muscle Mass Loss:  Moderate muscle mass loss Temples   (temporalis), Clavicles (pectoralis & deltoids), Thigh (quadriceps), Hand   (interosseous), Scapula (trapezius) Fluid Accumulation:  No fluid accumulation   Strength:  Not Performed\"    25 01:46 Hemoglobin Quant: 12.3    25 17:24 Sodium: 120    25 17:24 Chloride: 84              Registered Dietician Malnutrition Assessment, labs, renal ONS ordered, monitor  Options provided:  -- Protein calorie malnutrition severe  -- Other - I will add my own diagnosis  -- Disagree - Not applicable / Not valid  -- Disagree - Clinically unable to determine / Unknown  -- Refer to Clinical Documentation Reviewer    PROVIDER RESPONSE TEXT:    This patient has severe protein calorie malnutrition.    Query created by: Teresa Goel on 2025 3:09 PM      Electronically signed by:  Macri Deutsch MD 2025 3:51 PM           (CELEXA) 10 MG tablet 30 tablet 1 11/21/2016     Sig: TAKE 1 TABLET BY MOUTH DAILY    Class: Eprescribe    LORazepam (ATIVAN) 0.5 MG tablet 50 tablet 0 5/9/2016     Sig: TAKE 1 TABLET BY MOUTH EVERY 8 HOURS AS NEEDED FOR ANXIETY    Class: Script Not Printed      Allergies     Amoxicillin     diarrhea    Benzalkonium Chloride     ALOCRIL -- RASH      Immunizations History as of 3/15/2017     Name Date    DPT 9/10/1986, 5/3/1982, 3/6/1981, 1/9/1981, 1980    Tdap 9/10/2015              Patient Instructions      Sinusitis (Antibiotic Treatment)    The sinuses are air-filled spaces within the bones of the face. They connect to the inside of the nose. Sinusitis is an inflammation of the tissue lining the sinus cavity. Sinus inflammation can occur during a cold. It can also be due to allergies to pollens and other particles in the air. Sinusitis can cause symptoms of sinus congestion and fullness. A sinus infection causes fever, headache and facial pain. There is often green or yellow drainage from the nose or into the back of the throat (post-nasal drip). You have been given antibiotics to treat this condition.  Home care:  · Take the full course of antibiotics as instructed. Do not stop taking them, even if you feel better.  · Drink plenty of water, hot tea, and other liquids. This may help thin mucus. It also may promote sinus drainage.  · Heat may help soothe painful areas of the face. Use a towel soaked in hot water. Or,  the shower and direct the hot spray onto your face. Using a vaporizer along with a menthol rub at night may also help.   · An expectorant containing guaifenesin may help thin the mucus and promote drainage from the sinuses.  · Over-the-counter decongestants may be used unless a similar medicine was prescribed. Nasal sprays work the fastest. Use one that contains phenylephrine or oxymetazoline. First blow the nose gently. Then use the spray. Do not use these medicines more often than  directed on the label or symptoms may get worse. You may also use tablets containing pseudoephedrine. Avoid products that combine ingredients, because side effects may be increased. Read labels. You can also ask the pharmacist for help. (NOTE: Persons with high blood pressure should not use decongestants. They can raise blood pressure.)  · Over-the-counter antihistamines may help if allergies contributed to your sinusitis.    · Do not use nasal rinses or irrigation during an acute sinus infection, unless told to by your health care provider. Rinsing may spread the infection to other sinuses.  · Use acetaminophen or ibuprofen to control pain, unless another pain medicine was prescribed. (If you have chronic liver or kidney disease or ever had a stomach ulcer, talk with your doctor before using these medicines. Aspirin should never be used in anyone under 18 years of age who is ill with a fever. It may cause severe liver damage.)  · Don't smoke. This can worsen symptoms.  Follow-up care  Follow up with your healthcare provider or our staff if you are not improving within the next week.  When to seek medical advice  Call your healthcare provider if any of these occur:  · Facial pain or headache becoming more severe  · Stiff neck  · Unusual drowsiness or confusion  · Swelling of the forehead or eyelids  · Vision problems, including blurred or double vision  · Fever of 100.4ºF (38ºC) or higher, or as directed by your healthcare provider  · Seizure  · Breathing problems  · Symptoms not resolving within 10 days  © 2031-6005 [employer]. [address] [place] [address], [place] [address]. All rights reserved. This information is not intended as a substitute for professional medical care. Always follow your healthcare professional's instructions.

## 2025-05-02 NOTE — PROGRESS NOTES
[facility]  DAILY PROGRESS NOTE    *ATTENTION:  This note has been created by a medical student for educational purposes only.  Please do not refer to the content of this note for clinical decision-making, billing, or other purposes.  Please see attending physician’s note to obtain clinical information on this patient.*    Patient:    Lauri Hoover , 75 y.o. male   MRN:  [id number]  Room/Bed:  [address] [place] [address]  Admission Date:   5/1/2025  Code status:  Full Code    Reason for Admission: Diarrhea  Barriers to Discharge: Evaluation of Lung Mass  Anticipated Date of Discharge: Tomorrow 05/03      SUBJECTIVE:   Events of the last 24 hours:  No acute events overnight.  Patient seen at beside. No acute complaints. Patient comes in with 2 weeks of diarrhea and vomiting. He came in after he fell after standing up from bed and feeling dizzy. He says he fell onto the bed and di not hit his head at all. Last bowel movement yesterday. Not feeling nauseous and has not vomited since being here.     Describes persistent nausea and vomiting and states he has \"always\" had diarrhea. He states he changed his diet to not eat any beef. He describes loss of sensation in his feet. He states he has not been diagnosed with diabetes but has been treated for a right foot diabetic infection correlated with treatment of right foot osteomyelitis in 2022. Currently has a wound on the right foot for the last few weeks.     ROS (positive findings are in BOLD; negative findings are in regular font)  Constitutional: fevers, chills, appetite changes, weight changes, fatigue  HEENT: changes in vision, changes in hearing, sore throat, dysphagia  Cardiovascular: chest pain, palpitations, PND, orthopnea, edema  Pulmonary: SOB, cough, sputum production, wheezing, chest tightness  Gastrointestinal: abdominal pain, nausea/vomiting, diarrhea, constipation, melena, hematochezia  Genitourinary: dysuria, hesitation, dribbling, urgency,  hematuria  Musculoskeletal: arthralgias, myalgias  Skin: rash, itching  Neurological: sensory changes, motor changes, headache, neuropathy  Psychiatric: mood changes  Endocrine: heat/cold intolerance  Heme: easy bruising/easy bleeding, LAD    CURRENT INPATIENT MEDICATIONS:  Current Facility-Administered Medications   Medication Dose Route Frequency Provider Last Rate Last Admin    sodium chloride flush 0.9 % injection 5-40 mL  5-40 mL IntraVENous 2 times per day Hannah Morocho MD   10 mL at 05/02/25 0857    sodium chloride flush 0.9 % injection 5-40 mL  5-40 mL IntraVENous PRN Hannah Morocho MD        0.9 % sodium chloride infusion   IntraVENous PRN Hannah Morocho MD        heparin (porcine) injection 5,000 Units  5,000 Units SubCUTAneous 3 times per day Hannah Morocho MD   5,000 Units at 05/02/25 0857    ondansetron (ZOFRAN-ODT) disintegrating tablet 4 mg  4 mg Oral Q8H PRN Hannah Morocho MD        Or    ondansetron (ZOFRAN) injection 4 mg  4 mg IntraVENous Q6H PRN Hannah Morocho MD        polyethylene glycol (GLYCOLAX) packet 17 g  17 g Oral Daily PRN Hannah Morocho MD        acetaminophen (TYLENOL) tablet 650 mg  650 mg Oral Q6H PRN Hannah Morocho MD        Or    acetaminophen (TYLENOL) suppository 650 mg  650 mg Rectal Q6H PRN Hannah Morocho MD        [Held by provider] allopurinol (ZYLOPRIM) tablet 300 mg  300 mg Oral Daily Hannah Morocho MD   300 mg at 05/02/25 0856    [Held by provider] lisinopril (PRINIVIL;ZESTRIL) tablet 10 mg  10 mg Oral Daily Hannah Morocho MD        zolpidem (AMBIEN) tablet 10 mg  10 mg Oral Nightly PRN Hannah Morocho MD        0.9 % sodium chloride infusion   IntraVENous Continuous BaciMarci MD 62 mL/hr at 05/02/25 0016 New Bag at 05/02/25 0016       OBJECTIVE:  No intake or output data in the 24 hours ending 05/02/25 0943    /69   Pulse 88   Temp 97.7 °F (36.5 °C) (Oral)   Resp 18   Ht 1.803 m (5' 11\")   Wt 80.2 kg (176 lb 12.9 oz)

## 2025-05-02 NOTE — PLAN OF CARE
Problem: Pain  Goal: Verbalizes/displays adequate comfort level or baseline comfort level  Outcome: Progressing  Flowsheets (Taken 5/2/2025 0237)  Verbalizes/displays adequate comfort level or baseline comfort level:   Encourage patient to monitor pain and request assistance   Assess pain using appropriate pain scale   Administer analgesics based on type and severity of pain and evaluate response     Problem: Skin/Tissue Integrity  Goal: Skin integrity remains intact  Description: 1.  Monitor for areas of redness and/or skin breakdown2.  Assess vascular access sites hourly3.  Every 4-6 hours minimum:  Change oxygen saturation probe site4.  Every 4-6 hours:  If on nasal continuous positive airway pressure, respiratory therapy assess nares and determine need for appliance change or resting period  Outcome: Progressing  Flowsheets (Taken 5/2/2025 0237)  Skin Integrity Remains Intact:   Monitor for areas of redness and/or skin breakdown   Assess vascular access sites hourly   Every 4-6 hours minimum:  Change oxygen saturation probe site   Turn and reposition as indicated     Problem: Safety - Adult  Goal: Free from fall injury  Outcome: Progressing  Flowsheets (Taken 5/2/2025 0237)  Free From Fall Injury: Instruct family/caregiver on patient safety

## 2025-05-02 NOTE — CONSULTS
Consult Note      Consult requested by: Marci Deutsch MD    ADMIT DATE: 5/1/2025  CONSULT DATE: May 2, 2025                 Admission diagnosis: Hyponatremia   Reason for Nephrology Consultation: hyponatremia , TAN      Assessment:   1) AC on chronic hypotonic hypovolumic asymptomatic hyponatremia , etiology d/t dehydration /poor solute intake with beer potomania , urine sodium < 20 and urine osm 268 go along with prerenal state and poor solute intake   2) TAN on CKD3 ( baseline creat 1.7) , creat in 5 range , etiology prerenal v/s Ischaemic ATN from severe dehydration /relative hypotension, CT without any obst  3) AGMA --> suspect ketoacidoses and GI losses   4) hypomagnesemia   5) abnormal liver enzymes  6) LLL lung mass, CT abd/pelvis showed solid oval shaped 3 cm mass w/ small region of eccentric cavitation noted in same LLL region with previous consolidation.   7) R foot wound  8) Hypertension   9) GI symptoms , gastroenteritis ?  10) sepsis/hypotension     Recommendations:     1) strict I/o   2) Bicarb gtt  3) monitor renal panel q6hrs   4) goal correction of 4-6 meq in 24 hrs   5) midodrine 10 mg TID   6) no NSAIDs , I*V contrast nephrotoxins  7) renally dose meds for egfr < 30   8) monitor and replace magnesium , electrolytes     Discussed plan with primary team    Please call with questions    Andrzej Mcleod MD [facility]  Cell [phone]  Pager: [phone]  Fax   [phone]         HPI: Lauri Hoover is a 75 y.o. male White (non-)  with PMHx of gout, HTN, CKD3 who presented with complaint of a few days of nausea, vomiting, diarrhea, and weakness.  Has been having daily nausea and non bloody, non-bilious vomiting with last episode of vomiting this morning. Also has been having bouts of watery diarrhea a day. Patient passed out and fell onto his bed this morning upon standing up prompting him to come to the ED. Denies fevers, chills, dysuria, hematuria, hematochezia, CP, SOB, LE edema. He

## 2025-05-02 NOTE — PLAN OF CARE
Problem: Physical Therapy - Adult  Goal: By Discharge: Performs mobility at highest level of function for planned discharge setting.  See evaluation for individualized goals.  Description: Physical Therapy Goals:  Initiated 5/2/2025 to be met within 7-10 days.    1.  Patient will move from supine to sit and sit to supine  in bed with modified independence.    2.  Patient will transfer from bed to chair and chair to bed with modified independence using the least restrictive device.  3.  Patient will perform sit to stand with modified independence.  4.  Patient will ambulate with modified independence for 150 feet with the least restrictive device.   5.  Patient will ascend/descend 3 stairs with  handrail(s) with modified independence.    PLOF: Independent. Lives alone in single story home. Has been using cane occasionally.       Outcome: Progressing    PHYSICAL THERAPY EVALUATION    Patient: Lauri Hoover (75 y.o. male)  Date: 5/2/2025  Primary Diagnosis: Syncope and collapse [R55]  Hyponatremia [E87.1]  TAN (acute kidney injury) [N17.9]  Vomiting and diarrhea [R11.10, R19.7]       Precautions: Fall Risk, Contact Precautions, Isolation    ASSESSMENT :  Patient resting in bed upon arrival and agreeable to PT evaluation. Motivated to move. Mod I supine to sit transfer. Good sitting balance and intact BLE strength. Supervision sit to stand transfer. Stood for about 2 minutes holding onto RW for support while waiting for blood pressure to take, reports mild lightheadedness. + Orthostatic BP. Takes two steps laterally to improve positioning for return to bed. Patient requests to remain sitting EOB and has no symptoms, though BP 90/64. Encouraged to return to supine and updated nurse on patient presentation. All needs left within reach.       DEFICITS/IMPAIRMENTS:    , Body Structures, Functions, Activity Limitations Requiring Skilled Therapeutic Intervention: Decreased functional mobility ;Decreased  understanding;Demonstrated understanding;Continued education needed    Thank you for this referral.  Jannet Posada, PT  Minutes: 24      Eval Complexity: Decision Making: Medium Complexity

## 2025-05-02 NOTE — PLAN OF CARE
Problem: Occupational Therapy - Adult  Goal: By Discharge: Performs self-care activities at highest level of function for planned discharge setting.  See evaluation for individualized goals.  Description: Occupational Therapy Goals:  Initiated 5/2/2025 to be met within 7-10 days.    1.  Patient will perform grooming with modified independence while standing at the sink for > 5 min with Good balance (monitor BP).   2.  Patient will perform bathing with modified independence.  3.  Patient will perform lower body dressing with modified independence for seated and standing aspects.  4.  Patient will perform toilet transfers with modified independence.  5.  Patient will perform all aspects of toileting with modified independence.  6.  Patient will participate in upper extremity therapeutic exercise/activities with supervision/set-up for 8 minutes to improve endurance and UB strength needed for ADLs    7.  Patient will utilize energy conservation techniques during functional activities with verbal cues.    PLOF: Pt lives alone, reports being independent with ADLs and functional mobility w/o AD.   Outcome: Progressing  OCCUPATIONAL THERAPY EVALUATION    Patient: Lauri Hoover (75 y.o. male)  Date: 5/2/2025  Primary Diagnosis: Syncope and collapse [R55]  Hyponatremia [E87.1]  TAN (acute kidney injury) [N17.9]  Vomiting and diarrhea [R11.10, R19.7]       Precautions: Fall Risk, Contact Precautions, Isolation    ASSESSMENT :  Pt is motivated to participate in OT, easily distracted, benefited from VCs to maintain attention on task. Pt with wound on R foot with bulky dressing, required CGA to don socks for safety and dressing protection. Pt completed mult seated grooming tasks with set-up. SBA- CGA for standing tasks with support due to decreased standing balance and orthostatic vitals (See below). Pt requested to stay seated EOB to wait for breakfast, RN notified. /77,  bpm.    Orthostatic Vitals:       home setting.    This AMPAC score should be considered in conjunction with interdisciplinary team recommendations to determine the most appropriate discharge setting. Patient's social support, diagnosis, medical stability, and prior level of function should also be taken into consideration.     SUBJECTIVE:   Patient stated, “I don't feel dizzy.”    OBJECTIVE DATA SUMMARY:     Past Medical History:   Diagnosis Date    Arthritis     Hemochromatosis     Ill-defined condition     GOUT     Past Surgical History:   Procedure Laterality Date    ORTHOPEDIC SURGERY      LEFT HAND       Home Situation:   Social/Functional History  Lives With: Alone  Type of Home: House  Home Layout: One level  Home Access: Stairs to enter without rails  Entrance Stairs - Number of Steps: 3  Bathroom Shower/Tub: Tub/Shower unit  Home Equipment: Cane, Walker - Rolling  Prior Level of Assist for ADLs: Independent  Prior Level of Assist for Transfers: Independent  []  Right hand dominant   []  Left hand dominant    Cognitive/Behavioral Status:  Orientation  Overall Orientation Status: Within Functional Limits  Orientation Level: Oriented X4  Cognition  Overall Cognitive Status: Exceptions  Safety Judgement: Decreased awareness of need for safety    Skin: visible skin intact  Edema: none noted    Vision/Perceptual:    Vision  Vision: Within Functional Limits        Coordination: BUE  Coordination: Within functional limits        Balance:     Balance  Sitting: Intact  Standing: Impaired;With support  Standing - Static: Good  Standing - Dynamic: Fair    Strength: BUE  Strength: Within functional limits    Tone & Sensation: BUE  Tone: Normal  Sensation: Intact    Range of Motion: BUE  AROM: Within functional limits   Functional Mobility and Transfers for ADLs:  Bed Mobility:     Bed Mobility Training  Bed Mobility Training: Yes  Rolling: Independent  Supine to Sit: Supervision  Sit to Supine: Supervision  Scooting: Supervision  Transfers:

## 2025-05-03 ENCOUNTER — APPOINTMENT (OUTPATIENT)
Facility: HOSPITAL | Age: 75
DRG: 682 | End: 2025-05-03
Payer: MEDICARE

## 2025-05-03 LAB
ANION GAP SERPL CALC-SCNC: 16 MMOL/L (ref 3–18)
ANION GAP SERPL CALC-SCNC: 17 MMOL/L (ref 3–18)
ANION GAP SERPL CALC-SCNC: 18 MMOL/L (ref 3–18)
BASOPHILS # BLD: 0.04 K/UL (ref 0–0.1)
BASOPHILS NFR BLD: 1 % (ref 0–2)
BUN SERPL-MCNC: 70 MG/DL (ref 6–23)
BUN SERPL-MCNC: 76 MG/DL (ref 6–23)
BUN SERPL-MCNC: 78 MG/DL (ref 6–23)
BUN/CREAT SERPL: 19 (ref 12–20)
BUN/CREAT SERPL: 20 (ref 12–20)
BUN/CREAT SERPL: 22 (ref 12–20)
C COLI+JEJUNI TUF STL QL NAA+PROBE: NEGATIVE
C DIFF GDH STL QL: NEGATIVE
C DIFF TOX A+B STL QL IA: NEGATIVE
C DIFF TOXIN INTERPRETATION: NORMAL
CALCIUM SERPL-MCNC: 8 MG/DL (ref 8.5–10.1)
CALCIUM SERPL-MCNC: 8.1 MG/DL (ref 8.5–10.1)
CALCIUM SERPL-MCNC: 8.2 MG/DL (ref 8.5–10.1)
CEA SERPL-MCNC: 6.7 NG/ML
CHLORIDE SERPL-SCNC: 93 MMOL/L (ref 98–107)
CHLORIDE SERPL-SCNC: 93 MMOL/L (ref 98–107)
CHLORIDE SERPL-SCNC: 95 MMOL/L (ref 98–107)
CO2 SERPL-SCNC: 17 MMOL/L (ref 21–32)
CO2 SERPL-SCNC: 21 MMOL/L (ref 21–32)
CO2 SERPL-SCNC: 21 MMOL/L (ref 21–32)
CREAT SERPL-MCNC: 3.22 MG/DL (ref 0.6–1.3)
CREAT SERPL-MCNC: 3.73 MG/DL (ref 0.6–1.3)
CREAT SERPL-MCNC: 4.01 MG/DL (ref 0.6–1.3)
DIFFERENTIAL METHOD BLD: ABNORMAL
EC STX1+STX2 GENES STL QL NAA+PROBE: NEGATIVE
EOSINOPHIL # BLD: 0.11 K/UL (ref 0–0.4)
EOSINOPHIL NFR BLD: 2.6 % (ref 0–5)
ERYTHROCYTE [DISTWIDTH] IN BLOOD BY AUTOMATED COUNT: 13.4 % (ref 11.6–14.5)
ETEC ELTA+ESTB GENES STL QL NAA+PROBE: NEGATIVE
GLUCOSE SERPL-MCNC: 103 MG/DL (ref 74–108)
GLUCOSE SERPL-MCNC: 89 MG/DL (ref 74–108)
GLUCOSE SERPL-MCNC: 98 MG/DL (ref 74–108)
HCT VFR BLD AUTO: 31 % (ref 36–48)
HGB BLD-MCNC: 11.3 G/DL (ref 13–16)
IMM GRANULOCYTES # BLD AUTO: 0.04 K/UL (ref 0–0.04)
IMM GRANULOCYTES NFR BLD AUTO: 1 % (ref 0–0.5)
LYMPHOCYTES # BLD: 0.75 K/UL (ref 0.9–3.6)
LYMPHOCYTES NFR BLD: 18 % (ref 21–52)
MAGNESIUM SERPL-MCNC: 1.8 MG/DL (ref 1.6–2.6)
MCH RBC QN AUTO: 33.2 PG (ref 24–34)
MCHC RBC AUTO-ENTMCNC: 36.5 G/DL (ref 31–37)
MCV RBC AUTO: 91.2 FL (ref 78–100)
MONOCYTES # BLD: 0.54 K/UL (ref 0.05–1.2)
MONOCYTES NFR BLD: 12.9 % (ref 3–10)
NEUTS SEG # BLD: 2.69 K/UL (ref 1.8–8)
NEUTS SEG NFR BLD: 64.5 % (ref 40–73)
NRBC # BLD: 0 K/UL (ref 0–0.01)
NRBC BLD-RTO: 0 PER 100 WBC
P SHIGELLOIDES DNA STL QL NAA+PROBE: NEGATIVE
PHOSPHATE SERPL-MCNC: 5 MG/DL (ref 2.5–4.9)
PLATELET # BLD AUTO: 128 K/UL (ref 135–420)
PMV BLD AUTO: 11.2 FL (ref 9.2–11.8)
POTASSIUM SERPL-SCNC: 3.5 MMOL/L (ref 3.5–5.5)
POTASSIUM SERPL-SCNC: 3.6 MMOL/L (ref 3.5–5.5)
POTASSIUM SERPL-SCNC: 3.9 MMOL/L (ref 3.5–5.5)
RBC # BLD AUTO: 3.4 M/UL (ref 4.35–5.65)
SALMONELLA SP SPAO STL QL NAA+PROBE: NEGATIVE
SHIGELLA SP+EIEC IPAH STL QL NAA+PROBE: NEGATIVE
SODIUM SERPL-SCNC: 129 MMOL/L (ref 136–145)
SODIUM SERPL-SCNC: 130 MMOL/L (ref 136–145)
SODIUM SERPL-SCNC: 132 MMOL/L (ref 136–145)
V CHOL+PARA+VUL DNA STL QL NAA+NON-PROBE: NEGATIVE
WBC # BLD AUTO: 4.2 K/UL (ref 4.6–13.2)
Y ENTEROCOL DNA STL QL NAA+NON-PROBE: NEGATIVE

## 2025-05-03 PROCEDURE — 82378 CARCINOEMBRYONIC ANTIGEN: CPT

## 2025-05-03 PROCEDURE — 2580000003 HC RX 258: Performed by: INTERNAL MEDICINE

## 2025-05-03 PROCEDURE — 84153 ASSAY OF PSA TOTAL: CPT

## 2025-05-03 PROCEDURE — 36415 COLL VENOUS BLD VENIPUNCTURE: CPT

## 2025-05-03 PROCEDURE — 1100000003 HC PRIVATE W/ TELEMETRY

## 2025-05-03 PROCEDURE — 6370000000 HC RX 637 (ALT 250 FOR IP): Performed by: INTERNAL MEDICINE

## 2025-05-03 PROCEDURE — 6370000000 HC RX 637 (ALT 250 FOR IP)

## 2025-05-03 PROCEDURE — 6370000000 HC RX 637 (ALT 250 FOR IP): Performed by: FAMILY MEDICINE

## 2025-05-03 PROCEDURE — 85025 COMPLETE CBC W/AUTO DIFF WBC: CPT

## 2025-05-03 PROCEDURE — 84154 ASSAY OF PSA FREE: CPT

## 2025-05-03 PROCEDURE — 73630 X-RAY EXAM OF FOOT: CPT

## 2025-05-03 PROCEDURE — 83735 ASSAY OF MAGNESIUM: CPT

## 2025-05-03 PROCEDURE — 6360000002 HC RX W HCPCS

## 2025-05-03 PROCEDURE — 2500000003 HC RX 250 WO HCPCS: Performed by: INTERNAL MEDICINE

## 2025-05-03 PROCEDURE — 94761 N-INVAS EAR/PLS OXIMETRY MLT: CPT

## 2025-05-03 PROCEDURE — 84100 ASSAY OF PHOSPHORUS: CPT

## 2025-05-03 PROCEDURE — 80048 BASIC METABOLIC PNL TOTAL CA: CPT

## 2025-05-03 PROCEDURE — 2500000003 HC RX 250 WO HCPCS

## 2025-05-03 RX ORDER — SODIUM BICARBONATE 650 MG/1
650 TABLET ORAL 4 TIMES DAILY
Status: DISCONTINUED | OUTPATIENT
Start: 2025-05-03 | End: 2025-05-07 | Stop reason: HOSPADM

## 2025-05-03 RX ORDER — LANOLIN ALCOHOL/MO/W.PET/CERES
400 CREAM (GRAM) TOPICAL ONCE
Status: COMPLETED | OUTPATIENT
Start: 2025-05-03 | End: 2025-05-03

## 2025-05-03 RX ORDER — SODIUM CHLORIDE, SODIUM LACTATE, POTASSIUM CHLORIDE, CALCIUM CHLORIDE 600; 310; 30; 20 MG/100ML; MG/100ML; MG/100ML; MG/100ML
INJECTION, SOLUTION INTRAVENOUS CONTINUOUS
Status: DISCONTINUED | OUTPATIENT
Start: 2025-05-03 | End: 2025-05-06

## 2025-05-03 RX ORDER — LOPERAMIDE HYDROCHLORIDE 2 MG/1
2 CAPSULE ORAL 4 TIMES DAILY PRN
Status: DISCONTINUED | OUTPATIENT
Start: 2025-05-03 | End: 2025-05-04

## 2025-05-03 RX ADMIN — SODIUM CHLORIDE, PRESERVATIVE FREE 10 ML: 5 INJECTION INTRAVENOUS at 21:42

## 2025-05-03 RX ADMIN — SODIUM BICARBONATE 650 MG: 650 TABLET ORAL at 17:39

## 2025-05-03 RX ADMIN — HEPARIN SODIUM 5000 UNITS: 5000 INJECTION INTRAVENOUS; SUBCUTANEOUS at 05:42

## 2025-05-03 RX ADMIN — SODIUM CHLORIDE, PRESERVATIVE FREE 10 ML: 5 INJECTION INTRAVENOUS at 09:09

## 2025-05-03 RX ADMIN — SODIUM BICARBONATE 650 MG: 650 TABLET ORAL at 21:42

## 2025-05-03 RX ADMIN — MIDODRINE HYDROCHLORIDE 10 MG: 10 TABLET ORAL at 09:08

## 2025-05-03 RX ADMIN — ZOLPIDEM TARTRATE 10 MG: 5 TABLET ORAL at 23:54

## 2025-05-03 RX ADMIN — Medication 1 MG: at 09:09

## 2025-05-03 RX ADMIN — SODIUM BICARBONATE: 84 INJECTION, SOLUTION INTRAVENOUS at 05:42

## 2025-05-03 RX ADMIN — MIDODRINE HYDROCHLORIDE 10 MG: 10 TABLET ORAL at 17:02

## 2025-05-03 RX ADMIN — HEPARIN SODIUM 5000 UNITS: 5000 INJECTION INTRAVENOUS; SUBCUTANEOUS at 13:30

## 2025-05-03 RX ADMIN — SODIUM CHLORIDE, SODIUM LACTATE, POTASSIUM CHLORIDE, AND CALCIUM CHLORIDE: .6; .31; .03; .02 INJECTION, SOLUTION INTRAVENOUS at 21:32

## 2025-05-03 RX ADMIN — LOPERAMIDE HYDROCHLORIDE 2 MG: 2 CAPSULE ORAL at 22:24

## 2025-05-03 RX ADMIN — HEPARIN SODIUM 5000 UNITS: 5000 INJECTION INTRAVENOUS; SUBCUTANEOUS at 21:42

## 2025-05-03 RX ADMIN — MIDODRINE HYDROCHLORIDE 10 MG: 10 TABLET ORAL at 12:30

## 2025-05-03 RX ADMIN — Medication 400 MG: at 10:58

## 2025-05-03 ASSESSMENT — PAIN SCALES - GENERAL
PAINLEVEL_OUTOF10: 0

## 2025-05-03 NOTE — PROGRESS NOTES
[facility]  DAILY PROGRESS NOTE    *ATTENTION:  This note has been created by a medical student for educational purposes only.  Please do not refer to the content of this note for clinical decision-making, billing, or other purposes.  Please see attending physician’s note to obtain clinical information on this patient.*    Patient:    Lauri Hoover , 75 y.o. male   MRN:  [id number]  Room/Bed:  [address] [place] [address]  Admission Date:   5/1/2025  Code status:  Full Code    Reason for Admission: Decreased Appetite / Weight Loss / Nausea & Vomiting / Hyponatremia  Barriers to Discharge: IV Electrolytes  Anticipated Date of Discharge: Monday 05/05      SUBJECTIVE:   Events of the last 24 hours:  The patient's vital signs displayed low Bps overnight ranging from 89-90 / 61-47 but MAP was in 60s-70s. This morning the patient's blood pressure increased to 106/81 with MAP of 81. Otherwise, the patient experienced no acute events overnight and states that he slept well.  Patient seen at beside. No acute complaints. He states he feels \"good\" overall and denies any pain or nausea/vomiting. He states that his right leg is more swollen than his left due to the bandage on his right foot.    ROS (positive findings are in BOLD; negative findings are in regular font)  Constitutional: fevers, chills, appetite changes, weight changes, fatigue  HEENT: changes in vision, changes in hearing, sore throat, dysphagia  Cardiovascular: chest pain, palpitations, PND, orthopnea, edema  Pulmonary: SOB, cough, sputum production, wheezing, chest tightness  Gastrointestinal: abdominal pain, nausea/vomiting, diarrhea, constipation, melena, hematochezia  Genitourinary: dysuria, hesitation, dribbling, urgency, hematuria  Musculoskeletal: arthralgias, myalgias  Skin: rash, itching  Neurological: sensory changes, motor changes, headache  Psychiatric: mood changes  Endocrine: heat/cold intolerance  Heme: easy bruising/easy bleeding, LAD    CURRENT  INPATIENT MEDICATIONS:  Current Facility-Administered Medications   Medication Dose Route Frequency Provider Last Rate Last Admin    Virt-Caps 1 mg  1 capsule Oral Daily Marci Deutsch MD   1 mg at 05/02/25 1436    sodium bicarbonate 150 mEq in dextrose 5 % 1,000 mL infusion   IntraVENous Continuous Andrzej Mcleod  mL/hr at 05/03/25 0542 New Bag at 05/03/25 0542    midodrine (PROAMATINE) tablet 10 mg  10 mg Oral TID WC Andzrej Mcleod MD   10 mg at 05/02/25 1635    bismuth subsalicylate (PEPTO BISMOL) 525 MG/15ML suspension 30 mL  30 mL Oral Q6H PRN Hannah Morocho MD   30 mL at 05/02/25 2148    sodium chloride flush 0.9 % injection 5-40 mL  5-40 mL IntraVENous 2 times per day Hannah Morocho MD   10 mL at 05/02/25 2107    sodium chloride flush 0.9 % injection 5-40 mL  5-40 mL IntraVENous PRN Hannah Morocho MD        0.9 % sodium chloride infusion   IntraVENous PRN Hannah Morocho MD        heparin (porcine) injection 5,000 Units  5,000 Units SubCUTAneous 3 times per day Hannah Morocho MD   5,000 Units at 05/03/25 0542    ondansetron (ZOFRAN-ODT) disintegrating tablet 4 mg  4 mg Oral Q8H PRN Hannah Morocho MD        Or    ondansetron (ZOFRAN) injection 4 mg  4 mg IntraVENous Q6H PRN Hannah Morocho MD        polyethylene glycol (GLYCOLAX) packet 17 g  17 g Oral Daily PRN Hannah Morocho MD        acetaminophen (TYLENOL) tablet 650 mg  650 mg Oral Q6H PRN Hannah Morocho MD        Or    acetaminophen (TYLENOL) suppository 650 mg  650 mg Rectal Q6H PRN Hannah Morocho MD        [Held by provider] allopurinol (ZYLOPRIM) tablet 300 mg  300 mg Oral Daily Hannah Morocho MD   300 mg at 05/02/25 0856    [Held by provider] lisinopril (PRINIVIL;ZESTRIL) tablet 10 mg  10 mg Oral Daily Irons, Hannah D, MD        zolpidem (AMBIEN) tablet 10 mg  10 mg Oral Nightly PRN Hannah Morocho MD   10 mg at 05/02/25 2148       OBJECTIVE:    Intake/Output Summary (Last 24 hours) at 5/3/2025 0902  Last  data filed at 5/3/2025 0545  Gross per 24 hour   Intake 2566.53 ml   Output 1050 ml   Net 1516.53 ml       BP (!) 106/56   Pulse 78   Temp 97.2 °F (36.2 °C) (Temporal)   Resp 19   Ht 1.803 m (5' 10.98\")   Wt 78.3 kg (172 lb 9.9 oz)   SpO2 100%   BMI 24.09 kg/m²     PHYSICAL EXAM  Gen: NAD, comfortable  HEENT: normocephalic, atraumatic, MMM, no thyromegaly, no JVD  CV: RRR, S1/S2 present without M/R/G  Pulm: CTAB, no wheezes, no crackles  Abd: S/NT/ND, no rebound, no guarding, no hepatosplenomegaly   MSK: no clubbing, R LE edema non-pitting with gauze dressing over R foot wound  Skin: warm, dry, intact, no rash  Neuro: CN II-XII grossly intact, no focal deficits appreciated   Psych: appropriate, alert, oriented x3    LABWORK (LAST 24 HOURS)  Recent Results (from the past 24 hours)   Basic Metabolic Panel    Collection Time: 05/02/25 10:14 AM   Result Value Ref Range    Sodium 123 (L) 136 - 145 mmol/L    Potassium 4.6 3.5 - 5.5 mmol/L    Chloride 90 (L) 98 - 107 mmol/L    CO2 14 (L) 21 - 32 mmol/L    Anion Gap 19 (H) 3.0 - 18.0 mmol/L    Glucose 79 74 - 108 mg/dL    BUN 76 (H) 6 - 23 MG/DL    Creatinine 5.38 (H) 0.6 - 1.3 MG/DL    BUN/Creatinine Ratio 14 12 - 20      Est, Glom Filt Rate 10 (L) >60 ml/min/1.73m2    Calcium 8.1 (L) 8.5 - 10.1 MG/DL   Lactic Acid    Collection Time: 05/02/25 10:14 AM   Result Value Ref Range    Lactic Acid 1.4 0.4 - 2.0 mmol/L   Basic Metabolic Panel    Collection Time: 05/02/25 12:35 PM   Result Value Ref Range    Sodium 123 (L) 136 - 145 mmol/L    Potassium 4.8 3.5 - 5.5 mmol/L    Chloride 91 (L) 98 - 107 mmol/L    CO2 12 (L) 21 - 32 mmol/L    Anion Gap 20 (H) 3.0 - 18.0 mmol/L    Glucose 79 74 - 108 mg/dL    BUN 74 (H) 6 - 23 MG/DL    Creatinine 5.24 (H) 0.6 - 1.3 MG/DL    BUN/Creatinine Ratio 14 12 - 20      Est, Glom Filt Rate 11 (L) >60 ml/min/1.73m2    Calcium 7.9 (L) 8.5 - 10.1 MG/DL   Magnesium    Collection Time: 05/02/25 12:35 PM   Result Value Ref Range    Magnesium  yes

## 2025-05-03 NOTE — CONSULTS
NUTRITION FOLLOW UP NOTE    Admitted for hyponatremia; CKD3, several days n/v and diarrhea. Pt seen for - Positive Nutrition Screen: MST: wt loss, MST: poor appetite, (MST noted 14-23 lbs loss). Per H&P noted pt reports loss of 30-40 lbs in the past few months. Pt seen awake and oriented. Per pt appetite has remained the same since admission. Average of ~58% of meals completed yesterday. Consult received for poor PO intake/appetite 5 or more days. RD currently following - please see full note from 5/2. Will continue to monitor PO intake and assess at follow up on 5/6.    Current Nutrition Therapies:  Average Meal Intake: 1-25%  Average Supplements Intake: None Ordered  ADULT DIET; Regular; Low Phosphorus (Less than 1000 mg); No Beef; likes: frt/salad  ADULT ORAL NUTRITION SUPPLEMENT; Breakfast, Lunch, Dinner; Renal Oral Supplement     Meal Intake: Provides on average ~53% kcal, 100% protein of estimated needs  Patient Vitals for the past 168 hrs:   PO Meals Eaten (%)   05/02/25 1606 26 - 50%   05/02/25 1242 26 - 50%   05/02/25 0900 51 - 75%     Nutrition Related Findings:   Pertinent Meds:   Virt-caps  Sodium bicarb @ 100 ml/hr Pertinent Labs:  Recent Labs     05/02/25  0143 05/02/25  0146 05/02/25  1235 05/02/25  2133 05/03/25  0351   GLUCOSE  --    < > 79 86 98   BUN  --    < > 74* 80* PENDING   CREATININE  --    < > 5.24* 4.66* 4.01*   NA  --    < > 123* 126* 129*   K  --    < > 4.8 4.4 3.5   CL  --    < > 91* 94* 93*   CO2  --    < > 12* 15* 17*   CALCIUM  --    < > 7.9* 8.0* 8.1*   PHOS 5.3*  --   --   --  5.0*   MG 1.1*   < > 1.5*  --  1.8    < > = values in this interval not displayed.     Recent Labs     05/01/25  1653   POCGLU 119*     Last BM: 05/02/25    Skin: Wound Type:  (neruopathic wound of right plantar)    Edema: None per EMR          Estimated Nutrition Needs: 79.5 kg   Energy Requirements Based On: Kcal/kg  Weight Used for Energy Requirements: Current  Energy (kcal/day): 4078-5630 kcal/d (30-35

## 2025-05-03 NOTE — PROGRESS NOTES
[facility]  DAILY PROGRESS NOTE      Patient:    Lauri Hoover , 75 y.o. male   MRN:  [id number]  Room/Bed:  2303/01  Admission Date:   5/1/2025  Code status:  Full Code    Reason for Admission: Hyponatremia  Barriers to Discharge: Hyponatremia  Anticipated Date of Discharge: 05/04/25      SUBJECTIVE:   Events of the last 24 hours:   No acute events overnight.   Patient seen at beside. Denies pain, CP, or SOB. Says diarrhea about the same. Has been eating better. Worries about how low his blood pressure gets when he stands.    ROS as above    CURRENT INPATIENT MEDICATIONS:  Current Facility-Administered Medications   Medication Dose Route Frequency Provider Last Rate Last Admin    magnesium oxide (MAG-OX) tablet 400 mg  400 mg Oral Once Penelope Gu DO        Virt-Caps 1 mg  1 capsule Oral Daily Marci Deutsch MD   1 mg at 05/03/25 0909    sodium bicarbonate 150 mEq in dextrose 5 % 1,000 mL infusion   IntraVENous Continuous Andrzej Mcleod  mL/hr at 05/03/25 0542 New Bag at 05/03/25 0542    midodrine (PROAMATINE) tablet 10 mg  10 mg Oral TID WC Andrzej Mcleod MD   10 mg at 05/03/25 0908    bismuth subsalicylate (PEPTO BISMOL) 525 MG/15ML suspension 30 mL  30 mL Oral Q6H PRN Hannah Morocho MD   30 mL at 05/02/25 2148    sodium chloride flush 0.9 % injection 5-40 mL  5-40 mL IntraVENous 2 times per day Hannah Morocho MD   10 mL at 05/03/25 0909    sodium chloride flush 0.9 % injection 5-40 mL  5-40 mL IntraVENous PRN Hannah Morocho MD        0.9 % sodium chloride infusion   IntraVENous PRN Hannah Morocho MD        heparin (porcine) injection 5,000 Units  5,000 Units SubCUTAneous 3 times per day Hannah Morocho MD   5,000 Units at 05/03/25 0542    ondansetron (ZOFRAN-ODT) disintegrating tablet 4 mg  4 mg Oral Q8H PRN Hannah Morocho MD        Or    ondansetron (ZOFRAN) injection 4 mg  4 mg IntraVENous Q6H PRN Irons, Hannah D, MD        polyethylene glycol (GLYCOLAX) packet  17 g  17 g Oral Daily PRN Hannah Morocho MD        acetaminophen (TYLENOL) tablet 650 mg  650 mg Oral Q6H PRN Hannah Morocho MD        Or    acetaminophen (TYLENOL) suppository 650 mg  650 mg Rectal Q6H PRN Hannah Morocho MD        [Held by provider] allopurinol (ZYLOPRIM) tablet 300 mg  300 mg Oral Daily Hannah Morocho MD   300 mg at 05/02/25 0856    [Held by provider] lisinopril (PRINIVIL;ZESTRIL) tablet 10 mg  10 mg Oral Daily Hannah Morocho MD        zolpidem (AMBIEN) tablet 10 mg  10 mg Oral Nightly PRN Hannah Morocho MD   10 mg at 05/02/25 2148       OBJECTIVE:    Intake/Output Summary (Last 24 hours) at 5/3/2025 1033  Last data filed at 5/3/2025 0925  Gross per 24 hour   Intake 2566.53 ml   Output 1050 ml   Net 1516.53 ml       BP (!) 106/56   Pulse 78   Temp 97.2 °F (36.2 °C) (Temporal)   Resp 19   Ht 1.803 m (5' 10.98\")   Wt 78.3 kg (172 lb 9.9 oz)   SpO2 100%   BMI 24.09 kg/m²     PHYSICAL EXAM  Gen: NAD, comfortable   HEENT: normocephalic, atraumatic  CV: RRR, S1/S2 present without M/R/G  Pulm: CTAB, no wheezes, no crackles  Abd: S/NT/ND   MSK: no clubbing, no edema  Skin: wound on RLE foot  Neuro:no focal deficits appreciated   Psych: appropriate, alert    LABWORK (LAST 24 HOURS)  Recent Results (from the past 24 hours)   Basic Metabolic Panel    Collection Time: 05/02/25 12:35 PM   Result Value Ref Range    Sodium 123 (L) 136 - 145 mmol/L    Potassium 4.8 3.5 - 5.5 mmol/L    Chloride 91 (L) 98 - 107 mmol/L    CO2 12 (L) 21 - 32 mmol/L    Anion Gap 20 (H) 3.0 - 18.0 mmol/L    Glucose 79 74 - 108 mg/dL    BUN 74 (H) 6 - 23 MG/DL    Creatinine 5.24 (H) 0.6 - 1.3 MG/DL    BUN/Creatinine Ratio 14 12 - 20      Est, Glom Filt Rate 11 (L) >60 ml/min/1.73m2    Calcium 7.9 (L) 8.5 - 10.1 MG/DL   Magnesium    Collection Time: 05/02/25 12:35 PM   Result Value Ref Range    Magnesium 1.5 (L) 1.6 - 2.6 mg/dL   Sodium, urine, random    Collection Time: 05/02/25  3:55 PM   Result Value Ref Range

## 2025-05-03 NOTE — PROGRESS NOTES
Bedside and Verbal shift change report given to Franko Carmona RN (oncoming nurse) by Vimal Mclean RN (offgoing nurse). Report included the following information Nurse Handoff Report, Intake/Output, MAR, Recent Results, Med Rec Status, and Cardiac Rhythm NSR. Will resume care and monitor Pt. Condition.    Pt. Educated on call bell when in need of help and assistance.

## 2025-05-03 NOTE — PROGRESS NOTES
Nephrology Progress Note      Patient: Lauri Hoover               Sex: male          DOA: 5/1/2025  YOB: 1950      Age:  75 y.o.        LOS:  LOS: 2 days   MRN: [id number]                    CSN: [id number]      Impression:   1) AC on chronic hypotonic hypovolumic asymptomatic hyponatremia , etiology d/t dehydration /poor solute intake with beer potomania , urine sodium < 20 and urine osm 268 go along with prerenal state and poor solute intake   2) TAN on CKD3 ( baseline creat 1.7) , etiology prerenal v/s Ischaemic ATN from severe dehydration /relative hypotension, CT without any obst  3) AGMA --> suspect ketoacidoses and GI losses   4) hypomagnesemia   5) abnormal liver enzymes  6) LLL lung mass, CT abd/pelvis showed solid oval shaped 3 cm mass w/ small region of eccentric cavitation noted in same LLL region with previous consolidation.   7) R foot wound  8) Hypertension   9) GI symptoms , gastroenteritis ?  10) sepsis/hypotension      Recommendations:      1) strict I/o , kidney is improving, start gentle IV fluid  2) off Bicarb gtt, cont PO  3) monitor renal panel daily  4) S na stable 132 today  5) midodrine 10 mg TID   6) no NSAIDs , IV contrast nephrotoxins  7) renally dose meds for egfr < 30   8) monitor and replace magnesium , electrolytes    Please call with questions    Subjective:   Patient denies shortness of breath, urine output 1 L      Current Facility-Administered Medications:     sodium bicarbonate tablet 650 mg, 650 mg, Oral, 4x Daily, Marci Deutsch MD, 650 mg at 05/03/25 1739    Virt-Caps 1 mg, 1 capsule, Oral, Daily, Marci Deutsch MD, 1 mg at 05/03/25 0909    midodrine (PROAMATINE) tablet 10 mg, 10 mg, Oral, TID WC, Andrzej Mcleod MD, 10 mg at 05/03/25 1702    bismuth subsalicylate (PEPTO BISMOL) 525 MG/15ML suspension 30 mL, 30 mL, Oral, Q6H PRN, Hannah Morocho MD, 30 mL at 05/02/25 2148    sodium chloride flush 0.9 % injection 5-40 mL, 5-40 mL, IntraVENous, 2 times per  day, Hannah Morocho MD, 10 mL at 05/03/25 0909    sodium chloride flush 0.9 % injection 5-40 mL, 5-40 mL, IntraVENous, PRN, Hannah Morocho MD    0.9 % sodium chloride infusion, , IntraVENous, PRN, Hannah Morocho MD    heparin (porcine) injection 5,000 Units, 5,000 Units, SubCUTAneous, 3 times per day, Hannah Morocho MD, 5,000 Units at 05/03/25 1330    ondansetron (ZOFRAN-ODT) disintegrating tablet 4 mg, 4 mg, Oral, Q8H PRN **OR** ondansetron (ZOFRAN) injection 4 mg, 4 mg, IntraVENous, Q6H PRN, Hannah Morocho MD    polyethylene glycol (GLYCOLAX) packet 17 g, 17 g, Oral, Daily PRN, Hannah Morocho MD    acetaminophen (TYLENOL) tablet 650 mg, 650 mg, Oral, Q6H PRN **OR** acetaminophen (TYLENOL) suppository 650 mg, 650 mg, Rectal, Q6H PRN, Hannah Morocho MD    [Held by provider] allopurinol (ZYLOPRIM) tablet 300 mg, 300 mg, Oral, Daily, Hannah Morocho MD, 300 mg at 05/02/25 0856    [Held by provider] lisinopril (PRINIVIL;ZESTRIL) tablet 10 mg, 10 mg, Oral, Daily, Hannah Morocho MD    zolpidem (AMBIEN) tablet 10 mg, 10 mg, Oral, Nightly PRN, Hannah Morocho MD, 10 mg at 05/02/25 2148    Objective:     BP (!) 88/62   Pulse (!) 101   Temp 97.7 °F (36.5 °C) (Temporal)   Resp 20   Ht 1.803 m (5' 10.98\")   Wt 78.3 kg (172 lb 9.9 oz)   SpO2 98%   BMI 24.09 kg/m²       Intake/Output Summary (Last 24 hours) at 5/3/2025 1825  Last data filed at 5/3/2025 1757  Gross per 24 hour   Intake 3106.66 ml   Output 1450 ml   Net 1656.66 ml       Physical Exam:   General:    Alert, cooperative, no distress, appears stated age.     Head:   Normocephalic, without obvious abnormality, atraumatic.  Eyes:   Conjunctivae clear, anicteric sclerae.    Nose:  Nares normal. No drainage   Throat: Lips, mucosa, and tongue normal.  No Thrush  Neck:  Supple, and no JVD.  Lungs:   Clear to auscultation bilaterally.  No Wheezing, No rales.  Heart:   Regular rate and rhythm,  no murmur  Abdomen:   Soft, non-tender. Not

## 2025-05-03 NOTE — PLAN OF CARE
Problem: Pain  Goal: Verbalizes/displays adequate comfort level or baseline comfort level  Outcome: Progressing  Flowsheets (Taken 5/2/2025 2233)  Verbalizes/displays adequate comfort level or baseline comfort level:   Encourage patient to monitor pain and request assistance   Assess pain using appropriate pain scale   Administer analgesics based on type and severity of pain and evaluate response   Implement non-pharmacological measures as appropriate and evaluate response     Problem: Skin/Tissue Integrity  Goal: Skin integrity remains intact  Description: 1.  Monitor for areas of redness and/or skin breakdown2.  Assess vascular access sites hourly3.  Every 4-6 hours minimum:  Change oxygen saturation probe site4.  Every 4-6 hours:  If on nasal continuous positive airway pressure, respiratory therapy assess nares and determine need for appliance change or resting period  Outcome: Progressing  Flowsheets (Taken 5/2/2025 2233)  Skin Integrity Remains Intact: Monitor for areas of redness and/or skin breakdown     Problem: Safety - Adult  Goal: Free from fall injury  Outcome: Progressing  Flowsheets (Taken 5/2/2025 2233)  Free From Fall Injury: Instruct family/caregiver on patient safety  Note: Pt. Educated on call bell when in need of help and assistance.  Pt. Verbalized understanding.

## 2025-05-04 ENCOUNTER — APPOINTMENT (OUTPATIENT)
Facility: HOSPITAL | Age: 75
DRG: 682 | End: 2025-05-04
Payer: MEDICARE

## 2025-05-04 LAB
ANION GAP SERPL CALC-SCNC: 19 MMOL/L (ref 3–18)
BACTERIA SPEC CULT: ABNORMAL
BASOPHILS # BLD: 0.04 K/UL (ref 0–0.1)
BASOPHILS NFR BLD: 0.5 % (ref 0–2)
BUN SERPL-MCNC: 67 MG/DL (ref 6–23)
BUN/CREAT SERPL: 23 (ref 12–20)
CALCIUM SERPL-MCNC: 8.8 MG/DL (ref 8.5–10.1)
CC UR VC: ABNORMAL
CHLORIDE SERPL-SCNC: 94 MMOL/L (ref 98–107)
CO2 SERPL-SCNC: 19 MMOL/L (ref 21–32)
CREAT SERPL-MCNC: 2.86 MG/DL (ref 0.6–1.3)
DIFFERENTIAL METHOD BLD: ABNORMAL
ECHO BSA: 1.96 M2
ECHO EST RA PRESSURE: 3 MMHG
ECHO LA DIAMETER INDEX: 2.3 CM/M2
ECHO LA DIAMETER: 4.5 CM
ECHO LV EF PHYSICIAN: 55 %
ECHO LV FRACTIONAL SHORTENING: 38 % (ref 28–44)
ECHO LV INTERNAL DIMENSION DIASTOLE INDEX: 2.3 CM/M2
ECHO LV INTERNAL DIMENSION DIASTOLIC: 4.5 CM (ref 4.2–5.9)
ECHO LV INTERNAL DIMENSION SYSTOLIC INDEX: 1.43 CM/M2
ECHO LV INTERNAL DIMENSION SYSTOLIC: 2.8 CM
ECHO LV IVSD: 1.2 CM (ref 0.6–1)
ECHO LV MASS 2D: 164 G (ref 88–224)
ECHO LV MASS INDEX 2D: 83.7 G/M2 (ref 49–115)
ECHO LV POSTERIOR WALL DIASTOLIC: 0.9 CM (ref 0.6–1)
ECHO LV RELATIVE WALL THICKNESS RATIO: 0.4
ECHO RV FREE WALL PEAK S': 18.1 CM/S
ECHO RV TAPSE: 1.4 CM (ref 1.7–?)
EOSINOPHIL # BLD: 0.08 K/UL (ref 0–0.4)
EOSINOPHIL NFR BLD: 1 % (ref 0–5)
ERYTHROCYTE [DISTWIDTH] IN BLOOD BY AUTOMATED COUNT: 13.5 % (ref 11.6–14.5)
GLUCOSE SERPL-MCNC: 94 MG/DL (ref 74–108)
HCT VFR BLD AUTO: 34.4 % (ref 36–48)
HGB BLD-MCNC: 12.2 G/DL (ref 13–16)
IMM GRANULOCYTES # BLD AUTO: 0.06 K/UL (ref 0–0.04)
IMM GRANULOCYTES NFR BLD AUTO: 0.7 % (ref 0–0.5)
LYMPHOCYTES # BLD: 0.4 K/UL (ref 0.9–3.6)
LYMPHOCYTES NFR BLD: 4.8 % (ref 21–52)
MAGNESIUM SERPL-MCNC: 1.5 MG/DL (ref 1.6–2.6)
MCH RBC QN AUTO: 33.2 PG (ref 24–34)
MCHC RBC AUTO-ENTMCNC: 35.5 G/DL (ref 31–37)
MCV RBC AUTO: 93.7 FL (ref 78–100)
MONOCYTES # BLD: 0.45 K/UL (ref 0.05–1.2)
MONOCYTES NFR BLD: 5.4 % (ref 3–10)
NEUTS SEG # BLD: 7.23 K/UL (ref 1.8–8)
NEUTS SEG NFR BLD: 87.6 % (ref 40–73)
NRBC # BLD: 0 K/UL (ref 0–0.01)
NRBC BLD-RTO: 0 PER 100 WBC
PHOSPHATE SERPL-MCNC: 3.2 MG/DL (ref 2.5–4.9)
PLATELET # BLD AUTO: 138 K/UL (ref 135–420)
PMV BLD AUTO: 10.9 FL (ref 9.2–11.8)
POTASSIUM SERPL-SCNC: 4.3 MMOL/L (ref 3.5–5.5)
PSA FREE MFR SERPL: 51.2 %
PSA FREE SERPL-MCNC: 0.87 NG/ML
PSA SERPL-MCNC: 1.7 NG/ML (ref 0–4)
RBC # BLD AUTO: 3.67 M/UL (ref 4.35–5.65)
SERVICE CMNT-IMP: ABNORMAL
SODIUM SERPL-SCNC: 133 MMOL/L (ref 136–145)
WBC # BLD AUTO: 8.3 K/UL (ref 4.6–13.2)

## 2025-05-04 PROCEDURE — 6370000000 HC RX 637 (ALT 250 FOR IP)

## 2025-05-04 PROCEDURE — 0JBQ0ZZ EXCISION OF RIGHT FOOT SUBCUTANEOUS TISSUE AND FASCIA, OPEN APPROACH: ICD-10-PCS | Performed by: PODIATRIST

## 2025-05-04 PROCEDURE — 94761 N-INVAS EAR/PLS OXIMETRY MLT: CPT

## 2025-05-04 PROCEDURE — 2500000003 HC RX 250 WO HCPCS

## 2025-05-04 PROCEDURE — 2580000003 HC RX 258: Performed by: FAMILY MEDICINE

## 2025-05-04 PROCEDURE — 84100 ASSAY OF PHOSPHORUS: CPT

## 2025-05-04 PROCEDURE — 36415 COLL VENOUS BLD VENIPUNCTURE: CPT

## 2025-05-04 PROCEDURE — 6370000000 HC RX 637 (ALT 250 FOR IP): Performed by: INTERNAL MEDICINE

## 2025-05-04 PROCEDURE — 87186 SC STD MICRODIL/AGAR DIL: CPT

## 2025-05-04 PROCEDURE — 6360000002 HC RX W HCPCS

## 2025-05-04 PROCEDURE — 87070 CULTURE OTHR SPECIMN AEROBIC: CPT

## 2025-05-04 PROCEDURE — 87077 CULTURE AEROBIC IDENTIFY: CPT

## 2025-05-04 PROCEDURE — 6370000000 HC RX 637 (ALT 250 FOR IP): Performed by: FAMILY MEDICINE

## 2025-05-04 PROCEDURE — 93306 TTE W/DOPPLER COMPLETE: CPT | Performed by: INTERNAL MEDICINE

## 2025-05-04 PROCEDURE — 87205 SMEAR GRAM STAIN: CPT

## 2025-05-04 PROCEDURE — 80048 BASIC METABOLIC PNL TOTAL CA: CPT

## 2025-05-04 PROCEDURE — 85025 COMPLETE CBC W/AUTO DIFF WBC: CPT

## 2025-05-04 PROCEDURE — 1100000003 HC PRIVATE W/ TELEMETRY

## 2025-05-04 PROCEDURE — 83735 ASSAY OF MAGNESIUM: CPT

## 2025-05-04 PROCEDURE — 93306 TTE W/DOPPLER COMPLETE: CPT

## 2025-05-04 RX ORDER — LEVOFLOXACIN 500 MG/1
250 TABLET, FILM COATED ORAL DAILY
Status: DISCONTINUED | OUTPATIENT
Start: 2025-05-05 | End: 2025-05-07

## 2025-05-04 RX ORDER — LOPERAMIDE HYDROCHLORIDE 2 MG/1
2 CAPSULE ORAL 3 TIMES DAILY PRN
Status: DISCONTINUED | OUTPATIENT
Start: 2025-05-04 | End: 2025-05-04

## 2025-05-04 RX ORDER — MAGNESIUM SULFATE HEPTAHYDRATE 40 MG/ML
2000 INJECTION, SOLUTION INTRAVENOUS ONCE
Status: COMPLETED | OUTPATIENT
Start: 2025-05-04 | End: 2025-05-04

## 2025-05-04 RX ORDER — LEVOFLOXACIN 500 MG/1
500 TABLET, FILM COATED ORAL ONCE
Status: COMPLETED | OUTPATIENT
Start: 2025-05-04 | End: 2025-05-04

## 2025-05-04 RX ADMIN — SODIUM BICARBONATE 650 MG: 650 TABLET ORAL at 20:31

## 2025-05-04 RX ADMIN — SODIUM BICARBONATE 650 MG: 650 TABLET ORAL at 09:32

## 2025-05-04 RX ADMIN — MIDODRINE HYDROCHLORIDE 10 MG: 10 TABLET ORAL at 17:44

## 2025-05-04 RX ADMIN — MIDODRINE HYDROCHLORIDE 10 MG: 10 TABLET ORAL at 09:32

## 2025-05-04 RX ADMIN — Medication 1 MG: at 09:32

## 2025-05-04 RX ADMIN — ZOLPIDEM TARTRATE 10 MG: 5 TABLET ORAL at 23:53

## 2025-05-04 RX ADMIN — SODIUM CHLORIDE, SODIUM LACTATE, POTASSIUM CHLORIDE, AND CALCIUM CHLORIDE: .6; .31; .03; .02 INJECTION, SOLUTION INTRAVENOUS at 17:44

## 2025-05-04 RX ADMIN — MIDODRINE HYDROCHLORIDE 10 MG: 10 TABLET ORAL at 12:56

## 2025-05-04 RX ADMIN — LEVOFLOXACIN 500 MG: 500 TABLET, FILM COATED ORAL at 20:31

## 2025-05-04 RX ADMIN — SODIUM BICARBONATE 650 MG: 650 TABLET ORAL at 12:56

## 2025-05-04 RX ADMIN — SODIUM BICARBONATE 650 MG: 650 TABLET ORAL at 17:44

## 2025-05-04 RX ADMIN — HEPARIN SODIUM 5000 UNITS: 5000 INJECTION INTRAVENOUS; SUBCUTANEOUS at 17:44

## 2025-05-04 RX ADMIN — SODIUM CHLORIDE, PRESERVATIVE FREE 10 ML: 5 INJECTION INTRAVENOUS at 20:32

## 2025-05-04 RX ADMIN — MAGNESIUM SULFATE HEPTAHYDRATE 2000 MG: 40 INJECTION, SOLUTION INTRAVENOUS at 06:42

## 2025-05-04 RX ADMIN — HEPARIN SODIUM 5000 UNITS: 5000 INJECTION INTRAVENOUS; SUBCUTANEOUS at 20:31

## 2025-05-04 RX ADMIN — SODIUM CHLORIDE, PRESERVATIVE FREE 10 ML: 5 INJECTION INTRAVENOUS at 09:32

## 2025-05-04 RX ADMIN — HEPARIN SODIUM 5000 UNITS: 5000 INJECTION INTRAVENOUS; SUBCUTANEOUS at 05:48

## 2025-05-04 RX ADMIN — LOPERAMIDE HYDROCHLORIDE 2 MG: 2 CAPSULE ORAL at 03:49

## 2025-05-04 ASSESSMENT — PAIN SCALES - GENERAL
PAINLEVEL_OUTOF10: 0

## 2025-05-04 NOTE — PROGRESS NOTES
[facility]  DAILY PROGRESS NOTE      Patient:    Lauri Hoover , 75 y.o. male   MRN:  [id number]  Room/Bed:  463/01  Admission Date:   5/1/2025  Code status:  Full Code    Reason for Admission: Hyponatremia  Barriers to Discharge: Hyponatremia  Anticipated Date of Discharge: 05/04/25      SUBJECTIVE:   Events of the last 24 hours:   No acute events overnight.   Patient seen at [facility]. No acute complaints.      ROS as above    CURRENT INPATIENT MEDICATIONS:  Current Facility-Administered Medications   Medication Dose Route Frequency Provider Last Rate Last Admin    magnesium sulfate 2000 mg in water 50 mL IVPB  2,000 mg IntraVENous Once Penelope Gu DO 25 mL/hr at 05/04/25 0642 2,000 mg at 05/04/25 0642    sodium bicarbonate tablet 650 mg  650 mg Oral 4x Daily Marci Deutsch MD   650 mg at 05/03/25 2142    lactated ringers infusion   IntraVENous Continuous Lopez Agosto MD 50 mL/hr at 05/04/25 0255 Rate Verify at 05/04/25 0255    loperamide (IMODIUM) capsule 2 mg  2 mg Oral 4x Daily PRN Freda العلي MD   2 mg at 05/04/25 0349    Virt-Caps 1 mg  1 capsule Oral Daily Marci Deutsch MD   1 mg at 05/03/25 0909    midodrine (PROAMATINE) tablet 10 mg  10 mg Oral TID WC Andrzej Mcleod MD   10 mg at 05/03/25 1702    bismuth subsalicylate (PEPTO BISMOL) 525 MG/15ML suspension 30 mL  30 mL Oral Q6H PRN Hannah Morocho MD   30 mL at 05/02/25 2148    sodium chloride flush 0.9 % injection 5-40 mL  5-40 mL IntraVENous 2 times per day Hannah Morocho MD   10 mL at 05/03/25 2142    sodium chloride flush 0.9 % injection 5-40 mL  5-40 mL IntraVENous PRN Hannah Morocho MD        0.9 % sodium chloride infusion   IntraVENous PRN Hannah Morocho MD        heparin (porcine) injection 5,000 Units  5,000 Units SubCUTAneous 3 times per day Hannah Morocho MD   5,000 Units at 05/04/25 0548    ondansetron (ZOFRAN-ODT) disintegrating tablet 4 mg  4 mg Oral Q8H PRN Hannah Morocho MD        Or

## 2025-05-04 NOTE — CONSULTS
Podiatry History and Physical      Patient: Lauri Hoover               Sex: male          DOA: [unfilled]       YOB: 1950      Age:  75 y.o.        LOS:  LOS: 3 days     Subjective:         Date of Consultation:  May 4, 2025    Referring Physician: Shanell Jung, resident physician    Patient is a 75 y.o. male who is being seen for right plantar foot wound. Patient has chronic submet 2 wound. He denies any treatment for it. He previously had amputation of right third toe. Patient has contracture of toes on bilateral foot. He denies N/V/C/F. He was admitted for weakness and vomiting, diarrhea. He found to have hyponatremia and TAN on admission.     Patient Active Problem List    Diagnosis Date Noted    Severe protein-calorie malnutrition 01/12/2025    Hyponatremia 01/11/2025    Right groin pain 02/07/2024    Right lumbar radiculopathy 02/07/2024    Lumbar spondylosis 02/07/2024    Acute hematogenous osteomyelitis of right foot (HCC) 12/27/2022    Infected abrasion of third toe 12/21/2022    Seizure (HCC) 07/17/2022    AMS (altered mental status) 04/29/2022    Neuropathy 03/11/2020    HNP (herniated nucleus pulposus), lumbar 04/25/2018    Neuritis 04/25/2018     Past Medical History:   Diagnosis Date    Arthritis     Hemochromatosis     Ill-defined condition     GOUT      No family history on file.   Social History     Tobacco Use    Smoking status: Never    Smokeless tobacco: Never   Substance Use Topics    Alcohol use: Yes     Alcohol/week: 6.0 standard drinks of alcohol     Past Surgical History:   Procedure Laterality Date    ORTHOPEDIC SURGERY      LEFT HAND      Prior to Admission medications    Medication Sig Start Date End Date Taking? Authorizing Provider   diclofenac sodium (VOLTAREN) 1 % GEL Apply 2 g topically 4 times daily as needed 7/3/24   Provider, MD Bao   etodolac (LODINE) 400 MG tablet Take 1 tablet by mouth 2 times daily as needed (pain) 9/19/24 11/18/24  Malorie Ozuna

## 2025-05-04 NOTE — DISCHARGE SUMMARY
[facility]  DISCHARGE SUMMARY      Name:   Lauri Hoover 75 y.o. male  MRN:   [id number]  CSN:   [id number]  Admission Date:  5/1/2025  Discharge Date:  05/05/2025  Attending:             Swapna Gil MD   PCP:              Mima Ryan, DO   ================================================================  Reason for Admission:  Syncope and collapse [R55]  Hyponatremia [E87.1]  TAN (acute kidney injury) [N17.9]  Vomiting and diarrhea [R11.10, R19.7]    Discharge Diagnosis:       Hypovolemic hypotonic hyponatremia 2/2 extrarenal loss (nausea, vomiting, diarrhea)     TAN on CKD III     Hypomagnesemia     Unintentional weight loss (30-40 lbs over last several months)     Elevated CEA  Black stools, resolved  R foot ulcer, stage 3-4     Hx of osteomyelitis s/p R third toe amputation     LLL lung mass   Radiculopathy   Lumbar stenosis without neurogenic claudication   Lumbar spondylosis  Hemochromatosis     Hypertension  Gout    Follow-up studies/evaluations for PCP/Important Notes to PCP:  Follow up on Levaquin antibiotic adherence  Follow up on EGD/colonoscopy appointment  Follow up on podiatry appointment  Plan for repeat CT chest 8-12 weeks post discharge to reevaluation left lower lobe lung lesion   Repeat BMP to evaluate sodium. Per nephro, place nephrology referral if sodium is not within goal (128-130 mmol/L)  Follow up on blood pressure [lisinopril held, midodrine (started during inpatient stay) continued]  Pending labs/investigations to follow up as below  Medication reconciliation:  Discontinued Medications: None  New Medications: Levaquin, sodium chloride, midodrine    JUNG Follow Up Appointment:   Follow-up Information       Follow up With Specialties Details Why Contact [facility] [place] [address]  [phone]             Readmission prevention plan:   Medication  of osteomyelitis s/p R third toe amputation     - MRI right foot negative for osteomyelitis     - right foot wound culture positive for staph aureus     - discharged home with Levaquin        UTI     - urine cx positive for Enterococcus faecalis     - treated with Levaquin     Other stable chronic conditions:  - Hemochromatosis: OP phlebotomy and heme/onc  - Hypertension: held home lisinopril 10 mg given Bethanie  - Gout: held home allopurinol 300 mg daily due to BETHANIE      Pertinent Results:      CURRENT ADMISSION IMAGING RESULTS   MRI Result (most recent):  MRI FOOT RIGHT WO CONTRAST 05/05/2025    Narrative  Procedure: MRI of the right forefoot without contrast.    Indications: Weakness, foot wound    Comparisons: Radiograph 5/3/2025, MRI 2022    Technique: Triplanar T1 without fat-sat and T2 weighted with fat-sat MRI images  of the forefoot were obtained.    Findings:    OSSEOUS STRUCTURES:   Interval third toe amputation. No evidence of fracture,  contusion or AVN. No bone marrow edema suggestive of osteomyelitis. Degenerative  changes first MTP joint with joint space loss, osteophytosis, first metatarsal  head subchondral cyst and lateral subluxation. Also polyarticular mild to  moderate degenerative changes in the midfoot. No significant joint effusions.    MUSCLES AND TENDONS: Diffuse muscular atrophy. Intact tendinous structures.    LIGAMENTS: Intact Lisfranc ligament. Intact distal aspect plantar fascia.    ADDITIONAL FINDINGS: Plantar forefoot soft tissue edema. Soft tissue ulceration  at the level of the second metatarsal head with associated regional soft tissue  thickening and edema but no discrete fluid collection.    Impression  Plantar forefoot soft tissue ulceration and soft tissue inflammation at the  level of the second metatarsal head without evidence of osteomyelitis. No  evidence of fluid collection to suggest abscess.    Polyarticular degenerative changes in the midfoot and forefoot most

## 2025-05-04 NOTE — PLAN OF CARE
Problem: Discharge Planning  Goal: Discharge to home or other facility with appropriate resources  Outcome: Progressing  Flowsheets (Taken 5/3/2025 2115)  Discharge to home or other facility with appropriate resources: Identify barriers to discharge with patient and caregiver     Problem: Pain  Goal: Verbalizes/displays adequate comfort level or baseline comfort level  Outcome: Progressing      4

## 2025-05-04 NOTE — PROGRESS NOTES
Nephrology Progress Note      Patient: Lauri Hoover               Sex: male          DOA: 5/1/2025  YOB: 1950      Age:  75 y.o.        LOS:  LOS: 3 days   MRN: [id number]                    CSN: [id number]      Impression:   1) AC on chronic hypotonic hypovolumic asymptomatic hyponatremia , etiology d/t dehydration /poor solute intake with beer potomania , urine sodium < 20 and urine osm 268 go along with prerenal state and poor solute intake   2) TAN on CKD3 ( baseline creat 1.7) , etiology prerenal v/s Ischaemic ATN from severe dehydration /relative hypotension, CT without any obst  3) AGMA --> suspect ketoacidoses and GI losses   4) hypomagnesemia   5) abnormal liver enzymes  6) LLL lung mass, CT abd/pelvis showed solid oval shaped 3 cm mass w/ small region of eccentric cavitation noted in same LLL region with previous consolidation.   7) R foot wound  8) Hypertension   9) GI symptoms , gastroenteritis ?  10) sepsis/hypotension      Recommendations:      1) strict I/o , kidney is improving, cont gentle IV fluid, tolerating  2) off Bicarb gtt, cont PO  3) monitor renal panel daily  4) S na stable 133 today  5) midodrine 10 mg TID   6) no NSAIDs , IV contrast nephrotoxins  7) renally dose meds for egfr < 30   8) monitor and replace magnesium , electrolytes    Please call with questions    Subjective:   Patient denies shortness of breath, kidney function is improving, urine output 1050 cc      Current Facility-Administered Medications:     sodium bicarbonate tablet 650 mg, 650 mg, Oral, 4x Daily, Marci Deutsch MD, 650 mg at 05/04/25 1256    lactated ringers infusion, , IntraVENous, Continuous, Marci Deutsch MD, Last Rate: 50 mL/hr at 05/04/25 1026, Rate Verify at 05/04/25 1026    Virt-Caps 1 mg, 1 capsule, Oral, Daily, Marci Deutsch MD, 1 mg at 05/04/25 0932    midodrine (PROAMATINE) tablet 10 mg, 10 mg, Oral, TID JESENIA, Andrzej Mcleod MD, 10 mg at 05/04/25 1256    bismuth subsalicylate (PEPTO

## 2025-05-04 NOTE — PROGRESS NOTES
[facility]  POST-ROUNDING NOTE    Assessment & Plan:  - MRI pending for osteomyelitis per Podiatry  - Patient with Urine Cx growing 80,000 enterococcus, having no sx at this time  - Will treat with Levofloxacin 500 mg, then 250 daily for 4 days  - Febrile 101.8 axillary, however recheck normal at 99.3 oral.    The above patient and plan were discussed with my supervising physician.     See daily progress note for full assessment/plan.      Penelope Gu DO, PGY-2  [facility]  5/4/2025, 6:01 PM

## 2025-05-05 ENCOUNTER — APPOINTMENT (OUTPATIENT)
Facility: HOSPITAL | Age: 75
DRG: 682 | End: 2025-05-05
Payer: MEDICARE

## 2025-05-05 LAB
ANION GAP SERPL CALC-SCNC: 11 MMOL/L (ref 3–18)
BUN SERPL-MCNC: 52 MG/DL (ref 6–23)
BUN/CREAT SERPL: 28 (ref 12–20)
CALCIUM SERPL-MCNC: 8.4 MG/DL (ref 8.5–10.1)
CHLORIDE SERPL-SCNC: 92 MMOL/L (ref 98–107)
CO2 SERPL-SCNC: 25 MMOL/L (ref 21–32)
CREAT SERPL-MCNC: 1.87 MG/DL (ref 0.6–1.3)
ERYTHROCYTE [DISTWIDTH] IN BLOOD BY AUTOMATED COUNT: 13.9 % (ref 11.6–14.5)
FERRITIN SERPL-MCNC: 895 NG/ML (ref 13–400)
GLUCOSE SERPL-MCNC: 106 MG/DL (ref 74–108)
HCT VFR BLD AUTO: 27.6 % (ref 36–48)
HEMOCCULT STL QL: POSITIVE
HGB BLD-MCNC: 9.8 G/DL (ref 13–16)
IRON SERPL-MCNC: 26 UG/DL (ref 50–175)
MAGNESIUM SERPL-MCNC: 1.5 MG/DL (ref 1.6–2.6)
MCH RBC QN AUTO: 33.3 PG (ref 24–34)
MCHC RBC AUTO-ENTMCNC: 35.5 G/DL (ref 31–37)
MCV RBC AUTO: 93.9 FL (ref 78–100)
NRBC # BLD: 0 K/UL (ref 0–0.01)
NRBC BLD-RTO: 0 PER 100 WBC
PLATELET # BLD AUTO: 88 K/UL (ref 135–420)
PMV BLD AUTO: 11.2 FL (ref 9.2–11.8)
POTASSIUM SERPL-SCNC: 3.7 MMOL/L (ref 3.5–5.5)
RBC # BLD AUTO: 2.94 M/UL (ref 4.35–5.65)
SODIUM SERPL-SCNC: 128 MMOL/L (ref 136–145)
WBC # BLD AUTO: 6 K/UL (ref 4.6–13.2)

## 2025-05-05 PROCEDURE — 85027 COMPLETE CBC AUTOMATED: CPT

## 2025-05-05 PROCEDURE — 2580000003 HC RX 258: Performed by: FAMILY MEDICINE

## 2025-05-05 PROCEDURE — 82728 ASSAY OF FERRITIN: CPT

## 2025-05-05 PROCEDURE — 6370000000 HC RX 637 (ALT 250 FOR IP): Performed by: INTERNAL MEDICINE

## 2025-05-05 PROCEDURE — 83540 ASSAY OF IRON: CPT

## 2025-05-05 PROCEDURE — 80048 BASIC METABOLIC PNL TOTAL CA: CPT

## 2025-05-05 PROCEDURE — 36415 COLL VENOUS BLD VENIPUNCTURE: CPT

## 2025-05-05 PROCEDURE — 6360000002 HC RX W HCPCS

## 2025-05-05 PROCEDURE — 73718 MRI LOWER EXTREMITY W/O DYE: CPT

## 2025-05-05 PROCEDURE — 83735 ASSAY OF MAGNESIUM: CPT

## 2025-05-05 PROCEDURE — 99222 1ST HOSP IP/OBS MODERATE 55: CPT | Performed by: INTERNAL MEDICINE

## 2025-05-05 PROCEDURE — 6370000000 HC RX 637 (ALT 250 FOR IP)

## 2025-05-05 PROCEDURE — 97530 THERAPEUTIC ACTIVITIES: CPT

## 2025-05-05 PROCEDURE — 2500000003 HC RX 250 WO HCPCS

## 2025-05-05 PROCEDURE — 82272 OCCULT BLD FECES 1-3 TESTS: CPT

## 2025-05-05 PROCEDURE — 97535 SELF CARE MNGMENT TRAINING: CPT

## 2025-05-05 PROCEDURE — 1100000003 HC PRIVATE W/ TELEMETRY

## 2025-05-05 PROCEDURE — 6370000000 HC RX 637 (ALT 250 FOR IP): Performed by: FAMILY MEDICINE

## 2025-05-05 RX ORDER — PANTOPRAZOLE SODIUM 40 MG/1
40 TABLET, DELAYED RELEASE ORAL 2 TIMES DAILY
Status: DISCONTINUED | OUTPATIENT
Start: 2025-05-05 | End: 2025-05-07 | Stop reason: HOSPADM

## 2025-05-05 RX ORDER — MAGNESIUM SULFATE HEPTAHYDRATE 40 MG/ML
2000 INJECTION, SOLUTION INTRAVENOUS ONCE
Status: COMPLETED | OUTPATIENT
Start: 2025-05-05 | End: 2025-05-05

## 2025-05-05 RX ADMIN — PANTOPRAZOLE SODIUM 40 MG: 40 TABLET, DELAYED RELEASE ORAL at 23:23

## 2025-05-05 RX ADMIN — SODIUM BICARBONATE 650 MG: 650 TABLET ORAL at 16:34

## 2025-05-05 RX ADMIN — HEPARIN SODIUM 5000 UNITS: 5000 INJECTION INTRAVENOUS; SUBCUTANEOUS at 05:11

## 2025-05-05 RX ADMIN — Medication 1 MG: at 09:59

## 2025-05-05 RX ADMIN — SODIUM CHLORIDE, PRESERVATIVE FREE 10 ML: 5 INJECTION INTRAVENOUS at 23:22

## 2025-05-05 RX ADMIN — SODIUM CHLORIDE, SODIUM LACTATE, POTASSIUM CHLORIDE, AND CALCIUM CHLORIDE: .6; .31; .03; .02 INJECTION, SOLUTION INTRAVENOUS at 16:09

## 2025-05-05 RX ADMIN — MAGNESIUM SULFATE HEPTAHYDRATE 2000 MG: 40 INJECTION, SOLUTION INTRAVENOUS at 09:55

## 2025-05-05 RX ADMIN — SODIUM BICARBONATE 650 MG: 650 TABLET ORAL at 09:59

## 2025-05-05 RX ADMIN — SODIUM CHLORIDE, SODIUM LACTATE, POTASSIUM CHLORIDE, AND CALCIUM CHLORIDE: .6; .31; .03; .02 INJECTION, SOLUTION INTRAVENOUS at 23:24

## 2025-05-05 RX ADMIN — SODIUM CHLORIDE, PRESERVATIVE FREE 10 ML: 5 INJECTION INTRAVENOUS at 10:00

## 2025-05-05 RX ADMIN — ZOLPIDEM TARTRATE 10 MG: 5 TABLET ORAL at 23:25

## 2025-05-05 RX ADMIN — SODIUM BICARBONATE 650 MG: 650 TABLET ORAL at 23:23

## 2025-05-05 ASSESSMENT — PAIN SCALES - GENERAL
PAINLEVEL_OUTOF10: 0

## 2025-05-05 NOTE — PROGRESS NOTES
[facility]  POST-ROUNDING NOTE    Assessment & Plan:    - Patient with drop in hgb (9.8 today, 12.2 yesterday). Patient also with drop in platelet (88 today, 138 yesterday). Also reported dark BM several days ago. Patient without evidence of overt GI bleed, therefore EGD/colonoscopy not performed. NPO or clear diet recommended until hgb is stabilized. CTA  or bleeding scan recommended if signs of active bleeding present. Appreciate GI recs. Clear diet started. 4Ts score of 2 with low probability (<5%) of HIT. Will continue SQH and trend platelet count.    - Patient cancelled 5/14 colonoscopy due to transportation issues. Per CM, daughter able to take patient to outpatient colonoscopy appointment on day that she does not have her child. CM provided contact to Dr. Roy's office to reschedule colonoscopy. 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 Repeat CT chest in 8-12 weeks recommended to ensure lesion has not increased in size. Also possible that lesion will resolve. Appreciate pulm recs    Update:  - FOBT positive   - diet changed to NPO. Bleed scan ordered as patient with GFR of 37 and contrast not advised by nephro. SQH held. Protonix added for GI ppx     The above patient and plan were discussed with my supervising physician.     See daily progress note for full assessment/plan.      Cecily Tan DO, PGY-1  [facility]  5/5/2025, 2:49 PM

## 2025-05-05 NOTE — PROGRESS NOTES
Nephrology Progress Note      Patient: Lauri Hoover               Sex: male          DOA: 5/1/2025  YOB: 1950      Age:  75 y.o.        LOS:  LOS: 4 days   MRN: [id number]                    CSN: [id number]      Impression:   1) AC on chronic hypotonic hypovolumic asymptomatic hyponatremia , etiology d/t dehydration /poor solute intake with beer potomania , urine sodium < 20 and urine osm 268 go along with prerenal state and poor solute intake   2) TAN on CKD3 ( baseline creat 1.7) , etiology prerenal v/s Ischaemic ATN from severe dehydration /relative hypotension, CT without any obst ---> improving close to baseline    3) AGMA --> suspect ketoacidoses and GI losses   4) hypomagnesemia   5) abnormal liver enzymes  6) LLL lung mass, CT abd/pelvis showed solid oval shaped 3 cm mass w/ small region of eccentric cavitation noted in same LLL region with previous consolidation.   7) R foot wound  8) Hypertension   9) GI symptoms , gastroenteritis      Recommendations:      1) strict I/o , kidney is improving, cont gentle IV fluid, tolerating  2) LR @ 50 cc/hrs , restrict free water intake   3) monitor renal panel daily  4) trend sodium daily   5) midodrine 10 mg TID   6) no NSAIDs , IV contrast nephrotoxins  7) renally dose meds for egfr < 30   8) monitor and replace magnesium , electrolytes    Please call with questions    Subjective:   Patient denies shortness of breath, kidney function is improving,      Current Facility-Administered Medications:     levoFLOXacin (LEVAQUIN) tablet 250 mg, 250 mg, Oral, Daily, Penelope Gu G, DO    sodium bicarbonate tablet 650 mg, 650 mg, Oral, 4x Daily, Marci Deutsch MD, 650 mg at 05/05/25 0959    lactated ringers infusion, , IntraVENous, Continuous, Marci Deutsch MD, Last Rate: 50 mL/hr at 05/05/25 0225, Rate Verify at 05/05/25 0225    Virt-Caps 1 mg, 1 capsule, Oral, Daily, Marci Deutsch MD, 1 mg at 05/05/25 0959    midodrine (PROAMATINE) tablet 10 mg, 10 mg,

## 2025-05-05 NOTE — PROGRESS NOTES
[facility]  DAILY PROGRESS NOTE      Patient:    Lauri Hoover , 75 y.o. male   MRN:  [id number]  Room/Bed:  463/01  Admission Date:   5/1/2025  Code status:  Full Code    Reason for Admission: Hyponatremia  Barriers to Discharge: Hyponatremia, pending official MRI read for evaluation of right foot ulcer  Anticipated Date of Discharge: 05/08/25      SUBJECTIVE:   Events of the last 24 hours:  No acute events overnight. Patient seen at bedside. Denies current pain. Reports that he is tired and has not eaten. NPO for bleed scan as FOBT positive.      ROS as above    CURRENT INPATIENT MEDICATIONS:  Current Facility-Administered Medications   Medication Dose Route Frequency Provider Last Rate Last Admin    pantoprazole (PROTONIX) tablet 40 mg  40 mg Oral BID Cecily Tan DO        levoFLOXacin (LEVAQUIN) tablet 250 mg  250 mg Oral Daily Brittanie, Ibtesam G, DO        sodium bicarbonate tablet 650 mg  650 mg Oral 4x Daily Marci Deutsch MD   650 mg at 05/05/25 1634    lactated ringers infusion   IntraVENous Continuous Marci Deutsch MD 50 mL/hr at 05/05/25 1609 New Bag at 05/05/25 1609    Virt-Caps 1 mg  1 capsule Oral Daily Marci Deutsch MD   1 mg at 05/05/25 0959    midodrine (PROAMATINE) tablet 10 mg  10 mg Oral TID WC Andrzej Mcleod MD   10 mg at 05/04/25 1744    bismuth subsalicylate (PEPTO BISMOL) 525 MG/15ML suspension 30 mL  30 mL Oral Q6H PRN Hannah Morocho MD   30 mL at 05/02/25 2148    sodium chloride flush 0.9 % injection 5-40 mL  5-40 mL IntraVENous 2 times per day Hannah Morocho MD   10 mL at 05/05/25 1000    sodium chloride flush 0.9 % injection 5-40 mL  5-40 mL IntraVENous PRN Hannah Morocho MD        0.9 % sodium chloride infusion   IntraVENous PRN Hannah Morocho MD        [Held by provider] heparin (porcine) injection 5,000 Units  5,000 Units SubCUTAneous 3 times per day Hannah Morocho MD   5,000 Units at 05/05/25 0511    ondansetron (ZOFRAN-ODT) disintegrating tablet 4

## 2025-05-05 NOTE — PROGRESS NOTES
MRI screening form needs to be filled out and faxed to [phone] BEFORE MRI can be scheduled.  If unable to obtain information from patient , MPO[employer] needs to be contacted . If patient is claustrophobic or will needs pain meds, please have ordered in advance in order to facilitate exam.

## 2025-05-05 NOTE — PLAN OF CARE
Problem: Discharge Planning  Goal: Discharge to home or other facility with appropriate resources  Outcome: Progressing  Flowsheets (Taken 5/4/2025 1207 by Elly Jones, RN)  Discharge to home or other facility with appropriate resources: Identify barriers to discharge with patient and caregiver     Problem: Pain  Goal: Verbalizes/displays adequate comfort level or baseline comfort level  Outcome: Progressing  Flowsheets (Taken 5/2/2025 2233 by Franko Carmona, RN)  Verbalizes/displays adequate comfort level or baseline comfort level:   Encourage patient to monitor pain and request assistance   Assess pain using appropriate pain scale   Administer analgesics based on type and severity of pain and evaluate response   Implement non-pharmacological measures as appropriate and evaluate response  Note: Resident will state that he will use the call light for initial pain complaint.      Problem: Skin/Tissue Integrity  Goal: Skin integrity remains intact  Description: 1.  Monitor for areas of redness and/or skin breakdown2.  Assess vascular access sites hourly3.  Every 4-6 hours minimum:  Change oxygen saturation probe site4.  Every 4-6 hours:  If on nasal continuous positive airway pressure, respiratory therapy assess nares and determine need for appliance change or resting period  Outcome: Progressing  Flowsheets (Taken 5/4/2025 0800 by Elly Jones, RN)  Skin Integrity Remains Intact: Monitor for areas of redness and/or skin breakdown  Note: Resident will continue to move self in the bed.     Problem: Safety - Adult  Goal: Free from fall injury  Outcome: Progressing  Flowsheets (Taken 5/2/2025 2233 by Franko Carmona, RN)  Free From Fall Injury: Instruct family/caregiver on patient safety  Note: Family member/care giver will state that the bed should be in the lowest position.      Problem: Nutrition Deficit:  Goal: Optimize nutritional status  Outcome: Progressing  Flowsheets (Taken 5/4/2025

## 2025-05-05 NOTE — PROGRESS NOTES
Comprehensive Nutrition Assessment    Type and Reason for Visit:  Reassess, NPO/clear liquid    Nutrition Recommendations/Plan:   Continue NPO as tolerated, advance as tolerated when medically feasible.  Continue multivitamin supplementation daily, Daily wts.  Continue to monitor readiness for diet advancement or nutrition support, weight, labs, and plan of care during admission.     Malnutrition Assessment:  Malnutrition Status:  Severe malnutrition (05/02/25 1237)    Context:  Acute Illness     Findings of the 6 clinical characteristics of malnutrition:  Energy Intake:  50% or less of estimated energy requirements for 5 or more days  Weight Loss:  Greater than 7.5% over 3 months (-8.3% x4 months)     Body Fat Loss:  Mild body fat loss Triceps, Buccal region   Muscle Mass Loss:  Moderate muscle mass loss Temples (temporalis), Clavicles (pectoralis & deltoids), Thigh (quadriceps), Hand (interosseous), Scapula (trapezius)  Fluid Accumulation:  No fluid accumulation     Strength:  Not Performed    Nutrition Assessment:    Admitted for hyponatremia; CKD3, several days n/v and diarrhea. Per gastroenterology note NPO/CLD until Hgb stable and no signs of GI bleed. Attempted to visit pt; asleep did not wake to verbal stimuli. Per chart review average po intake 28% x8 documented meals. Unable to assess intake of ONS. Bed weight obtained 83.5 kg (183.7 lbs). Pt not meeting estimated protein-energy needs. Will monitor for diet advancement per protocol.      Meal Intake: Provides on average ~26% kcal, 54% protein of estimated needs  Patient Vitals for the past 168 hrs:   PO Meals Eaten (%)   05/04/25 1738 1 - 25%   05/04/25 1413 0%   05/03/25 1757 1 - 25%   05/03/25 1646 0%   05/03/25 0925 0%   05/02/25 1606 26 - 50%   05/02/25 1242 26 - 50%   05/02/25 0900 51 - 75%       Nutrition Related Findings:    Pertinent Meds:   Sodium bicarb  Virt-Caps  LR @ 50 mL/hr Pertinent Labs: Na 128 L, Cl 92 L, Ca 8.4, Mg 1.5 L, GFR

## 2025-05-05 NOTE — PROGRESS NOTES
[facility]  DAILY PROGRESS NOTE      Patient:    Lauri Hoover , 75 y.o. male   MRN:  [id number]  Room/Bed:  463/01  Admission Date:   5/1/2025  Code status:  Full Code    Reason for Admission: Hyponatremia  Barriers to Discharge: Hyponatremia  Anticipated Date of Discharge: 05/07/25      SUBJECTIVE:   Events of the last 24 hours:  No acute events overnight. Patient seen at bedside. Denies sob, chest pain, or pain to bilateral lower extremities. Reports that his last BM several days ago was dark but has not had another BM since.      ROS as above    CURRENT INPATIENT MEDICATIONS:  Current Facility-Administered Medications   Medication Dose Route Frequency Provider Last Rate Last Admin    levoFLOXacin (LEVAQUIN) tablet 250 mg  250 mg Oral Daily Kvng Gutesdaily G, DO        sodium bicarbonate tablet 650 mg  650 mg Oral 4x Daily Marci Deutsch MD   650 mg at 05/04/25 2031    lactated ringers infusion   IntraVENous Continuous Marci Deutsch MD 50 mL/hr at 05/05/25 0225 Rate Verify at 05/05/25 0225    Virt-Caps 1 mg  1 capsule Oral Daily Marci Deutsch MD   1 mg at 05/04/25 0932    midodrine (PROAMATINE) tablet 10 mg  10 mg Oral TID WC Andrzej Mcleod MD   10 mg at 05/04/25 1744    bismuth subsalicylate (PEPTO BISMOL) 525 MG/15ML suspension 30 mL  30 mL Oral Q6H PRN Hannah Morocho MD   30 mL at 05/02/25 2148    sodium chloride flush 0.9 % injection 5-40 mL  5-40 mL IntraVENous 2 times per day Hannah Morocho MD   10 mL at 05/04/25 2032    sodium chloride flush 0.9 % injection 5-40 mL  5-40 mL IntraVENous PRN Hannah Morocho MD        0.9 % sodium chloride infusion   IntraVENous PRN Hannah Morocho MD        heparin (porcine) injection 5,000 Units  5,000 Units SubCUTAneous 3 times per day Hannah Morocho MD   5,000 Units at 05/05/25 0511    ondansetron (ZOFRAN-ODT) disintegrating tablet 4 mg  4 mg Oral Q8H PRN Hannah Morocho MD        Or    ondansetron (ZOFRAN) injection 4 mg  4 mg IntraVENous Q6H

## 2025-05-05 NOTE — PLAN OF CARE
Problem: Physical Therapy - Adult  Goal: By Discharge: Performs mobility at highest level of function for planned discharge setting.  See evaluation for individualized goals.  Description: Physical Therapy Goals:  Initiated 5/2/2025 to be met within 7-10 days.    1.  Patient will move from supine to sit and sit to supine  in bed with modified independence.    2.  Patient will transfer from bed to chair and chair to bed with modified independence using the least restrictive device.  3.  Patient will perform sit to stand with modified independence.  4.  Patient will ambulate with modified independence for 150 feet with the least restrictive device.   5.  Patient will ascend/descend 3 stairs with  handrail(s) with modified independence.    PLOF: Independent. Lives alone in single story home. Has been using cane occasionally.       Outcome: Progressing    PHYSICAL THERAPY TREATMENT    Patient: Lauri Hoover (75 y.o. male)  Date: 5/5/2025  Diagnosis: Syncope and collapse [R55]  Hyponatremia [E87.1]  TAN (acute kidney injury) [N17.9]  Vomiting and diarrhea [R11.10, R19.7] Hyponatremia      Precautions: Fall Risk, Contact Precautions, Isolation, NWB right forefoot ,  ,  ,  ,  ,  ,      ASSESSMENT:  Patient resting in bed upon entry and seen with OT to maximize safety with OOB mobility. Right foot dressing intact. Needs mod A for supine to sit transfer given generalized deconditioning and weakness. Initially poor sitting balance with heavy posterior lean but was able to progress to fair sitting balance. Deferred standing transfer due to right foot bleeding. He scoots laterally along EOB with min A and returns to supine. Right leg elevated on pillow. Call bell left within reach and nurse notified.     Clarified with podiatrist patient in NWB through right forefoot and post op shoe left in the room.     Progression toward goals:   []      Improving appropriately and progressing toward goals  [x]      Improving slowly

## 2025-05-05 NOTE — PLAN OF CARE
Problem: Occupational Therapy - Adult  Goal: By Discharge: Performs self-care activities at highest level of function for planned discharge setting.  See evaluation for individualized goals.  Description: Occupational Therapy Goals:  Initiated 5/2/2025 to be met within 7-10 days.    1.  Patient will perform grooming with modified independence while standing at the sink for > 5 min with Good balance (monitor BP).   2.  Patient will perform bathing with modified independence.  3.  Patient will perform lower body dressing with modified independence for seated and standing aspects.  4.  Patient will perform toilet transfers with modified independence.  5.  Patient will perform all aspects of toileting with modified independence.  6.  Patient will participate in upper extremity therapeutic exercise/activities with supervision/set-up for 8 minutes to improve endurance and UB strength needed for ADLs    7.  Patient will utilize energy conservation techniques during functional activities with verbal cues.    PLOF: Pt lives alone, reports being independent with ADLs and functional mobility w/o AD.   Outcome: Progressing   OCCUPATIONAL THERAPY TREATMENT    Patient: Lauri Hoover (75 y.o. male)  Date: 5/5/2025  Diagnosis: Syncope and collapse [R55]  Hyponatremia [E87.1]  TAN (acute kidney injury) [N17.9]  Vomiting and diarrhea [R11.10, R19.7] Hyponatremia      Precautions: Fall Risk, Contact Precautions, Isolation    Chart, occupational therapy assessment, plan of care, and goals were reviewed.  ASSESSMENT:  Co-treated w/PT to maximize safety and pt participation. Pt w/flat affect and presents w/considerable weakness requiring MAX A w/BLE to maneuver to EOB. Pt w/LOB seated EOB requiring MOD A to recover. Pt demonstrates improving trunk control performing ADLs at EOB. Blood noted plantar surface RLE and returned to supine w/2 person assist. Pt left w/all needs within reach. No c/o pain or SOB w/activity.    Progression  toward goals:  []          Improving appropriately and progressing toward goals  [x]          Improving slowly and progressing toward goals  []          Not making progress toward goals and plan of care will be adjusted     PLAN:  Patient continues to benefit from skilled intervention to address the above impairments.  Continue treatment per established plan of care.    Further Equipment Recommendations for Discharge: shower chair and rolling walker    AMPAC: AM-PAC Inpatient Daily Activity Raw Score: 15    Current research shows that an AM-PAC score of 17 or less is not associated with a discharge to the patient's home setting.    This AMPAC score should be considered in conjunction with interdisciplinary team recommendations to determine the most appropriate discharge setting. Patient's social support, diagnosis, medical stability, and prior level of function should also be taken into consideration.     SUBJECTIVE:   Patient stated, \"I can't have anything to eat.\"    OBJECTIVE DATA SUMMARY:   Cognitive/Behavioral Status:  Orientation  Orientation Level: Oriented X4    Functional Mobility and Transfers for ADLs:   Bed Mobility:  Bed Mobility Training  Bed Mobility Training: Yes  Supine to Sit: Partial/Moderate assistance  Sit to Supine: Partial/Moderate assistance;2 Person assistance  Scooting: Minimal assistance     Balance:  Balance  Sitting: Impaired  Sitting - Static: Fair (occasional)  Sitting - Dynamic: Fair (occasional)    ADL Intervention:  Grooming: Stand by assistance  Face/hand hygiene and oral care seated EOB    UE Dressing: Minimal assistance  [facility] gown seated EOB    Neuro Re-Education:  Sitting EOB ~ 10 minutes w/close guarding    Pain:  Intensity Pre-treatment: 0/10   Intensity Post-treatment: 0/10  Scale: Numeric Rating Scale    Activity Tolerance:    Fair    After treatment:   []  Patient left in no apparent distress sitting up in chair  [x]  Patient left in no apparent distress

## 2025-05-05 NOTE — PROGRESS NOTES
[facility]  DAILY PROGRESS NOTE    *ATTENTION:  This note has been created by a medical student for educational purposes only.  Please do not refer to the content of this note for clinical decision-making, billing, or other purposes.  Please see attending physician’s note to obtain clinical information on this patient.*    Patient:    Lauri Hoover , 75 y.o. male   MRN:  [id number]  Room/Bed:  [facility]  Admission Date:   5/1/2025  Code status:  Full Code    Reason for Admission: Diarrhea, Weight Loss, Hyponatremia  Barriers to Discharge: MRI of R Foot  Anticipated Date of Discharge: 05/07/2025      SUBJECTIVE:   Events of the last 24 hours:  No acute events overnight. Vital signs have been stable. Afebrile since axillary temperature spike of 101.1 yesterday afternoon which came down to 99.3 on oral recheck.  Patient seen at beside. No acute complaints. Does not endorse any pain. No headache, fever, chills, nausea, vomiting. He states his diarrhea has abated and he most recently had a solid stool described as \"dark brown\" in color which did not appear to him to contain any dried blood. This stool previously described as black in the chart. Last bowel movement Saturday 05/03/25. States he wants to go home soon. Denies dysuria or hematuria.    ROS (positive findings are in BOLD; negative findings are in regular font)  Constitutional: fevers, chills, appetite changes, weight changes, fatigue  HEENT: changes in vision, changes in hearing, sore throat, dysphagia  Cardiovascular: chest pain, palpitations, PND, orthopnea, edema  Pulmonary: SOB, cough, sputum production, wheezing, chest tightness  Gastrointestinal: abdominal pain, nausea/vomiting, diarrhea, constipation, melena, hematochezia  Genitourinary: dysuria, hesitation, dribbling, urgency, hematuria  Musculoskeletal: arthralgias, myalgias  Skin: rash, itching  Neurological: sensory changes, motor changes, headache  Psychiatric: mood changes  Endocrine:  Osteomyelitis  -Seen by Podiatry, appreciate recommendations  -Submet 2 wound on the right plantar surface  -Previous amputation of third right toe  Plan:  MRI today  Surgery if imaging demonstrates osteomyelitis per Podiatry note    LLL Lung Mass - Resolved  -Asbestos occupational exposure, no tobacco hx  -05/02 Chest CT demonstrated complex cystic mass likely pneumatocele with complex fluid  Plan:  Consult Pulmonology    Elevated CEA  -CEA 6.7 on 05/03  -hx 3 colonoscopies all reportedly normal but records unavailable  -Reported dark stools LBM 05/03/2025   Plan:  Consult GI for possible inpatient colonoscopy or arrange outpatient appointment with accommodations for patient's transportation issues    Global:  Code: Full  IVF/Drips: Continuous LR 50 mL/hr  I/O / Wt: per unit routine  Diet:  low phos  Bowel Regimen, Last BM: miralax prn  DVT/AC: SQH  Mobility: per protocol   Disposition: Home  Anticipated LOS: 2-3 days unless needs surgery     Point of Contact (relationship, number): Brynn Ryan, daughter (POA) [phone]    ================================================================  Further management for Mr. Lauri DE JESUS Ivonecamila will be discussed on rounds with my attending.      ESTEBAN SUTTON, MS4  [facility]  May 5, 2025 7:50 AM

## 2025-05-05 NOTE — CONSULTS
[email or web address]  [phone]    GASTROENTEROLOGY CONSULT      Impression:     Admitted 5/1 with weakness, NVD non-bloody, hypotension, tachycardia, and hyponatremia. Admitted in January for similar. Hx hemochromatosis with phlebotomy 2021.    Anemia: acute on chronic. Hgb per chart review ranges 10-12. Since admission pt has dropped from 13 to 9.8 5/5 with 2 pt drop since 5/4. LBM was a smear today, otherwise was several days ago. Pt denies signs of bleeding including hematochezia, melena, and hematemesis. Differentials include anemia of chronic disease and slow GIB in absence of overt signs of bleeding.  - 2/2025 ferritin 449     Diarrhea: ongoing off/on for months vs years. Sxs resolved after imodium 5/3   - LBM 5/5/25 small green smear per primary RN  - Neg GI panel and C. diff  - Colon enteritis noted on CT      Plan:     - Check iron/ferritin  - No endoscopic evaluation at this time without overt signs of GIB and pt has upcoming EGD/colonoscopy as outpatient.  - If there are signs of active bleeding, recommend making pt NPO and ordering CTA or bleeding scan to localize bleed source and better evaluate for possible intervention.  - Keep pt NPO or on clear liquid diet until Hgb is stable and there are no signs of overt GIB.    Chief Complaint: Anemia    HPI:  Lauri Hoover is a 75 y.o. male who I am being asked to see in consultation for an opinion regarding anemia. Pt states he has been having frequent stools without ABD pain and no signs of bleeding, but maybe dark \"spots\" in stool. Unable to characterize beyond that. Requested bedpan which was relayed to primary RN who reported a small smear of greenish stool output.    PMH:   Past Medical History:   Diagnosis Date    Arthritis     Hemochromatosis     Ill-defined condition     GOUT       PSH:   Past Surgical History:   Procedure Laterality Date    ORTHOPEDIC SURGERY      LEFT HAND       Social HX:   Social History     Socioeconomic  inspection: no rashes, ulcers  eyes: inspection: normal conjunctivae and lids; no jaundice   ENMT: mouth: normal oral mucosa,lips and gums  neck: thyroid: normal size, consistency and position;  respiratory: effort: normal chest excursion; no intercostal retraction or accessory muscle use.   abdominal: abdomen: normal consistency; no tenderness or masses. hernias: no hernias appreciated. liver: normal size and consistency. spleen: not palpable.   rectal: hemoccult/guaiac: not performed.   musculoskeletal: digits and nails: no clubbing, cyanosis, petechiae or other inflammatory conditions. head and neck: normal range of motion; no pain, crepitation or contracture. spine/ribs/pelvis: normal range of motion; no pain, deformity or contracture.     Basic Metabolic Profile   Recent Labs     05/04/25  0103 05/05/25  0520   * 128*   K 4.3 3.7   CL 94* 92*   CO2 19* 25   BUN 67* 52*   MG 1.5* 1.5*   PHOS 3.2  --          CBC w/Diff    Recent Labs     05/05/25  0520   WBC 6.0   RBC 2.94*   HGB 9.8*   HCT 27.6*   MCV 93.9   MCH 33.3   MCHC 35.5   RDW 13.9   PLT 88*   MPV 11.2    No results for input(s): \"MONO\", \"PRO\", \"METAS\" in the last 72 hours.    Invalid input(s): \"GRANS\", \"LYMPH\", \"EOS\", \"BASO\", \"MYELO\", \"BLAST\"     Hepatic Function   No results for input(s): \"TP\" in the last 72 hours.    Invalid input(s): \"ALB\", \"DBILI\", \"TBILI\", \"GPT\", \"SGOT\", \"AP\", \"AML\", \"LPSE\"     Coags   No results for input(s): \"INR\", \"APTT\" in the last 72 hours.    Invalid input(s): \"PTP\"        GERI Jay.   [facility]  [phone]

## 2025-05-05 NOTE — CONSULTS
[facility].  Pulmonary, Critical Care, and Sleep Medicine    Initial Patient Consult    Name: Lauri Hoover MRN: [id number]   : 1950 Hospital: [facility]   Date: 2025        IMPRESSION:   Cystic, fluid filled lung lesion of the LLL  - noted in the area of previous consolidation 2025  - does not have appearance consistent with an abscess  - most likely a post infectious pneumatocele  Hx of Pneumonia 2025  HypoNa  TAN on CKDIII  R foot osteomyelitis  - MRI of foot pending, podiatry evaluated     Patient Active Problem List   Diagnosis    HNP (herniated nucleus pulposus), lumbar    Neuropathy    Neuritis    AMS (altered mental status)    Seizure (HCC)    Infected abrasion of third toe    Acute hematogenous osteomyelitis of right foot (HCC)    Right groin pain    Right lumbar radiculopathy    Lumbar spondylosis    Hyponatremia    Severe protein-calorie malnutrition      RECOMMENDATIONS:   Lesion appears to be a benign pneumatocele from the prior infection. No further workup required at this time. I would recommend a CT chest be done in 8-12 weeks to ensure it is not increasing in size, it may even resolve. This could be done in your outpatient clinic and referred to us if there are any concerns.     Available for any questions.      Subjective:     This patient has been seen and evaluated at the request of Dr. Gil for abnormal CT scan. Patient is a 75 y.o. 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 He was found to have hyponatremia and TAN. There was an accidental finding of a lung lesion in LLL. Of note, the patient was found to have a LLL pneumonia on CT scan on 2025. We were consulted for further evaluation.     Pt appears well, resting comfortably. Denies any dyspnea or cough. He does endorse some weight loss over several months. No LE swelling, no fever or chills.  in the left lower lobe abutting the lateral pleural surface  and the major fissure. Measurement 2.8 x 2.3 x 2.3 cm. The borders are smooth.  The internal component is homogeneous low density but greater than water  density.  Eccentric along the medial border is a smoothly marginated area of gas. There  are now several additional tiny gas droplets demonstrated along its ventral  ventral aspect.  Band of soft tissue extends from the inferior hilum inferior and lateral to abut  the mass.    No other pulmonary nodules are demonstrated.    There is no evidence of mediastinal, hilar, nor axillary adenopathy.    Evaluation of the mediastinum and ritchie is limited by the lack of IV contrast.  Mild fusiform dilatation of the ascending aorta with maximum diameter of 3.8 cm.  Scattered calcific plaque..  Coronary arterial calcifications: Mild    There are no pleural effusions.    The included portion of the of the liver is unremarkable.    The adrenal glands are normal.    The chest wall soft tissues are unremarkable.    The bony structures are unremarkable.    Impression  Sharply marginated oval-shaped 2.8 cm mass containing a small amount of  intraluminal air which has changed position since the prior CT abdomen exam of  5/1/2025 indicative of a complex cystic mass, likely a pneumatocele filled with  complex fluid. Less likely abscess as there is no surrounding inflammation and  the wall adjacent to the intraluminal gas appears thin.    Electronically signed by Josefina Curtis    Xray Result (most recent):  XR FOOT RIGHT (MIN 3 VIEWS) 05/03/2025    Narrative  EXAM: XR FOOT RIGHT (MIN 3 VIEWS)    CLINICAL INDICATION/HISTORY :Provided with order: open wound, c/f osteomyelitis.    COMPARISON: December 21, 2022    TECHNIQUE: 3 VIEWS    FINDINGS: Limitation due to overlapping structures. Osteopenia. Third toe  amputation. Very small calcaneal spurs. No soft tissue air or radiopaque foreign  matter in the soft

## 2025-05-05 NOTE — PROGRESS NOTES
Podiatry Progress Note    Patient: Lauri Hoover               Sex: male          DOA: [unfilled]       YOB: 1950      Age:  75 y.o.        LOS:  LOS: 4 days     POD * No surgery found *  Procedure:   * No surgery found *      Subjective:     Patient seen resting quiet and comfortably and no apparent distress. He still hasn't had MRI. Patient denies any new pedal complaint.       Objective:       Physical Exam:  Right foot dressings are dry and intact. No strikethrough noted on dressings.     Assessment:     Principal Problem:    Hyponatremia  Active Problems:    Severe protein-calorie malnutrition  Resolved Problems:    * No resolved hospital problems. *      Plan/Recommendations/Medical Decision Making:     - Discussed with nursing staff regarding MRI. They will contact radiology regarding that.   - Meantime remain NWB to right forefoot.   - Keep dressings intact.   - If MRI is positive will need surgical intervention otherwise patient is stable to d/c with close follow up outpatient.

## 2025-05-05 NOTE — PLAN OF CARE
Problem: Discharge Planning  Goal: Discharge to home or other facility with appropriate resources  Outcome: Progressing  Flowsheets (Taken 5/4/2025 2015 by Courtney Mancuso, RN)  Discharge to home or other facility with appropriate resources: Identify barriers to discharge with patient and caregiver     Problem: Pain  Goal: Verbalizes/displays adequate comfort level or baseline comfort level  Outcome: Progressing  Flowsheets (Taken 5/5/2025 1256)  Verbalizes/displays adequate comfort level or baseline comfort level: Assess pain using appropriate pain scale     Problem: Skin/Tissue Integrity  Goal: Skin integrity remains intact  Description: 1.  Monitor for areas of redness and/or skin breakdown2.  Assess vascular access sites hourly3.  Every 4-6 hours minimum:  Change oxygen saturation probe site4.  Every 4-6 hours:  If on nasal continuous positive airway pressure, respiratory therapy assess nares and determine need for appliance change or resting period  Outcome: Progressing  Flowsheets (Taken 5/4/2025 0800 by Elly Jones, RN)  Skin Integrity Remains Intact: Monitor for areas of redness and/or skin breakdown     Problem: Safety - Adult  Goal: Free from fall injury  Outcome: Progressing  Note: Patient will remain free from falls during hospital visit     Problem: Nutrition Deficit:  Goal: Optimize nutritional status  Outcome: Progressing  Flowsheets (Taken 5/5/2025 1256)  Nutrient intake appropriate for improving, restoring, or maintaining nutritional needs:   Monitor oral intake, labs, and treatment plans   Assess nutritional status and recommend course of action

## 2025-05-06 ENCOUNTER — APPOINTMENT (OUTPATIENT)
Facility: HOSPITAL | Age: 75
DRG: 682 | End: 2025-05-06
Payer: MEDICARE

## 2025-05-06 PROBLEM — E83.119 HEMOCHROMATOSIS: Status: ACTIVE | Noted: 2025-05-06

## 2025-05-06 LAB
ALBUMIN SERPL-MCNC: 2.5 G/DL (ref 3.4–5)
ALBUMIN/GLOB SERPL: 0.9 (ref 0.8–1.7)
ALP SERPL-CCNC: 176 U/L (ref 45–117)
ALT SERPL-CCNC: 53 U/L (ref 10–50)
ANION GAP SERPL CALC-SCNC: 10 MMOL/L (ref 3–18)
AST SERPL-CCNC: 79 U/L (ref 10–38)
BACTERIA SPEC CULT: ABNORMAL
BACTERIA SPEC CULT: ABNORMAL
BILIRUB DIRECT SERPL-MCNC: 0.5 MG/DL (ref 0–0.2)
BILIRUB SERPL-MCNC: 0.8 MG/DL (ref 0.2–1)
BUN SERPL-MCNC: 37 MG/DL (ref 6–23)
BUN/CREAT SERPL: 26 (ref 12–20)
CALCIUM SERPL-MCNC: 8.9 MG/DL (ref 8.5–10.1)
CHLORIDE SERPL-SCNC: 92 MMOL/L (ref 98–107)
CK SERPL-CCNC: 36 U/L (ref 39–308)
CO2 SERPL-SCNC: 25 MMOL/L (ref 21–32)
CREAT SERPL-MCNC: 1.4 MG/DL (ref 0.6–1.3)
ERYTHROCYTE [DISTWIDTH] IN BLOOD BY AUTOMATED COUNT: 13.8 % (ref 11.6–14.5)
GGT SERPL-CCNC: 182 U/L (ref 15–85)
GLOBULIN SER CALC-MCNC: 2.9 G/DL (ref 2–4)
GLUCOSE SERPL-MCNC: 102 MG/DL (ref 74–108)
GRAM STN SPEC: ABNORMAL
GRAM STN SPEC: ABNORMAL
HCT VFR BLD AUTO: 27.7 % (ref 36–48)
HGB BLD-MCNC: 9.8 G/DL (ref 13–16)
INR PPP: 1.1 (ref 0.9–1.1)
MAGNESIUM SERPL-MCNC: 1.8 MG/DL (ref 1.6–2.6)
MCH RBC QN AUTO: 32.9 PG (ref 24–34)
MCHC RBC AUTO-ENTMCNC: 35.4 G/DL (ref 31–37)
MCV RBC AUTO: 93 FL (ref 78–100)
NRBC # BLD: 0 K/UL (ref 0–0.01)
NRBC BLD-RTO: 0 PER 100 WBC
PLATELET # BLD AUTO: 91 K/UL (ref 135–420)
PMV BLD AUTO: 10.8 FL (ref 9.2–11.8)
POTASSIUM SERPL-SCNC: 3.8 MMOL/L (ref 3.5–5.5)
PROT SERPL-MCNC: 5.4 G/DL (ref 6.4–8.2)
PROTHROMBIN TIME: 14.6 SEC (ref 11.9–14.9)
RBC # BLD AUTO: 2.98 M/UL (ref 4.35–5.65)
SERVICE CMNT-IMP: ABNORMAL
SODIUM SERPL-SCNC: 127 MMOL/L (ref 136–145)
WBC # BLD AUTO: 4.9 K/UL (ref 4.6–13.2)

## 2025-05-06 PROCEDURE — 97535 SELF CARE MNGMENT TRAINING: CPT

## 2025-05-06 PROCEDURE — 1100000003 HC PRIVATE W/ TELEMETRY

## 2025-05-06 PROCEDURE — 6370000000 HC RX 637 (ALT 250 FOR IP): Performed by: INTERNAL MEDICINE

## 2025-05-06 PROCEDURE — 86704 HEP B CORE ANTIBODY TOTAL: CPT

## 2025-05-06 PROCEDURE — 94761 N-INVAS EAR/PLS OXIMETRY MLT: CPT

## 2025-05-06 PROCEDURE — 86708 HEPATITIS A ANTIBODY: CPT

## 2025-05-06 PROCEDURE — 80048 BASIC METABOLIC PNL TOTAL CA: CPT

## 2025-05-06 PROCEDURE — 76705 ECHO EXAM OF ABDOMEN: CPT

## 2025-05-06 PROCEDURE — 6370000000 HC RX 637 (ALT 250 FOR IP)

## 2025-05-06 PROCEDURE — 85610 PROTHROMBIN TIME: CPT

## 2025-05-06 PROCEDURE — 36415 COLL VENOUS BLD VENIPUNCTURE: CPT

## 2025-05-06 PROCEDURE — 86803 HEPATITIS C AB TEST: CPT

## 2025-05-06 PROCEDURE — 97530 THERAPEUTIC ACTIVITIES: CPT

## 2025-05-06 PROCEDURE — 85027 COMPLETE CBC AUTOMATED: CPT

## 2025-05-06 PROCEDURE — 97116 GAIT TRAINING THERAPY: CPT

## 2025-05-06 PROCEDURE — 82550 ASSAY OF CK (CPK): CPT

## 2025-05-06 PROCEDURE — 6360000002 HC RX W HCPCS

## 2025-05-06 PROCEDURE — 6370000000 HC RX 637 (ALT 250 FOR IP): Performed by: FAMILY MEDICINE

## 2025-05-06 PROCEDURE — 82977 ASSAY OF GGT: CPT

## 2025-05-06 PROCEDURE — 83735 ASSAY OF MAGNESIUM: CPT

## 2025-05-06 PROCEDURE — 2500000003 HC RX 250 WO HCPCS

## 2025-05-06 PROCEDURE — 80076 HEPATIC FUNCTION PANEL: CPT

## 2025-05-06 RX ORDER — HEPARIN SODIUM (PORCINE) LOCK FLUSH IV SOLN 100 UNIT/ML 100 UNIT/ML
500 SOLUTION INTRAVENOUS PRN
Status: DISCONTINUED | OUTPATIENT
Start: 2025-05-06 | End: 2025-05-07 | Stop reason: HOSPADM

## 2025-05-06 RX ORDER — SODIUM CHLORIDE 1 G/1
1 TABLET ORAL
Status: DISCONTINUED | OUTPATIENT
Start: 2025-05-06 | End: 2025-05-07 | Stop reason: HOSPADM

## 2025-05-06 RX ADMIN — Medication 0.5 UNITS: at 09:40

## 2025-05-06 RX ADMIN — Medication 1 G: at 18:23

## 2025-05-06 RX ADMIN — SODIUM CHLORIDE, PRESERVATIVE FREE 10 ML: 5 INJECTION INTRAVENOUS at 21:11

## 2025-05-06 RX ADMIN — LEVOFLOXACIN 250 MG: 500 TABLET, FILM COATED ORAL at 16:18

## 2025-05-06 RX ADMIN — MIDODRINE HYDROCHLORIDE 10 MG: 10 TABLET ORAL at 13:53

## 2025-05-06 RX ADMIN — SODIUM BICARBONATE 650 MG: 650 TABLET ORAL at 16:19

## 2025-05-06 RX ADMIN — PANTOPRAZOLE SODIUM 40 MG: 40 TABLET, DELAYED RELEASE ORAL at 21:11

## 2025-05-06 RX ADMIN — SODIUM BICARBONATE 650 MG: 650 TABLET ORAL at 21:11

## 2025-05-06 RX ADMIN — SODIUM BICARBONATE 650 MG: 650 TABLET ORAL at 13:53

## 2025-05-06 RX ADMIN — ZOLPIDEM TARTRATE 10 MG: 5 TABLET ORAL at 21:14

## 2025-05-06 RX ADMIN — Medication 27.5 MILLICURIE: at 10:20

## 2025-05-06 ASSESSMENT — PAIN SCALES - GENERAL
PAINLEVEL_OUTOF10: 0

## 2025-05-06 NOTE — PLAN OF CARE
Problem: Physical Therapy - Adult  Goal: By Discharge: Performs mobility at highest level of function for planned discharge setting.  See evaluation for individualized goals.  Description: Physical Therapy Goals:  Initiated 5/2/2025 to be met within 7-10 days.    1.  Patient will move from supine to sit and sit to supine  in bed with modified independence.    2.  Patient will transfer from bed to chair and chair to bed with modified independence using the least restrictive device.  3.  Patient will perform sit to stand with modified independence.  4.  Patient will ambulate with modified independence for 150 feet with the least restrictive device.   5.  Patient will ascend/descend 3 stairs with  handrail(s) with modified independence.    PLOF: Independent. Lives alone in single story home. Has been using cane occasionally.       Outcome: Progressing     PHYSICAL THERAPY TREATMENT    Patient: Lauri Hoover (75 y.o. male)  Date: 5/6/2025  Diagnosis: Syncope and collapse [R55]  Hyponatremia [E87.1]  TAN (acute kidney injury) [N17.9]  Vomiting and diarrhea [R11.10, R19.7] Hyponatremia      Precautions: Fall Risk, Contact Precautions, Isolation,  ,  ,  ,  ,  ,  ,  , NWB right forefoot    ASSESSMENT:  Patient resting in bed upon entry and motivated to participate in therapy. He demonstrates significant improvements in activity tolerance to OOB mobility. Min A supine to sit transfer. Educated on weight bearing precautions and assisted to fernando post op shoe. Min A sit to stand transfer. He ambulates 15 ft in the room using RW and CGA for balance. Verbal cues to maintain weight off right toes. Seated rest break. Ambulates 15 ft back to bed with improved right step clearance. Will continue to follow    Progression toward goals:   []      Improving appropriately and progressing toward goals  [x]      Improving slowly and progressing toward goals  []      Not making progress toward goals and plan of care will be adjusted

## 2025-05-06 NOTE — PROGRESS NOTES
[facility]  DAILY PROGRESS NOTE    *ATTENTION:  This note has been created by a medical student for educational purposes only.  Please do not refer to the content of this note for clinical decision-making, billing, or other purposes.  Please see attending physician’s note to obtain clinical information on this patient.*    Patient:    Lauri Hoover , 75 y.o. male   MRN:  [id number]  Room/Bed:  [facility]  Admission Date:   5/1/2025  Code status:  Full Code    Reason for Admission: Hyponatremia  Barriers to Discharge: Hyponatremia / R Foot MRI Pending / Anemia Workup  Anticipated Date of Discharge: Thursday 05/08/2025      SUBJECTIVE:   Events of the last 24 hours:  No acute events overnight. Patient had MRI completed around midnight. Made NPO at midnight.   Patient seen at beside. Complaining of discomfort in bed and being NPO. Additionally, strongly requests PT to help him get up and walk around because feels \"stiff\" due to laying in bed for many hours. No pain or n/v/f/c/diarrhea. Last bowel movement was last night at 7/8 PM described as soft and brown/green in color, small amount, and not watery. No dysuria or hematuria.     Patient states that he has previously undergone phlebotomy treatment for his hemochromatosis but has never experienced symptoms of his condition. He didn't notice a difference after phlebotomy treatments.     PT/OT recommend home with rolling walker and shower chair.     ROS (positive findings are in BOLD; negative findings are in regular font)  Constitutional: fevers, chills, appetite changes, weight changes, fatigue  HEENT: changes in vision, changes in hearing, sore throat, dysphagia  Cardiovascular: chest pain, palpitations, PND, orthopnea, edema  Pulmonary: SOB, cough, sputum production, wheezing, chest tightness  Gastrointestinal: abdominal pain, nausea/vomiting, diarrhea, constipation, melena, hematochezia  Genitourinary: dysuria, hesitation, dribbling, urgency,    Plan:  Repeat Chest CT in 10-12 months outpatient    Hemochromatosis  -Hx HH, reportedly asymptomatic, denies diagnosis of diabetes mellitus  -Low iron does not match with HH diagnosis  Plan:  Consider phlebotomy treatment     Code: Full  Diet: NPO  DVT/AC: SQH held for bleeding scan   Mobility: per protocol  Disposition: Home     Point of Contact (relationship, number): Brynn Ryan, daughter (POA) [phone]     ================================================================  Further management for Mr. Lauri DE JESUS Ivonecamila will be discussed on rounds with my attending.      ESTEBAN SUTTON, MS4  [facility]  May 6, 2025 7:33 AM

## 2025-05-06 NOTE — PLAN OF CARE
Problem: Occupational Therapy - Adult  Goal: By Discharge: Performs self-care activities at highest level of function for planned discharge setting.  See evaluation for individualized goals.  Description: Occupational Therapy Goals:  Initiated 5/2/2025 to be met within 7-10 days.    1.  Patient will perform grooming with modified independence while standing at the sink for > 5 min with Good balance (monitor BP).   2.  Patient will perform bathing with modified independence.  3.  Patient will perform lower body dressing with modified independence for seated and standing aspects.  4.  Patient will perform toilet transfers with modified independence.  5.  Patient will perform all aspects of toileting with modified independence.  6.  Patient will participate in upper extremity therapeutic exercise/activities with supervision/set-up for 8 minutes to improve endurance and UB strength needed for ADLs    7.  Patient will utilize energy conservation techniques during functional activities with verbal cues.    PLOF: Pt lives alone, reports being independent with ADLs and functional mobility w/o AD.   Outcome: Progressing   OCCUPATIONAL THERAPY TREATMENT    Patient: Lauri Hoover (75 y.o. male)  Date: 5/6/2025  Diagnosis: Syncope and collapse [R55]  Hyponatremia [E87.1]  TAN (acute kidney injury) [N17.9]  Vomiting and diarrhea [R11.10, R19.7] Hyponatremia      Precautions: Fall Risk, Contact Precautions, Isolation    Chart, occupational therapy assessment, plan of care, and goals were reviewed.  ASSESSMENT:  Pt is pleasant and cooperative. Demonstrates improving bed mobility this date. Tolerates sitting EOB ~ 15 minutes performing ADL grooming tasks. Requires increase time and assist for thoroughness w/complex hair care task.     **Pt now w/weight bearing precautions requiring reeval for next session **    Progression toward goals:  [x]          Improving appropriately and progressing toward goals  []          Improving  present  [x]  Bed/chair alarm activated    COMMUNICATION/EDUCATION:   Patient Education  Education Given To: Patient  Education Provided: Plan of Care;Energy Conservation  Education Method: Verbal;Teach Back  Barriers to Learning: None  Education Outcome: Continued education needed    Thank you for this referral.  INDU Guzman  Minutes: 23

## 2025-05-06 NOTE — PROGRESS NOTES
Nephrology Progress Note      Patient: Lauri Hoover               Sex: male          DOA: 5/1/2025  YOB: 1950      Age:  75 y.o.        LOS:  LOS: 5 days   MRN: [id number]                    CSN: [id number]      Impression:   1) AC on chronic hypotonic hypovolumic asymptomatic hyponatremia , etiology d/t dehydration /poor solute intake with beer potomania , urine sodium < 20 and urine osm 268 go along with prerenal state and poor solute intake , patient chronic hyponatremia component d/t beer potomania /poor solute intake , baseline sodium around 130  2) TAN on CKD3 ( baseline creat 1.7) , etiology prerenal v/s Ischaemic ATN from severe dehydration /relative hypotension, CT without any obst ---> improving close to baseline    3) AGMA --> suspect ketoacidoses and GI losses   4) hypomagnesemia   5) abnormal liver enzymes  6) LLL lung mass, CT abd/pelvis showed solid oval shaped 3 cm mass w/ small region of eccentric cavitation noted in same LLL region with previous consolidation.   7) R foot wound  8) Hypertension   9) GI symptoms , gastroenteritis /FOBT+--> RBC scan neg      Recommendations:      1) strict I/o , kidney is improving to baseline , on PO diet now , encourage po solute intake   2) fluid restrict 1200 ml/day   3) add salt tab 1gm TID X 3 days   4) trend sodium daily   5) midodrine 10 mg TID   6) no NSAIDs , IV contrast nephrotoxins  7) renally dose meds for egfr < 30   8) monitor and replace electrolytes     D/c IVF   Patient sodium is not too far from his baseline , goal is ~ 130   Needs improved nutrition and fluid restriction   Salt tabs for few days     Please call with questions,    Please call with questions    Andrzej Mcleod MD FASN  Cell [phone]  Pager: [phone]  Fax   [phone]       Subjective:   Patient denies shortness of breath,   kidney function is improving,      Current Facility-Administered Medications:     heparin 100 UNIT/ML injection 500 Units, 500  Units, IntraCATHeter, PRN, Cecily Tan DO, 0.5 Units at 05/06/25 0940    pantoprazole (PROTONIX) tablet 40 mg, 40 mg, Oral, BID, Cecily Tan DO, 40 mg at 05/05/25 2323    levoFLOXacin (LEVAQUIN) tablet 250 mg, 250 mg, Oral, Daily, Cecily Tan DO    sodium bicarbonate tablet 650 mg, 650 mg, Oral, 4x Daily, Marci Deutsch MD, 650 mg at 05/06/25 1353    lactated ringers infusion, , IntraVENous, Continuous, Marci Deutsch MD, Last Rate: 50 mL/hr at 05/05/25 2324, New Bag at 05/05/25 2324    Virt-Caps 1 mg, 1 capsule, Oral, Daily, Marci Deutsch MD, 1 mg at 05/05/25 0959    midodrine (PROAMATINE) tablet 10 mg, 10 mg, Oral, TID WC, [facility] Center[facility], 10 mg at 05/06/25 1353    bismuth subsalicylate (PEPTO BISMOL) 525 MG/15ML suspension 30 mL, 30 mL, Oral, Q6H PRN, Hannah Morocho MD, 30 mL at 05/02/25 2148    sodium chloride flush 0.9 % injection 5-40 mL, 5-40 mL, IntraVENous, 2 times per day, Hannah Morocho MD, 10 mL at 05/05/25 2322    sodium chloride flush 0.9 % injection 5-40 mL, 5-40 mL, IntraVENous, PRN, Hannah Morocho MD    0.9 % sodium chloride infusion, , IntraVENous, PRN, Hannah Morocho MD    [Held by provider] heparin (porcine) injection 5,000 Units, 5,000 Units, SubCUTAneous, 3 times per day, Hannah Morocho MD, 5,000 Units at 05/05/25 0511    ondansetron (ZOFRAN-ODT) disintegrating tablet 4 mg, 4 mg, Oral, Q8H PRN **OR** ondansetron (ZOFRAN) injection 4 mg, 4 mg, IntraVENous, Q6H PRN, Hannah Morocho MD    polyethylene glycol (GLYCOLAX) packet 17 g, 17 g, Oral, Daily PRN, Hannah Morocho MD    acetaminophen (TYLENOL) tablet 650 mg, 650 mg, Oral, Q6H PRN **OR** acetaminophen (TYLENOL) suppository 650 mg, 650 mg, Rectal, Q6H PRN, Hannah Morocho MD    [Held by provider] allopurinol (ZYLOPRIM) tablet 300 mg, 300 mg, Oral, Daily, Hannah Morocho MD, 300 mg at 05/02/25 0856    [Held by provider] lisinopril (PRINIVIL;ZESTRIL) tablet 10 mg, 10 mg, Oral, Daily, Rashawn,

## 2025-05-06 NOTE — PROGRESS NOTES
[facility]  POST-ROUNDING NOTE    Assessment & Plan:    - MRI right foot negative for osteomyelitis. Advised for follow up in one week with podiatry. Appreciate podiatry recs  - Wound cx right foot positive for staph aureus and sensitive to Levaquin. Patient currently on Levaquin for UTI.  Continue Levaquin  - Hgb stable at 9.8 today (9.8 on 5/5, 12.2 on 5/4). Bleed scan negative for active GI bleed. Hepatitis, hepatic function panel, RUQ US liver ordered by GI. CK also ordered by GI rule out muscle injury. Appreciate GI assistance in the care of this patient. Low sodium diet ordered.  - Per nephro, patient with sodium goal of 130. Currently near goal at 127 today. IVF LR discontinued and sodium chloride tabs started by nephro. Appreciate nephro recs      The above patient and plan were discussed with my supervising physician.     See daily progress note for full assessment/plan.      Cecily Tan DO, PGY-1  [facility]  5/6/2025, 2:25 PM

## 2025-05-06 NOTE — PROGRESS NOTES
[phone]    Gastroenterology follow up-Progress note    Impression:  Admitted 5/1 with weakness, NVD non-bloody, hypotension, tachycardia, and hyponatremia. Admitted in January for similar. Hx hemochromatosis with phlebotomy 2021.     Anemia: acute on chronic, normocytic. Hgb per chart review ranges 10-12. Since admission pt has dropped from 13 to 9.8 5/5 with 2 pt drop since 5/4. LBM was a smear 5/5, otherwise was several days ago. Pt denies signs of bleeding including hematochezia, melena, and hematemesis. Differentials favor anemia of chronic disease, possible slow GIB though there are no overt signs of bleeding.  - 5/6: Hgb stable since yesterday, no overt signs of bleeding   Ferritin 895  Iron 26   Bleed scan TRBC neg for active bleed     Diarrhea: ongoing off/on for months vs years. Sxs resolved after imodium 5/3   - LBM 5/5/25 small green smear per primary RN  - Neg GI panel and C. diff  - Colon enteritis noted on CT    Elevated liver enzymes: hepatocellular pattern   - AST 90, ALT 28, Alk Phos 238      Hyponatremia  Duodenal diverticulum measures 2.8 x 6 cm on axial imaging  Reactive adenopathy at the celiac axis and rian hepatis     Plan:  - replete iron, consider transfusion  - No endoscopic evaluation at this time without overt signs of GIB and pt has upcoming EGD/colonoscopy as outpatient.  - If there are signs of active bleeding, recommend making pt NPO and ordering CTA or bleeding scan to localize bleed source and better evaluate for possible intervention.  - Keep pt NPO or on clear liquid diet until Hgb is stable and there are no signs of overt GIB.  - Hepatitis and hepatic function panels today  - CK to r/o muscle injury  - RUQ US of liver       Chief Complaint: Anemia      Subjective:  Pt seen resting in bed after bleed scan, NAD, no complaints, requested water which I brought him.     ROS: Denies any fevers, chills, rash.       Patient Active Problem List   Diagnosis    HNP (herniated  \"METAS\", \"PRO\"   Basic Metabolic Profile   Recent Labs     05/04/25  0103 05/05/25  0520 05/06/25  0126   *   < > 127*   K 4.3   < > 3.8   CL 94*   < > 92*   CO2 19*   < > 25   BUN 67*   < > 37*   GLUCOSE 94   < > 102   MG 1.5*   < > 1.8   PHOS 3.2  --   --     < > = values in this interval not displayed.        Hepatic Function    Lab Results   Component Value Date/Time    ALT 28 05/01/2025 05:24 PM     09/13/2019 02:02 PM    No components found for: \"SGOT\", \"GPT\", \"DBILI\", \"TBIL\"       Coags   No results for input(s): \"INR\", \"APTT\" in the last 72 hours.    Invalid input(s): \"PTP\"            GERI Jay    [facility]  [email or web address]  Phone: [phone]

## 2025-05-06 NOTE — PLAN OF CARE
Problem: Discharge Planning  Goal: Discharge to home or other facility with appropriate resources  5/6/2025 0224 by Kiran Escalona RN  Outcome: Progressing  Flowsheets (Taken 5/6/2025 0224)  Discharge to home or other facility with appropriate resources: Identify barriers to discharge with patient and caregiver  5/5/2025 1256 by Kathrin Otoole RN  Outcome: Progressing  Flowsheets (Taken 5/4/2025 2015 by Courtney Mancuso, RN)  Discharge to home or other facility with appropriate resources: Identify barriers to discharge with patient and caregiver     Problem: Pain  Goal: Verbalizes/displays adequate comfort level or baseline comfort level  5/6/2025 0224 by Kiran Escalona RN  Outcome: Progressing  Flowsheets (Taken 5/6/2025 0224)  Verbalizes/displays adequate comfort level or baseline comfort level:   Encourage patient to monitor pain and request assistance   Assess pain using appropriate pain scale   Administer analgesics based on type and severity of pain and evaluate response   Implement non-pharmacological measures as appropriate and evaluate response  5/5/2025 1256 by Kathrin Otoole RN  Outcome: Progressing  Flowsheets (Taken 5/5/2025 1256)  Verbalizes/displays adequate comfort level or baseline comfort level: Assess pain using appropriate pain scale     Problem: Skin/Tissue Integrity  Goal: Skin integrity remains intact  Description: 1.  Monitor for areas of redness and/or skin breakdown2.  Assess vascular access sites hourly3.  Every 4-6 hours minimum:  Change oxygen saturation probe site4.  Every 4-6 hours:  If on nasal continuous positive airway pressure, respiratory therapy assess nares and determine need for appliance change or resting period  5/6/2025 0224 by Kiran Escalona RN  Outcome: Progressing  Flowsheets (Taken 5/6/2025 0224)  Skin Integrity Remains Intact: Monitor for areas of redness and/or skin breakdown  5/5/2025 1256 by Kathrin Otoole RN  Outcome: Progressing  Flowsheets (Taken 5/4/2025 0800 by

## 2025-05-07 VITALS
BODY MASS INDEX: 24.62 KG/M2 | WEIGHT: 172 LBS | DIASTOLIC BLOOD PRESSURE: 69 MMHG | OXYGEN SATURATION: 97 % | SYSTOLIC BLOOD PRESSURE: 110 MMHG | HEART RATE: 93 BPM | TEMPERATURE: 97.7 F | HEIGHT: 70 IN | RESPIRATION RATE: 18 BRPM

## 2025-05-07 LAB
ANION GAP SERPL CALC-SCNC: 8 MMOL/L (ref 3–18)
BACTERIA SPEC CULT: NORMAL
BACTERIA SPEC CULT: NORMAL
BUN SERPL-MCNC: 31 MG/DL (ref 6–23)
BUN/CREAT SERPL: 24 (ref 12–20)
CALCIUM SERPL-MCNC: 8.7 MG/DL (ref 8.5–10.1)
CHLORIDE SERPL-SCNC: 94 MMOL/L (ref 98–107)
CO2 SERPL-SCNC: 27 MMOL/L (ref 21–32)
CREAT SERPL-MCNC: 1.32 MG/DL (ref 0.6–1.3)
ERYTHROCYTE [DISTWIDTH] IN BLOOD BY AUTOMATED COUNT: 13.6 % (ref 11.6–14.5)
GLUCOSE SERPL-MCNC: 94 MG/DL (ref 74–108)
HAV AB SER QL IA: POSITIVE
HAV IGM SER QL: NONREACTIVE
HBV CORE AB SERPL QL IA: NEGATIVE
HCT VFR BLD AUTO: 26.2 % (ref 36–48)
HCV AB SERPL QL IA: NORMAL
HCV IGG SERPL QL IA: NON REACTIVE S/CO RATIO
HGB BLD-MCNC: 9.1 G/DL (ref 13–16)
MAGNESIUM SERPL-MCNC: 1.5 MG/DL (ref 1.6–2.6)
MCH RBC QN AUTO: 32.7 PG (ref 24–34)
MCHC RBC AUTO-ENTMCNC: 34.7 G/DL (ref 31–37)
MCV RBC AUTO: 94.2 FL (ref 78–100)
NRBC # BLD: 0 K/UL (ref 0–0.01)
NRBC BLD-RTO: 0 PER 100 WBC
PLATELET # BLD AUTO: 88 K/UL (ref 135–420)
PMV BLD AUTO: 10.8 FL (ref 9.2–11.8)
POTASSIUM SERPL-SCNC: 4.1 MMOL/L (ref 3.5–5.5)
RBC # BLD AUTO: 2.78 M/UL (ref 4.35–5.65)
SERVICE CMNT-IMP: NORMAL
SERVICE CMNT-IMP: NORMAL
SODIUM SERPL-SCNC: 128 MMOL/L (ref 136–145)
WBC # BLD AUTO: 4.4 K/UL (ref 4.6–13.2)

## 2025-05-07 PROCEDURE — 6370000000 HC RX 637 (ALT 250 FOR IP): Performed by: FAMILY MEDICINE

## 2025-05-07 PROCEDURE — 97110 THERAPEUTIC EXERCISES: CPT

## 2025-05-07 PROCEDURE — 80048 BASIC METABOLIC PNL TOTAL CA: CPT

## 2025-05-07 PROCEDURE — 6370000000 HC RX 637 (ALT 250 FOR IP): Performed by: INTERNAL MEDICINE

## 2025-05-07 PROCEDURE — 6370000000 HC RX 637 (ALT 250 FOR IP)

## 2025-05-07 PROCEDURE — 2500000003 HC RX 250 WO HCPCS

## 2025-05-07 PROCEDURE — 97116 GAIT TRAINING THERAPY: CPT

## 2025-05-07 PROCEDURE — 36415 COLL VENOUS BLD VENIPUNCTURE: CPT

## 2025-05-07 PROCEDURE — 85027 COMPLETE CBC AUTOMATED: CPT

## 2025-05-07 PROCEDURE — 83735 ASSAY OF MAGNESIUM: CPT

## 2025-05-07 PROCEDURE — 86709 HEPATITIS A IGM ANTIBODY: CPT

## 2025-05-07 RX ORDER — MAGNESIUM SULFATE HEPTAHYDRATE 40 MG/ML
2000 INJECTION, SOLUTION INTRAVENOUS ONCE
Status: COMPLETED | OUTPATIENT
Start: 2025-05-07 | End: 2025-05-07

## 2025-05-07 RX ORDER — LEVOFLOXACIN 500 MG/1
500 TABLET, FILM COATED ORAL EVERY 24 HOURS
Qty: 5 TABLET | Refills: 0 | Status: SHIPPED | OUTPATIENT
Start: 2025-05-08 | End: 2025-05-13

## 2025-05-07 RX ORDER — LEVOFLOXACIN 500 MG/1
500 TABLET, FILM COATED ORAL EVERY 24 HOURS
Qty: 5 TABLET | Refills: 0 | Status: CANCELLED | OUTPATIENT
Start: 2025-05-08 | End: 2025-05-13

## 2025-05-07 RX ORDER — SODIUM CHLORIDE 1 G/1
1 TABLET ORAL
Qty: 90 TABLET | Refills: 3 | Status: SHIPPED | OUTPATIENT
Start: 2025-05-07 | End: 2025-05-10

## 2025-05-07 RX ORDER — LEVOFLOXACIN 500 MG/1
500 TABLET, FILM COATED ORAL EVERY 24 HOURS
Status: DISCONTINUED | OUTPATIENT
Start: 2025-05-08 | End: 2025-05-07 | Stop reason: HOSPADM

## 2025-05-07 RX ORDER — MIDODRINE HYDROCHLORIDE 10 MG/1
10 TABLET ORAL
Qty: 21 TABLET | Refills: 0 | Status: SHIPPED | OUTPATIENT
Start: 2025-05-07

## 2025-05-07 RX ORDER — LEVOFLOXACIN 500 MG/1
250 TABLET, FILM COATED ORAL ONCE
Status: COMPLETED | OUTPATIENT
Start: 2025-05-07 | End: 2025-05-07

## 2025-05-07 RX ORDER — LEVOFLOXACIN 500 MG/1
250 TABLET, FILM COATED ORAL ONCE
Status: DISCONTINUED | OUTPATIENT
Start: 2025-05-07 | End: 2025-05-07

## 2025-05-07 RX ADMIN — Medication 1 G: at 15:12

## 2025-05-07 RX ADMIN — PANTOPRAZOLE SODIUM 40 MG: 40 TABLET, DELAYED RELEASE ORAL at 08:06

## 2025-05-07 RX ADMIN — Medication 1 MG: at 08:05

## 2025-05-07 RX ADMIN — SODIUM BICARBONATE 650 MG: 650 TABLET ORAL at 08:06

## 2025-05-07 RX ADMIN — MAGNESIUM SULFATE HEPTAHYDRATE 2000 MG: 40 INJECTION, SOLUTION INTRAVENOUS at 06:48

## 2025-05-07 RX ADMIN — Medication 1 G: at 08:06

## 2025-05-07 RX ADMIN — SODIUM CHLORIDE, PRESERVATIVE FREE 10 ML: 5 INJECTION INTRAVENOUS at 08:05

## 2025-05-07 RX ADMIN — MIDODRINE HYDROCHLORIDE 10 MG: 10 TABLET ORAL at 08:06

## 2025-05-07 RX ADMIN — SODIUM BICARBONATE 650 MG: 650 TABLET ORAL at 15:12

## 2025-05-07 RX ADMIN — LEVOFLOXACIN 250 MG: 500 TABLET, FILM COATED ORAL at 10:52

## 2025-05-07 RX ADMIN — LEVOFLOXACIN 250 MG: 500 TABLET, FILM COATED ORAL at 08:06

## 2025-05-07 RX ADMIN — MIDODRINE HYDROCHLORIDE 10 MG: 10 TABLET ORAL at 15:12

## 2025-05-07 ASSESSMENT — PAIN SCALES - GENERAL
PAINLEVEL_OUTOF10: 0
PAINLEVEL_OUTOF10: 0

## 2025-05-07 NOTE — PROGRESS NOTES
Podiatry Progress Note    Patient: Lauri Hoover               Sex: male          DOA: [unfilled]       YOB: 1950      Age:  75 y.o.        LOS:  LOS: 6 days     POD * No surgery found *  Procedure:   * No surgery found *      Subjective:     Patient seen resting quiet and comfortably and no apparent distress. Patient had an MRI of right foot which was negative for osteomyelitis. Denies N/V/C/F. Awaiting discharge pending on hyponatremia.       Objective:       Physical Exam:  Right submet 2 full-thickness wound with mostly granular base, probing deep to capsule/deep fascia. No erythema or edema noted. No purulence noted.     Assessment:     Principal Problem:    Hyponatremia  Active Problems:    Severe protein-calorie malnutrition    Hemochromatosis  Resolved Problems:    * No resolved hospital problems. *      Plan/Recommendations/Medical Decision Making:     - MRI findings reviewed with patient. Plantar forefoot soft tissue ulceration and soft tissue inflammation at the level of the second metatarsal head without evidence of osteomyelitis. No evidence of fluid collection to suggest abscess. Polyarticular degenerative changes in the midfoot and forefoot most pronounced at the first MTP joint.  - No surgical intervention indicated at this time. Will need close follow outpatient. Please make an appointment prior to discharge.   - Will need periodic debridement with possible application of skin substitute graft.   - Discussed about proper offloading of wound to allow healing.

## 2025-05-07 NOTE — PLAN OF CARE
Problem: Discharge Planning  Goal: Discharge to home or other facility with appropriate resources  5/7/2025 0020 by Iliana Bush, RN  Outcome: Progressing  Flowsheets (Taken 5/7/2025 0020)  Discharge to home or other facility with appropriate resources:   Identify barriers to discharge with patient and caregiver   Identify discharge learning needs (meds, wound care, etc)  Note: Wound care     Problem: Pain  Goal: Verbalizes/displays adequate comfort level or baseline comfort level  5/7/2025 0020 by Iliana Bush, RN  Outcome: Progressing  Flowsheets (Taken 5/7/2025 0020)  Verbalizes/displays adequate comfort level or baseline comfort level:   Assess pain using appropriate pain scale   Administer analgesics based on type and severity of pain and evaluate response   Implement non-pharmacological measures as appropriate and evaluate response  Note: Patient has PRN pain medication ordered       Problem: Skin/Tissue Integrity  Goal: Skin integrity remains intact  Description: 1.  Monitor for areas of redness and/or skin breakdown2.  Assess vascular access sites hourly3.  Every 4-6 hours minimum:  Change oxygen saturation probe site4.  Every 4-6 hours:  If on nasal continuous positive airway pressure, respiratory therapy assess nares and determine need for appliance change or resting period  5/7/2025 0020 by Iliana Bush, RN  Outcome: Progressing  Flowsheets (Taken 5/7/2025 0020)  Skin Integrity Remains Intact:   Monitor for areas of redness and/or skin breakdown   Turn and reposition as indicated  Note: Encourage patient to make frequent turns while in bed     Problem: Nutrition Deficit:  Goal: Optimize nutritional status  5/7/2025 0020 by Iliana Bush, RN  Outcome: Progressing  Flowsheets (Taken 5/7/2025 0020)  Nutrient intake appropriate for improving, restoring, or maintaining nutritional needs:   Assess nutritional status and recommend course of action   Monitor oral intake, labs, and  Status to a Safe Level of Function: Assess activities of daily living deficits and provide assistive devices as needed  Note: Encourage patient to assist with ADLs     Problem: Metabolic/Fluid and Electrolytes - Adult  Goal: Electrolytes maintained within normal limits  Outcome: Progressing  Flowsheets (Taken 5/7/2025 0020)  Electrolytes maintained within normal limits:   Monitor labs and assess patient for signs and symptoms of electrolyte imbalances   Administer electrolyte replacement as ordered   Monitor response to electrolyte replacements, including repeat lab results as appropriate  Note: Patient has electrolyte replacement protocol in place     Problem: Metabolic/Fluid and Electrolytes - Adult  Goal: Hemodynamic stability and optimal renal function maintained  Outcome: Progressing  Flowsheets (Taken 5/7/2025 0020)  Hemodynamic stability and optimal renal function maintained:   Monitor intake, output and patient weight   Monitor labs and assess for signs and symptoms of volume excess or deficit     Problem: Metabolic/Fluid and Electrolytes - Adult  Goal: Glucose maintained within prescribed range  Outcome: Progressing  Flowsheets (Taken 5/7/2025 0020)  Glucose maintained within prescribed range:   Monitor blood glucose as ordered   Assess for signs and symptoms of hyperglycemia and hypoglycemia

## 2025-05-07 NOTE — PROGRESS NOTES
Pt is alert and oriented and able to make needs known. No s/s of any pain or discomfort. Discharge instructions reviewed in full detail with the patient. Opportunity given for questions and answers provided.  All belongings are with the patient. Pt was wheeled down in  the wheelchair.  Pt is discharged in stable condition. IV was removed an intact.

## 2025-05-07 NOTE — PLAN OF CARE
Problem: Discharge Planning  Goal: Discharge to home or other facility with appropriate resources  Outcome: Progressing  Flowsheets (Taken 5/6/2025 0224 by Kiran Escalona, RN)  Discharge to home or other facility with appropriate resources: Identify barriers to discharge with patient and caregiver     Problem: Pain  Goal: Verbalizes/displays adequate comfort level or baseline comfort level  Outcome: Progressing  Note: Patient will be able to efficiently rate pain on appropriate pain scale.     Problem: Skin/Tissue Integrity  Goal: Skin integrity remains intact  Description: 1.  Monitor for areas of redness and/or skin breakdown2.  Assess vascular access sites hourly3.  Every 4-6 hours minimum:  Change oxygen saturation probe site4.  Every 4-6 hours:  If on nasal continuous positive airway pressure, respiratory therapy assess nares and determine need for appliance change or resting period  Outcome: Progressing  Note: Patient will remain free of new areas of skin breakdown.      Problem: Safety - Adult  Goal: Free from fall injury  Outcome: Progressing  Note: Patient will remain free from falls during hospital visit     Problem: Nutrition Deficit:  Goal: Optimize nutritional status  Outcome: Progressing  Note: Patient will consume adequate nutrition.

## 2025-05-07 NOTE — CARE COORDINATION
05/02/25 1354   IMM Letter   IMM Letter given to Patient/Family/Significant other/Guardian/POA/by: Priscila Celestin   IMM Letter date given: 05/02/25   IMM Letter time given: 1335         Medicare patient has received, reviewed, and signed 2nd IM letter informing them of their right to appeal the discharge. Signed copy has been placed in patient chart.    
   05/07/25 1240   IMM Letter   IMM Letter given to Patient/Family/Significant other/Guardian/POA/by: Bertha Schneider RN   IMM Letter date given: 05/07/25   IMM Letter time given: 1145     Patient waived his right to a four hour decision to appeal discharge.     Bertha Schneider BSN, RN   Case Management   [phone]    
CM met with patient at the bedside. Patient demographics and HIPAA verified. Patient has acceptance to [facility], but his preference is to return home with family support. Will follow up to confirm patients decision.        05/02/25 1340   Service Assessment   Patient Orientation Alert and Oriented   Cognition Alert   History Provided By Patient   Primary Caregiver Self   Support Systems Family Members   Patient's Healthcare Decision Maker is: Legal Next of Kin   PCP Verified by CM Yes   Last Visit to PCP Within last 3 months   Prior Functional Level Independent in ADLs/IADLs   Current Functional Level Independent in ADLs/IADLs   Can patient return to prior living arrangement Yes   Ability to make needs known: Good   Family able to assist with home care needs: Yes   Would you like for me to discuss the discharge plan with any other family members/significant others, and if so, who? No   Financial Resources Medicare   Community Resources None   CM/DIONISIO Referral Other (see comment)  (discharge planning)   Social/Functional History   Lives With Alone   Type of Home House   Home Layout One level   Home Access Stairs to enter without rails   Entrance Stairs - Number of Steps 3   Bathroom Shower/Tub Tub/Shower unit   Bathroom Equipment None   Home Equipment Cane;Walker - Rolling   Receives Help From Family   Prior Level of Assist for ADLs Independent   Prior Level of Assist for Homemaking Independent   Ambulation Assistance Independent   Prior Level of Assist for Transfers Independent   Active  Yes   Mode of Transportation Car   Occupation Retired   Discharge Planning   Type of Residence House   Living Arrangements Alone   Current Services Prior To Admission None   Potential Assistance Needed N/A   DME Ordered? No   Potential Assistance Purchasing Medications No   Type of Home Care Services None   Patient expects to be discharged to: House   History of falls? 0   Services At/After Discharge   Transition of Care 
CM sent out Home Health referral out in [facility] for discharge planning.               Josefina Mustafa RN Case Manager  [phone]   
D/C order noted for today. Orders reviewed. Home Health arranged with [facility]. Patient does not have his house keys and unable to obtain them. Daughter will transport patient home at 2100.  remains available if needed.    Bertha Schneider, MANSIN, RN  Case Management   [phone]    
Disposition for home with [facility] to initiate services 5/5/25, pending medical clearance for discharge.    Family to transport home, with no additional needs identified at this time.    Joe Canales LMSW   Case Management    
Patient had to cancel recent colonoscopy due to transportation issues.    Patient's daughter Brynn stated she is able to transport patient for a colonoscopy, but it has to be on a day she does not have her child.     Information provided to contact Dr. Roy's office to re-schedule.     Bertha NAZARION, RN   Case Management   [phone]    
Spoke to patient about ARU, patient stated he would prefer to go home with home health. Referrals placed in [facility].     Bertha SALAMANCA, RN   Case Management   [phone]    
Spoke to patient at bedside, he stated his daughter dropped of his house keys and would like transportation home.      Called [facility] [phone], spoke to Antonia arranged transport to patient home [address], [place] [address] for today at 1530. Trip number is [id number].     Bertha SALAMANCA, RN   Case Management   [phone]    
Patent

## 2025-05-07 NOTE — PROGRESS NOTES
sodium chloride tabs        - monitor Mg and replete as necessary   - bicarb 650 mg QID  - midodrine 10 mg TID  - strict I/O's    Unintentional weight loss (30-40 lbs over last several months)        Elevated CEA     Black stools, resolved     - bleed scan from 5/6 negative for active bleed. outpatient EGD/endoscopy recommended     Plan:     - GI following, appreciate recs     - OP EGD/colonoscopy     R foot ulcer, stage 3-4  Hx of osteomyelitis s/p R third toe amputation     - MRI right foot without osteomyelitis. Advised to follow up outpatient in 1 week by podiatry      - right foot cx sensitive to Levaquin       Plan:  - continue Levaquin   - follow up OP with podiatry   - wound care      LLL lung mass  - CT A/P on admission with LLL focal mass  - Per pulm, lesion likely benign pneumatocele. Advised to have repeat CT chest in 8-12 weeks to ensure lesion has not increased in size  Plan:  - OP CT chest in 8-12 weeks        Radiculopathy      Lumbar stenosis without neurogenic claudication      Lumbar spondylosis   - has plans for outpatient Mri and possible injections  Plan:  - avoid NSAIDs  - consider gabapentin if pt concerned of back pain       Other stable chronic conditions:  - Hemochromatosis: OP phlebotomy and heme/onc  - Hypertension: hold home lisinopril 10 mg given Bethanie  - Gout: hold home allopurinol 300 mg daily due to BETHANIE    Global:  Code: Full  IVF/Drips: Continuous bicarb in dextrose  I/O / Wt: per unit routine  Diet:  low phos  Bowel Regimen, Last BM: miralax prn  DVT/AC: SQH  Mobility: per protocol   Disposition: Home  Anticipated LOS: 3-4 days     Point of Contact (relationship, number): Brynn Ryan, daughter (POA) [phone]     I have discussed Mr. Lauri Hoover case with my attending who agrees with the plan of care.       ================================================================  Further management for Mr. Lauri Hoover will be discussed on rounds with my  attending.      Cecily Tan DO, PGY-1  [facility]  May 6, 2025 6:13 AM

## 2025-05-07 NOTE — PROGRESS NOTES
[facility] Medicine  DAILY PROGRESS NOTE    *ATTENTION:  This note has been created by a medical student for educational purposes only.  Please do not refer to the content of this note for clinical decision-making, billing, or other purposes.  Please see attending physician’s note to obtain clinical information on this patient.*    Patient:    Lauri Hoover , 75 y.o. male   MRN:  [id number]  Room/Bed:  [facility]  Admission Date:   5/1/2025  Code status:  Full Code    Reason for Admission: N/V/Diarrhea/Weight Loss  Barriers to Discharge: Hyponatremia  Anticipated Date of Discharge: Tomorrow 05/08/2025      SUBJECTIVE:   Events of the last 24 hours:  No acute events overnight. Patient received PRN Zolpidem around 9:30 PM.  Patient seen at beside. No acute complaints. States he is ready to go home. No pain or discomfort. Did have nausea overnight with the salt tablet but did not vomit. Two bowel movements last night described as green-brown in color, more of a solid consistency. Spent time discussing his diet and he agrees to add some salt to his diet. He is interested in re-establishing care with Dr. Strauss whom he has previously seen for phlebotomy.     ROS (positive findings are in BOLD; negative findings are in regular font)  Constitutional: fevers, chills, appetite changes, weight changes, fatigue  HEENT: changes in vision, changes in hearing, sore throat, dysphagia  Cardiovascular: chest pain, palpitations, PND, orthopnea, edema  Pulmonary: SOB, cough, sputum production, wheezing, chest tightness  Gastrointestinal: abdominal pain, nausea/vomiting, diarrhea, constipation, melena, hematochezia  Genitourinary: dysuria, hesitation, dribbling, urgency, hematuria  Musculoskeletal: arthralgias, myalgias  Skin: rash, itching  Neurological: sensory changes, motor changes, headache  Psychiatric: mood changes  Endocrine: heat/cold intolerance  Heme: easy bruising/easy bleeding, LAD    CURRENT INPATIENT  evaluation as on the previous exam of January 2025 focal left lateral lower lobe consolidation has been present for which follow-up has been recommended Electronically signed by Josefina Curtis         ASSESSMENT AND PLAN:     Reason for Admission: Lauri Hoover is a 75 y.o. male  admitted for Hyponatremia    Hyponatremia / Hypomagnesemia  -Na up to 128 from 127 yesterday  -Magnesium down to 1.5 from 1.8 yesterday  Plan:  Appreciate Nephrology input  Continue NaCl tablets 1 g with meals TID  Continue NaHCO3 650 qD  Replete magnesium as necessary with goal > 1.5  Patient agrees to implement more sodium in his diet     Weight Loss / Anemia of Chronic Disease  -Hemoglobin down to 9.1 from 9.8 yesterday  -FOBT positive but NM GI scan was negative for bleed  -Patient has had slow downtrending Hgb  -Reportedly had 3 previous colonoscopies, records unavailable but chart indicates 12/11/2014 and one in 2019, last one had hyperplastic polyps removed per pt according to DC Summary from admission in January 2025   Plan:  Colonoscopy outpatient with CM assistance in arranging patient's transport, can transport with daughter     TAN / CKD III  -Cr improved today to 1.3 from 1.4 yesterday  -Appreciate Nephrology recommendations  Plan:  Encourage hydration  Hold nephrotoxic meds as previous (allopurinol, colchicine, lisinopril)    UTI  -80,000 enterococcus, asymptomatic   Plan:  Continue Levaquin 250 ending 05/10    R Foot Wound  -MRI no evidence of osteomyelitis; no need for surgery per Podiatry  -WCx growing heavy Staph aureus sensitive to Levaquin which patient already on for UTI as above  Plan:  Continue Levaquin 250 for a total of 5 days, ending 05/10    LLL Lung Mass  -Demonstrated to be benign on imaging per previous notes  Plan:  F/u outpatient for repeat chest CT in 10-12 months per Pulmonology    Hx Hemochromatosis  -Previously treated with Phlebotomy and monitored for status by Dr. Fl[facility]

## 2025-05-07 NOTE — PROGRESS NOTES
[phone]    Gastroenterology follow up-Progress note    Impression:  Admitted 5/1 with weakness, NVD non-bloody, hypotension, tachycardia, and hyponatremia. Admitted in January for similar. Hx hemochromatosis with phlebotomy 2021.     Anemia: acute on chronic, normocytic. Hgb per chart review ranges 10-12. Since admission pt has dropped from 13 to 9.8 5/5 with 2 pt drop since 5/4. LBM was a smear 5/5, otherwise was several days ago. Pt denies signs of bleeding including hematochezia, melena, and hematemesis. Differentials favor anemia of chronic disease, possible slow GIB though there are no overt signs of bleeding.  - 5/7: Hgb stable, no blood or black stool noted with this morning's BM which was described as normal.  - 5/6: Hgb stable since yesterday, no overt signs of bleeding   Ferritin 895  Iron 26   Bleed scan TRBC neg for active bleed     Diarrhea: ongoing off/on for months vs years. Sxs resolved after imodium 5/3   - 5/7/25: Pt reports a good BM this morning without diarrhea  - LBM 5/5/25 small green smear per primary RN  - Neg GI panel and C. diff  - Colon enteritis noted on CT    Elevated liver enzymes: hepatocellular pattern, improving. Hepatitis panel unrevealing, Hep A +Ab likely indicative of immunity, will confirm with IgM  - RUQ US coarse liver echo texture, otherwise neg for significant hepatobiliary findings.  - AST 79, ALT 53, Alk Phos 176, , CK 36  - AST 90, ALT 28, Alk Phos 238    Hyponatremia  Duodenal diverticulum measures 2.8 x 6 cm on axial imaging  Reactive adenopathy at the celiac axis and rian hepatis     Plan:  - replete iron, consider transfusion  - No endoscopic evaluation at this time without overt signs of GIB and pt has upcoming EGD/colonoscopy as outpatient.  - If there are signs of active bleeding, recommend making pt NPO and ordering CTA or bleeding scan to localize bleed source and better evaluate for possible intervention.  - CK to r/o muscle injury - neg.  -  OP FU to continue elevated liver enzyme work up.       Chief Complaint: Anemia      Subjective:  Pt seen sitting up in chair, NAD, no complaints, had a good breakfast and BM, eager to return home today. Understands need for GI FU after discharge to reschedule EGD/colonoscopy.     ROS: Denies any fevers, chills, rash.       Patient Active Problem List   Diagnosis    HNP (herniated nucleus pulposus), lumbar    Neuropathy    Neuritis    AMS (altered mental status)    Seizure (HCC)    Infected abrasion of third toe    Acute hematogenous osteomyelitis of right foot (HCC)    Right groin pain    Right lumbar radiculopathy    Lumbar spondylosis    Hyponatremia    Severe protein-calorie malnutrition    Hemochromatosis         /80   Pulse 83   Temp 97.7 °F (36.5 °C) (Oral)   Resp 18   Ht 1.778 m (5' 10\")   Wt 78 kg (172 lb)   SpO2 96%   BMI 24.68 kg/m²     constitutional: appearance: well developed, well nourished, normal habitus, no deformities, in no acute distress.   skin: inspection: no rashes, ulcers  eyes: inspection: normal conjunctivae and lids; no jaundice   ENMT: mouth: normal oral mucosa,lips and gums  neck: thyroid: normal size, consistency and position;  respiratory: effort: normal chest excursion; no intercostal retraction or accessory muscle use.   abdominal: abdomen: normal consistency; no tenderness or masses. hernias: no hernias appreciated. liver: normal size and consistency. spleen: not palpable.   rectal: hemoccult/guaiac: not performed.   musculoskeletal: digits and nails: no clubbing, cyanosis, petechiae or other inflammatory conditions. head and neck: normal range of motion; no pain, crepitation or contracture. spine/ribs/pelvis: normal range of motion; no pain, deformity or contracture.         Intake/Output Summary (Last 24 hours) at 5/7/2025 1047  Last data filed at 5/7/2025 0924  Gross per 24 hour   Intake 1680 ml   Output 240 ml   Net 1440 ml       CBC w/Diff    Lab Results

## 2025-05-07 NOTE — PLAN OF CARE
Problem: Pain  Goal: Verbalizes/displays adequate comfort level or baseline comfort level  5/7/2025 1017 by Jada Kiser, RN  Outcome: Progressing  Note: The staff will monitor and treat pain. Current pain regimen is in place and working. Will continue to meet pain goal.      Problem: Nutrition Deficit:  Goal: Optimize nutritional status  5/7/2025 1017 by Jada Kiser, RN  Outcome: Progressing  Note: Will monitor pt's diet. Encouraged a healthy diet for haling purposes. Pt sodium is low. Pt was educated on ways to increase his sodium.   5/7/2025 0020 by Iliana Bush, RN  Outcome: Progressing  Flowsheets (Taken 5/7/2025 0020)  Nutrient intake appropriate for improving, restoring, or maintaining nutritional needs:   Assess nutritional status and recommend course of action   Monitor oral intake, labs, and treatment plans

## 2025-05-07 NOTE — PLAN OF CARE
Problem: Physical Therapy - Adult  Goal: By Discharge: Performs mobility at highest level of function for planned discharge setting.  See evaluation for individualized goals.  Description: Physical Therapy Goals:  Initiated 5/2/2025 to be met within 7-10 days.    1.  Patient will move from supine to sit and sit to supine  in bed with modified independence.    2.  Patient will transfer from bed to chair and chair to bed with modified independence using the least restrictive device.  3.  Patient will perform sit to stand with modified independence.  4.  Patient will ambulate with modified independence for 150 feet with the least restrictive device.   5.  Patient will ascend/descend 3 stairs with  handrail(s) with modified independence.    PLOF: Independent. Lives alone in single story home. Has been using cane occasionally.       Outcome: Progressing   PHYSICAL THERAPY TREATMENT    Patient: Lauri Hoover (75 y.o. male)  Date: 5/7/2025  Diagnosis: Syncope and collapse [R55]  Hyponatremia [E87.1]  TAN (acute kidney injury) [N17.9]  Vomiting and diarrhea [R11.10, R19.7] Hyponatremia      Precautions: Fall Risk, Weight Bearing,  ,  ,  ,  ,  ,  ,      ASSESSMENT:  Pt received in recliner, agreeable to PT session. Pt able to verbalize WB precautions, post-op shoe already donned. Completes seated TE without incident. Sit to stand supervision into RW, good compliance with WB throughout session. Overall slow gait with good safety awareness. Ambulates for 3 bouts of approx 15 feet. Completes a 2nd round of seated TE without incident.t completes 3 sit to stand for good UE reach back carryover and completes standing marches for goof functional carry-over for stairs. Returns to recliner. Left with all needs and call bell within reach.       Progression toward goals:   [x]      Improving appropriately and progressing toward goals  []      Improving slowly and progressing toward goals  []      Not making progress toward goals  and plan of care will be adjusted     PLAN:  Patient continues to benefit from skilled intervention to address the above impairments.  Continue treatment per established plan of care.    Further Equipment Recommendations for Discharge: rolling walker    AMPA: AM-PAC Inpatient Mobility Raw Score : 18      Current research shows that an AM-PAC score of 18 (14 without stairs) or greater is associated with a discharge to the patient's home setting.    This AMPAC score should be considered in conjunction with interdisciplinary team recommendations to determine the most appropriate discharge setting. Patient's social support, diagnosis, medical stability, and prior level of function should also be taken into consideration.     SUBJECTIVE:   Patient stated, \"I want to go home.\"    OBJECTIVE DATA SUMMARY:   Critical Behavior:  Orientation  Overall Orientation Status: Within Functional Limits  Orientation Level: Oriented X4  Cognition  Overall Cognitive Status: WFL    Functional Mobility Training:  Bed Mobility:  Bed Mobility Training  Bed Mobility Training: No  Transfers:  Transfer Training  Transfer Training: Yes  Sit to Stand: Supervision  Stand to Sit: Contact guard assistance  Balance:  Balance  Sitting: Intact  Sitting - Static: Good (unsupported)  Sitting - Dynamic: Good (unsupported)  Standing: Impaired;With support  Standing - Static: Good  Standing - Dynamic: Good        Ambulation/Gait Training:     Gait  Gait Training: Yes  Left Side Weight Bearing: Full  Right Side Weight Bearing: Heel  Overall Level of Assistance: Supervision  Distance (ft): 45 Feet (3x15')  Assistive Device: Walker, rolling  Interventions: Weight shifting training/pressure relief;Verbal cues  Base of Support: Narrowed;Shift to left  Speed/Anne: Slow  Step Length: Right shortened;Left shortened  Stance: Right decreased  Gait Abnormalities: Decreased step clearance          Therapeutic Exercises:     EXERCISE   Sets   Reps   Active Active

## 2025-05-08 LAB
MITOCHONDRIA M2 IGG SER-ACNC: <20 UNITS (ref 0–20)
O+P SPEC MICRO: NORMAL
O+P STL CONC: NORMAL
SMA IGG SER-ACNC: 6 UNITS (ref 0–19)
SPECIMEN SOURCE: NORMAL

## 2025-05-19 ENCOUNTER — HOSPITAL ENCOUNTER (EMERGENCY)
Facility: HOSPITAL | Age: 75
Discharge: HOME | End: 2025-05-19
Attending: EMERGENCY MEDICINE
Payer: MEDICARE

## 2025-05-19 ENCOUNTER — APPOINTMENT (OUTPATIENT)
Facility: HOSPITAL | Age: 75
End: 2025-05-19
Payer: MEDICARE

## 2025-05-19 VITALS
RESPIRATION RATE: 17 BRPM | TEMPERATURE: 99 F | SYSTOLIC BLOOD PRESSURE: 108 MMHG | HEIGHT: 71 IN | WEIGHT: 171 LBS | HEART RATE: 69 BPM | DIASTOLIC BLOOD PRESSURE: 63 MMHG | OXYGEN SATURATION: 100 % | BODY MASS INDEX: 23.94 KG/M2

## 2025-05-19 DIAGNOSIS — D64.9 ANEMIA, UNSPECIFIED TYPE: ICD-10-CM

## 2025-05-19 DIAGNOSIS — R79.89 ELEVATED SERUM CREATININE: ICD-10-CM

## 2025-05-19 DIAGNOSIS — R53.1 WEAKNESS: Primary | ICD-10-CM

## 2025-05-19 DIAGNOSIS — R42 DIZZINESS: ICD-10-CM

## 2025-05-19 DIAGNOSIS — E87.1 HYPONATREMIA: ICD-10-CM

## 2025-05-19 DIAGNOSIS — I45.10 INCOMPLETE RIGHT BUNDLE BRANCH BLOCK: ICD-10-CM

## 2025-05-19 LAB
ALBUMIN SERPL-MCNC: 3.7 G/DL (ref 3.4–5)
ALBUMIN/GLOB SERPL: 1.1 (ref 0.8–1.7)
ALP SERPL-CCNC: 324 U/L (ref 45–117)
ALT SERPL-CCNC: 42 U/L (ref 10–50)
ANION GAP SERPL CALC-SCNC: 13 MMOL/L (ref 3–18)
APPEARANCE UR: CLEAR
AST SERPL-CCNC: 59 U/L (ref 10–38)
BASOPHILS # BLD: 0.03 K/UL (ref 0–0.1)
BASOPHILS NFR BLD: 0.4 % (ref 0–2)
BILIRUB DIRECT SERPL-MCNC: 0.2 MG/DL (ref 0–0.2)
BILIRUB SERPL-MCNC: 0.6 MG/DL (ref 0.2–1)
BILIRUB UR QL: NEGATIVE
BUN SERPL-MCNC: 29 MG/DL (ref 6–23)
BUN/CREAT SERPL: 20 (ref 12–20)
CALCIUM SERPL-MCNC: 9.9 MG/DL (ref 8.5–10.1)
CHLORIDE SERPL-SCNC: 95 MMOL/L (ref 98–107)
CO2 SERPL-SCNC: 21 MMOL/L (ref 21–32)
COLOR UR: YELLOW
CREAT SERPL-MCNC: 1.47 MG/DL (ref 0.6–1.3)
CREAT UR-MCNC: 83.1 MG/DL (ref 30–125)
DIFFERENTIAL METHOD BLD: ABNORMAL
EOSINOPHIL # BLD: 0.2 K/UL (ref 0–0.4)
EOSINOPHIL NFR BLD: 2.6 % (ref 0–5)
ERYTHROCYTE [DISTWIDTH] IN BLOOD BY AUTOMATED COUNT: 14.7 % (ref 11.6–14.5)
GLOBULIN SER CALC-MCNC: 3.2 G/DL (ref 2–4)
GLUCOSE SERPL-MCNC: 101 MG/DL (ref 74–108)
GLUCOSE UR STRIP.AUTO-MCNC: NEGATIVE MG/DL
HCT VFR BLD AUTO: 31.3 % (ref 36–48)
HGB BLD-MCNC: 10.6 G/DL (ref 13–16)
HGB UR QL STRIP: NEGATIVE
IMM GRANULOCYTES # BLD AUTO: 0.05 K/UL (ref 0–0.04)
IMM GRANULOCYTES NFR BLD AUTO: 0.6 % (ref 0–0.5)
KETONES UR QL STRIP.AUTO: NEGATIVE MG/DL
LEUKOCYTE ESTERASE UR QL STRIP.AUTO: NEGATIVE
LIPASE SERPL-CCNC: 29 U/L (ref 13–75)
LYMPHOCYTES # BLD: 1.55 K/UL (ref 0.9–3.6)
LYMPHOCYTES NFR BLD: 20.1 % (ref 21–52)
MAGNESIUM SERPL-MCNC: 1.7 MG/DL (ref 1.6–2.6)
MCH RBC QN AUTO: 32.8 PG (ref 24–34)
MCHC RBC AUTO-ENTMCNC: 33.9 G/DL (ref 31–37)
MCV RBC AUTO: 96.9 FL (ref 78–100)
MONOCYTES # BLD: 0.56 K/UL (ref 0.05–1.2)
MONOCYTES NFR BLD: 7.3 % (ref 3–10)
NEUTS SEG # BLD: 5.31 K/UL (ref 1.8–8)
NEUTS SEG NFR BLD: 69 % (ref 40–73)
NITRITE UR QL STRIP.AUTO: NEGATIVE
NRBC # BLD: 0 K/UL (ref 0–0.01)
NRBC BLD-RTO: 0 PER 100 WBC
OSMOLALITY SERPL: 282 MOSM/KG H2O (ref 280–301)
OSMOLALITY UR: 319 MOSM/KG H2O
PH UR STRIP: 6.5 (ref 5–8)
PLATELET # BLD AUTO: 251 K/UL (ref 135–420)
PMV BLD AUTO: 11 FL (ref 9.2–11.8)
POTASSIUM SERPL-SCNC: 5.3 MMOL/L (ref 3.5–5.5)
PROT SERPL-MCNC: 6.9 G/DL (ref 6.4–8.2)
PROT UR STRIP-MCNC: NEGATIVE MG/DL
RBC # BLD AUTO: 3.23 M/UL (ref 4.35–5.65)
SODIUM SERPL-SCNC: 129 MMOL/L (ref 136–145)
SODIUM UR-SCNC: <20 MMOL/L (ref 40–220)
SP GR UR REFRACTOMETRY: 1.01 (ref 1–1.03)
TROPONIN T SERPL HS-MCNC: 28.7 NG/L (ref 0–22)
TROPONIN T SERPL HS-MCNC: 32 NG/L (ref 0–22)
UROBILINOGEN UR QL STRIP.AUTO: 0.2 EU/DL (ref 0.2–1)
WBC # BLD AUTO: 7.7 K/UL (ref 4.6–13.2)

## 2025-05-19 PROCEDURE — 80076 HEPATIC FUNCTION PANEL: CPT

## 2025-05-19 PROCEDURE — 93005 ELECTROCARDIOGRAM TRACING: CPT | Performed by: EMERGENCY MEDICINE

## 2025-05-19 PROCEDURE — 6370000000 HC RX 637 (ALT 250 FOR IP): Performed by: GENERAL PRACTICE

## 2025-05-19 PROCEDURE — 81003 URINALYSIS AUTO W/O SCOPE: CPT

## 2025-05-19 PROCEDURE — 82570 ASSAY OF URINE CREATININE: CPT

## 2025-05-19 PROCEDURE — 83690 ASSAY OF LIPASE: CPT

## 2025-05-19 PROCEDURE — 99284 EMERGENCY DEPT VISIT MOD MDM: CPT

## 2025-05-19 PROCEDURE — 85025 COMPLETE CBC W/AUTO DIFF WBC: CPT

## 2025-05-19 PROCEDURE — 80048 BASIC METABOLIC PNL TOTAL CA: CPT

## 2025-05-19 PROCEDURE — 84300 ASSAY OF URINE SODIUM: CPT

## 2025-05-19 PROCEDURE — 83735 ASSAY OF MAGNESIUM: CPT

## 2025-05-19 PROCEDURE — 70450 CT HEAD/BRAIN W/O DYE: CPT

## 2025-05-19 PROCEDURE — 84484 ASSAY OF TROPONIN QUANT: CPT

## 2025-05-19 PROCEDURE — 83935 ASSAY OF URINE OSMOLALITY: CPT

## 2025-05-19 PROCEDURE — 83930 ASSAY OF BLOOD OSMOLALITY: CPT

## 2025-05-19 PROCEDURE — 2580000003 HC RX 258: Performed by: GENERAL PRACTICE

## 2025-05-19 RX ORDER — ACETAMINOPHEN 500 MG
1000 TABLET ORAL
Status: COMPLETED | OUTPATIENT
Start: 2025-05-19 | End: 2025-05-19

## 2025-05-19 RX ORDER — 0.9 % SODIUM CHLORIDE 0.9 %
1000 INTRAVENOUS SOLUTION INTRAVENOUS ONCE
Status: COMPLETED | OUTPATIENT
Start: 2025-05-19 | End: 2025-05-19

## 2025-05-19 RX ADMIN — ACETAMINOPHEN 1000 MG: 500 TABLET ORAL at 19:49

## 2025-05-19 RX ADMIN — SODIUM CHLORIDE 1000 ML: 0.9 INJECTION, SOLUTION INTRAVENOUS at 19:49

## 2025-05-19 ASSESSMENT — PAIN SCALES - GENERAL: PAINLEVEL_OUTOF10: 0

## 2025-05-19 ASSESSMENT — PAIN - FUNCTIONAL ASSESSMENT: PAIN_FUNCTIONAL_ASSESSMENT: 0-10

## 2025-05-19 NOTE — ED NOTES
Assumed care of pt. Pt A+Ox4. Pt C/O weakness, diarrhea, lightheadedness x1 week. Pt seen for same recently. Pt states no improvement. Pt on monitor. Vitals stable at this time.

## 2025-05-19 NOTE — ED TRIAGE NOTES
Pt presented to ED via EMS transport with c/o Generalized weakness; Diarrhea; lightheadedness x1week; was recently in hospital for same issue; last BM @1pm    Pt placed on monitor

## 2025-05-19 NOTE — ED PROVIDER NOTES
[facility] EMERGENCY DEPARTMENT  EMERGENCY DEPARTMENT ENCOUNTER      Pt Name: Lauri Hoover  MRN: [id number]  Birthdate 1950  Date of evaluation: 5/19/2025  Provider: Obie Tierney MD  9:54 PM    CHIEF COMPLAINT       Chief Complaint   Patient presents with    Dizziness    Fatigue    Diarrhea         HISTORY OF PRESENT ILLNESS    Lauri Hoover is a 75 y.o. male with a past medical history of epilepsy, osteomyelitis, malnutrition who presents with continued weakness.  Patient states he feels weak and dizzy when he wakes up in the mornings the last all day.  Describes the dizziness is intermittent and worse when he stands up.  Denies any fevers.  No chest pain or shortness of breath.  He is being treated for a wound on the right foot.  Also complaining of a mild headache.  Still having approximately 2-3 loose stools per day.    Patient was recent discharged on May 8 with diagnosis of hypotonic hyponatremia, TAN, hypomagnesia    HPI    Nursing Notes were reviewed.    REVIEW OF SYSTEMS       Review of Systems    Except as noted above the remainder of the review of systems was reviewed and negative.       PAST MEDICAL HISTORY     Past Medical History:   Diagnosis Date    Arthritis     Hemochromatosis     Ill-defined condition     GOUT         SURGICAL HISTORY       Past Surgical History:   Procedure Laterality Date    ORTHOPEDIC SURGERY      LEFT HAND         CURRENT MEDICATIONS       Previous Medications    ALLOPURINOL (ZYLOPRIM) 300 MG TABLET    Take 1 tablet by mouth daily    AMLODIPINE (NORVASC) 5 MG TABLET    Take 1 tablet by mouth daily    COLCHICINE (COLCRYS) 0.6 MG TABLET    Take 1 tablet by mouth as needed    DICLOFENAC SODIUM (VOLTAREN) 1 % GEL    Apply 2 g topically 4 times daily as needed    LISINOPRIL (PRINIVIL;ZESTRIL) 10 MG TABLET    Take 1 tablet by mouth daily    MIDODRINE (PROAMATINE) 10 MG TABLET    Take 1 tablet by mouth 3 times daily (with meals)    PREGABALIN (LYRICA)

## 2025-05-19 NOTE — ED PROVIDER NOTES
Normal sinus rhythm at 90 normal axis normal intervals no ST elevation or depression interpreted by myself     Joao Kelley MD  05/19/25 1902

## 2025-05-20 LAB
EKG ATRIAL RATE: 90 BPM
EKG DIAGNOSIS: NORMAL
EKG P AXIS: 1 DEGREES
EKG P-R INTERVAL: 190 MS
EKG Q-T INTERVAL: 360 MS
EKG QRS DURATION: 82 MS
EKG QTC CALCULATION (BAZETT): 440 MS
EKG R AXIS: -30 DEGREES
EKG T AXIS: 26 DEGREES
EKG VENTRICULAR RATE: 90 BPM

## 2025-05-20 PROCEDURE — 93010 ELECTROCARDIOGRAM REPORT: CPT | Performed by: INTERNAL MEDICINE

## 2025-05-20 NOTE — DISCHARGE INSTRUCTIONS
You were evaluated in the emergency department today for weakness and dizziness.  Your sodium level was 129 today which was improved from 128 when you are discharged previously.  For this reason we do not feel the need to come to the hospital for an electrolyte disturbance.  Your magnesium was 1.7 today which is within normal limits.  You received 1 L of fluids and your blood pressure normalized.  Please continue to follow-up with your primary care physician as scheduled.   52M w/ PMH of lactose intolerance, transferred from [facility] for fever and abdominal pain w/ suspected new diagnosis of metastatic GI cancer.    # R/o new diagnosis of metastatic malignancy- favor GI primary  - Pt with 1 year hx of abd discomfort a/w liver lesion   - CEA elevated 249  - CT chest/abd/pelvis reviewed, results as above, pertinent for irregular wall thickening of the mid-distal transverse colon with surrounding stranding, colonic lymphadenopathy with focal outpouching of air in communication with lumen concerning for possible perforation, hypodense liver lesions, one of which measuring 8.5cm, and CT chest showing b/l subcentimeter pulmonary nodules with left mammary and cardiophrenic angle lymph nodes measuring <10mm  - Given concern for possible perforation, appreciate GI eval and recs, will not pursue colonoscopy at this time  - Appreciate colorectal surgery eval and recs s/p laprascopic left hemicolectomy with primary anastamosis on 6/4. Recovering well. Having bowel movements and eating regular diet.   - Discussed with patient he needs to follow up outpatient for final pathology results and can have IR guided liver biopsy done at that time.   - In discharge paperwork please include [facility] New Patient Scheduling Unit phone number [phone]. An email will be sent and [facility] will reach out to patient to make an appointment however if patient does not hear from the center within a week he should reach out to schedule.     #Clostridium septicum bacteremia   - Pt found to have fever at OSH and blood cultures + clostridium septicum  - Currently on metronidazole   - Based on literature search- c. septicum bacteremia has a clear association with malignancies and highly known to be associated with colorectal malignancy (But also with AML and MDS). The reported rates of C. septicum infection with underlying malignancy ranges from 50-85%. It can lead to direct, spontaneous infections of the bowel and peritoneal cavity, therefore bacteremia with this organism may be an unexpected initial presentation of undiagnosed colon carcinoma.    One such case report is featured here: https://www.ncbi.nlm.nih.gov/pmc/articles/ZML5812171/  - Given the above info, suspicion for colorectal primary is even higher, but still need tissue diagnosis   - Repeat blood cultures, follow up sensitivities    #Microcytic anemia   Pt with downtrending hemoglobin, MCV in 50's  In the setting of likely GI malignancy with possible slow GI bleed   Iron studies consistent with iron deficiency- can start oral supplementation 325mg every other day for optimal absorption  Holding off on colonoscopy for now   Transfuse PRN for hgb <7    Shorty Radford MD   Hematology Oncology Fellow  Pager [phone]   After 5pm and on weekends page on call fellow        52 uo M w/ PMH of lactose intolerance, transferred from [facility] for fever and abdominal pain w/ suspected new diagnosis of metastatic GI cancer, likely colon cancer.    # R/o new diagnosis of metastatic malignancy- favor GI primary  - Pt with 1 year hx of abd discomfort a/w liver lesion   - CEA elevated 249  - CT chest/abd/pelvis reviewed, results as above, pertinent for irregular wall thickening of the mid-distal transverse colon with surrounding stranding, colonic lymphadenopathy with focal outpouching of air in communication with lumen concerning for possible perforation, hypodense liver lesions, one of which measuring 8.5cm, and CT chest showing b/l subcentimeter pulmonary nodules with left mammary and cardiophrenic angle lymph nodes measuring <10mm  - Given concern for possible perforation, appreciate GI eval and recs, will not pursue colonoscopy at this time  - Appreciate colorectal surgery eval and recs s/p laparoscopic left hemicolectomy with primary anastamosis on 6/4. Recovering well. Having bowel movements and eating regular diet.   - Discussed with patient he needs to follow up outpatient for final pathology results and can have IR guided liver biopsy done at that time.   - In discharge paperwork please include [facility] New Patient Scheduling Unit phone number [phone]. An email will be sent and [facility] will reach out to patient to make an appointment.    #Clostridium septicum bacteremia   - Pt found to have fever at OSH and blood cultures + clostridium septicum  - Currently on metronidazole   - Based on literature search- c. septicum bacteremia has a clear association with malignancies and highly known to be associated with colorectal malignancy (But also with AML and MDS). The reported rates of C. septicum infection with underlying malignancy ranges from 50-85%. It can lead to direct, spontaneous infections of the bowel and peritoneal cavity, therefore bacteremia with this organism may be an unexpected initial presentation of undiagnosed colon carcinoma.    One such case report is featured here: https://www.ncbi.nlm.nih.gov/pmc/articles/ZVP3385055/  - Given the above info, suspicion for colorectal primary is even higher, but still need tissue diagnosis   - Repeat blood cultures, follow up sensitivities    #Microcytic anemia   Pt with downtrending hemoglobin, MCV in 50's  In the setting of likely GI malignancy with possible slow GI bleed   Iron studies consistent with iron deficiency- can start oral supplementation 325mg every other day for optimal absorption  Holding off on colonoscopy for now   Transfuse PRN for hgb <7    Shorty Radford MD   Hematology Oncology Fellow  Pager [phone]   After 5pm and on weekends page on call fellow

## 2025-05-20 NOTE — ED NOTES
Pt DC. Dc instructions and education completed. Pt verbalized understanding. IV removed. Pt provided water on DC. Pt daughter to  pt.

## 2025-06-04 ENCOUNTER — HOSPITAL ENCOUNTER (INPATIENT)
Facility: HOSPITAL | Age: 75
LOS: 2 days | Discharge: HOME HEALTH | End: 2025-06-07
Attending: EMERGENCY MEDICINE | Admitting: FAMILY MEDICINE
Payer: MEDICARE

## 2025-06-04 DIAGNOSIS — E87.1 HYPONATREMIA: Primary | ICD-10-CM

## 2025-06-04 LAB
ALBUMIN SERPL-MCNC: 3.5 G/DL (ref 3.4–5)
ALBUMIN/GLOB SERPL: 1.3 (ref 0.8–1.7)
ALP SERPL-CCNC: 259 U/L (ref 45–117)
ALT SERPL-CCNC: 28 U/L (ref 10–50)
ANION GAP SERPL CALC-SCNC: 15 MMOL/L (ref 3–18)
AST SERPL-CCNC: 69 U/L (ref 10–38)
BASOPHILS # BLD: 0.02 K/UL (ref 0–0.1)
BASOPHILS NFR BLD: 0.3 % (ref 0–2)
BILIRUB SERPL-MCNC: 1 MG/DL (ref 0.2–1)
BUN SERPL-MCNC: 21 MG/DL (ref 6–23)
BUN/CREAT SERPL: 13 (ref 12–20)
CALCIUM SERPL-MCNC: 8.8 MG/DL (ref 8.5–10.1)
CHLORIDE SERPL-SCNC: 90 MMOL/L (ref 98–107)
CO2 SERPL-SCNC: 18 MMOL/L (ref 21–32)
CREAT SERPL-MCNC: 1.64 MG/DL (ref 0.6–1.3)
DIFFERENTIAL METHOD BLD: ABNORMAL
EOSINOPHIL # BLD: 0.02 K/UL (ref 0–0.4)
EOSINOPHIL NFR BLD: 0.3 % (ref 0–5)
ERYTHROCYTE [DISTWIDTH] IN BLOOD BY AUTOMATED COUNT: 16.5 % (ref 11.6–14.5)
GLOBULIN SER CALC-MCNC: 2.7 G/DL (ref 2–4)
GLUCOSE SERPL-MCNC: 110 MG/DL (ref 74–108)
HCT VFR BLD AUTO: 23.9 % (ref 36–48)
HGB BLD-MCNC: 8.6 G/DL (ref 13–16)
IMM GRANULOCYTES # BLD AUTO: 0.04 K/UL (ref 0–0.04)
IMM GRANULOCYTES NFR BLD AUTO: 0.7 % (ref 0–0.5)
LYMPHOCYTES # BLD: 1.3 K/UL (ref 0.9–3.6)
LYMPHOCYTES NFR BLD: 21.2 % (ref 21–52)
MCH RBC QN AUTO: 34.3 PG (ref 24–34)
MCHC RBC AUTO-ENTMCNC: 36 G/DL (ref 31–37)
MCV RBC AUTO: 95.2 FL (ref 78–100)
MONOCYTES # BLD: 0.79 K/UL (ref 0.05–1.2)
MONOCYTES NFR BLD: 12.9 % (ref 3–10)
NEUTS SEG # BLD: 3.95 K/UL (ref 1.8–8)
NEUTS SEG NFR BLD: 64.6 % (ref 40–73)
NRBC # BLD: 0.02 K/UL (ref 0–0.01)
NRBC BLD-RTO: 0.3 PER 100 WBC
PLATELET # BLD AUTO: 193 K/UL (ref 135–420)
PMV BLD AUTO: 9.9 FL (ref 9.2–11.8)
POTASSIUM SERPL-SCNC: 3.7 MMOL/L (ref 3.5–5.5)
PROT SERPL-MCNC: 6.2 G/DL (ref 6.4–8.2)
RBC # BLD AUTO: 2.51 M/UL (ref 4.35–5.65)
SODIUM SERPL-SCNC: 124 MMOL/L (ref 136–145)
WBC # BLD AUTO: 6.1 K/UL (ref 4.6–13.2)

## 2025-06-04 PROCEDURE — 93005 ELECTROCARDIOGRAM TRACING: CPT | Performed by: EMERGENCY MEDICINE

## 2025-06-04 PROCEDURE — 84484 ASSAY OF TROPONIN QUANT: CPT

## 2025-06-04 PROCEDURE — 99285 EMERGENCY DEPT VISIT HI MDM: CPT

## 2025-06-04 PROCEDURE — 80053 COMPREHEN METABOLIC PANEL: CPT

## 2025-06-04 PROCEDURE — 85025 COMPLETE CBC W/AUTO DIFF WBC: CPT

## 2025-06-04 ASSESSMENT — PAIN SCALES - GENERAL: PAINLEVEL_OUTOF10: 6

## 2025-06-04 ASSESSMENT — PAIN - FUNCTIONAL ASSESSMENT: PAIN_FUNCTIONAL_ASSESSMENT: 0-10

## 2025-06-05 LAB
ANION GAP SERPL CALC-SCNC: 12 MMOL/L (ref 3–18)
ANION GAP SERPL CALC-SCNC: 13 MMOL/L (ref 3–18)
ANION GAP SERPL CALC-SCNC: 14 MMOL/L (ref 3–18)
BUN SERPL-MCNC: 20 MG/DL (ref 6–23)
BUN SERPL-MCNC: 20 MG/DL (ref 6–23)
BUN SERPL-MCNC: 21 MG/DL (ref 6–23)
BUN/CREAT SERPL: 14 (ref 12–20)
CALCIUM SERPL-MCNC: 8.3 MG/DL (ref 8.5–10.1)
CALCIUM SERPL-MCNC: 8.4 MG/DL (ref 8.5–10.1)
CALCIUM SERPL-MCNC: 8.6 MG/DL (ref 8.5–10.1)
CHLORIDE SERPL-SCNC: 94 MMOL/L (ref 98–107)
CHLORIDE SERPL-SCNC: 95 MMOL/L (ref 98–107)
CHLORIDE SERPL-SCNC: 95 MMOL/L (ref 98–107)
CO2 SERPL-SCNC: 18 MMOL/L (ref 21–32)
CO2 SERPL-SCNC: 20 MMOL/L (ref 21–32)
CO2 SERPL-SCNC: 20 MMOL/L (ref 21–32)
CREAT SERPL-MCNC: 1.41 MG/DL (ref 0.6–1.3)
CREAT SERPL-MCNC: 1.42 MG/DL (ref 0.6–1.3)
CREAT SERPL-MCNC: 1.58 MG/DL (ref 0.6–1.3)
EKG ATRIAL RATE: 96 BPM
EKG DIAGNOSIS: NORMAL
EKG P AXIS: 43 DEGREES
EKG P-R INTERVAL: 178 MS
EKG Q-T INTERVAL: 366 MS
EKG QRS DURATION: 84 MS
EKG QTC CALCULATION (BAZETT): 462 MS
EKG R AXIS: 5 DEGREES
EKG T AXIS: 35 DEGREES
EKG VENTRICULAR RATE: 96 BPM
GLUCOSE SERPL-MCNC: 93 MG/DL (ref 74–108)
GLUCOSE SERPL-MCNC: 93 MG/DL (ref 74–108)
GLUCOSE SERPL-MCNC: 97 MG/DL (ref 74–108)
MAGNESIUM SERPL-MCNC: 1.2 MG/DL (ref 1.6–2.6)
OSMOLALITY SERPL: 265 MOSM/KG H2O (ref 280–301)
OSMOLALITY UR: 173 MOSM/KG H2O
PHOSPHATE SERPL-MCNC: 3.2 MG/DL (ref 2.5–4.9)
PHOSPHATE SERPL-MCNC: 3.3 MG/DL (ref 2.5–4.9)
POTASSIUM SERPL-SCNC: 3.5 MMOL/L (ref 3.5–5.5)
POTASSIUM SERPL-SCNC: 3.5 MMOL/L (ref 3.5–5.5)
POTASSIUM SERPL-SCNC: 3.6 MMOL/L (ref 3.5–5.5)
SODIUM SERPL-SCNC: 126 MMOL/L (ref 136–145)
SODIUM SERPL-SCNC: 127 MMOL/L (ref 136–145)
SODIUM SERPL-SCNC: 127 MMOL/L (ref 136–145)
SODIUM UR-SCNC: 21 MMOL/L (ref 40–220)
T4 FREE SERPL-MCNC: 1 NG/DL (ref 0.9–1.7)
T4 FREE SERPL-MCNC: 1 NG/DL (ref 0.9–1.7)
TROPONIN T SERPL HS-MCNC: 37.2 NG/L (ref 0–22)
TSH, 3RD GENERATION: 1.95 UIU/ML (ref 0.27–4.2)
TSH, 3RD GENERATION: 2.29 UIU/ML (ref 0.27–4.2)

## 2025-06-05 PROCEDURE — 6360000002 HC RX W HCPCS: Performed by: INTERNAL MEDICINE

## 2025-06-05 PROCEDURE — 99222 1ST HOSP IP/OBS MODERATE 55: CPT | Performed by: INTERNAL MEDICINE

## 2025-06-05 PROCEDURE — 6370000000 HC RX 637 (ALT 250 FOR IP): Performed by: INTERNAL MEDICINE

## 2025-06-05 PROCEDURE — 94761 N-INVAS EAR/PLS OXIMETRY MLT: CPT

## 2025-06-05 PROCEDURE — 84443 ASSAY THYROID STIM HORMONE: CPT

## 2025-06-05 PROCEDURE — 93010 ELECTROCARDIOGRAM REPORT: CPT | Performed by: INTERNAL MEDICINE

## 2025-06-05 PROCEDURE — 2500000003 HC RX 250 WO HCPCS

## 2025-06-05 PROCEDURE — 83930 ASSAY OF BLOOD OSMOLALITY: CPT

## 2025-06-05 PROCEDURE — 2580000003 HC RX 258: Performed by: INTERNAL MEDICINE

## 2025-06-05 PROCEDURE — 84100 ASSAY OF PHOSPHORUS: CPT

## 2025-06-05 PROCEDURE — 84439 ASSAY OF FREE THYROXINE: CPT

## 2025-06-05 PROCEDURE — 36415 COLL VENOUS BLD VENIPUNCTURE: CPT

## 2025-06-05 PROCEDURE — 80048 BASIC METABOLIC PNL TOTAL CA: CPT

## 2025-06-05 PROCEDURE — 84300 ASSAY OF URINE SODIUM: CPT

## 2025-06-05 PROCEDURE — 1100000003 HC PRIVATE W/ TELEMETRY

## 2025-06-05 PROCEDURE — 96360 HYDRATION IV INFUSION INIT: CPT

## 2025-06-05 PROCEDURE — 83935 ASSAY OF URINE OSMOLALITY: CPT

## 2025-06-05 PROCEDURE — 2500000003 HC RX 250 WO HCPCS: Performed by: INTERNAL MEDICINE

## 2025-06-05 PROCEDURE — 6370000000 HC RX 637 (ALT 250 FOR IP): Performed by: EMERGENCY MEDICINE

## 2025-06-05 PROCEDURE — 6370000000 HC RX 637 (ALT 250 FOR IP)

## 2025-06-05 PROCEDURE — 6370000000 HC RX 637 (ALT 250 FOR IP): Performed by: FAMILY MEDICINE

## 2025-06-05 PROCEDURE — 82533 TOTAL CORTISOL: CPT

## 2025-06-05 PROCEDURE — 83735 ASSAY OF MAGNESIUM: CPT

## 2025-06-05 PROCEDURE — 2580000003 HC RX 258: Performed by: EMERGENCY MEDICINE

## 2025-06-05 RX ORDER — SODIUM CHLORIDE 0.9 % (FLUSH) 0.9 %
5-40 SYRINGE (ML) INJECTION EVERY 12 HOURS SCHEDULED
Status: DISCONTINUED | OUTPATIENT
Start: 2025-06-05 | End: 2025-06-07 | Stop reason: HOSPADM

## 2025-06-05 RX ORDER — ACETAMINOPHEN 325 MG/1
650 TABLET ORAL EVERY 6 HOURS PRN
Status: DISCONTINUED | OUTPATIENT
Start: 2025-06-05 | End: 2025-06-07 | Stop reason: HOSPADM

## 2025-06-05 RX ORDER — PREGABALIN 75 MG/1
75 CAPSULE ORAL 2 TIMES DAILY PRN
Status: DISCONTINUED | OUTPATIENT
Start: 2025-06-05 | End: 2025-06-07 | Stop reason: HOSPADM

## 2025-06-05 RX ORDER — BISACODYL 10 MG
10 SUPPOSITORY, RECTAL RECTAL DAILY PRN
Status: DISCONTINUED | OUTPATIENT
Start: 2025-06-05 | End: 2025-06-07 | Stop reason: HOSPADM

## 2025-06-05 RX ORDER — AMLODIPINE BESYLATE 5 MG/1
5 TABLET ORAL DAILY
Status: DISCONTINUED | OUTPATIENT
Start: 2025-06-05 | End: 2025-06-07 | Stop reason: HOSPADM

## 2025-06-05 RX ORDER — NEPHROCAP 1 MG
1 CAP ORAL DAILY
Status: DISCONTINUED | OUTPATIENT
Start: 2025-06-05 | End: 2025-06-07 | Stop reason: HOSPADM

## 2025-06-05 RX ORDER — ZOLPIDEM TARTRATE 5 MG/1
10 TABLET ORAL NIGHTLY PRN
Status: DISCONTINUED | OUTPATIENT
Start: 2025-06-05 | End: 2025-06-05

## 2025-06-05 RX ORDER — SODIUM CHLORIDE 9 MG/ML
INJECTION, SOLUTION INTRAVENOUS PRN
Status: DISCONTINUED | OUTPATIENT
Start: 2025-06-05 | End: 2025-06-07 | Stop reason: HOSPADM

## 2025-06-05 RX ORDER — POTASSIUM CHLORIDE 1500 MG/1
40 TABLET, EXTENDED RELEASE ORAL PRN
Status: DISCONTINUED | OUTPATIENT
Start: 2025-06-05 | End: 2025-06-07

## 2025-06-05 RX ORDER — ALLOPURINOL 300 MG/1
300 TABLET ORAL DAILY
Status: DISCONTINUED | OUTPATIENT
Start: 2025-06-05 | End: 2025-06-07 | Stop reason: HOSPADM

## 2025-06-05 RX ORDER — SODIUM CHLORIDE 9 MG/ML
INJECTION, SOLUTION INTRAVENOUS CONTINUOUS
Status: DISPENSED | OUTPATIENT
Start: 2025-06-05 | End: 2025-06-06

## 2025-06-05 RX ORDER — ACETAMINOPHEN 650 MG/1
650 SUPPOSITORY RECTAL EVERY 6 HOURS PRN
Status: DISCONTINUED | OUTPATIENT
Start: 2025-06-05 | End: 2025-06-07 | Stop reason: HOSPADM

## 2025-06-05 RX ORDER — MIDODRINE HYDROCHLORIDE 10 MG/1
10 TABLET ORAL
Status: DISCONTINUED | OUTPATIENT
Start: 2025-06-05 | End: 2025-06-07 | Stop reason: HOSPADM

## 2025-06-05 RX ORDER — MAGNESIUM SULFATE IN WATER 40 MG/ML
2000 INJECTION, SOLUTION INTRAVENOUS PRN
Status: DISCONTINUED | OUTPATIENT
Start: 2025-06-05 | End: 2025-06-07

## 2025-06-05 RX ORDER — POTASSIUM CHLORIDE 7.45 MG/ML
10 INJECTION INTRAVENOUS PRN
Status: DISCONTINUED | OUTPATIENT
Start: 2025-06-05 | End: 2025-06-07

## 2025-06-05 RX ORDER — ONDANSETRON 4 MG/1
4 TABLET, ORALLY DISINTEGRATING ORAL EVERY 8 HOURS PRN
Status: DISCONTINUED | OUTPATIENT
Start: 2025-06-05 | End: 2025-06-05

## 2025-06-05 RX ORDER — SODIUM CHLORIDE 0.9 % (FLUSH) 0.9 %
5-40 SYRINGE (ML) INJECTION PRN
Status: DISCONTINUED | OUTPATIENT
Start: 2025-06-05 | End: 2025-06-07 | Stop reason: HOSPADM

## 2025-06-05 RX ORDER — ONDANSETRON 2 MG/ML
4 INJECTION INTRAMUSCULAR; INTRAVENOUS EVERY 6 HOURS PRN
Status: DISCONTINUED | OUTPATIENT
Start: 2025-06-05 | End: 2025-06-05

## 2025-06-05 RX ORDER — SODIUM BICARBONATE 650 MG/1
650 TABLET ORAL 3 TIMES DAILY
Status: DISCONTINUED | OUTPATIENT
Start: 2025-06-05 | End: 2025-06-07 | Stop reason: HOSPADM

## 2025-06-05 RX ORDER — MAGNESIUM SULFATE HEPTAHYDRATE 40 MG/ML
2000 INJECTION, SOLUTION INTRAVENOUS ONCE
Status: COMPLETED | OUTPATIENT
Start: 2025-06-05 | End: 2025-06-05

## 2025-06-05 RX ORDER — LISINOPRIL 10 MG/1
10 TABLET ORAL DAILY
Status: DISCONTINUED | OUTPATIENT
Start: 2025-06-05 | End: 2025-06-07 | Stop reason: HOSPADM

## 2025-06-05 RX ORDER — 0.9 % SODIUM CHLORIDE 0.9 %
1000 INTRAVENOUS SOLUTION INTRAVENOUS ONCE
Status: COMPLETED | OUTPATIENT
Start: 2025-06-05 | End: 2025-06-05

## 2025-06-05 RX ORDER — ENOXAPARIN SODIUM 100 MG/ML
40 INJECTION SUBCUTANEOUS DAILY
Status: DISCONTINUED | OUTPATIENT
Start: 2025-06-05 | End: 2025-06-07 | Stop reason: HOSPADM

## 2025-06-05 RX ORDER — ZOLPIDEM TARTRATE 5 MG/1
10 TABLET ORAL NIGHTLY
Status: DISCONTINUED | OUTPATIENT
Start: 2025-06-05 | End: 2025-06-07 | Stop reason: HOSPADM

## 2025-06-05 RX ORDER — ACETAMINOPHEN 325 MG/1
650 TABLET ORAL
Status: COMPLETED | OUTPATIENT
Start: 2025-06-05 | End: 2025-06-05

## 2025-06-05 RX ADMIN — SODIUM CHLORIDE 1000 ML: 0.9 INJECTION, SOLUTION INTRAVENOUS at 00:37

## 2025-06-05 RX ADMIN — Medication 1 MG: at 13:00

## 2025-06-05 RX ADMIN — SODIUM CHLORIDE, PRESERVATIVE FREE 10 ML: 5 INJECTION INTRAVENOUS at 21:41

## 2025-06-05 RX ADMIN — SODIUM BICARBONATE 650 MG: 650 TABLET ORAL at 16:05

## 2025-06-05 RX ADMIN — ACETAMINOPHEN 650 MG: 325 TABLET ORAL at 00:42

## 2025-06-05 RX ADMIN — ZOLPIDEM TARTRATE 10 MG: 5 TABLET ORAL at 21:28

## 2025-06-05 RX ADMIN — ACETAMINOPHEN 650 MG: 325 TABLET ORAL at 21:28

## 2025-06-05 RX ADMIN — SODIUM CHLORIDE, PRESERVATIVE FREE 10 ML: 5 INJECTION INTRAVENOUS at 09:59

## 2025-06-05 RX ADMIN — ENOXAPARIN SODIUM 40 MG: 100 INJECTION SUBCUTANEOUS at 09:59

## 2025-06-05 RX ADMIN — SODIUM CHLORIDE: 0.9 INJECTION, SOLUTION INTRAVENOUS at 16:14

## 2025-06-05 RX ADMIN — SODIUM BICARBONATE 650 MG: 650 TABLET ORAL at 21:28

## 2025-06-05 RX ADMIN — MAGNESIUM SULFATE HEPTAHYDRATE 2000 MG: 40 INJECTION, SOLUTION INTRAVENOUS at 21:36

## 2025-06-05 RX ADMIN — MIDODRINE HYDROCHLORIDE 10 MG: 10 TABLET ORAL at 09:58

## 2025-06-05 ASSESSMENT — PAIN DESCRIPTION - ORIENTATION
ORIENTATION: POSTERIOR
ORIENTATION: LEFT;RIGHT

## 2025-06-05 ASSESSMENT — PAIN DESCRIPTION - LOCATION
LOCATION: HEAD
LOCATION: HEAD

## 2025-06-05 ASSESSMENT — PAIN SCALES - GENERAL
PAINLEVEL_OUTOF10: 0
PAINLEVEL_OUTOF10: 4
PAINLEVEL_OUTOF10: 0
PAINLEVEL_OUTOF10: 6
PAINLEVEL_OUTOF10: 0

## 2025-06-05 ASSESSMENT — PAIN DESCRIPTION - FREQUENCY: FREQUENCY: INTERMITTENT

## 2025-06-05 ASSESSMENT — PAIN - FUNCTIONAL ASSESSMENT: PAIN_FUNCTIONAL_ASSESSMENT: ACTIVITIES ARE NOT PREVENTED

## 2025-06-05 ASSESSMENT — PAIN DESCRIPTION - ONSET: ONSET: GRADUAL

## 2025-06-05 ASSESSMENT — PAIN DESCRIPTION - DESCRIPTORS
DESCRIPTORS: THROBBING
DESCRIPTORS: ACHING

## 2025-06-05 ASSESSMENT — PAIN DESCRIPTION - PAIN TYPE: TYPE: ACUTE PAIN

## 2025-06-05 NOTE — PLAN OF CARE
Problem: Pain  Goal: Verbalizes/displays adequate comfort level or baseline comfort level  Outcome: Progressing  Flowsheets (Taken 6/5/2025 8225)  Verbalizes/displays adequate comfort level or baseline comfort level:   Encourage patient to monitor pain and request assistance   Assess pain using appropriate pain scale   Consider cultural and social influences on pain and pain management   Implement non-pharmacological measures as appropriate and evaluate response   Administer analgesics based on type and severity of pain and evaluate response   Notify Licensed Independent Practitioner if interventions unsuccessful or patient reports new pain  Note: Patient will be able to efficiently rate pain on appropriate pain scale.  Patient will be able to identify pain triggers and ways to manage pain using non pharmacologic interventions  Nurse will monitor patients pain level and provide the appropriate pain management interventions  Nurse will reassess patients pain level per protocol and evaluate response to intervention.      Problem: Skin/Tissue Integrity  Goal: Skin integrity remains intact  Description: 1.  Monitor for areas of redness and/or skin breakdown2.  Assess vascular access sites hourly3.  Every 4-6 hours minimum:  Change oxygen saturation probe site4.  Every 4-6 hours:  If on nasal continuous positive airway pressure, respiratory therapy assess nares and determine need for appliance change or resting period  Outcome: Progressing  Flowsheets (Taken 6/5/2025 5799)  Skin Integrity Remains Intact:   Monitor for areas of redness and/or skin breakdown   Assess vascular access sites hourly  Note: Nurse will Monitor the site of impaired tissue integrity at least once daily for color changes, redness, swelling, warmth, pain, or other signs of infection.  Nurse will Monitor the client’s skin care practices, noting the type of soap or other cleansing agents used, the temperature of the water, and the frequency of skin  cleansing.      Problem: Safety - Adult  Goal: Free from fall injury  Outcome: Progressing  Flowsheets (Taken 6/5/2025 1710)  Free From Fall Injury: Instruct family/caregiver on patient safety  Note: Patient will remain free from falls during hospital visit  Nurse will ensure patients bed is in lowest position, wheels locked and alarm on.  Nurse will ensure all fall safety protocols have been initiated including call light within reach, fall sign posted and yellow non slip socks are being worn.  Patient will demonstrate understanding of fall precautions and safety     Problem: Cardiovascular - Adult  Goal: Absence of cardiac dysrhythmias or at baseline  Outcome: Progressing  Flowsheets (Taken 6/5/2025 1710)  Absence of cardiac dysrhythmias or at baseline:   Assess for signs of decreased cardiac output   Monitor cardiac rate and rhythm   Administer antiarrhythmia medication and electrolyte replacement as ordered  Note: Ensure adequate hydration, Dehydration can contribute to hypotension.   Nurse will communicate between telemetry and provider changes to rhythm and/or rate.     Problem: Musculoskeletal - Adult  Goal: Return mobility to safest level of function  Outcome: Progressing  Flowsheets (Taken 6/5/2025 3478)  Return Mobility to Safest Level of Function:   Assess patient stability and activity tolerance for standing, transferring and ambulating with or without assistive devices   Assist with transfers and ambulation using safe patient handling equipment as needed   Ensure adequate protection for wounds/incisions during mobilization   Obtain physical therapy/occupational therapy consults as needed   Apply continuous passive motion per provider or physical therapy orders to increase flexion toward goal   Instruct patient/family in ordered activity level  Note: Patients ambulates with the use of a cane      Problem: Metabolic/Fluid and Electrolytes - Adult  Goal: Electrolytes maintained within normal

## 2025-06-05 NOTE — PROGRESS NOTES
TRANSFER - IN REPORT:    Verbal report received from Beth Simmons RN on Lauri Hoover  being received from ED for routine progression of patient care      Report consisted of patient's Situation, Background, Assessment and   Recommendations(SBAR).     Information from the following report(s) Nurse Handoff Report, ED SBAR, and MAR was reviewed with the receiving nurse.    Opportunity for questions and clarification was provided.      Assessment completed upon patient's arrival to unit and care assumed.

## 2025-06-05 NOTE — CONSULTS
Consult Note      Consult requested by: Baumgarten, Margaret Y, *    ADMIT DATE: 6/4/2025  CONSULT DATE: June 5, 2025                 Admission diagnosis: Hyponatremia   Reason for Nephrology Consultation: hyponatremia , TAN      Assessment:     1) AC on chronic hypotonic hypovolumic asymptomatic hyponatremia , etiology d/t dehydration in setting of GI losses/poor intake /poor solute intake with beer potomania , urine studies pending , may have underlying SIADH from his undiagnosed ?LLL mass but present decompensation of sodium appears to be hypovolumia and poor solute intake. He is moreover orthostatic and continues to have a lot of diarrhea   2) CKD3 ( baseline creat 1.4) , volume status appears to be depleted/prerenal   3) mild NAGMA -->  GI losses   4) LLL lung mass, last admit : CT abd/pelvis showed solid oval shaped 3 cm mass w/ small region of eccentric cavitation noted in same LLL region with previous consolidation.   7) Hypertension , presently orthostatic and hypotensive   8) GI symptoms , gastroenteritis      Recommendations:      1) strict I/o   2) NS @ 100cc/hrs   3) urine osm, urine sodium , serum osm, thyroids and cortisol ordered   4) check renal panel daily   4) add bicarb PO   5) continue midodrine 10 mg TID for BP support   6) renally dose meds for egfr < 30      Discussed plan with primary team     Please call with questions     Andrzej Mcleod MD [facility]  Cell [phone]  Pager: [phone]  Fax   [phone]            HPI: Lauri Hoover is a 75 y.o. male White (non-)  with PMHx of gout, HTN, CKD3 who presented with complaint of a few days of nausea, vomiting, diarrhea, dizzyness and near syncopy..  Has been having daily nausea and non bloody, non-bilious vomiting and bouts of watery diarrhea several times/day . Patient was feeling dizzy and had pre syncopal episode . Denies fevers, chills, dysuria, hematuria, hematochezia, CP, SOB, LE edema. He was noted to have persistant  06/05/25 1158   BP: 122/61 99/65  130/71   Pulse: 79 81  67   Resp: 18 17  16   Temp: 97.5 °F (36.4 °C) 97.7 °F (36.5 °C)  97.7 °F (36.5 °C)   TempSrc: Oral Oral  Oral   SpO2: 97% 97%  98%   Weight:       Height:   1.803 m (5' 10.98\")      [unfilled]  Admission weight: Weight - Scale: 77.6 kg (171 lb) (06/04/25 2243)      Intake/Output Summary (Last 24 hours) at 6/5/2025 1454  Last data filed at 6/5/2025 0927  Gross per 24 hour   Intake --   Output 525 ml   Net -525 ml       Patient is in no apparent distress.   HEENT: Head is normocephalic and atraumatic. Pupils are round, equal, reactive to light. Sclerae are anicteric. Oropharynx clear.   Neck: no cervical lymphadenopathy or thyromegaly.   Lungs: good air entry, clear to auscultation bilaterally. Trachea at the midline. Cardiovascular system: S1, S2, regular rate and rhythm. No murmurs, gallops or rubs. No jvd. Carotid upstroke 2 + bilaterally.   Abdomen: soft, non tender, non distended. Positive bowel sounds. No hepatosplenomegaly. No abdominal bruits.   Extremities: no clubbing, cyanosis or edema. Strong dorsalis pedis pulses. Brisk capillary refill on the toes bilaterally.   Integumentary: skin is grossly intact.   Neurologic: Alert, oriented time three. Cooperative and appropriate. No gross motor or sensory deficits.       Data Review:    Labs: Results:       Chemistry Recent Labs     06/04/25  2308 06/05/25  0630 06/05/25  1226   * 127* 126*   K 3.7 3.5 3.5   CL 90* 94* 95*   CO2 18* 20* 18*   BUN 21 21 20   GLOB 2.7  --   --    PHOS  --  3.3  --          CBC w/Diff Recent Labs     06/04/25  2308   WBC 6.1   RBC 2.51*   HGB 8.6*   HCT 23.9*            Iron/Ferritin No results for input(s): \"IRON\" in the last 72 hours.    Invalid input(s): \"TIBCCALC\"   PTH/VIT D No results for input(s): \"PTH\" in the last 72 hours.    Invalid input(s): \"VITD\"           Andrzej Mcleod MD  6/5/2025  2:56 PM      June 5, 2025

## 2025-06-05 NOTE — ED PROVIDER NOTES
EMERGENCY DEPARTMENT HISTORY AND PHYSICAL EXAM      Date: 6/4/2025  Patient Name: Lauri Hoover    History of Presenting Illness     Chief Complaint   Patient presents with    Dizziness       History (Context): Lauri Hoover is a 75 y.o. male  w/ pmhx of hyponatremia, seizures, osteomyelitis of the right foot, gout presents to the hospital today for lightheadedness associated with imbalance.  Patient states he notices when he gets up to walk he feels unsteady on his feet and has been stumbling but has not fallen.  States the symptoms have been going on for the last 2 days.  Also states he vomited yesterday and has had multiple episodes of diarrhea today and describes the diarrhea as loose watery stool.  States that he has an appointment coming up for a colonoscopy    Also complaining of of a intermittent headache, this is not the worst headache of his life.    Denies nausea, vomiting  Denies abdominal pain  Denies blood loss in the vomit or in the stool  Denies any other acute complaints at this time    PCP: Mima Ryan DO    Current Facility-Administered Medications   Medication Dose Route Frequency Provider Last Rate Last Admin    sodium chloride flush 0.9 % injection 5-40 mL  5-40 mL IntraVENous 2 times per day Nhan-Lonnie Larson, DO        sodium chloride flush 0.9 % injection 5-40 mL  5-40 mL IntraVENous PRN Nhan-Lonnie Larson, DO        0.9 % sodium chloride infusion   IntraVENous PRN Nhan-Lonnie Larson DO        potassium chloride (KLOR-CON M) extended release tablet 40 mEq  40 mEq Oral PRN Nhan-Lonnie Larson DO        Or    potassium bicarb-citric acid (EFFER-K) effervescent tablet 40 mEq  40 mEq Oral PRN Nhan-Lonnie Larson DO        Or    potassium chloride 10 mEq/100 mL IVPB (Peripheral Line)  10 mEq IntraVENous PRN Nhan-Lonnie Larson DO        magnesium sulfate 2000 mg in 50 mL IVPB premix  2,000 mg IntraVENous PRN  Lonnie Fields DO        enoxaparin (LOVENOX) injection 40 mg  40 mg SubCUTAneous Daily Lonnie Fields DO        ondansetron (ZOFRAN-ODT) disintegrating tablet 4 mg  4 mg Oral Q8H PRN Lonnie Fields DO        Or    ondansetron (ZOFRAN) injection 4 mg  4 mg IntraVENous Q6H PRN Lonnie Fields DO        acetaminophen (TYLENOL) tablet 650 mg  650 mg Oral Q6H PRN Lonnie Fields DO        Or    acetaminophen (TYLENOL) suppository 650 mg  650 mg Rectal Q6H PRN Lonnie Fields,         potassium chloride 10 mEq/100 mL IVPB (Peripheral Line)  10 mEq IntraVENous PRN Lonnie Fields DO        bisacodyl (DULCOLAX) suppository 10 mg  10 mg Rectal Daily PRN Lonnie Fields DO         Current Outpatient Medications   Medication Sig Dispense Refill    sodium chloride 1 g tablet Take 1 tablet by mouth 3 times daily (with meals) for 7 doses 90 tablet 3    midodrine (PROAMATINE) 10 MG tablet Take 1 tablet by mouth 3 times daily (with meals) 21 tablet 0    diclofenac sodium (VOLTAREN) 1 % GEL Apply 2 g topically 4 times daily as needed      [Paused] pregabalin (LYRICA) 75 MG capsule One po qd and take 2 capsules by mouth at bedtime 90 capsule 2    [Paused] allopurinol (ZYLOPRIM) 300 MG tablet Take 1 tablet by mouth daily      [Paused] lisinopril (PRINIVIL;ZESTRIL) 10 MG tablet Take 1 tablet by mouth daily      [Paused] amLODIPine (NORVASC) 5 MG tablet Take 1 tablet by mouth daily      [Paused] colchicine (COLCRYS) 0.6 MG tablet Take 1 tablet by mouth as needed      zolpidem (AMBIEN) 10 MG tablet Take 1 tablet by mouth.       No current facility-administered medications on file prior to encounter.     Current Outpatient Medications on File Prior to Encounter   Medication Sig Dispense Refill    sodium chloride 1 g tablet Take 1 tablet by mouth 3 times daily (with meals) for 7 doses 90 tablet 3    midodrine

## 2025-06-05 NOTE — H&P
HPI    This is a 75-year-old male with a past medical history significant for recurrent  hyponatremia, gout as well as hypertension.    Patient presented to the hospital reporting lightheadedness and significant dizziness.  This has been going on at least for the last 48 hours.  Patient stated that he has been very unsteady on his feet because of the symptoms.    He had prior history of hyponatremia.  He does take sodium tablets at home and he stated that he has been very compliant with his medical regimen.    He however reported excessive water drinking as well as excessive urination    He had no other complaint      Past Medical History:   Diagnosis Date    Arthritis     Hemochromatosis     Ill-defined condition     GOUT      Past Surgical History:   Procedure Laterality Date    ORTHOPEDIC SURGERY      LEFT HAND     No family history on file.   Social History     Tobacco Use    Smoking status: Never    Smokeless tobacco: Never   Substance Use Topics    Alcohol use: Yes     Alcohol/week: 6.0 standard drinks of alcohol       Prior to Admission medications    Medication Sig Start Date End Date Taking? Authorizing Provider   sodium chloride 1 g tablet Take 1 tablet by mouth 3 times daily (with meals) for 7 doses 5/7/25 5/10/25  Cecily Tan DO   midodrine (PROAMATINE) 10 MG tablet Take 1 tablet by mouth 3 times daily (with meals) 5/7/25   Cecily Tan DO   diclofenac sodium (VOLTAREN) 1 % GEL Apply 2 g topically 4 times daily as needed 7/3/24   Bao Davis MD   pregabalin (LYRICA) 75 MG capsule One po qd and take 2 capsules by mouth at bedtime 8/22/24 9/21/24  Malorie Ozuna MD   allopurinol (ZYLOPRIM) 300 MG tablet Take 1 tablet by mouth daily    Bao Davis MD   lisinopril (PRINIVIL;ZESTRIL) 10 MG tablet Take 1 tablet by mouth daily    Bao Davis MD   amLODIPine (NORVASC) 5 MG tablet Take 1 tablet by mouth daily 5/10/22   Automatic Reconciliation, Ar  hours  Monitor closely for any signs of CMP    Restrict fluid as indicated  Monitor vital sign closely      Continue home med unless otherwise contraindicated  DVT and GI prophylaxis on board    Nursing to encourage patient to be out of bed and in chair for each meal  Patient to ambulate as well as elevated as well    Could consider PT and OT evaluation     Additional management per clinical course    Signed By: Lonnie Fields DO     June 5, 2025

## 2025-06-05 NOTE — DISCHARGE INSTR - DIET

## 2025-06-05 NOTE — PROGRESS NOTES
[facility]      Patient transferred to PFM service. Full note to follow.        Nima Restrepo DO, PGY-2  [facility]  6/5/2025, 10:55 AM

## 2025-06-05 NOTE — CARE COORDINATION
06/05/25 0854   Readmission Assessment   Number of Days since last admission? 8-30 days   Previous Disposition Home with Home Health  ([facility])   Who is being Interviewed Patient   What was the patient's/caregiver's perception as to why they think they needed to return back to the hospital? Other (Comment)  (dizzy)   Did you visit your Primary Care Physician after you left the hospital, before you returned this time? Yes   Did you see a specialist, such as Cardiac, Pulmonary, Orthopedic Physician, etc. after you left the hospital? No   Who advised the patient to return to the hospital? Self-referral   Does the patient report anything that got in the way of taking their medications? No   In our efforts to provide the best possible care to you and others like you, can you think of anything that we could have done to help you after you left the hospital the first time, so that you might not have needed to return so soon? Other (Comment)  (no)     Bertha SALAMANCA, RN   Case Management   [phone]

## 2025-06-05 NOTE — PROGRESS NOTES
4 Eyes Skin Assessment     NAME:  Lauri Hoover  YOB: 1950  MEDICAL RECORD NUMBER:  [id number]    The patient is being assessed for  Admission    I agree that at least one RN has performed a thorough Head to Toe Skin Assessment on the patient. ALL assessment sites listed below have been assessed.      Areas assessed by both nurses:    Head, Face, Ears, Shoulders, Back, Chest, Arms, Elbows, Hands, Sacrum. Buttock, Coccyx, Ischium, Legs. Feet and Heels, and Under Medical Devices         Does the Patient have a Wound? Yes wound(s) were present on assessment. LDA wound assessment was Initiated and completed by RN       Levy Prevention initiated by RN: No  Wound Care Orders initiated by RN: No    Pressure Injury (Stage 3,4, Unstageable, DTI, NWPT, and Complex wounds) if present, place Wound referral order by RN under : No    New Ostomies, if present place, Ostomy referral order under : No     Nurse 1 eSignature: Electronically signed by Dolly Saravia RN on 6/5/25 at 8:51 AM EDT    **SHARE this note so that the co-signing nurse can place an eSignature**    Nurse 2 eSignature: {Esignature:[id number]}

## 2025-06-05 NOTE — CARE COORDINATION
06/05/25 0851   Service Assessment   Patient Orientation Alert and Oriented   Cognition Alert   History Provided By Patient   Primary Caregiver Self   Accompanied By/Relationship no one at bedside   Support Systems Children   Patient's Healthcare Decision Maker is: Named in Scanned ACP Document   PCP Verified by CM Yes   Last Visit to PCP Within last 3 months   Prior Functional Level Independent in ADLs/IADLs   Current Functional Level Assistance with the following:;Mobility   Can patient return to prior living arrangement Yes   Ability to make needs known: Good   Family able to assist with home care needs: Yes   Would you like for me to discuss the discharge plan with any other family members/significant others, and if so, who? No   Financial Resources Medicare   Community Resources None   CM/SW Referral Other (see comment)  (not applicable)   Social/Functional History   Lives With Alone   Type of Home House   Home Layout One level   Home Access Stairs to enter without rails   Entrance Stairs - Number of Steps 3   Bathroom Shower/Tub Tub/Shower unit   Bathroom Toilet Standard   Bathroom Equipment None   Bathroom Accessibility Accessible   Home Equipment Cane;Walker - Rolling   Receives Help From Family   Prior Level of Assist for ADLs Independent   Prior Level of Assist for Homemaking Independent   Homemaking Responsibilities Yes   Ambulation Assistance Independent   Prior Level of Assist for Transfers Independent   Active  Yes   Mode of Transportation Car   Education not applicable   Occupation Retired   Type of Occupation not applicable   Discharge Planning   Type of Residence House   Living Arrangements Alone   Current Services Prior To Admission Home Care   Potential Assistance Needed Home Care   DME Ordered? No   Potential Assistance Purchasing Medications No   Type of Home Care Services OT;PT;Nursing Services   Patient expects to be discharged to: House   Follow Up Appointment: Best Day/Time  Monday  AM   One/Two Story Residence One story   History of falls? 0   Services At/After Discharge   Transition of Care Consult (CM Consult) Home Health;Discharge Planning   Internal Home Health Yes   Services At/After Discharge Home Health   [place] Resource Information Provided? No   Mode of Transport at Discharge Other (see comment)  (Daughter to transport)   Hospital Transport Time of Discharge   (to be determined on day of discharge)   Confirm Follow Up Transport Family   Condition of Participation: Discharge Planning   The Plan for Transition of Care is related to the following treatment goals: Disposition is home with available resources   The Patient and/or Patient Representative was provided with a Choice of Provider? Patient   The Patient and/Or Patient Representative agree with the Discharge Plan? Yes   Freedom of Choice list was provided with basic dialogue that supports the patient's individualized plan of care/goals, treatment preferences, and shares the quality data associated with the providers?  Yes     Spoke to patient at bedside, HIPAA verified. Completed initial assessment and verified demographics.     Bertha SALAMANCA, RN   Case Management   [phone]

## 2025-06-05 NOTE — PROGRESS NOTES
Comprehensive Nutrition Assessment    Type and Reason for Visit:  Initial, Positive nutrition screen    Nutrition Recommendations/Plan:   Continue current diet as tolerated.  Order Ensure Plus High Protein (each provides 350 kcal, 20g protein) BID and Gelatein 20 (each provides 80 kcal, 20g protein) once daily  Recommend/order virt-caps supplementation daily, Daily wts.  Continue to monitor tolerance of PO, compliance of oral supplements, weight, labs, and plan of care during admission.     Malnutrition Assessment:  Malnutrition Status:  Severe malnutrition (06/05/25 1109)    Context:  Chronic Illness     Findings of the 6 clinical characteristics of malnutrition:  Energy Intake:  Mild decrease in energy intake (<75% x4 days pta)  Weight Loss:  Greater than 20% over 1 year (-21.6% x9 months)     Body Fat Loss:  Mild body fat loss Buccal region, Triceps, Orbital   Muscle Mass Loss:  Severe muscle mass loss Temples (temporalis), Clavicles (pectoralis & deltoids), Scapula (trapezius), Hand (interosseous)  Fluid Accumulation:  No fluid accumulation     Strength:  Not Performed    Nutrition History and Allergies:      Past Medical History:   Diagnosis Date    Arthritis     Hemochromatosis     Ill-defined condition     GOUT     PTA: Per pt decreased appetite x4 days pta; pt endorses having gradually declining appetite overall and having a difficult time eating more than 1-2 times per day. Pt reports drinking boost which fills him for most of the day. Pt reports  lbs with -40 lbs recent/unintentional weight loss in the past x4 months.     Weight Hx: 192 lbs Wt change: -47 lb (-21.6%) x 9 months - significant. Wt 12  Wt Readings from Last 10 Encounters:   06/04/25 77.6 kg (171 lb)   05/19/25 77.6 kg (171 lb)   05/04/25 78 kg (172 lb)   01/10/25 87.1 kg (192 lb)   09/19/24 98.9 kg (218 lb)   08/22/24 99.7 kg (219 lb 12.8 oz)   04/18/24 103.6 kg (228 lb 6.4 oz)   03/11/24 100.7 kg (222 lb)   02/07/24 99.8 kg (220

## 2025-06-05 NOTE — PROGRESS NOTES
[facility]  DAILY PROGRESS NOTE      Patient:    Lauri Hoover , 75 y.o. male   MRN:  [id number]  Room/Bed:  466/01  Admission Date:   6/4/2025  Code status:  Full Code    Reason for Admission: Hyponatremia  Barriers to Discharge: Electrolyte abnormalities  Anticipated Date of Discharge: 6/7    Lauri Hoover is a 75-year-old male with past medical history of gout, hypertension, CKD and hyponatremia who presented to the ED overnight 6/5 for lightheadedness and dizziness x 48 hours.  In the ED he was vitally stable, one low BP with a MAP of 64.    Lab work: CBC with hemoglobin 8.6 (previous 11-12 about 1 month ago).  Sodium 124, creatinine 1.6.3, alk phos 259, troponin is 37.2 (previously 28-57) TSH, free T4, Phos  He was given a bolus 1 L sodium chloride.  He was admitted to hospitalist service, his fluids were restricted with plan for BMP every 6 hours.      SUBJECTIVE:   Events of the last 24 hours:  No acute events overnight.  Patient seen at beside. Complains of general weakness.     ROS (positive findings are in BOLD; negative findings are in regular font)  Constitutional: fevers, chills, appetite changes, weight changes, fatigue  HEENT: changes in vision, changes in hearing, sore throat, dysphagia  Cardiovascular: chest pain, palpitations, PND, orthopnea, edema  Pulmonary: SOB, cough, sputum production, wheezing, chest tightness  Gastrointestinal: abdominal pain, nausea/vomiting, diarrhea, constipation, melena, hematochezia  Genitourinary: dysuria, hesitation, dribbling, urgency, hematuria  Musculoskeletal: arthralgias, myalgias  Skin: rash, itching  Neurological: sensory changes, motor changes, headache  Psychiatric: mood changes  Endocrine: heat/cold intolerance  Heme: easy bruising/easy bleeding, LAD    CURRENT INPATIENT MEDICATIONS:  Current Facility-Administered Medications   Medication Dose Route Frequency Provider Last Rate Last Admin    sodium chloride flush 0.9 % injection 5-40 mL

## 2025-06-05 NOTE — PROGRESS NOTES
*ATTENTION:  This note has been created by a medical student for educational purposes only.  Please do not refer to the content of this note for clinical decision-making, billing, or other purposes.  Please see attending physician’s note to obtain clinical information on this patient.*       [facility] Medicine  DAILY PROGRESS NOTE      Patient:    Lauri Hoover , 75 y.o. male   MRN:  [id number]  Room/Bed:  [facility]  Admission Date:   6/4/2025  Code status:  Full Code    Reason for Admission: Hyponatremia   Barriers to Discharge: ***      ASSESSMENT AND PLAN:     Hyponatremia   - Prior history of hyponatremia  - Home med sodium tablets, but patient stopped taking due to heartburn and indigestion     Plan:  - Fluid restriction   - Check serum and urine osmolality     Severe protein-calorie malnutrition & weight loss   - Patient has vomiting & diarrhea for several days, with reduced appetite & solute intake      Plan:  - Adult oral nutrition supplement & standard high calorie/high protein oral supplement  - Monitor patient weight    Hypotension  - Dizziness at home  - Patient has history of hypertension, which he takes lisinopril and amlodipine for   Plan:  - Midodrine if BP <110/70   - Hold lisinopril, amlodipine     Stage I acute kidney injury (TAN)  - Bun/Cre raio 14   - Creatinine 1.58  - Possibly prerenal, due to hypovolemia   Plan:  - Follow up with nephrologist outpatient   - Hold allopurinol and colchicine     Anemia   - Hemoglobin 8.6 today, anemia ongoing since last month    - 5/2 chest CT: Solitary lung mass in the left lower lobe abutting the lateral pleural surface and the major fissure. Measurement 2.8 x 2.3 x 2.3 cm. The borders are smooth.The internal component is homogeneous low density but greater than water  density. Eccentric along the medial border is a smoothly marginated area of gas. There are now several additional tiny gas droplets demonstrated along its ventral ventral aspect. Band of  Rate 44 (L) >60 ml/min/1.73m2    Calcium 8.8 8.5 - 10.1 MG/DL    Total Bilirubin 1.0 0.2 - 1.0 MG/DL    ALT 28 10 - 50 U/L    AST 69 (H) 10 - 38 U/L    Alk Phosphatase 259 (H) 45 - 117 U/L    Total Protein 6.2 (L) 6.4 - 8.2 g/dL    Albumin 3.5 3.4 - 5.0 g/dL    Globulin 2.7 2.0 - 4.0 g/dL    Albumin/Globulin Ratio 1.3 0.8 - 1.7     Troponin    Collection Time: 06/04/25 11:08 PM   Result Value Ref Range    Troponin T 37.2 (H) 0 - 22 ng/L   Basic Metabolic Panel    Collection Time: 06/05/25  6:30 AM   Result Value Ref Range    Sodium 127 (L) 136 - 145 mmol/L    Potassium 3.5 3.5 - 5.5 mmol/L    Chloride 94 (L) 98 - 107 mmol/L    CO2 20 (L) 21 - 32 mmol/L    Anion Gap 13 3.0 - 18.0 mmol/L    Glucose 93 74 - 108 mg/dL    BUN 21 6 - 23 MG/DL    Creatinine 1.58 (H) 0.6 - 1.3 MG/DL    BUN/Creatinine Ratio 14 12 - 20      Est, Glom Filt Rate 46 (L) >60 ml/min/1.73m2    Calcium 8.4 (L) 8.5 - 10.1 MG/DL   Phosphorus    Collection Time: 06/05/25  6:30 AM   Result Value Ref Range    Phosphorus 3.3 2.5 - 4.9 MG/DL   TSH + Free T4 Panel    Collection Time: 06/05/25  6:30 AM   Result Value Ref Range    TSH, 3rd Generation 2.290 0.27 - 4.20 uIU/mL    T4 Free 1.0 0.9 - 1.7 NG/DL       IMAGING AND PROCEDURES (LAST 24 HOURS)  No results found.     ================================================================  Further management for Mr. Lauri Hoover will be discussed on rounds with my attending.      Manoj Sierra, MS3  [facility]  June 5, 2025 12:50 PM

## 2025-06-05 NOTE — ED TRIAGE NOTES
Pt arrives to triage reporting lightheadedness & dizziness over the last two days, reports stumbling in the house   Pt has hx of hyponatremia, reports taking one sodium tablet yesterday  Pt reports he has no appetite   Pt also reporting HA

## 2025-06-06 LAB
ANION GAP SERPL CALC-SCNC: 12 MMOL/L (ref 3–18)
ANION GAP SERPL CALC-SCNC: 14 MMOL/L (ref 3–18)
BUN SERPL-MCNC: 13 MG/DL (ref 6–23)
BUN SERPL-MCNC: 17 MG/DL (ref 6–23)
BUN/CREAT SERPL: 11 (ref 12–20)
BUN/CREAT SERPL: 15 (ref 12–20)
CALCIUM SERPL-MCNC: 8.5 MG/DL (ref 8.5–10.1)
CALCIUM SERPL-MCNC: 8.9 MG/DL (ref 8.5–10.1)
CHLORIDE SERPL-SCNC: 100 MMOL/L (ref 98–107)
CHLORIDE SERPL-SCNC: 99 MMOL/L (ref 98–107)
CO2 SERPL-SCNC: 17 MMOL/L (ref 21–32)
CO2 SERPL-SCNC: 19 MMOL/L (ref 21–32)
CORTIS SERPL-MCNC: 13.5 UG/DL
CREAT SERPL-MCNC: 1.18 MG/DL (ref 0.6–1.3)
CREAT SERPL-MCNC: 1.19 MG/DL (ref 0.6–1.3)
GLUCOSE SERPL-MCNC: 95 MG/DL (ref 74–108)
GLUCOSE SERPL-MCNC: 96 MG/DL (ref 74–108)
MAGNESIUM SERPL-MCNC: 1.6 MG/DL (ref 1.6–2.6)
MAGNESIUM SERPL-MCNC: 1.9 MG/DL (ref 1.6–2.6)
PHOSPHATE SERPL-MCNC: 2.1 MG/DL (ref 2.5–4.9)
PHOSPHATE SERPL-MCNC: 2.7 MG/DL (ref 2.5–4.9)
POTASSIUM SERPL-SCNC: 3.7 MMOL/L (ref 3.5–5.5)
POTASSIUM SERPL-SCNC: 3.8 MMOL/L (ref 3.5–5.5)
SODIUM SERPL-SCNC: 129 MMOL/L (ref 136–145)
SODIUM SERPL-SCNC: 130 MMOL/L (ref 136–145)

## 2025-06-06 PROCEDURE — 84100 ASSAY OF PHOSPHORUS: CPT

## 2025-06-06 PROCEDURE — 6370000000 HC RX 637 (ALT 250 FOR IP)

## 2025-06-06 PROCEDURE — 97116 GAIT TRAINING THERAPY: CPT

## 2025-06-06 PROCEDURE — 83735 ASSAY OF MAGNESIUM: CPT

## 2025-06-06 PROCEDURE — 6370000000 HC RX 637 (ALT 250 FOR IP): Performed by: FAMILY MEDICINE

## 2025-06-06 PROCEDURE — 94761 N-INVAS EAR/PLS OXIMETRY MLT: CPT

## 2025-06-06 PROCEDURE — 2500000003 HC RX 250 WO HCPCS: Performed by: INTERNAL MEDICINE

## 2025-06-06 PROCEDURE — 80048 BASIC METABOLIC PNL TOTAL CA: CPT

## 2025-06-06 PROCEDURE — 6360000002 HC RX W HCPCS: Performed by: INTERNAL MEDICINE

## 2025-06-06 PROCEDURE — 2580000003 HC RX 258: Performed by: INTERNAL MEDICINE

## 2025-06-06 PROCEDURE — 97162 PT EVAL MOD COMPLEX 30 MIN: CPT

## 2025-06-06 PROCEDURE — 36415 COLL VENOUS BLD VENIPUNCTURE: CPT

## 2025-06-06 PROCEDURE — 97535 SELF CARE MNGMENT TRAINING: CPT

## 2025-06-06 PROCEDURE — 1100000003 HC PRIVATE W/ TELEMETRY

## 2025-06-06 PROCEDURE — 97165 OT EVAL LOW COMPLEX 30 MIN: CPT

## 2025-06-06 PROCEDURE — 6370000000 HC RX 637 (ALT 250 FOR IP): Performed by: INTERNAL MEDICINE

## 2025-06-06 RX ADMIN — SODIUM BICARBONATE 650 MG: 650 TABLET ORAL at 20:05

## 2025-06-06 RX ADMIN — ZOLPIDEM TARTRATE 10 MG: 5 TABLET ORAL at 20:05

## 2025-06-06 RX ADMIN — SODIUM CHLORIDE, PRESERVATIVE FREE 10 ML: 5 INJECTION INTRAVENOUS at 20:09

## 2025-06-06 RX ADMIN — ENOXAPARIN SODIUM 40 MG: 100 INJECTION SUBCUTANEOUS at 09:24

## 2025-06-06 RX ADMIN — SODIUM BICARBONATE 650 MG: 650 TABLET ORAL at 16:33

## 2025-06-06 RX ADMIN — SODIUM CHLORIDE, PRESERVATIVE FREE 10 ML: 5 INJECTION INTRAVENOUS at 09:24

## 2025-06-06 RX ADMIN — Medication 1 MG: at 09:23

## 2025-06-06 RX ADMIN — SODIUM CHLORIDE: 0.9 INJECTION, SOLUTION INTRAVENOUS at 11:43

## 2025-06-06 RX ADMIN — SODIUM BICARBONATE 650 MG: 650 TABLET ORAL at 09:23

## 2025-06-06 ASSESSMENT — PAIN SCALES - GENERAL
PAINLEVEL_OUTOF10: 0

## 2025-06-06 NOTE — PROGRESS NOTES
In Patient Progress note      Admit Date: 6/4/2025      Impression:     1) AC on chronic hypotonic hypovolumic asymptomatic hyponatremia , etiology d/t dehydration in setting of GI losses/poor intake /poor solute intake with beer potomania ,patient  may have underlying SIADH from his undiagnosed ?LLL mass but present decompensation of sodium appears to be hypovolumia and poor solute intake. He is moreover orthostatic and continues to have a lot of diarrhea ---> sodium improving   2) CKD3 ( baseline creat 1.4) , volume status appears to be depleted/prerenal   3) mild NAGMA -->  GI losses   4) LLL lung mass, last admit : CT abd/pelvis showed solid oval shaped 3 cm mass w/ small region of eccentric cavitation noted in same LLL region with previous consolidation.   7) Hypertension , presently orthostatic and hypotensive   8) GI symptoms , gastroenteritis --> improving      Recommendations:      1) strict I/o   2) decrease NS @ 50 cc/hrs   3) increase solute intake , protein shakes BID   4) check renal panel daily   4) bicarb PO for GIlosses  5) continue midodrine 10 mg TID for BP support      Discussed plan with primary team     Please call with questions     Andrzej Mcleod MD FASN  Cell [phone]  Pager: [phone]  Fax   [phone]   Subjective:     - feels betyter, no dizzyness  - respiratory - stable  - hemodynamics - stable, no pressrs  - UOP-ok  - Nutrition -ok    Objective:     /72   Pulse 77   Temp 97.9 °F (36.6 °C) (Oral)   Resp 20   Ht 1.803 m (5' 10.98\")   Wt 77.6 kg (171 lb)   SpO2 98%   BMI 23.86 kg/m²       Intake/Output Summary (Last 24 hours) at 6/6/2025 1210  Last data filed at 6/6/2025 0831  Gross per 24 hour   Intake 1080 ml   Output 1550 ml   Net -470 ml       Physical Exam:     Gen NAD  HENT mmm  RS AEBE   CVS s1s2 wnl no JVD  Ext edema +  Skin no rashes  Neuro oriented X 3     Data Review:    Recent Labs     06/04/25  2308   WBC 6.1   RBC 2.51*   HCT 23.9*   MCV 95.2   MCH

## 2025-06-06 NOTE — PROGRESS NOTES
RENAL PROGRESS NOTE        Lauri Hoover         Assessment  - Acute on chronic hypotonic hypovolumic asymptomatic hyponatremia , etiology d/t dehydration in setting of GI losses/poor intake /poor solute intake with beer potomania ,less likely SIADH from lung mass taking low urine Na and osmolality   - CKD stage 3   - Lung mass   - Hx of HTN now with orthostatic hypotension     Plan   - Hyponatremia has improved with IVF and now up to 130 , off IVF and PO intake is better   - Continue with protein shakes   - BP has improved , hold Midodrine and follow   - Check labs daily   - If D/C can follow as OP in 3-4 weeks                                                                                                                                    Subjective:  Patient complaints off: No complaints.   No SOB/CP/N/V.      Patient Active Problem List   Diagnosis    HNP (herniated nucleus pulposus), lumbar    Neuropathy    Neuritis    AMS (altered mental status)    Seizure (HCC)    Infected abrasion of third toe    Acute hematogenous osteomyelitis of right foot (HCC)    Right groin pain    Right lumbar radiculopathy    Lumbar spondylosis    Hyponatremia    Severe protein-calorie malnutrition    Hemochromatosis       Current Facility-Administered Medications   Medication Dose Route Frequency Provider Last Rate Last Admin    sodium chloride flush 0.9 % injection 5-40 mL  5-40 mL IntraVENous 2 times per day Nhan-Lonnie Larson, DO   10 mL at 06/06/25 0924    sodium chloride flush 0.9 % injection 5-40 mL  5-40 mL IntraVENous PRN Nhan-Lonnie Larson, DO        0.9 % sodium chloride infusion   IntraVENous PRN Nhan-Lonnie Larson,  mL/hr at 06/06/25 1143 New Bag at 06/06/25 1143    potassium chloride (KLOR-CON M) extended release tablet 40 mEq  40 mEq Oral PRN Lonnie Fields DO        Or    potassium bicarb-citric acid (EFFER-K) effervescent tablet 40 mEq  40 mEq Oral PRN

## 2025-06-06 NOTE — PLAN OF CARE
Problem: Pain  Goal: Verbalizes/displays adequate comfort level or baseline comfort level  6/6/2025 0124 by Sahhida العلي RN  Outcome: Progressing  Note: PRN analgesic given per MD order and upon pt request. Will reassess pt pain level using the appropriate pain scale.     Problem: Skin/Tissue Integrity  Goal: Skin integrity remains intact  Description: 1.  Monitor for areas of redness and/or skin breakdown2.  Assess vascular access sites hourly3.  Every 4-6 hours minimum:  Change oxygen saturation probe site4.  Every 4-6 hours:  If on nasal continuous positive airway pressure, respiratory therapy assess nares and determine need for appliance change or resting period  6/6/2025 0124 by Shahida العلي RN  Outcome: Progressing  Note: Skin assessed for skin integrity and skin breakdown.     Problem: Safety - Adult  Goal: Free from fall injury  6/6/2025 0124 by Shahida العلي RN  Outcome: Progressing  Note: Safety measures in place per protocol. Call bell within reach, bed in the lowest position, and bed alarm in place.     Problem: Cardiovascular - Adult  Goal: Absence of cardiac dysrhythmias or at baseline  6/6/2025 0124 by Shahida العلي RN  Outcome: Progressing  Note: Heart rate and B/P monitored for signs of cardiac distress.       Problem: Musculoskeletal - Adult  Goal: Return mobility to safest level of function  6/6/2025 0124 by Shahida العلي RN  Outcome: Progressing  Flowsheets (Taken 6/5/2025 2100)  Return Mobility to Safest Level of Function: Assist with transfers and ambulation using safe patient handling equipment as needed     Problem: Metabolic/Fluid and Electrolytes - Adult  Goal: Electrolytes maintained within normal limits  6/6/2025 0124 by Shahida العلي RN  Outcome: Progressing  Note: Lab results monitored for electrolyte imbalances. Will replace electrolytes per protocol if levels are abnormal.

## 2025-06-06 NOTE — PROGRESS NOTES
[facility]  POST-ROUNDING NOTE    Assessment & Plan:  -appreciate nephrology recommendations   -NS decreased to 50cc/hr  -plan to recheck Na tonight and in AM, if improved and patient remains asymptomatic, will plan to discharge tomorrow  -DME and Home PT ordered    The above patient and plan were discussed with my supervising physician.     See daily progress note for full assessment/plan.      Trinity Cardoza MD, PGY-1  [facility]  6/6/2025, 12:49 PM

## 2025-06-06 NOTE — PROGRESS NOTES
[facility] Medicine  DAILY PROGRESS NOTE      Patient:    Lauri Hoover , 75 y.o. male   MRN:  [id number]  Room/Bed:  466/01  Admission Date:   6/4/2025  Code status:  Full Code    Reason for Admission: Hyponatremia  Barriers to Discharge: Electrolyte abnormalities  Anticipated Date of Discharge: 6/7    Lauri Hoover is a 75-year-old male with past medical history of gout, hypertension, CKD and hyponatremia who presented 6/5 for lightheadedness and dizziness x 48 hours.    SUBJECTIVE:   Events of the last 24 hours:  No acute events overnight.  Patient seen at beside. Reports feeling well, weakness improved. Did not have issues taking sodium bicarb tabs yesterday. No N/V, diarrhea. Last BM 2 days ago, typically goes daily.    ROS per HPI    CURRENT INPATIENT MEDICATIONS:  Current Facility-Administered Medications   Medication Dose Route Frequency Provider Last Rate Last Admin    sodium chloride flush 0.9 % injection 5-40 mL  5-40 mL IntraVENous 2 times per day Nhan-Lonnie Larson, DO   10 mL at 06/05/25 2141    sodium chloride flush 0.9 % injection 5-40 mL  5-40 mL IntraVENous PRN Nhan-SchlitaidrLonnie, DO        0.9 % sodium chloride infusion   IntraVENous PRN Nhan-Schlitaidr, Lonnie, DO        potassium chloride (KLOR-CON M) extended release tablet 40 mEq  40 mEq Oral PRN Nhan-Lonnie Larson, DO        Or    potassium bicarb-citric acid (EFFER-K) effervescent tablet 40 mEq  40 mEq Oral PRN Nhan-GerardoidrLonnie, DO        Or    potassium chloride 10 mEq/100 mL IVPB (Peripheral Line)  10 mEq IntraVENous PRN [place]-GerardoidrLonnie, DO        magnesium sulfate 2000 mg in 50 mL IVPB premix  2,000 mg IntraVENous PRN Nhan-GerardoidrLonnie, DO        enoxaparin (LOVENOX) injection 40 mg  40 mg SubCUTAneous Daily Nhan-Lonnie Larson, DO   40 mg at 06/05/25 0959    acetaminophen (TYLENOL) tablet 650 mg  650 mg Oral Q6H PRN Diamond

## 2025-06-06 NOTE — PROGRESS NOTES
Physical Therapy  PHYSICAL THERAPY EVALUATION/DISCHARGE    Patient: Lauri Hoover (75 y.o. male)  Date: 6/6/2025  Primary Diagnosis: Hyponatremia [E87.1]       Precautions: Fall Risk,  ,  ,  ,  ,  ,  ,    PLOF: Lives alone in a 1 story home with 3 KENYATTA and B rails. Mod I SPC prior to admission.     ASSESSMENT AND RECOMMENDATIONS:  Pt cleared by nursing. Pt received in bed in NAD, educated on PT role and evaluation process and agreeable. Pt supervision to EOB, good seated balance. 5/5 BLE for knee extension, hip flexion, and ankle DF/PF; reports intact sensation. Sit to stand supervision, ambulate with SPC within room and into hallway, increase postural sway. Returns to room and gait trial with RW, improved balance and minimal postural sway, no LOB noted. Returns back to EOB and request to sit up and bush his teeth, provided set up . Call bell and all needs left within reach. Pt is at functional baseline and skilled acute care PT is not indicated at this time, will sign off.       Patient does not require further skilled physical therapy intervention at this level of care.    Further Equipment Recommendations for Discharge: rolling walker    AMPA: AM-PAC Inpatient Mobility Raw Score : 21      Current research shows that an AM-PAC score of 18 (14 without stairs) or greater is associated with a discharge to the patient's home setting.    This AMPAC score should be considered in conjunction with interdisciplinary team recommendations to determine the most appropriate discharge setting. Patient's social support, diagnosis, medical stability, and prior level of function should also be taken into consideration.     SUBJECTIVE:   Patient stated “I need a walker.”    OBJECTIVE DATA SUMMARY:     Past Medical History:   Diagnosis Date    Arthritis     Hemochromatosis     Ill-defined condition     GOUT     Past Surgical History:   Procedure Laterality Date    ORTHOPEDIC SURGERY      LEFT HAND       Home  Support: Widened  Speed/Anne: Slow  Step Length: Right shortened;Left shortened  Stance: Right decreased  Gait Abnormalities: Decreased step clearance                Pain:  Intensity Pre-treatment: 0/10   Intensity Post-treatment: 0/10  Scale: Numeric Rating Scale      Activity Tolerance:   Activity Tolerance: Patient tolerated evaluation without incident  Please refer to the flowsheet for vital signs taken during this treatment.    After treatment:   []         Patient left in no apparent distress sitting up in chair  [x]         Patient left in no apparent distress in bed  [x]         Call bell left within reach  [x]         Nursing notified  []         Caregiver present  [x]         Bed/chair alarm activated  []         SCDs applied    COMMUNICATION/EDUCATION:   Patient Education  Education Given To: Patient  Education Provided: Role of Therapy;Plan of Care;Transfer Training;Mobility Training;Fall Prevention Strategies;Equipment;Energy Conservation  Education Method: Demonstration;Verbal;Teach Back  Barriers to Learning: None  Education Outcome: Verbalized understanding;Demonstrated understanding;Continued education needed    Thank you for this referral.  Margot Holm, PT  Minutes: 24      Eval Complexity: Decision Making: Medium Complexity

## 2025-06-06 NOTE — PLAN OF CARE
Problem: Skin/Tissue Integrity  Goal: Skin integrity remains intact  Description: 1.  Monitor for areas of redness and/or skin breakdown2.  Assess vascular access sites hourly3.  Every 4-6 hours minimum:  Change oxygen saturation probe site4.  Every 4-6 hours:  If on nasal continuous positive airway pressure, respiratory therapy assess nares and determine need for appliance change or resting period  Outcome: Progressing  Flowsheets (Taken 6/5/2025 0740)  Skin Integrity Remains Intact:   Monitor for areas of redness and/or skin breakdown   Assess vascular access sites hourly  Note: atient will remain free of new areas of skin breakdown.   Patient will be able to verbalize interventions for maintaining adequate skin integrity.  Patient will be able to verbalize the importance of turning, adequate nutrition and using call bell for periods of incontinence  Nurse will educate patient on frequently turning and repositioning in preventing skin breakdown     Problem: Safety - Adult  Goal: Free from fall injury  Outcome: Progressing  Flowsheets (Taken 6/5/2025 1710)  Free From Fall Injury: Instruct family/caregiver on patient safety  Note: Patient will remain free from falls during hospital visit  Nurse will ensure patients bed is in lowest position, wheels locked and alarm on.  Nurse will ensure all fall safety protocols have been initiated including call light within reach, fall sign posted and yellow non slip socks are being worn.  Patient will demonstrate understanding of fall precautions and safety    Patient ambulated with PT today, not steady on cane recommend walker for future ambulation     Problem: Cardiovascular - Adult  Goal: Absence of cardiac dysrhythmias or at baseline  Outcome: Progressing  Flowsheets (Taken 6/5/2025 1710)  Absence of cardiac dysrhythmias or at baseline:   Assess for signs of decreased cardiac output   Monitor cardiac rate and rhythm   Administer antiarrhythmia medication and electrolyte  replacement as ordered  Note: Ensure adequate hydration, Dehydration can contribute to hypotension.   Nurse will communicate between telemetry and provider changes to rhythm and/or rate.     Problem: Metabolic/Fluid and Electrolytes - Adult  Goal: Electrolytes maintained within normal limits  Outcome: Progressing  Flowsheets (Taken 6/5/2025 6043)  Electrolytes maintained within normal limits:   Monitor labs and assess patient for signs and symptoms of electrolyte imbalances   Administer electrolyte replacement as ordered   Monitor response to electrolyte replacements, including repeat lab results as appropriate  Note: Nurse will monitor patients lab results throughout shift and initiate replacement protocol for any abnormal labs.  Nurse will monitor patients response to replacement  Patient will have understanding of electrolyte imbalance signs and symptoms

## 2025-06-06 NOTE — CONSULTS
Room 466: Focused wound assess    Right foot 2nd mth, neuropathic ulcer, POA. Pt uses honey & silicone at home.       Right heel intact.  Left heel is also intact.  Michelle NAZARION, RN, CWOCN, CFCN

## 2025-06-06 NOTE — PROGRESS NOTES
OCCUPATIONAL THERAPY EVALUATION/DISCHARGE    Patient: Lauri Hoover (75 y.o. male)  Date: 6/6/2025  Primary Diagnosis: Hyponatremia [E87.1]       Precautions: Fall Risk  PLOF: Pt lives alone, reports being Mod Ind for ADLs and functional mobility. Pt uses cane prn    ASSESSMENT AND RECOMMENDATIONS:    Based on the objective data below, patient presents with ability to perform basic ADLs and functional transfers at/near baseline level of function. Pt performed/simulated UB/LB ADLs with Mod Ind for seated and supervision for std aspects for safety. Pt educated on mult energy conservation techniques to utilize in home environment and while at the hospital, including pacing and deep breathing to prevent SOB and fatigue, and increase activity tolerance and safety w/ADLs and functional mobility, pt verbalized understanding. Maximum therapeutic gains met at current level of care and patient will be discharged from occupational therapy at this time.    Further Equipment Recommendations for Discharge: shower chair and rolling walker    AMPAC: AM-PAC Inpatient Daily Activity Raw Score: 21    Current research shows that an AM-PAC score of 18 or greater is associated with a discharge to the patient's home setting.    This AMPAC score should be considered in conjunction with interdisciplinary team recommendations to determine the most appropriate discharge setting. Patient's social support, diagnosis, medical stability, and prior level of function should also be taken into consideration.     SUBJECTIVE:   Patient stated “I take my time.”    OBJECTIVE DATA SUMMARY:     Past Medical History:   Diagnosis Date    Arthritis     Hemochromatosis     Ill-defined condition     GOUT     Past Surgical History:   Procedure Laterality Date    ORTHOPEDIC SURGERY      LEFT HAND       Home Situation:   Social/Functional History  Lives With: Alone  Type of Home: House  Home Layout: One level  Home Access: Stairs to enter without

## 2025-06-07 VITALS
OXYGEN SATURATION: 97 % | WEIGHT: 171 LBS | BODY MASS INDEX: 23.94 KG/M2 | TEMPERATURE: 98.2 F | DIASTOLIC BLOOD PRESSURE: 68 MMHG | SYSTOLIC BLOOD PRESSURE: 134 MMHG | HEIGHT: 71 IN | HEART RATE: 78 BPM | RESPIRATION RATE: 18 BRPM

## 2025-06-07 PROBLEM — N17.9 AKI (ACUTE KIDNEY INJURY): Status: RESOLVED | Noted: 2025-06-07 | Resolved: 2025-06-07

## 2025-06-07 PROBLEM — D64.9 ACUTE ON CHRONIC ANEMIA: Status: ACTIVE | Noted: 2025-06-07

## 2025-06-07 PROBLEM — N17.9 AKI (ACUTE KIDNEY INJURY): Status: ACTIVE | Noted: 2025-06-07

## 2025-06-07 LAB
ANION GAP SERPL CALC-SCNC: 11 MMOL/L (ref 3–18)
BASOPHILS # BLD: 0.02 K/UL (ref 0–0.1)
BASOPHILS # BLD: 0.02 K/UL (ref 0–0.1)
BASOPHILS NFR BLD: 0.3 % (ref 0–2)
BASOPHILS NFR BLD: 0.3 % (ref 0–2)
BUN SERPL-MCNC: 11 MG/DL (ref 6–23)
BUN/CREAT SERPL: 11 (ref 12–20)
CALCIUM SERPL-MCNC: 8.7 MG/DL (ref 8.5–10.1)
CHLORIDE SERPL-SCNC: 99 MMOL/L (ref 98–107)
CO2 SERPL-SCNC: 20 MMOL/L (ref 21–32)
CREAT SERPL-MCNC: 1.04 MG/DL (ref 0.6–1.3)
DIFFERENTIAL METHOD BLD: ABNORMAL
DIFFERENTIAL METHOD BLD: ABNORMAL
EOSINOPHIL # BLD: 0.03 K/UL (ref 0–0.4)
EOSINOPHIL # BLD: 0.03 K/UL (ref 0–0.4)
EOSINOPHIL NFR BLD: 0.5 % (ref 0–5)
EOSINOPHIL NFR BLD: 0.5 % (ref 0–5)
ERYTHROCYTE [DISTWIDTH] IN BLOOD BY AUTOMATED COUNT: 16.4 % (ref 11.6–14.5)
ERYTHROCYTE [DISTWIDTH] IN BLOOD BY AUTOMATED COUNT: 16.5 % (ref 11.6–14.5)
FERRITIN SERPL-MCNC: 613 NG/ML (ref 13–400)
FOLATE SERPL-MCNC: 10.1 NG/ML (ref 4.6–34.8)
GLUCOSE SERPL-MCNC: 87 MG/DL (ref 74–108)
HCT VFR BLD AUTO: 20.8 % (ref 36–48)
HCT VFR BLD AUTO: 21.3 % (ref 36–48)
HGB BLD-MCNC: 7.5 G/DL (ref 13–16)
HGB BLD-MCNC: 7.7 G/DL (ref 13–16)
IMM GRANULOCYTES # BLD AUTO: 0.02 K/UL (ref 0–0.04)
IMM GRANULOCYTES # BLD AUTO: 0.05 K/UL (ref 0–0.04)
IMM GRANULOCYTES NFR BLD AUTO: 0.3 % (ref 0–0.5)
IMM GRANULOCYTES NFR BLD AUTO: 0.8 % (ref 0–0.5)
IRON SATN MFR SERPL: 43 %
IRON SERPL-MCNC: 91 UG/DL (ref 50–175)
LYMPHOCYTES # BLD: 1.31 K/UL (ref 0.9–3.6)
LYMPHOCYTES # BLD: 1.42 K/UL (ref 0.9–3.6)
LYMPHOCYTES NFR BLD: 21.4 % (ref 21–52)
LYMPHOCYTES NFR BLD: 22.9 % (ref 21–52)
MAGNESIUM SERPL-MCNC: 1.5 MG/DL (ref 1.6–2.6)
MAGNESIUM SERPL-MCNC: 2.2 MG/DL (ref 1.6–2.6)
MCH RBC QN AUTO: 34.4 PG (ref 24–34)
MCH RBC QN AUTO: 35.2 PG (ref 24–34)
MCHC RBC AUTO-ENTMCNC: 36.1 G/DL (ref 31–37)
MCHC RBC AUTO-ENTMCNC: 36.2 G/DL (ref 31–37)
MCV RBC AUTO: 95.4 FL (ref 78–100)
MCV RBC AUTO: 97.3 FL (ref 78–100)
MONOCYTES # BLD: 0.72 K/UL (ref 0.05–1.2)
MONOCYTES # BLD: 0.74 K/UL (ref 0.05–1.2)
MONOCYTES NFR BLD: 11.6 % (ref 3–10)
MONOCYTES NFR BLD: 12.1 % (ref 3–10)
NEUTS SEG # BLD: 3.96 K/UL (ref 1.8–8)
NEUTS SEG # BLD: 4 K/UL (ref 1.8–8)
NEUTS SEG NFR BLD: 63.9 % (ref 40–73)
NEUTS SEG NFR BLD: 65.4 % (ref 40–73)
NRBC # BLD: 0 K/UL (ref 0–0.01)
NRBC # BLD: 0 K/UL (ref 0–0.01)
NRBC BLD-RTO: 0 PER 100 WBC
NRBC BLD-RTO: 0 PER 100 WBC
PHOSPHATE SERPL-MCNC: 2.2 MG/DL (ref 2.5–4.9)
PHOSPHATE SERPL-MCNC: 2.9 MG/DL (ref 2.5–4.9)
PLATELET # BLD AUTO: 158 K/UL (ref 135–420)
PLATELET # BLD AUTO: 171 K/UL (ref 135–420)
PMV BLD AUTO: 10.2 FL (ref 9.2–11.8)
PMV BLD AUTO: 9.8 FL (ref 9.2–11.8)
POTASSIUM SERPL-SCNC: 3.5 MMOL/L (ref 3.5–5.5)
RBC # BLD AUTO: 2.18 M/UL (ref 4.35–5.65)
RBC # BLD AUTO: 2.19 M/UL (ref 4.35–5.65)
SODIUM SERPL-SCNC: 130 MMOL/L (ref 136–145)
TIBC SERPL-MCNC: 212 UG/DL (ref 250–450)
UIBC SERPL-MCNC: 121 UG/DL (ref 112–347)
VIT B12 SERPL-MCNC: 517 PG/ML (ref 211–911)
WBC # BLD AUTO: 6.1 K/UL (ref 4.6–13.2)
WBC # BLD AUTO: 6.2 K/UL (ref 4.6–13.2)

## 2025-06-07 PROCEDURE — 85025 COMPLETE CBC W/AUTO DIFF WBC: CPT

## 2025-06-07 PROCEDURE — 6370000000 HC RX 637 (ALT 250 FOR IP): Performed by: FAMILY MEDICINE

## 2025-06-07 PROCEDURE — 82746 ASSAY OF FOLIC ACID SERUM: CPT

## 2025-06-07 PROCEDURE — 6370000000 HC RX 637 (ALT 250 FOR IP): Performed by: INTERNAL MEDICINE

## 2025-06-07 PROCEDURE — 2580000003 HC RX 258

## 2025-06-07 PROCEDURE — 82728 ASSAY OF FERRITIN: CPT

## 2025-06-07 PROCEDURE — 36415 COLL VENOUS BLD VENIPUNCTURE: CPT

## 2025-06-07 PROCEDURE — 83550 IRON BINDING TEST: CPT

## 2025-06-07 PROCEDURE — 82607 VITAMIN B-12: CPT

## 2025-06-07 PROCEDURE — 80048 BASIC METABOLIC PNL TOTAL CA: CPT

## 2025-06-07 PROCEDURE — 83735 ASSAY OF MAGNESIUM: CPT

## 2025-06-07 PROCEDURE — 6360000002 HC RX W HCPCS

## 2025-06-07 PROCEDURE — 2500000003 HC RX 250 WO HCPCS

## 2025-06-07 PROCEDURE — 6360000002 HC RX W HCPCS: Performed by: INTERNAL MEDICINE

## 2025-06-07 PROCEDURE — 83540 ASSAY OF IRON: CPT

## 2025-06-07 PROCEDURE — 84100 ASSAY OF PHOSPHORUS: CPT

## 2025-06-07 RX ORDER — SODIUM BICARBONATE 650 MG/1
650 TABLET ORAL 3 TIMES DAILY
Qty: 90 TABLET | Refills: 0 | Status: SHIPPED | OUTPATIENT
Start: 2025-06-07

## 2025-06-07 RX ORDER — MAGNESIUM SULFATE IN WATER 40 MG/ML
4000 INJECTION, SOLUTION INTRAVENOUS ONCE
Status: COMPLETED | OUTPATIENT
Start: 2025-06-07 | End: 2025-06-07

## 2025-06-07 RX ADMIN — SODIUM BICARBONATE 650 MG: 650 TABLET ORAL at 14:27

## 2025-06-07 RX ADMIN — MAGNESIUM SULFATE HEPTAHYDRATE 4000 MG: 40 INJECTION, SOLUTION INTRAVENOUS at 06:28

## 2025-06-07 RX ADMIN — SODIUM BICARBONATE 650 MG: 650 TABLET ORAL at 08:54

## 2025-06-07 RX ADMIN — Medication 1 MG: at 08:54

## 2025-06-07 RX ADMIN — ENOXAPARIN SODIUM 40 MG: 100 INJECTION SUBCUTANEOUS at 08:59

## 2025-06-07 RX ADMIN — POTASSIUM PHOSPHATE, MONOBASIC POTASSIUM PHOSPHATE, DIBASIC 15 MMOL: 224; 236 INJECTION, SOLUTION, CONCENTRATE INTRAVENOUS at 09:04

## 2025-06-07 ASSESSMENT — PAIN SCALES - GENERAL
PAINLEVEL_OUTOF10: 0

## 2025-06-07 NOTE — CARE COORDINATION
D/C order noted for today. Orders reviewed. Home health booked with [facility]. Walker delivered to bedside. Shower chair will be delivered to patient's home. Daughter to transport.  remains available if needed.    MANSI MorrisonN, RN  Case Management   [phone]

## 2025-06-07 NOTE — DISCHARGE SUMMARY
[facility] Family [facility]  DISCHARGE SUMMARY      Name:   Lauri Hoover 75 y.o. male  MRN:   [id number]  CSN:   [id number]  Admission Date:  6/4/2025  Discharge Date:  6/7/2025  Attending:             Baumgarten, Margaret Y, *   PCP:              Mima Ryan DO   ================================================================  Reason for Admission:  Hyponatremia [E87.1]    Discharge Diagnosis:    Acute on chronic hypotonic hypovolemic hyponatremia vs SIADH  Hypotension, resolved  Acute on chronic anemia  TAN, resolved  CKD3  Weight loss  Elevated CEA  Elevated Liver Enzyme  LLL lung mass  Left foot ulcer  HTN  Gout  Hemochromatosis    Follow-up studies/evaluations for PCP/Important Notes to PCP:  Repeat BMP, Mg, Phos, CBC.  Assess BP, restart held meds if appropriate  Consider mirtazapine for appetite stimulation, encourage regular meals and protein intake  Pending labs/investigations to follow up as below  Medication reconciliation:  Discontinued Medications: midodrine, NaCl tabs  New Medications: sodium bicarb tabs TID    JUNG Follow Up Appointment:   Follow-up Information       Follow up With Specialties Details Why Contact Info    Mima Ryan DO Family Medicine Schedule an appointment as soon as possible for a visit in 3 day(s) Make appointment for follow up in 3-7 days. [address]  [place]  [facility] [address]  [phone]               Readmission prevention plan:   Close PCP follow up  Better tolerated sodium replacement  Improved nutrition    GOALS OF CARE (including Code Status, Advanced Care Plan):   Full measures    Pending labs/ investigations at discharge to follow up:   RLL lung mass workup, repeat chest CT 8-12 weeks  EGD/colonoscopy 7/1    Operative Procedures:   None    Consultants:    Nephrology    Condition at discharge: Stable    Disposition at Discharge:  Home    Functional Status at Discharge: Ambulates independently and with walker    Diet: Regular diet    Discharge

## 2025-06-07 NOTE — PROGRESS NOTES
[facility] Family [facility]  DAILY PROGRESS NOTE      Patient:    Lauri Hoover , 75 y.o. male   MRN:  [id number]  Room/Bed:  466/01  Admission Date:   6/4/2025  Code status:  Full Code    Reason for Admission: Hyponatremia  Barriers to Discharge: Electrolyte abnormalities  Anticipated Date of Discharge: 6/7    Lauri Hoover is a 75-year-old male with past medical history of gout, hypertension, CKD and hyponatremia who presented 6/5 for lightheadedness and dizziness x 48 hours.    SUBJECTIVE:   Events of the last 24 hours:  No acute events overnight.  Patient seen at beside. Reports feeling well. No N/V, diarrhea. Reports a dark brown/black stool yesterday afternoon. No lightheadedness.     ROS per HPI    CURRENT INPATIENT MEDICATIONS:  Current Facility-Administered Medications   Medication Dose Route Frequency Provider Last Rate Last Admin    potassium phosphate 15 mmol in sodium chloride 0.9 % 250 mL IVPB  15 mmol IntraVENous Once Nima Restrepo, DO 62.5 mL/hr at 06/07/25 0904 15 mmol at 06/07/25 0904    sodium chloride flush 0.9 % injection 5-40 mL  5-40 mL IntraVENous 2 times per day Lonnie Fields, DO   10 mL at 06/06/25 2009    sodium chloride flush 0.9 % injection 5-40 mL  5-40 mL IntraVENous PRN Nhan-Lonnie Larson, DO        0.9 % sodium chloride infusion   IntraVENous PRN Lonnie Fields,  mL/hr at 06/06/25 1143 New Bag at 06/06/25 1143    enoxaparin (LOVENOX) injection 40 mg  40 mg SubCUTAneous Daily Nhan-Lonnie Larson, DO   40 mg at 06/07/25 0859    acetaminophen (TYLENOL) tablet 650 mg  650 mg Oral Q6H PRN Nhan-Lonnie Larson, DO   650 mg at 06/05/25 2128    Or    acetaminophen (TYLENOL) suppository 650 mg  650 mg Rectal Q6H PRN Nhan-Lonnie Larson, DO        bisacodyl (DULCOLAX) suppository 10 mg  10 mg Rectal Daily PRN Lonnie Fields, DO        [Held by provider] midodrine (PROAMATINE) tablet 10 mg  10 mg  with IVF  Plan:  -Will continue to monitor with BMP  - Nephrology on board     Weight loss  Elevated CEA  Elevated Liver Enzyme  -EGD and colonoscopy planned for 7/1     LLL lung mass   - Pulmonology consulted previous admission and suggested repeat chest CT 8 to 12 weeks     Right foot ulcer  History of osteomyelitis s/p right third toe amputation  - Has completed course of Levaquin  -Wound care while admitted     Hypertension  - Hold lisinopril 10 mg and amlodipine 5 mg due to TAN and low blood pressures     Gout  - Hold allopurinol for TAN      Hemochromatosis  -OP phlebotomy and heme/onc      Code: Full  Diet: Adult diet, fluid restriction 1.5 L  DVT/AC: Lovenox  Mobility: per protocol  Disposition: Pending    Point of Contact (relationship): TBD    ================================================================  Further management for . aLuri Hoover will be discussed on rounds with my attending.      Trinity Cardoza MD, PGY-1  [facility]  June 7, 2025 11:33 AM

## 2025-06-07 NOTE — PLAN OF CARE
Problem: Pain  Goal: Verbalizes/displays adequate comfort level or baseline comfort level  Outcome: Progressing  Flowsheets (Taken 6/5/2025 0927 by Ivanna Naik RN)  Verbalizes/displays adequate comfort level or baseline comfort level:   Encourage patient to monitor pain and request assistance   Assess pain using appropriate pain scale   Consider cultural and social influences on pain and pain management   Implement non-pharmacological measures as appropriate and evaluate response   Administer analgesics based on type and severity of pain and evaluate response   Notify Licensed Independent Practitioner if interventions unsuccessful or patient reports new pain     Problem: Skin/Tissue Integrity  Goal: Skin integrity remains intact  Description: 1.  Monitor for areas of redness and/or skin breakdown2.  Assess vascular access sites hourly3.  Every 4-6 hours minimum:  Change oxygen saturation probe site4.  Every 4-6 hours:  If on nasal continuous positive airway pressure, respiratory therapy assess nares and determine need for appliance change or resting period  Outcome: Progressing  Flowsheets  Taken 6/7/2025 0925  Skin Integrity Remains Intact: Monitor for areas of redness and/or skin breakdown  Taken 6/7/2025 0741  Skin Integrity Remains Intact: Monitor for areas of redness and/or skin breakdown     Problem: Safety - Adult  Goal: Free from fall injury  Outcome: Progressing  Flowsheets (Taken 6/7/2025 0925)  Free From Fall Injury: Instruct family/caregiver on patient safety     Problem: Cardiovascular - Adult  Goal: Absence of cardiac dysrhythmias or at baseline  Outcome: Progressing  Flowsheets (Taken 6/7/2025 0741)  Absence of cardiac dysrhythmias or at baseline: Monitor cardiac rate and rhythm     Problem: Musculoskeletal - Adult  Goal: Return mobility to safest level of function  Outcome: Progressing  Flowsheets (Taken 6/7/2025 0741)  Return Mobility to Safest Level of Function: Assess patient stability and

## 2025-06-07 NOTE — PLAN OF CARE
Problem: Pain  Goal: Verbalizes/displays adequate comfort level or baseline comfort level  6/7/2025 1701 by Vadim Herron RN  Outcome: Adequate for Discharge  6/7/2025 1554 by Vadim Herron RN  Outcome: Progressing  Flowsheets (Taken 6/5/2025 0991 by Ivanna Naik RN)  Verbalizes/displays adequate comfort level or baseline comfort level:   Encourage patient to monitor pain and request assistance   Assess pain using appropriate pain scale   Consider cultural and social influences on pain and pain management   Implement non-pharmacological measures as appropriate and evaluate response   Administer analgesics based on type and severity of pain and evaluate response   Notify Licensed Independent Practitioner if interventions unsuccessful or patient reports new pain     Problem: Skin/Tissue Integrity  Goal: Skin integrity remains intact  Description: 1.  Monitor for areas of redness and/or skin breakdown2.  Assess vascular access sites hourly3.  Every 4-6 hours minimum:  Change oxygen saturation probe site4.  Every 4-6 hours:  If on nasal continuous positive airway pressure, respiratory therapy assess nares and determine need for appliance change or resting period  6/7/2025 1701 by Vadim Herron RN  Outcome: Adequate for Discharge  6/7/2025 1554 by Vadim Herron RN  Outcome: Progressing  Flowsheets  Taken 6/7/2025 0925  Skin Integrity Remains Intact: Monitor for areas of redness and/or skin breakdown  Taken 6/7/2025 0741  Skin Integrity Remains Intact: Monitor for areas of redness and/or skin breakdown     Problem: Safety - Adult  Goal: Free from fall injury  6/7/2025 1701 by Vadim Herron RN  Outcome: Adequate for Discharge  6/7/2025 1554 by Vadim Herron RN  Outcome: Progressing  Flowsheets (Taken 6/7/2025 0925)  Free From Fall Injury: Instruct family/caregiver on patient safety     Problem: Cardiovascular - Adult  Goal: Absence of cardiac dysrhythmias or at baseline  6/7/2025  1701 by Vadim Herron RN  Outcome: Adequate for Discharge  6/7/2025 1554 by Vadim Herron RN  Outcome: Progressing  Flowsheets (Taken 6/7/2025 0741)  Absence of cardiac dysrhythmias or at baseline: Monitor cardiac rate and rhythm     Problem: Musculoskeletal - Adult  Goal: Return mobility to safest level of function  6/7/2025 1701 by Vadim Herron RN  Outcome: Adequate for Discharge  6/7/2025 1554 by Vadim Herron RN  Outcome: Progressing  Flowsheets (Taken 6/7/2025 0741)  Return Mobility to Safest Level of Function: Assess patient stability and activity tolerance for standing, transferring and ambulating with or without assistive devices     Problem: Metabolic/Fluid and Electrolytes - Adult  Goal: Electrolytes maintained within normal limits  6/7/2025 1701 by Vadim Herron RN  Outcome: Adequate for Discharge  6/7/2025 1554 by Vadim Herron RN  Outcome: Progressing  Flowsheets (Taken 6/7/2025 0741)  Electrolytes maintained within normal limits: Monitor labs and assess patient for signs and symptoms of electrolyte imbalances     Problem: Discharge Planning  Goal: Discharge to home or other facility with appropriate resources  6/7/2025 1701 by Vadim Herron RN  Outcome: Adequate for Discharge  6/7/2025 1554 by Vadim Herron RN  Outcome: Progressing  Flowsheets (Taken 6/7/2025 0741)  Discharge to home or other facility with appropriate resources: Identify barriers to discharge with patient and caregiver   NOTE: Discharge paper given to the patient. Provided opportunity to ask question and answered with satisfaction. IV line and heart monitor remove. Discharge to home.

## 2025-06-12 ENCOUNTER — HOSPITAL ENCOUNTER (EMERGENCY)
Facility: HOSPITAL | Age: 75
Discharge: HOME | End: 2025-06-12
Payer: MEDICARE

## 2025-06-12 VITALS
TEMPERATURE: 98.2 F | HEART RATE: 83 BPM | HEIGHT: 72 IN | OXYGEN SATURATION: 99 % | WEIGHT: 161 LBS | DIASTOLIC BLOOD PRESSURE: 85 MMHG | SYSTOLIC BLOOD PRESSURE: 112 MMHG | RESPIRATION RATE: 18 BRPM | BODY MASS INDEX: 21.81 KG/M2

## 2025-06-12 DIAGNOSIS — M79.2 NEUROPATHIC PAIN: Primary | ICD-10-CM

## 2025-06-12 DIAGNOSIS — Z51.89 VISIT FOR WOUND CHECK: ICD-10-CM

## 2025-06-12 PROCEDURE — 6370000000 HC RX 637 (ALT 250 FOR IP)

## 2025-06-12 PROCEDURE — 99283 EMERGENCY DEPT VISIT LOW MDM: CPT

## 2025-06-12 RX ORDER — ACETAMINOPHEN 500 MG
500 TABLET ORAL 4 TIMES DAILY PRN
Qty: 30 TABLET | Refills: 0 | Status: SHIPPED | OUTPATIENT
Start: 2025-06-12 | End: 2025-06-18

## 2025-06-12 RX ORDER — ACETAMINOPHEN 325 MG/1
650 TABLET ORAL
Status: COMPLETED | OUTPATIENT
Start: 2025-06-12 | End: 2025-06-12

## 2025-06-12 RX ORDER — GABAPENTIN 100 MG/1
100 CAPSULE ORAL
Status: COMPLETED | OUTPATIENT
Start: 2025-06-12 | End: 2025-06-12

## 2025-06-12 RX ADMIN — ACETAMINOPHEN 650 MG: 325 TABLET ORAL at 17:40

## 2025-06-12 RX ADMIN — GABAPENTIN 100 MG: 100 CAPSULE ORAL at 17:41

## 2025-06-12 ASSESSMENT — PAIN - FUNCTIONAL ASSESSMENT
PAIN_FUNCTIONAL_ASSESSMENT: 0-10
PAIN_FUNCTIONAL_ASSESSMENT: 0-10

## 2025-06-12 ASSESSMENT — PAIN DESCRIPTION - ORIENTATION: ORIENTATION: RIGHT

## 2025-06-12 ASSESSMENT — PAIN SCALES - GENERAL
PAINLEVEL_OUTOF10: 9
PAINLEVEL_OUTOF10: 7
PAINLEVEL_OUTOF10: 7

## 2025-06-12 ASSESSMENT — PAIN DESCRIPTION - LOCATION: LOCATION: LEG

## 2025-06-12 NOTE — ED PROVIDER NOTES
EMERGENCY DEPARTMENT HISTORY AND PHYSICAL EXAM      Patient Name: Lauri Hoover  MRN: [id number]  YOB: 1950  Provider: Brenda Mcneal PA-C  PCP: Mima Ryan DO   Time/Date of evaluation: 5:19 PM EDT on 6/12/25    History of Presenting Illness     Chief Complaint   Patient presents with    Leg Pain       History Provided By: Patient     History (Narative):   Lauri Hoover is a 75 y.o. male with a PMHX of arthritis, hemochromatosis  who presents to the emergency department  by POV C/O right leg pain.  Patient states that for the past 2 to 3 days he has been having right foot pain that radiates up his right leg.  He states that due to neuropathy has lack of sensation in his foot however he has been having persistent pain.  He states that he is try Tylenol and add Aleve with minimal relief.  Patient has an ulcer to the bottom of his right foot that is currently being managed.     Past History     Past Medical History:  Past Medical History:   Diagnosis Date    Arthritis     Hemochromatosis     Ill-defined condition     GOUT       Past Surgical History:  Past Surgical History:   Procedure Laterality Date    ORTHOPEDIC SURGERY      LEFT HAND       Family History:  No family history on file.    Social History:  Social History     Tobacco Use    Smoking status: Never    Smokeless tobacco: Never   Substance Use Topics    Alcohol use: Yes     Alcohol/week: 6.0 standard drinks of alcohol       Medications:  No current facility-administered medications for this encounter.     Current Outpatient Medications   Medication Sig Dispense Refill    acetaminophen (TYLENOL) 500 MG tablet Take 1 tablet by mouth 4 times daily as needed for Pain 30 tablet 0    sodium bicarbonate 650 MG tablet Take 1 tablet by mouth 3 times daily 90 tablet 0    diclofenac sodium (VOLTAREN) 1 % GEL Apply 2 g topically 4 times daily as needed      pregabalin (LYRICA) 75 MG capsule One po qd and take 2 capsules by mouth at  edit the dictations but occasionally words are mis-transcribed.)    Brenda Pearce PA-C (electronically signed)    BRITTA Pearce PA-C am the primary clinician of record.    Dragon Disclaimer     Please note that this dictation was completed with [employer], the computer voice recognition software.  Quite often unanticipated grammatical, syntax, homophones, and other interpretive errors are inadvertently transcribed by the computer software.  Please disregard these errors.  Please excuse any errors that have escaped final proofreading.       Brenda Pearce PA-C  06/12/25 3289

## 2025-06-12 NOTE — ED NOTES
Wound dressing applied. Patient educated on discharge instructions, verbalized understanding. Ambulatory to the exit.

## 2025-06-15 ENCOUNTER — APPOINTMENT (OUTPATIENT)
Facility: HOSPITAL | Age: 75
End: 2025-06-15
Payer: MEDICARE

## 2025-06-15 ENCOUNTER — HOSPITAL ENCOUNTER (EMERGENCY)
Facility: HOSPITAL | Age: 75
Discharge: HOME | End: 2025-06-15
Payer: MEDICARE

## 2025-06-15 VITALS
DIASTOLIC BLOOD PRESSURE: 74 MMHG | TEMPERATURE: 99.1 F | OXYGEN SATURATION: 97 % | RESPIRATION RATE: 18 BRPM | BODY MASS INDEX: 21.81 KG/M2 | SYSTOLIC BLOOD PRESSURE: 108 MMHG | HEIGHT: 72 IN | WEIGHT: 161 LBS | HEART RATE: 96 BPM

## 2025-06-15 DIAGNOSIS — M25.561 ACUTE PAIN OF RIGHT KNEE: Primary | ICD-10-CM

## 2025-06-15 LAB — ECHO BSA: 1.93 M2

## 2025-06-15 PROCEDURE — 6370000000 HC RX 637 (ALT 250 FOR IP): Performed by: PHYSICIAN ASSISTANT

## 2025-06-15 PROCEDURE — 93971 EXTREMITY STUDY: CPT

## 2025-06-15 PROCEDURE — 93971 EXTREMITY STUDY: CPT | Performed by: INTERNAL MEDICINE

## 2025-06-15 PROCEDURE — 99284 EMERGENCY DEPT VISIT MOD MDM: CPT

## 2025-06-15 RX ORDER — HYDROCODONE BITARTRATE AND ACETAMINOPHEN 5; 325 MG/1; MG/1
1 TABLET ORAL
Refills: 0 | Status: COMPLETED | OUTPATIENT
Start: 2025-06-15 | End: 2025-06-15

## 2025-06-15 RX ADMIN — HYDROCODONE BITARTRATE AND ACETAMINOPHEN 1 TABLET: 5; 325 TABLET ORAL at 14:21

## 2025-06-15 ASSESSMENT — PAIN DESCRIPTION - ORIENTATION
ORIENTATION: RIGHT
ORIENTATION: RIGHT

## 2025-06-15 ASSESSMENT — PAIN DESCRIPTION - DESCRIPTORS
DESCRIPTORS: ACHING
DESCRIPTORS: SHARP

## 2025-06-15 ASSESSMENT — PAIN - FUNCTIONAL ASSESSMENT
PAIN_FUNCTIONAL_ASSESSMENT: PREVENTS OR INTERFERES SOME ACTIVE ACTIVITIES AND ADLS
PAIN_FUNCTIONAL_ASSESSMENT: 0-10

## 2025-06-15 ASSESSMENT — PAIN DESCRIPTION - LOCATION
LOCATION: KNEE
LOCATION: KNEE

## 2025-06-15 ASSESSMENT — PAIN DESCRIPTION - PAIN TYPE: TYPE: ACUTE PAIN

## 2025-06-15 ASSESSMENT — PAIN SCALES - GENERAL
PAINLEVEL_OUTOF10: 9
PAINLEVEL_OUTOF10: 9

## 2025-06-15 NOTE — ED PROVIDER NOTES
EMERGENCY DEPARTMENT HISTORY AND PHYSICAL EXAM      Date: 6/15/2025  Patient Name: Lauri Hoover    History of Presenting Illness     Chief Complaint   Patient presents with    Knee Pain     right       History (Context): Lauri Hoover is a 75 y.o. male with significant PMHx for hemochromatosis, gout presents ambulatory to the ED today. Patient reports continued pain to right knee that radiates up and down right leg. Denies known trauma or injury. Denies weakness. Patient was recently seen on 6/12 for continued pain. Denies previous hx blood clot. Denies taking anticoagulants.       PCP: Mima Ryan, DO    No current facility-administered medications for this encounter.     Current Outpatient Medications   Medication Sig Dispense Refill    acetaminophen (TYLENOL) 500 MG tablet Take 1 tablet by mouth 4 times daily as needed for Pain 30 tablet 0    sodium bicarbonate 650 MG tablet Take 1 tablet by mouth 3 times daily 90 tablet 0    diclofenac sodium (VOLTAREN) 1 % GEL Apply 2 g topically 4 times daily as needed      pregabalin (LYRICA) 75 MG capsule One po qd and take 2 capsules by mouth at bedtime 90 capsule 2    [Paused] allopurinol (ZYLOPRIM) 300 MG tablet Take 1 tablet by mouth daily      [Paused] lisinopril (PRINIVIL;ZESTRIL) 10 MG tablet Take 1 tablet by mouth daily      [Paused] amLODIPine (NORVASC) 5 MG tablet Take 1 tablet by mouth daily      [Paused] colchicine (COLCRYS) 0.6 MG tablet Take 1 tablet by mouth as needed      zolpidem (AMBIEN) 10 MG tablet Take 1 tablet by mouth.         Past History     Past Medical History:   Past Medical History:   Diagnosis Date    Arthritis     Hemochromatosis     Ill-defined condition     GOUT       Past Surgical History:  Past Surgical History:   Procedure Laterality Date    ORTHOPEDIC SURGERY      LEFT HAND       Family History:  No family history on file.    Social History:   Social History     Tobacco Use    Smoking status: Never    Smokeless

## 2025-06-18 ENCOUNTER — OFFICE VISIT (OUTPATIENT)
Facility: CLINIC | Age: 75
End: 2025-06-18
Payer: MEDICARE

## 2025-06-18 ENCOUNTER — TELEPHONE (OUTPATIENT)
Facility: CLINIC | Age: 75
End: 2025-06-18

## 2025-06-18 VITALS
SYSTOLIC BLOOD PRESSURE: 103 MMHG | BODY MASS INDEX: 23.16 KG/M2 | WEIGHT: 171 LBS | DIASTOLIC BLOOD PRESSURE: 68 MMHG | OXYGEN SATURATION: 95 % | TEMPERATURE: 97.8 F | HEIGHT: 72 IN | RESPIRATION RATE: 18 BRPM | HEART RATE: 84 BPM

## 2025-06-18 DIAGNOSIS — R63.4 WEIGHT LOSS, NON-INTENTIONAL: ICD-10-CM

## 2025-06-18 DIAGNOSIS — D64.9 ACUTE ON CHRONIC ANEMIA: ICD-10-CM

## 2025-06-18 DIAGNOSIS — F32.A DEPRESSION, UNSPECIFIED DEPRESSION TYPE: ICD-10-CM

## 2025-06-18 DIAGNOSIS — R63.0 LOSS OF APPETITE: ICD-10-CM

## 2025-06-18 DIAGNOSIS — M86.071 ACUTE HEMATOGENOUS OSTEOMYELITIS OF RIGHT FOOT (HCC): ICD-10-CM

## 2025-06-18 DIAGNOSIS — E87.1 CHRONIC HYPONATREMIA: ICD-10-CM

## 2025-06-18 DIAGNOSIS — E83.118 OTHER HEMOCHROMATOSIS: ICD-10-CM

## 2025-06-18 DIAGNOSIS — M25.561 ACUTE PAIN OF RIGHT KNEE: Primary | ICD-10-CM

## 2025-06-18 DIAGNOSIS — Z91.81 AT HIGH RISK FOR FALLS: ICD-10-CM

## 2025-06-18 DIAGNOSIS — Z13.6 SCREENING FOR CARDIOVASCULAR CONDITION: ICD-10-CM

## 2025-06-18 PROBLEM — R56.9 SEIZURE (HCC): Status: RESOLVED | Noted: 2022-07-17 | Resolved: 2025-06-18

## 2025-06-18 PROCEDURE — 1123F ACP DISCUSS/DSCN MKR DOCD: CPT | Performed by: STUDENT IN AN ORGANIZED HEALTH CARE EDUCATION/TRAINING PROGRAM

## 2025-06-18 PROCEDURE — 1159F MED LIST DOCD IN RCRD: CPT | Performed by: STUDENT IN AN ORGANIZED HEALTH CARE EDUCATION/TRAINING PROGRAM

## 2025-06-18 PROCEDURE — 99204 OFFICE O/P NEW MOD 45 MIN: CPT | Performed by: STUDENT IN AN ORGANIZED HEALTH CARE EDUCATION/TRAINING PROGRAM

## 2025-06-18 PROCEDURE — 1125F AMNT PAIN NOTED PAIN PRSNT: CPT | Performed by: STUDENT IN AN ORGANIZED HEALTH CARE EDUCATION/TRAINING PROGRAM

## 2025-06-18 PROCEDURE — G2211 COMPLEX E/M VISIT ADD ON: HCPCS | Performed by: STUDENT IN AN ORGANIZED HEALTH CARE EDUCATION/TRAINING PROGRAM

## 2025-06-18 RX ORDER — ACETAMINOPHEN 500 MG
500 TABLET ORAL EVERY 6 HOURS PRN
Qty: 120 TABLET | Refills: 0 | Status: SHIPPED | OUTPATIENT
Start: 2025-06-18

## 2025-06-18 RX ORDER — FLUTICASONE PROPIONATE 50 MCG
1-2 SPRAY, SUSPENSION (ML) NASAL
COMMUNITY

## 2025-06-18 RX ORDER — MIRTAZAPINE 15 MG/1
15 TABLET, FILM COATED ORAL NIGHTLY
Qty: 90 TABLET | Refills: 1 | Status: SHIPPED | OUTPATIENT
Start: 2025-06-18

## 2025-06-18 SDOH — ECONOMIC STABILITY: FOOD INSECURITY: WITHIN THE PAST 12 MONTHS, YOU WORRIED THAT YOUR FOOD WOULD RUN OUT BEFORE YOU GOT MONEY TO BUY MORE.: NEVER TRUE

## 2025-06-18 SDOH — ECONOMIC STABILITY: FOOD INSECURITY: WITHIN THE PAST 12 MONTHS, THE FOOD YOU BOUGHT JUST DIDN'T LAST AND YOU DIDN'T HAVE MONEY TO GET MORE.: NEVER TRUE

## 2025-06-18 ASSESSMENT — PATIENT HEALTH QUESTIONNAIRE - PHQ9
SUM OF ALL RESPONSES TO PHQ QUESTIONS 1-9: 0
1. LITTLE INTEREST OR PLEASURE IN DOING THINGS: NOT AT ALL
2. FEELING DOWN, DEPRESSED OR HOPELESS: NOT AT ALL
SUM OF ALL RESPONSES TO PHQ QUESTIONS 1-9: 0

## 2025-06-18 ASSESSMENT — ENCOUNTER SYMPTOMS
WHEEZING: 0
RHINORRHEA: 0
BLOOD IN STOOL: 0
CONSTIPATION: 0
ABDOMINAL PAIN: 0
CHEST TIGHTNESS: 0
COUGH: 0
SORE THROAT: 0
NAUSEA: 0
BACK PAIN: 0
DIARRHEA: 0
EYE PAIN: 0
VOMITING: 0
SHORTNESS OF BREATH: 0

## 2025-06-18 NOTE — PROGRESS NOTES
Have you been to the ER, urgent care clinic since your last visit?  Hospitalized since your last visit?   YES - When: approximately 3 days ago.  Where and Why: Pt went to the ED for pain in right foot .    Have you seen or consulted any other health care providers outside our system since your last visit?   NO            
results found for any visits on 06/18/25.    Patient Care Team:  Patient Care Team:  Aam Murry MD as PCP - General (Internal Medicine)        Assessment / Plan:    ICD-10-CM    1. Acute pain of right knee  M25.561 XR KNEE RIGHT (MIN 4 VIEWS)     acetaminophen (TYLENOL) 500 MG tablet      2. Chronic hyponatremia  E87.1 TSH + Free T4 Panel     Sodium, urine, random     Cortisol AM, Total     Osmolality, Urine      3. Weight loss, non-intentional  R63.4 mirtazapine (REMERON) 15 MG tablet      4. At high risk for falls  Z91.81       5. Acute on chronic anemia  D64.9       6. Loss of appetite  R63.0       7. Other hemochromatosis  E83.118       8. Screening for cardiovascular condition  Z13.6 Lipid Panel      9. Acute hematogenous osteomyelitis of right foot (HCC)  M86.071       10. Depression, unspecified depression type  F32.A          Acute right knee pain: Atraumatic suspect arthritis is causing this.  Will check x-ray of the right knee and take Tylenol as needed.  Patient will follow-up with me in a few months    Chronic hyponatremia: Suspect this is due to tea and toast diet, patient is euvolemic, borderline low blood pressure.  Will check urine sodium, urine osmolarity, TSH, a.m. cortisol.  Recommend mirtazapine to stimulate appetite.  Patient will follow-up with me in a few month    History of hypertension: Blood pressure is on the lower end we will continue to hold antihypertensive drugs.    Weight loss, loss of appetite, anemia: Currently screening for age-related malignancy with EGD and colonoscopy coming up in a few weeks.    Depression: Patient admits to being depressed.  Will start mirtazapine in hopes to also stimulate appetite    History of hemochromatosis: Patient was previously getting lab draws prior to COVID but this stopped during COVID    I independently reviewed and interpreted all labs and images    Health Maintenance Due   Topic Date Due    Lipids  Never done    COVID-19 Vaccine (6 -

## 2025-06-19 ENCOUNTER — TELEPHONE (OUTPATIENT)
Facility: CLINIC | Age: 75
End: 2025-06-19

## 2025-06-19 DIAGNOSIS — F51.01 PRIMARY INSOMNIA: Primary | ICD-10-CM

## 2025-06-19 RX ORDER — ZOLPIDEM TARTRATE 10 MG/1
10 TABLET ORAL NIGHTLY PRN
Qty: 30 TABLET | Refills: 0 | Status: SHIPPED | OUTPATIENT
Start: 2025-06-19 | End: 2025-07-19

## 2025-06-19 NOTE — TELEPHONE ENCOUNTER
Pt requesting refill    Ambien 10 MG tablet    [facility]    [address] [facility]    [phone]

## 2025-06-25 ENCOUNTER — TELEPHONE (OUTPATIENT)
Facility: CLINIC | Age: 75
End: 2025-06-25

## 2025-07-09 ENCOUNTER — HOSPITAL ENCOUNTER (OUTPATIENT)
Facility: HOSPITAL | Age: 75
Discharge: HOME | End: 2025-07-12
Payer: MEDICARE

## 2025-07-09 ENCOUNTER — TELEPHONE (OUTPATIENT)
Facility: CLINIC | Age: 75
End: 2025-07-09

## 2025-07-09 DIAGNOSIS — E87.1 CHRONIC HYPONATREMIA: ICD-10-CM

## 2025-07-09 DIAGNOSIS — Z13.6 SCREENING FOR CARDIOVASCULAR CONDITION: ICD-10-CM

## 2025-07-09 DIAGNOSIS — M25.561 ACUTE PAIN OF RIGHT KNEE: ICD-10-CM

## 2025-07-09 LAB
CHOLEST SERPL-MCNC: 127 MG/DL
HDLC SERPL-MCNC: 58 MG/DL (ref 40–60)
HDLC SERPL: 2.2 (ref 0–5)
LDLC SERPL CALC-MCNC: 51 MG/DL (ref 0–100)
OSMOLALITY UR: 230 MOSM/KG H2O
SODIUM UR-SCNC: <20 MMOL/L (ref 40–220)
T4 FREE SERPL-MCNC: 0.9 NG/DL (ref 0.9–1.7)
TRIGL SERPL-MCNC: 92 MG/DL (ref 0–150)
TSH, 3RD GENERATION: 3.74 UIU/ML (ref 0.27–4.2)
VLDLC SERPL CALC-MCNC: 18 MG/DL

## 2025-07-09 PROCEDURE — 84439 ASSAY OF FREE THYROXINE: CPT

## 2025-07-09 PROCEDURE — 80061 LIPID PANEL: CPT

## 2025-07-09 PROCEDURE — 36415 COLL VENOUS BLD VENIPUNCTURE: CPT

## 2025-07-09 PROCEDURE — 84300 ASSAY OF URINE SODIUM: CPT

## 2025-07-09 PROCEDURE — 83935 ASSAY OF URINE OSMOLALITY: CPT

## 2025-07-09 PROCEDURE — 82533 TOTAL CORTISOL: CPT

## 2025-07-09 PROCEDURE — 84443 ASSAY THYROID STIM HORMONE: CPT

## 2025-07-09 PROCEDURE — 73564 X-RAY EXAM KNEE 4 OR MORE: CPT

## 2025-07-10 LAB — CORTIS AM PEAK SERPL-MCNC: 16.7 UG/DL (ref 4.3–22.45)

## 2025-07-15 DIAGNOSIS — M25.561 ACUTE PAIN OF RIGHT KNEE: ICD-10-CM

## 2025-07-15 RX ORDER — PSEUDOEPHED/ACETAMINOPH/DIPHEN 30MG-500MG
1 TABLET ORAL EVERY 6 HOURS PRN
Qty: 120 TABLET | Refills: 0 | Status: SHIPPED | OUTPATIENT
Start: 2025-07-15

## 2025-07-29 ENCOUNTER — TELEPHONE (OUTPATIENT)
Facility: CLINIC | Age: 75
End: 2025-07-29

## 2025-07-29 DIAGNOSIS — F51.01 PRIMARY INSOMNIA: Primary | ICD-10-CM

## 2025-07-29 RX ORDER — ZOLPIDEM TARTRATE 10 MG/1
10 TABLET ORAL NIGHTLY PRN
Qty: 30 TABLET | Refills: 0 | Status: SHIPPED | OUTPATIENT
Start: 2025-07-29 | End: 2025-08-28

## 2025-07-29 RX ORDER — ZOLPIDEM TARTRATE 10 MG/1
10 TABLET ORAL NIGHTLY PRN
COMMUNITY
Start: 2025-07-08 | End: 2025-07-29

## 2025-08-06 ENCOUNTER — TELEPHONE (OUTPATIENT)
Facility: CLINIC | Age: 75
End: 2025-08-06

## 2025-08-19 ENCOUNTER — TELEPHONE (OUTPATIENT)
Facility: CLINIC | Age: 75
End: 2025-08-19

## 2025-08-22 ENCOUNTER — OFFICE VISIT (OUTPATIENT)
Facility: CLINIC | Age: 75
End: 2025-08-22
Payer: MEDICARE

## 2025-08-22 VITALS
HEIGHT: 72 IN | RESPIRATION RATE: 18 BRPM | OXYGEN SATURATION: 92 % | SYSTOLIC BLOOD PRESSURE: 136 MMHG | BODY MASS INDEX: 25.6 KG/M2 | DIASTOLIC BLOOD PRESSURE: 81 MMHG | WEIGHT: 189 LBS | TEMPERATURE: 98.1 F | HEART RATE: 79 BPM

## 2025-08-22 DIAGNOSIS — D64.9 ACUTE ON CHRONIC ANEMIA: ICD-10-CM

## 2025-08-22 DIAGNOSIS — F32.A DEPRESSION, UNSPECIFIED DEPRESSION TYPE: ICD-10-CM

## 2025-08-22 DIAGNOSIS — F51.01 PRIMARY INSOMNIA: Primary | ICD-10-CM

## 2025-08-22 DIAGNOSIS — E87.1 CHRONIC HYPONATREMIA: ICD-10-CM

## 2025-08-22 PROCEDURE — 1126F AMNT PAIN NOTED NONE PRSNT: CPT | Performed by: STUDENT IN AN ORGANIZED HEALTH CARE EDUCATION/TRAINING PROGRAM

## 2025-08-22 PROCEDURE — 99214 OFFICE O/P EST MOD 30 MIN: CPT | Performed by: STUDENT IN AN ORGANIZED HEALTH CARE EDUCATION/TRAINING PROGRAM

## 2025-08-22 PROCEDURE — 1159F MED LIST DOCD IN RCRD: CPT | Performed by: STUDENT IN AN ORGANIZED HEALTH CARE EDUCATION/TRAINING PROGRAM

## 2025-08-22 PROCEDURE — 1123F ACP DISCUSS/DSCN MKR DOCD: CPT | Performed by: STUDENT IN AN ORGANIZED HEALTH CARE EDUCATION/TRAINING PROGRAM

## 2025-08-22 PROCEDURE — G2211 COMPLEX E/M VISIT ADD ON: HCPCS | Performed by: STUDENT IN AN ORGANIZED HEALTH CARE EDUCATION/TRAINING PROGRAM

## 2025-08-22 SDOH — ECONOMIC STABILITY: FOOD INSECURITY: WITHIN THE PAST 12 MONTHS, THE FOOD YOU BOUGHT JUST DIDN'T LAST AND YOU DIDN'T HAVE MONEY TO GET MORE.: NEVER TRUE

## 2025-08-22 SDOH — ECONOMIC STABILITY: FOOD INSECURITY: WITHIN THE PAST 12 MONTHS, YOU WORRIED THAT YOUR FOOD WOULD RUN OUT BEFORE YOU GOT MONEY TO BUY MORE.: NEVER TRUE

## 2025-08-22 ASSESSMENT — PATIENT HEALTH QUESTIONNAIRE - PHQ9
SUM OF ALL RESPONSES TO PHQ QUESTIONS 1-9: 0
7. TROUBLE CONCENTRATING ON THINGS, SUCH AS READING THE NEWSPAPER OR WATCHING TELEVISION: NOT AT ALL
9. THOUGHTS THAT YOU WOULD BE BETTER OFF DEAD, OR OF HURTING YOURSELF: NOT AT ALL
8. MOVING OR SPEAKING SO SLOWLY THAT OTHER PEOPLE COULD HAVE NOTICED. OR THE OPPOSITE, BEING SO FIGETY OR RESTLESS THAT YOU HAVE BEEN MOVING AROUND A LOT MORE THAN USUAL: NOT AT ALL
SUM OF ALL RESPONSES TO PHQ QUESTIONS 1-9: 0
2. FEELING DOWN, DEPRESSED OR HOPELESS: NOT AT ALL
5. POOR APPETITE OR OVEREATING: NOT AT ALL
10. IF YOU CHECKED OFF ANY PROBLEMS, HOW DIFFICULT HAVE THESE PROBLEMS MADE IT FOR YOU TO DO YOUR WORK, TAKE CARE OF THINGS AT HOME, OR GET ALONG WITH OTHER PEOPLE: NOT DIFFICULT AT ALL
1. LITTLE INTEREST OR PLEASURE IN DOING THINGS: NOT AT ALL
3. TROUBLE FALLING OR STAYING ASLEEP: NOT AT ALL
SUM OF ALL RESPONSES TO PHQ QUESTIONS 1-9: 0
4. FEELING TIRED OR HAVING LITTLE ENERGY: NOT AT ALL
SUM OF ALL RESPONSES TO PHQ QUESTIONS 1-9: 0
6. FEELING BAD ABOUT YOURSELF - OR THAT YOU ARE A FAILURE OR HAVE LET YOURSELF OR YOUR FAMILY DOWN: NOT AT ALL

## 2025-08-22 ASSESSMENT — ENCOUNTER SYMPTOMS
WHEEZING: 0
VOMITING: 0
CHEST TIGHTNESS: 0
COUGH: 0
SORE THROAT: 0
BLOOD IN STOOL: 0
SHORTNESS OF BREATH: 0
ABDOMINAL PAIN: 0
RHINORRHEA: 0
CONSTIPATION: 0
NAUSEA: 0
BACK PAIN: 0
EYE PAIN: 0
DIARRHEA: 0

## 2025-08-25 DIAGNOSIS — M25.561 ACUTE PAIN OF RIGHT KNEE: ICD-10-CM

## 2025-08-25 RX ORDER — PSEUDOEPHED/ACETAMINOPH/DIPHEN 30MG-500MG
1 TABLET ORAL EVERY 6 HOURS PRN
Qty: 120 TABLET | Refills: 0 | Status: SHIPPED | OUTPATIENT
Start: 2025-08-25

## 2025-08-29 DIAGNOSIS — R63.4 WEIGHT LOSS, NON-INTENTIONAL: ICD-10-CM

## 2025-08-29 DIAGNOSIS — M48.062 SPINAL STENOSIS OF LUMBAR REGION WITH NEUROGENIC CLAUDICATION: ICD-10-CM

## 2025-08-29 DIAGNOSIS — M54.16 RIGHT LUMBAR RADICULOPATHY: ICD-10-CM

## 2025-08-29 DIAGNOSIS — M25.561 ACUTE PAIN OF RIGHT KNEE: ICD-10-CM

## 2025-09-03 ENCOUNTER — HOSPITAL ENCOUNTER (OUTPATIENT)
Facility: HOSPITAL | Age: 75
Discharge: HOME | End: 2025-09-06
Payer: MEDICARE

## 2025-09-03 DIAGNOSIS — F51.01 PRIMARY INSOMNIA: Primary | ICD-10-CM

## 2025-09-03 DIAGNOSIS — M14.671 CHARCOT'S JOINT OF ANKLE, RIGHT: ICD-10-CM

## 2025-09-03 PROCEDURE — 73630 X-RAY EXAM OF FOOT: CPT

## 2025-09-03 RX ORDER — PSEUDOEPHED/ACETAMINOPH/DIPHEN 30MG-500MG
1 TABLET ORAL EVERY 6 HOURS PRN
Qty: 120 TABLET | Refills: 0 | Status: SHIPPED | OUTPATIENT
Start: 2025-09-03

## 2025-09-03 RX ORDER — COLCHICINE 0.6 MG/1
0.6 TABLET ORAL DAILY
Qty: 60 TABLET | Refills: 0 | Status: SHIPPED | OUTPATIENT
Start: 2025-09-03 | End: 2025-11-02

## 2025-09-03 RX ORDER — ZOLPIDEM TARTRATE 10 MG/1
10 TABLET ORAL NIGHTLY PRN
Qty: 60 TABLET | Refills: 0 | Status: SHIPPED | OUTPATIENT
Start: 2025-09-03 | End: 2025-11-02

## 2025-09-03 RX ORDER — ZOLPIDEM TARTRATE 10 MG/1
TABLET ORAL
COMMUNITY
Start: 2025-08-06 | End: 2025-09-03 | Stop reason: SDUPTHER

## 2025-09-03 RX ORDER — LISINOPRIL 10 MG/1
10 TABLET ORAL DAILY
Qty: 60 TABLET | Refills: 0 | Status: SHIPPED | OUTPATIENT
Start: 2025-09-03 | End: 2025-11-02

## 2025-09-03 RX ORDER — AMLODIPINE BESYLATE 5 MG/1
5 TABLET ORAL DAILY
Qty: 60 TABLET | Refills: 0 | Status: SHIPPED | OUTPATIENT
Start: 2025-09-03 | End: 2025-11-02

## 2025-09-03 RX ORDER — FLUTICASONE PROPIONATE 50 MCG
1-2 SPRAY, SUSPENSION (ML) NASAL DAILY
Qty: 16 G | Refills: 1 | Status: SHIPPED | OUTPATIENT
Start: 2025-09-03

## 2025-09-03 RX ORDER — PREGABALIN 75 MG/1
CAPSULE ORAL
Qty: 90 CAPSULE | Refills: 2 | Status: SHIPPED | OUTPATIENT
Start: 2025-09-03 | End: 2026-09-03

## 2025-09-03 RX ORDER — MIRTAZAPINE 15 MG/1
15 TABLET, FILM COATED ORAL NIGHTLY
Qty: 90 TABLET | Refills: 1 | Status: SHIPPED | OUTPATIENT
Start: 2025-09-03

## (undated) DEVICE — SUTURE VCRL SZ 3-0 L27IN ABSRB UD L26MM SH 1/2 CIR J416H

## (undated) DEVICE — DRAPE,UNDERBUTTOCKS,PCH,STERILE: Brand: MEDLINE

## (undated) DEVICE — (D)SYR 10ML 1/5ML GRAD NSAF -- PKGING CHANGE USE ITEM 338027

## (undated) DEVICE — PREP SKN CHLRAPRP APL 26ML STR --

## (undated) DEVICE — MEDIA CONTRAST 10ML 200MG/ML 41%

## (undated) DEVICE — INTENDED FOR TISSUE SEPARATION, AND OTHER PROCEDURES THAT REQUIRE A SHARP SURGICAL BLADE TO PUNCTURE OR CUT.: Brand: BARD-PARKER SAFETY BLADES SIZE 10, STERILE

## (undated) DEVICE — (D)NDL SPNE 22GX15CM -- DISC BY MFR W/NO SUB

## (undated) DEVICE — CURITY NON-ADHERENT STRIPS: Brand: CURITY

## (undated) DEVICE — 3 ML SYRINGE WITH HYPODERMIC SAFETY NEEDLE: Brand: MAGELLAN

## (undated) DEVICE — KIT CLN UP BON SECOURS MARYV

## (undated) DEVICE — PACK PROCEDURE SURG MAJ W/ BASIN LF

## (undated) DEVICE — (D)BNDG ADHESIVE FABRIC 3/4X3 -- DISC BY MFR USE ITEM 357960

## (undated) DEVICE — DRAPE,EXTREMITY,89X128,STERILE: Brand: MEDLINE

## (undated) DEVICE — REM POLYHESIVE ADULT PATIENT RETURN ELECTRODE: Brand: VALLEYLAB

## (undated) DEVICE — SUTURE COAT VCRL PC 5 PRECIS COSM CONVENTIONAL CUT PRIM 3 8 J823H

## (undated) DEVICE — BNDG SELF ADH 4INX5YD STRL LF -- COFLEX LF2

## (undated) DEVICE — TRAY MYEL SFTY +

## (undated) DEVICE — GAUZE,SPONGE,4"X4",16PLY,STRL,LF,10/TRAY: Brand: MEDLINE

## (undated) DEVICE — BANDAGE,GAUZE,BULKEE LITE,3"X4.1YD,STRL: Brand: MEDLINE

## (undated) DEVICE — Z DISCONTINUED USE 2857930 NEEDLE HYPO 25GA L1.5IN BVL ORIENTED ECLIPSE

## (undated) DEVICE — THREE-QUARTER SHEET: Brand: CONVERTORS

## (undated) DEVICE — DRAPE TWL SURG 16X26IN BLU -- 4/PK

## (undated) DEVICE — STERILE POLYISOPRENE POWDER-FREE SURGICAL GLOVES: Brand: PROTEXIS

## (undated) DEVICE — SUTURE VCRL SZ 2-0 L27IN ABSRB UD L26MM SH 1/2 CIR J417H

## (undated) DEVICE — SOLUTION IV 1000ML 0.9% SOD CHL